# Patient Record
Sex: MALE | Employment: FULL TIME
[De-identification: names, ages, dates, MRNs, and addresses within clinical notes are randomized per-mention and may not be internally consistent; named-entity substitution may affect disease eponyms.]

---

## 2017-01-16 ENCOUNTER — OFFICE VISIT (OUTPATIENT)
Dept: UROLOGY | Facility: CLINIC | Age: 62
End: 2017-01-16

## 2017-01-16 VITALS
HEART RATE: 80 BPM | BODY MASS INDEX: 34.59 KG/M2 | SYSTOLIC BLOOD PRESSURE: 114 MMHG | TEMPERATURE: 97.4 F | DIASTOLIC BLOOD PRESSURE: 74 MMHG | WEIGHT: 293 LBS | HEIGHT: 77 IN

## 2017-01-16 DIAGNOSIS — N40.1 BENIGN NON-NODULAR PROSTATIC HYPERPLASIA WITH LOWER URINARY TRACT SYMPTOMS: ICD-10-CM

## 2017-01-16 DIAGNOSIS — R33.9 INCOMPLETE EMPTYING OF BLADDER: Primary | ICD-10-CM

## 2017-01-16 DIAGNOSIS — N32.0 BLADDER NECK CONTRACTURE: ICD-10-CM

## 2017-01-16 PROCEDURE — 99214 OFFICE O/P EST MOD 30 MIN: CPT | Performed by: UROLOGY

## 2017-01-16 PROCEDURE — 51798 US URINE CAPACITY MEASURE: CPT | Performed by: UROLOGY

## 2017-01-16 ASSESSMENT — ENCOUNTER SYMPTOMS
SHORTNESS OF BREATH: 0
WHEEZING: 0
ABDOMINAL PAIN: 0
EYE REDNESS: 0
VOMITING: 0
BACK PAIN: 0
COUGH: 0
NAUSEA: 0
EYE PAIN: 0
COLOR CHANGE: 0

## 2017-01-20 ENCOUNTER — TELEPHONE (OUTPATIENT)
Dept: UROLOGY | Facility: CLINIC | Age: 62
End: 2017-01-20

## 2017-01-26 PROBLEM — B18.2 HEPATITIS C CARRIER (HCC): Status: ACTIVE | Noted: 2017-01-26

## 2017-01-26 PROBLEM — B18.2 HEPATITIS C CARRIER (HCC): Chronic | Status: ACTIVE | Noted: 2017-01-26

## 2017-01-26 PROBLEM — Z71.3 WEIGHT LOSS COUNSELING, ENCOUNTER FOR: Status: ACTIVE | Noted: 2017-01-26

## 2017-02-13 ENCOUNTER — HOSPITAL ENCOUNTER (OUTPATIENT)
Age: 62
Setting detail: OUTPATIENT SURGERY
Discharge: HOME | End: 2017-02-13
Attending: UROLOGY | Admitting: UROLOGY
Payer: COMMERCIAL

## 2017-02-13 VITALS
WEIGHT: 292 LBS | DIASTOLIC BLOOD PRESSURE: 73 MMHG | TEMPERATURE: 97.3 F | RESPIRATION RATE: 18 BRPM | OXYGEN SATURATION: 97 % | HEIGHT: 76 IN | HEART RATE: 67 BPM | BODY MASS INDEX: 35.56 KG/M2 | SYSTOLIC BLOOD PRESSURE: 132 MMHG

## 2017-02-13 PROCEDURE — 7100000010 HC PHASE II RECOVERY - FIRST 15 MIN: Performed by: UROLOGY

## 2017-02-13 PROCEDURE — 3600000002 HC SURGERY LEVEL 2 BASE: Performed by: UROLOGY

## 2017-02-13 PROCEDURE — A4216 STERILE WATER/SALINE, 10 ML: HCPCS | Performed by: UROLOGY

## 2017-02-13 PROCEDURE — 6370000000 HC RX 637 (ALT 250 FOR IP): Performed by: UROLOGY

## 2017-02-13 PROCEDURE — 6360000002 HC RX W HCPCS

## 2017-02-13 PROCEDURE — 2580000003 HC RX 258: Performed by: UROLOGY

## 2017-02-13 RX ORDER — SODIUM CHLORIDE, SODIUM LACTATE, POTASSIUM CHLORIDE, CALCIUM CHLORIDE 600; 310; 30; 20 MG/100ML; MG/100ML; MG/100ML; MG/100ML
INJECTION, SOLUTION INTRAVENOUS CONTINUOUS
Status: DISCONTINUED | OUTPATIENT
Start: 2017-02-13 | End: 2017-02-13 | Stop reason: HOSPADM

## 2017-02-13 RX ORDER — LIDOCAINE HYDROCHLORIDE 10 MG/ML
1 INJECTION, SOLUTION EPIDURAL; INFILTRATION; INTRACAUDAL; PERINEURAL
Status: DISCONTINUED | OUTPATIENT
Start: 2017-02-13 | End: 2017-02-13 | Stop reason: HOSPADM

## 2017-02-13 RX ORDER — ULTRASOUND COUPLING MEDIUM
GEL (GRAM) TOPICAL PRN
Status: DISCONTINUED | OUTPATIENT
Start: 2017-02-13 | End: 2017-02-13 | Stop reason: HOSPADM

## 2017-02-13 RX ORDER — CIPROFLOXACIN 2 MG/ML
400 INJECTION, SOLUTION INTRAVENOUS
Status: DISCONTINUED | OUTPATIENT
Start: 2017-02-13 | End: 2017-02-13 | Stop reason: HOSPADM

## 2017-02-13 ASSESSMENT — PAIN DESCRIPTION - PAIN TYPE: TYPE: SURGICAL PAIN

## 2017-02-13 ASSESSMENT — PAIN SCALES - GENERAL: PAINLEVEL_OUTOF10: 0

## 2017-02-13 ASSESSMENT — PAIN - FUNCTIONAL ASSESSMENT: PAIN_FUNCTIONAL_ASSESSMENT: 0-10

## 2017-02-14 ENCOUNTER — OFFICE VISIT (OUTPATIENT)
Dept: BARIATRICS/WEIGHT MGMT | Facility: CLINIC | Age: 62
End: 2017-02-14

## 2017-02-14 VITALS
HEART RATE: 74 BPM | HEIGHT: 76 IN | DIASTOLIC BLOOD PRESSURE: 64 MMHG | SYSTOLIC BLOOD PRESSURE: 128 MMHG | BODY MASS INDEX: 35.25 KG/M2 | WEIGHT: 289.5 LBS

## 2017-02-14 DIAGNOSIS — E80.4 GILBERT'S DISEASE: ICD-10-CM

## 2017-02-14 DIAGNOSIS — F32.A DEPRESSION, UNSPECIFIED DEPRESSION TYPE: ICD-10-CM

## 2017-02-14 DIAGNOSIS — E66.9 OBESITY (BMI 30-39.9): Chronic | ICD-10-CM

## 2017-02-14 DIAGNOSIS — G62.9 NEUROPATHY: ICD-10-CM

## 2017-02-14 DIAGNOSIS — K25.9 GASTRIC ULCER, UNSPECIFIED CHRONICITY: ICD-10-CM

## 2017-02-14 DIAGNOSIS — G47.33 OBSTRUCTIVE SLEEP APNEA OF ADULT: Chronic | ICD-10-CM

## 2017-02-14 DIAGNOSIS — I10 HYPERTENSION, GOAL BELOW 140/90: Chronic | ICD-10-CM

## 2017-02-14 DIAGNOSIS — E11.9 CONTROLLED TYPE 2 DIABETES MELLITUS WITHOUT COMPLICATION, WITHOUT LONG-TERM CURRENT USE OF INSULIN (HCC): Primary | Chronic | ICD-10-CM

## 2017-02-14 PROCEDURE — 99204 OFFICE O/P NEW MOD 45 MIN: CPT | Performed by: NURSE PRACTITIONER

## 2017-02-14 RX ORDER — FLUOXETINE HYDROCHLORIDE 20 MG/1
20 CAPSULE ORAL DAILY
COMMUNITY
End: 2017-08-30 | Stop reason: SDUPTHER

## 2017-02-15 ENCOUNTER — HOSPITAL ENCOUNTER (OUTPATIENT)
Age: 62
Discharge: HOME | End: 2017-02-15
Payer: COMMERCIAL

## 2017-02-15 DIAGNOSIS — E66.9 OBESITY (BMI 30-39.9): Chronic | ICD-10-CM

## 2017-02-15 DIAGNOSIS — K25.9 GASTRIC ULCER, UNSPECIFIED CHRONICITY: ICD-10-CM

## 2017-02-15 DIAGNOSIS — G47.33 OBSTRUCTIVE SLEEP APNEA OF ADULT: Chronic | ICD-10-CM

## 2017-02-15 DIAGNOSIS — G62.9 NEUROPATHY: ICD-10-CM

## 2017-02-15 DIAGNOSIS — F32.A DEPRESSION, UNSPECIFIED DEPRESSION TYPE: ICD-10-CM

## 2017-02-15 DIAGNOSIS — E11.9 CONTROLLED TYPE 2 DIABETES MELLITUS WITHOUT COMPLICATION, WITHOUT LONG-TERM CURRENT USE OF INSULIN (HCC): Chronic | ICD-10-CM

## 2017-02-15 DIAGNOSIS — I10 HYPERTENSION, GOAL BELOW 140/90: Chronic | ICD-10-CM

## 2017-02-15 DIAGNOSIS — E80.4 GILBERT'S DISEASE: ICD-10-CM

## 2017-02-15 LAB
ABSOLUTE EOS #: 0.2 K/UL (ref 0–0.4)
ABSOLUTE LYMPH #: 1.5 K/UL (ref 1–4.8)
ABSOLUTE MONO #: 0.5 K/UL (ref 0.1–1.3)
ALBUMIN SERPL-MCNC: 4.4 G/DL (ref 3.5–5.2)
ALBUMIN/GLOBULIN RATIO: ABNORMAL (ref 1–2.5)
ALP BLD-CCNC: 76 U/L (ref 40–129)
ALT SERPL-CCNC: 49 U/L (ref 5–41)
ANION GAP SERPL CALCULATED.3IONS-SCNC: 13 MMOL/L (ref 9–17)
AST SERPL-CCNC: 37 U/L
BASOPHILS # BLD: 1 % (ref 0–2)
BASOPHILS ABSOLUTE: 0 K/UL (ref 0–0.2)
BILIRUB SERPL-MCNC: 1.83 MG/DL (ref 0.3–1.2)
BUN BLDV-MCNC: 17 MG/DL (ref 8–23)
BUN/CREAT BLD: ABNORMAL (ref 9–20)
CALCIUM SERPL-MCNC: 9.5 MG/DL (ref 8.6–10.4)
CHLORIDE BLD-SCNC: 101 MMOL/L (ref 98–107)
CHOLESTEROL/HDL RATIO: 5.4
CHOLESTEROL: 188 MG/DL
CO2: 24 MMOL/L (ref 20–31)
CREAT SERPL-MCNC: 1.16 MG/DL (ref 0.7–1.2)
CREATININE URINE: 118.8 MG/DL (ref 39–259)
DIFFERENTIAL TYPE: ABNORMAL
EKG ATRIAL RATE: 62 BPM
EKG P AXIS: 35 DEGREES
EKG P-R INTERVAL: 156 MS
EKG Q-T INTERVAL: 428 MS
EKG QRS DURATION: 92 MS
EKG QTC CALCULATION (BAZETT): 434 MS
EKG R AXIS: 76 DEGREES
EKG T AXIS: 33 DEGREES
EKG VENTRICULAR RATE: 62 BPM
EOSINOPHILS RELATIVE PERCENT: 3 % (ref 0–4)
GFR AFRICAN AMERICAN: >60 ML/MIN
GFR NON-AFRICAN AMERICAN: >60 ML/MIN
GFR SERPL CREATININE-BSD FRML MDRD: ABNORMAL ML/MIN/{1.73_M2}
GFR SERPL CREATININE-BSD FRML MDRD: ABNORMAL ML/MIN/{1.73_M2}
GLUCOSE BLD-MCNC: 136 MG/DL (ref 70–99)
HCT VFR BLD CALC: 46.7 % (ref 41–53)
HDLC SERPL-MCNC: 35 MG/DL
HEMOGLOBIN: 16.3 G/DL (ref 13.5–17.5)
LDL CHOLESTEROL: 84 MG/DL (ref 0–130)
LYMPHOCYTES # BLD: 28 % (ref 24–44)
MCH RBC QN AUTO: 33.3 PG (ref 26–34)
MCHC RBC AUTO-ENTMCNC: 34.9 G/DL (ref 31–37)
MCV RBC AUTO: 95.6 FL (ref 80–100)
MICROALBUMIN/CREAT 24H UR: <12 MG/L
MICROALBUMIN/CREAT UR-RTO: 10 MCG/MG CREAT
MONOCYTES # BLD: 9 % (ref 1–7)
PDW BLD-RTO: 12.8 % (ref 11.5–14.9)
PLATELET # BLD: 187 K/UL (ref 150–450)
PLATELET ESTIMATE: ABNORMAL
PMV BLD AUTO: 8.1 FL (ref 6–12)
POTASSIUM SERPL-SCNC: 4.5 MMOL/L (ref 3.7–5.3)
RBC # BLD: 4.88 M/UL (ref 4.5–5.9)
RBC # BLD: ABNORMAL 10*6/UL
SEG NEUTROPHILS: 59 % (ref 36–66)
SEGMENTED NEUTROPHILS ABSOLUTE COUNT: 3.3 K/UL (ref 1.3–9.1)
SODIUM BLD-SCNC: 138 MMOL/L (ref 135–144)
TOTAL PROTEIN: 7.4 G/DL (ref 6.4–8.3)
TRIGL SERPL-MCNC: 345 MG/DL
TSH SERPL DL<=0.05 MIU/L-ACNC: 1.5 MIU/L (ref 0.3–5)
URIC ACID: 6.2 MG/DL (ref 3.4–7)
VLDLC SERPL CALC-MCNC: ABNORMAL MG/DL (ref 1–30)
WBC # BLD: 5.5 K/UL (ref 3.5–11)
WBC # BLD: ABNORMAL 10*3/UL

## 2017-02-15 PROCEDURE — 82043 UR ALBUMIN QUANTITATIVE: CPT

## 2017-02-15 PROCEDURE — 82570 ASSAY OF URINE CREATININE: CPT

## 2017-02-15 PROCEDURE — 80053 COMPREHEN METABOLIC PANEL: CPT

## 2017-02-15 PROCEDURE — 36415 COLL VENOUS BLD VENIPUNCTURE: CPT

## 2017-02-15 PROCEDURE — 84443 ASSAY THYROID STIM HORMONE: CPT

## 2017-02-15 PROCEDURE — 84550 ASSAY OF BLOOD/URIC ACID: CPT

## 2017-02-15 PROCEDURE — 80061 LIPID PANEL: CPT

## 2017-02-15 PROCEDURE — 93005 ELECTROCARDIOGRAM TRACING: CPT

## 2017-02-15 PROCEDURE — 85025 COMPLETE CBC W/AUTO DIFF WBC: CPT

## 2017-02-20 ENCOUNTER — TELEPHONE (OUTPATIENT)
Dept: BARIATRICS/WEIGHT MGMT | Facility: CLINIC | Age: 62
End: 2017-02-20

## 2017-03-02 ENCOUNTER — TELEPHONE (OUTPATIENT)
Dept: UROLOGY | Facility: CLINIC | Age: 62
End: 2017-03-02

## 2017-03-06 ENCOUNTER — HOSPITAL ENCOUNTER (OUTPATIENT)
Age: 62
Setting detail: SPECIMEN
Discharge: HOME | End: 2017-03-06
Payer: COMMERCIAL

## 2017-03-06 ENCOUNTER — NURSE ONLY (OUTPATIENT)
Dept: UROLOGY | Facility: CLINIC | Age: 62
End: 2017-03-06

## 2017-03-06 ENCOUNTER — OFFICE VISIT (OUTPATIENT)
Dept: BARIATRICS/WEIGHT MGMT | Facility: CLINIC | Age: 62
End: 2017-03-06

## 2017-03-06 VITALS
WEIGHT: 280.8 LBS | DIASTOLIC BLOOD PRESSURE: 78 MMHG | BODY MASS INDEX: 34.19 KG/M2 | HEIGHT: 76 IN | SYSTOLIC BLOOD PRESSURE: 138 MMHG | RESPIRATION RATE: 18 BRPM | HEART RATE: 74 BPM

## 2017-03-06 DIAGNOSIS — E11.9 CONTROLLED TYPE 2 DIABETES MELLITUS WITHOUT COMPLICATION, WITHOUT LONG-TERM CURRENT USE OF INSULIN (HCC): Chronic | ICD-10-CM

## 2017-03-06 DIAGNOSIS — E66.9 OBESITY (BMI 30-39.9): Chronic | ICD-10-CM

## 2017-03-06 DIAGNOSIS — N32.0 BLADDER NECK CONTRACTURE: Primary | ICD-10-CM

## 2017-03-06 DIAGNOSIS — I10 HYPERTENSION, GOAL BELOW 140/90: Primary | Chronic | ICD-10-CM

## 2017-03-06 DIAGNOSIS — Z01.818 PRE-OP TESTING: ICD-10-CM

## 2017-03-06 DIAGNOSIS — G47.33 OBSTRUCTIVE SLEEP APNEA OF ADULT: Chronic | ICD-10-CM

## 2017-03-06 DIAGNOSIS — G62.9 NEUROPATHY: ICD-10-CM

## 2017-03-06 PROCEDURE — 99213 OFFICE O/P EST LOW 20 MIN: CPT | Performed by: NURSE PRACTITIONER

## 2017-03-07 LAB
CULTURE: NO GROWTH
CULTURE: NORMAL
Lab: NORMAL
SPECIMEN DESCRIPTION: NORMAL
STATUS: NORMAL

## 2017-03-10 ENCOUNTER — ANESTHESIA EVENT (OUTPATIENT)
Dept: OPERATING ROOM | Age: 62
End: 2017-03-10
Payer: COMMERCIAL

## 2017-03-13 ENCOUNTER — OFFICE VISIT (OUTPATIENT)
Dept: BARIATRICS/WEIGHT MGMT | Age: 62
End: 2017-03-13
Payer: COMMERCIAL

## 2017-03-13 ENCOUNTER — HOSPITAL ENCOUNTER (OUTPATIENT)
Age: 62
Setting detail: OUTPATIENT SURGERY
Discharge: HOME | End: 2017-03-13
Attending: UROLOGY | Admitting: UROLOGY
Payer: COMMERCIAL

## 2017-03-13 ENCOUNTER — ANESTHESIA (OUTPATIENT)
Dept: OPERATING ROOM | Age: 62
End: 2017-03-13
Payer: COMMERCIAL

## 2017-03-13 VITALS
DIASTOLIC BLOOD PRESSURE: 78 MMHG | SYSTOLIC BLOOD PRESSURE: 120 MMHG | HEIGHT: 66 IN | WEIGHT: 273.8 LBS | HEART RATE: 60 BPM | BODY MASS INDEX: 44 KG/M2

## 2017-03-13 VITALS
HEART RATE: 63 BPM | HEIGHT: 76 IN | OXYGEN SATURATION: 100 % | RESPIRATION RATE: 20 BRPM | WEIGHT: 273.8 LBS | TEMPERATURE: 97.2 F | BODY MASS INDEX: 33.34 KG/M2 | DIASTOLIC BLOOD PRESSURE: 80 MMHG | SYSTOLIC BLOOD PRESSURE: 130 MMHG

## 2017-03-13 VITALS — TEMPERATURE: 96.2 F | DIASTOLIC BLOOD PRESSURE: 64 MMHG | SYSTOLIC BLOOD PRESSURE: 107 MMHG | OXYGEN SATURATION: 98 %

## 2017-03-13 DIAGNOSIS — G47.33 OBSTRUCTIVE SLEEP APNEA OF ADULT: Chronic | ICD-10-CM

## 2017-03-13 DIAGNOSIS — F32.A DEPRESSION, UNSPECIFIED DEPRESSION TYPE: ICD-10-CM

## 2017-03-13 DIAGNOSIS — E66.01 OBESITY, MORBID, BMI 40.0-49.9 (HCC): ICD-10-CM

## 2017-03-13 DIAGNOSIS — I10 HYPERTENSION, GOAL BELOW 140/90: Chronic | ICD-10-CM

## 2017-03-13 DIAGNOSIS — E11.40 TYPE 2 DIABETES, CONTROLLED, WITH NEUROPATHY (HCC): Primary | ICD-10-CM

## 2017-03-13 LAB
GLUCOSE BLD-MCNC: 93 MG/DL (ref 74–106)
POC BUN: 29 MG/DL (ref 6–20)
POC CREATININE: 1.2 MG/DL (ref 0.6–1.4)

## 2017-03-13 PROCEDURE — 84520 ASSAY OF UREA NITROGEN: CPT

## 2017-03-13 PROCEDURE — 6370000000 HC RX 637 (ALT 250 FOR IP): Performed by: UROLOGY

## 2017-03-13 PROCEDURE — 7100000011 HC PHASE II RECOVERY - ADDTL 15 MIN: Performed by: UROLOGY

## 2017-03-13 PROCEDURE — 3600000014 HC SURGERY LEVEL 4 ADDTL 15MIN: Performed by: UROLOGY

## 2017-03-13 PROCEDURE — 3700000001 HC ADD 15 MINUTES (ANESTHESIA): Performed by: UROLOGY

## 2017-03-13 PROCEDURE — 3700000000 HC ANESTHESIA ATTENDED CARE: Performed by: UROLOGY

## 2017-03-13 PROCEDURE — 82565 ASSAY OF CREATININE: CPT

## 2017-03-13 PROCEDURE — 3600000004 HC SURGERY LEVEL 4 BASE: Performed by: UROLOGY

## 2017-03-13 PROCEDURE — C1769 GUIDE WIRE: HCPCS | Performed by: UROLOGY

## 2017-03-13 PROCEDURE — 7100000010 HC PHASE II RECOVERY - FIRST 15 MIN: Performed by: UROLOGY

## 2017-03-13 PROCEDURE — 7100000001 HC PACU RECOVERY - ADDTL 15 MIN: Performed by: UROLOGY

## 2017-03-13 PROCEDURE — 2500000003 HC RX 250 WO HCPCS: Performed by: UROLOGY

## 2017-03-13 PROCEDURE — 87086 URINE CULTURE/COLONY COUNT: CPT

## 2017-03-13 PROCEDURE — 2580000003 HC RX 258: Performed by: ANESTHESIOLOGY

## 2017-03-13 PROCEDURE — 82947 ASSAY GLUCOSE BLOOD QUANT: CPT

## 2017-03-13 PROCEDURE — 6360000002 HC RX W HCPCS: Performed by: NURSE ANESTHETIST, CERTIFIED REGISTERED

## 2017-03-13 PROCEDURE — 2500000003 HC RX 250 WO HCPCS: Performed by: NURSE ANESTHETIST, CERTIFIED REGISTERED

## 2017-03-13 PROCEDURE — 7100000000 HC PACU RECOVERY - FIRST 15 MIN: Performed by: UROLOGY

## 2017-03-13 PROCEDURE — 6360000002 HC RX W HCPCS: Performed by: UROLOGY

## 2017-03-13 PROCEDURE — 99213 OFFICE O/P EST LOW 20 MIN: CPT | Performed by: NURSE PRACTITIONER

## 2017-03-13 RX ORDER — FENTANYL CITRATE 50 UG/ML
INJECTION, SOLUTION INTRAMUSCULAR; INTRAVENOUS PRN
Status: DISCONTINUED | OUTPATIENT
Start: 2017-03-13 | End: 2017-03-13 | Stop reason: SDUPTHER

## 2017-03-13 RX ORDER — FENTANYL CITRATE 50 UG/ML
25 INJECTION, SOLUTION INTRAMUSCULAR; INTRAVENOUS EVERY 5 MIN PRN
Status: DISCONTINUED | OUTPATIENT
Start: 2017-03-13 | End: 2017-03-13 | Stop reason: HOSPADM

## 2017-03-13 RX ORDER — WATER 1000 ML/1000ML
INJECTION, SOLUTION INTRAVENOUS PRN
Status: DISCONTINUED | OUTPATIENT
Start: 2017-03-13 | End: 2017-03-13 | Stop reason: HOSPADM

## 2017-03-13 RX ORDER — MIDAZOLAM HYDROCHLORIDE 1 MG/ML
INJECTION INTRAMUSCULAR; INTRAVENOUS PRN
Status: DISCONTINUED | OUTPATIENT
Start: 2017-03-13 | End: 2017-03-13 | Stop reason: SDUPTHER

## 2017-03-13 RX ORDER — PROPOFOL 10 MG/ML
INJECTION, EMULSION INTRAVENOUS PRN
Status: DISCONTINUED | OUTPATIENT
Start: 2017-03-13 | End: 2017-03-13 | Stop reason: SDUPTHER

## 2017-03-13 RX ORDER — ONDANSETRON 2 MG/ML
4 INJECTION INTRAMUSCULAR; INTRAVENOUS
Status: DISCONTINUED | OUTPATIENT
Start: 2017-03-13 | End: 2017-03-13 | Stop reason: HOSPADM

## 2017-03-13 RX ORDER — SODIUM CHLORIDE, SODIUM LACTATE, POTASSIUM CHLORIDE, CALCIUM CHLORIDE 600; 310; 30; 20 MG/100ML; MG/100ML; MG/100ML; MG/100ML
INJECTION, SOLUTION INTRAVENOUS CONTINUOUS
Status: DISCONTINUED | OUTPATIENT
Start: 2017-03-13 | End: 2017-03-13 | Stop reason: HOSPADM

## 2017-03-13 RX ORDER — ONDANSETRON 2 MG/ML
INJECTION INTRAMUSCULAR; INTRAVENOUS PRN
Status: DISCONTINUED | OUTPATIENT
Start: 2017-03-13 | End: 2017-03-13 | Stop reason: SDUPTHER

## 2017-03-13 RX ORDER — OXYCODONE HYDROCHLORIDE AND ACETAMINOPHEN 5; 325 MG/1; MG/1
1 TABLET ORAL
Status: COMPLETED | OUTPATIENT
Start: 2017-03-13 | End: 2017-03-13

## 2017-03-13 RX ORDER — LIDOCAINE HYDROCHLORIDE 10 MG/ML
INJECTION, SOLUTION EPIDURAL; INFILTRATION; INTRACAUDAL; PERINEURAL PRN
Status: DISCONTINUED | OUTPATIENT
Start: 2017-03-13 | End: 2017-03-13 | Stop reason: SDUPTHER

## 2017-03-13 RX ORDER — DIPHENHYDRAMINE HYDROCHLORIDE 50 MG/ML
12.5 INJECTION INTRAMUSCULAR; INTRAVENOUS
Status: DISCONTINUED | OUTPATIENT
Start: 2017-03-13 | End: 2017-03-13 | Stop reason: HOSPADM

## 2017-03-13 RX ORDER — MIDAZOLAM HYDROCHLORIDE 1 MG/ML
1 INJECTION INTRAMUSCULAR; INTRAVENOUS EVERY 5 MIN PRN
Status: DISCONTINUED | OUTPATIENT
Start: 2017-03-13 | End: 2017-03-13 | Stop reason: HOSPADM

## 2017-03-13 RX ORDER — FENTANYL CITRATE 50 UG/ML
50 INJECTION, SOLUTION INTRAMUSCULAR; INTRAVENOUS EVERY 5 MIN PRN
Status: DISCONTINUED | OUTPATIENT
Start: 2017-03-13 | End: 2017-03-13 | Stop reason: HOSPADM

## 2017-03-13 RX ADMIN — PROPOFOL 100 MG: 10 INJECTION, EMULSION INTRAVENOUS at 15:12

## 2017-03-13 RX ADMIN — SODIUM CHLORIDE, POTASSIUM CHLORIDE, SODIUM LACTATE AND CALCIUM CHLORIDE: 600; 310; 30; 20 INJECTION, SOLUTION INTRAVENOUS at 15:06

## 2017-03-13 RX ADMIN — PROPOFOL 250 MG: 10 INJECTION, EMULSION INTRAVENOUS at 15:08

## 2017-03-13 RX ADMIN — LIDOCAINE HYDROCHLORIDE 50 MG: 10 INJECTION, SOLUTION EPIDURAL; INFILTRATION; INTRACAUDAL; PERINEURAL at 15:10

## 2017-03-13 RX ADMIN — ONDANSETRON 4 MG: 2 INJECTION, SOLUTION INTRAMUSCULAR; INTRAVENOUS at 15:25

## 2017-03-13 RX ADMIN — SODIUM CHLORIDE, POTASSIUM CHLORIDE, SODIUM LACTATE AND CALCIUM CHLORIDE: 600; 310; 30; 20 INJECTION, SOLUTION INTRAVENOUS at 14:19

## 2017-03-13 RX ADMIN — CEFAZOLIN SODIUM 2 G: 1 INJECTION, SOLUTION INTRAVENOUS at 15:18

## 2017-03-13 RX ADMIN — MIDAZOLAM HYDROCHLORIDE 2 MG: 1 INJECTION, SOLUTION INTRAMUSCULAR; INTRAVENOUS at 15:16

## 2017-03-13 RX ADMIN — OXYCODONE HYDROCHLORIDE AND ACETAMINOPHEN 1 TABLET: 5; 325 TABLET ORAL at 16:49

## 2017-03-13 RX ADMIN — FENTANYL CITRATE 100 MCG: 50 INJECTION INTRAMUSCULAR; INTRAVENOUS at 15:10

## 2017-03-13 ASSESSMENT — PAIN - FUNCTIONAL ASSESSMENT: PAIN_FUNCTIONAL_ASSESSMENT: 0-10

## 2017-03-13 ASSESSMENT — PAIN SCALES - GENERAL
PAINLEVEL_OUTOF10: 3
PAINLEVEL_OUTOF10: 4
PAINLEVEL_OUTOF10: 4
PAINLEVEL_OUTOF10: 0
PAINLEVEL_OUTOF10: 0
PAINLEVEL_OUTOF10: 3

## 2017-03-13 ASSESSMENT — ENCOUNTER SYMPTOMS
STRIDOR: 0
SHORTNESS OF BREATH: 0

## 2017-03-14 ENCOUNTER — TELEPHONE (OUTPATIENT)
Dept: UROLOGY | Age: 62
End: 2017-03-14

## 2017-03-14 ENCOUNTER — PROCEDURE VISIT (OUTPATIENT)
Dept: UROLOGY | Age: 62
End: 2017-03-14

## 2017-03-14 VITALS — HEART RATE: 71 BPM | SYSTOLIC BLOOD PRESSURE: 134 MMHG | DIASTOLIC BLOOD PRESSURE: 80 MMHG | TEMPERATURE: 98.5 F

## 2017-03-14 DIAGNOSIS — N32.0 BLADDER NECK CONTRACTURE: Primary | ICD-10-CM

## 2017-03-14 LAB
CULTURE: NO GROWTH
CULTURE: NORMAL
GLUCOSE BLD-MCNC: 107 MG/DL (ref 75–110)
Lab: NORMAL
SPECIMEN DESCRIPTION: NORMAL
STATUS: NORMAL

## 2017-03-14 PROCEDURE — 99024 POSTOP FOLLOW-UP VISIT: CPT | Performed by: NURSE PRACTITIONER

## 2017-03-15 ENCOUNTER — HOSPITAL ENCOUNTER (OUTPATIENT)
Age: 62
Discharge: HOME | End: 2017-03-15
Payer: COMMERCIAL

## 2017-03-15 DIAGNOSIS — G62.9 NEUROPATHY: ICD-10-CM

## 2017-03-15 DIAGNOSIS — I10 HYPERTENSION, GOAL BELOW 140/90: Chronic | ICD-10-CM

## 2017-03-15 DIAGNOSIS — G47.33 OBSTRUCTIVE SLEEP APNEA OF ADULT: Chronic | ICD-10-CM

## 2017-03-15 DIAGNOSIS — E66.9 OBESITY (BMI 30-39.9): Chronic | ICD-10-CM

## 2017-03-15 DIAGNOSIS — E11.9 CONTROLLED TYPE 2 DIABETES MELLITUS WITHOUT COMPLICATION, WITHOUT LONG-TERM CURRENT USE OF INSULIN (HCC): Chronic | ICD-10-CM

## 2017-03-15 LAB
ALBUMIN SERPL-MCNC: 4.3 G/DL (ref 3.5–5.2)
ALBUMIN/GLOBULIN RATIO: ABNORMAL (ref 1–2.5)
ALP BLD-CCNC: 73 U/L (ref 40–129)
ALT SERPL-CCNC: 45 U/L (ref 5–41)
ANION GAP SERPL CALCULATED.3IONS-SCNC: 12 MMOL/L (ref 9–17)
AST SERPL-CCNC: 32 U/L
BILIRUB SERPL-MCNC: 1.43 MG/DL (ref 0.3–1.2)
BUN BLDV-MCNC: 19 MG/DL (ref 8–23)
BUN/CREAT BLD: ABNORMAL (ref 9–20)
CALCIUM SERPL-MCNC: 9.2 MG/DL (ref 8.6–10.4)
CHLORIDE BLD-SCNC: 103 MMOL/L (ref 98–107)
CO2: 26 MMOL/L (ref 20–31)
CREAT SERPL-MCNC: 1.05 MG/DL (ref 0.7–1.2)
GFR AFRICAN AMERICAN: >60 ML/MIN
GFR NON-AFRICAN AMERICAN: >60 ML/MIN
GFR SERPL CREATININE-BSD FRML MDRD: ABNORMAL ML/MIN/{1.73_M2}
GFR SERPL CREATININE-BSD FRML MDRD: ABNORMAL ML/MIN/{1.73_M2}
GLUCOSE BLD-MCNC: 101 MG/DL (ref 70–99)
POTASSIUM SERPL-SCNC: 4.5 MMOL/L (ref 3.7–5.3)
SODIUM BLD-SCNC: 141 MMOL/L (ref 135–144)
TOTAL PROTEIN: 6.9 G/DL (ref 6.4–8.3)
URIC ACID: 5.4 MG/DL (ref 3.4–7)

## 2017-03-15 PROCEDURE — 36415 COLL VENOUS BLD VENIPUNCTURE: CPT

## 2017-03-15 PROCEDURE — 80053 COMPREHEN METABOLIC PANEL: CPT

## 2017-03-15 PROCEDURE — 84550 ASSAY OF BLOOD/URIC ACID: CPT

## 2017-03-27 ENCOUNTER — OFFICE VISIT (OUTPATIENT)
Dept: BARIATRICS/WEIGHT MGMT | Age: 62
End: 2017-03-27
Payer: COMMERCIAL

## 2017-03-27 VITALS
WEIGHT: 269.4 LBS | SYSTOLIC BLOOD PRESSURE: 130 MMHG | DIASTOLIC BLOOD PRESSURE: 70 MMHG | RESPIRATION RATE: 20 BRPM | BODY MASS INDEX: 32.79 KG/M2 | HEART RATE: 72 BPM

## 2017-03-27 DIAGNOSIS — E66.9 OBESITY (BMI 30-39.9): Chronic | ICD-10-CM

## 2017-03-27 DIAGNOSIS — G47.33 OBSTRUCTIVE SLEEP APNEA OF ADULT: Chronic | ICD-10-CM

## 2017-03-27 DIAGNOSIS — I10 HYPERTENSION, GOAL BELOW 140/90: Chronic | ICD-10-CM

## 2017-03-27 DIAGNOSIS — E11.40 TYPE 2 DIABETES, CONTROLLED, WITH NEUROPATHY (HCC): Primary | ICD-10-CM

## 2017-03-27 DIAGNOSIS — M15.9 PRIMARY OSTEOARTHRITIS INVOLVING MULTIPLE JOINTS: ICD-10-CM

## 2017-03-27 PROBLEM — E66.01 OBESITY, MORBID, BMI 40.0-49.9 (HCC): Status: RESOLVED | Noted: 2017-03-13 | Resolved: 2017-03-27

## 2017-03-27 PROCEDURE — 99213 OFFICE O/P EST LOW 20 MIN: CPT | Performed by: NURSE PRACTITIONER

## 2017-04-10 ENCOUNTER — OFFICE VISIT (OUTPATIENT)
Dept: BARIATRICS/WEIGHT MGMT | Age: 62
End: 2017-04-10
Payer: COMMERCIAL

## 2017-04-10 VITALS
RESPIRATION RATE: 16 BRPM | WEIGHT: 261.1 LBS | BODY MASS INDEX: 31.79 KG/M2 | HEART RATE: 60 BPM | DIASTOLIC BLOOD PRESSURE: 74 MMHG | SYSTOLIC BLOOD PRESSURE: 124 MMHG | HEIGHT: 76 IN

## 2017-04-10 DIAGNOSIS — E11.40 TYPE 2 DIABETES, CONTROLLED, WITH NEUROPATHY (HCC): Primary | ICD-10-CM

## 2017-04-10 DIAGNOSIS — G47.33 OBSTRUCTIVE SLEEP APNEA OF ADULT: Chronic | ICD-10-CM

## 2017-04-10 DIAGNOSIS — M15.9 PRIMARY OSTEOARTHRITIS INVOLVING MULTIPLE JOINTS: ICD-10-CM

## 2017-04-10 DIAGNOSIS — I10 HYPERTENSION, GOAL BELOW 140/90: Chronic | ICD-10-CM

## 2017-04-10 PROCEDURE — 99213 OFFICE O/P EST LOW 20 MIN: CPT | Performed by: NURSE PRACTITIONER

## 2017-04-17 ENCOUNTER — OFFICE VISIT (OUTPATIENT)
Dept: UROLOGY | Age: 62
End: 2017-04-17
Payer: COMMERCIAL

## 2017-04-17 VITALS
HEART RATE: 62 BPM | WEIGHT: 262 LBS | HEIGHT: 76 IN | TEMPERATURE: 97.8 F | BODY MASS INDEX: 31.9 KG/M2 | SYSTOLIC BLOOD PRESSURE: 115 MMHG | DIASTOLIC BLOOD PRESSURE: 65 MMHG

## 2017-04-17 DIAGNOSIS — N39.43 POST-VOID DRIBBLING: ICD-10-CM

## 2017-04-17 DIAGNOSIS — R33.9 INCOMPLETE EMPTYING OF BLADDER: Primary | ICD-10-CM

## 2017-04-17 DIAGNOSIS — R39.198 SLOW URINARY STREAM: ICD-10-CM

## 2017-04-17 DIAGNOSIS — N32.0 BLADDER NECK CONTRACTURE: ICD-10-CM

## 2017-04-17 PROCEDURE — 99024 POSTOP FOLLOW-UP VISIT: CPT | Performed by: UROLOGY

## 2017-04-17 PROCEDURE — 51798 US URINE CAPACITY MEASURE: CPT | Performed by: UROLOGY

## 2017-05-08 ENCOUNTER — OFFICE VISIT (OUTPATIENT)
Dept: BARIATRICS/WEIGHT MGMT | Age: 62
End: 2017-05-08
Payer: COMMERCIAL

## 2017-05-08 VITALS
WEIGHT: 253.6 LBS | RESPIRATION RATE: 20 BRPM | HEART RATE: 70 BPM | BODY MASS INDEX: 30.88 KG/M2 | DIASTOLIC BLOOD PRESSURE: 70 MMHG | SYSTOLIC BLOOD PRESSURE: 118 MMHG | HEIGHT: 76 IN

## 2017-05-08 DIAGNOSIS — G47.33 OBSTRUCTIVE SLEEP APNEA OF ADULT: Chronic | ICD-10-CM

## 2017-05-08 DIAGNOSIS — I10 HYPERTENSION, GOAL BELOW 140/90: Chronic | ICD-10-CM

## 2017-05-08 DIAGNOSIS — M15.9 PRIMARY OSTEOARTHRITIS INVOLVING MULTIPLE JOINTS: ICD-10-CM

## 2017-05-08 DIAGNOSIS — E11.40 TYPE 2 DIABETES, CONTROLLED, WITH NEUROPATHY (HCC): Primary | ICD-10-CM

## 2017-05-08 DIAGNOSIS — E66.9 OBESITY (BMI 30-39.9): Chronic | ICD-10-CM

## 2017-05-08 PROCEDURE — 99213 OFFICE O/P EST LOW 20 MIN: CPT | Performed by: NURSE PRACTITIONER

## 2017-05-15 ENCOUNTER — HOSPITAL ENCOUNTER (OUTPATIENT)
Age: 62
Discharge: HOME | End: 2017-05-15
Payer: COMMERCIAL

## 2017-05-15 DIAGNOSIS — M15.9 PRIMARY OSTEOARTHRITIS INVOLVING MULTIPLE JOINTS: ICD-10-CM

## 2017-05-15 DIAGNOSIS — E66.9 OBESITY (BMI 30-39.9): Chronic | ICD-10-CM

## 2017-05-15 DIAGNOSIS — E11.40 TYPE 2 DIABETES, CONTROLLED, WITH NEUROPATHY (HCC): ICD-10-CM

## 2017-05-15 DIAGNOSIS — G47.33 OBSTRUCTIVE SLEEP APNEA OF ADULT: Chronic | ICD-10-CM

## 2017-05-15 DIAGNOSIS — I10 HYPERTENSION, GOAL BELOW 140/90: Chronic | ICD-10-CM

## 2017-05-15 DIAGNOSIS — E55.9 VITAMIN D DEFICIENCY: ICD-10-CM

## 2017-05-15 LAB
ABSOLUTE EOS #: 0.2 K/UL (ref 0–0.4)
ABSOLUTE LYMPH #: 1.5 K/UL (ref 1–4.8)
ABSOLUTE MONO #: 0.5 K/UL (ref 0.1–1.3)
ALBUMIN SERPL-MCNC: 4.2 G/DL (ref 3.5–5.2)
ALBUMIN/GLOBULIN RATIO: ABNORMAL (ref 1–2.5)
ALP BLD-CCNC: 88 U/L (ref 40–129)
ALT SERPL-CCNC: 33 U/L (ref 5–41)
ANION GAP SERPL CALCULATED.3IONS-SCNC: 14 MMOL/L (ref 9–17)
AST SERPL-CCNC: 28 U/L
BASOPHILS # BLD: 1 %
BASOPHILS ABSOLUTE: 0 K/UL (ref 0–0.2)
BILIRUB SERPL-MCNC: 1.35 MG/DL (ref 0.3–1.2)
BUN BLDV-MCNC: 26 MG/DL (ref 8–23)
BUN/CREAT BLD: ABNORMAL (ref 9–20)
CALCIUM SERPL-MCNC: 9.7 MG/DL (ref 8.6–10.4)
CHLORIDE BLD-SCNC: 100 MMOL/L (ref 98–107)
CHOLESTEROL/HDL RATIO: 4.4
CHOLESTEROL: 153 MG/DL
CO2: 27 MMOL/L (ref 20–31)
CREAT SERPL-MCNC: 1.1 MG/DL (ref 0.7–1.2)
DIFFERENTIAL TYPE: NORMAL
EOSINOPHILS RELATIVE PERCENT: 3 %
ESTIMATED AVERAGE GLUCOSE: 103 MG/DL
GFR AFRICAN AMERICAN: >60 ML/MIN
GFR NON-AFRICAN AMERICAN: >60 ML/MIN
GFR SERPL CREATININE-BSD FRML MDRD: ABNORMAL ML/MIN/{1.73_M2}
GFR SERPL CREATININE-BSD FRML MDRD: ABNORMAL ML/MIN/{1.73_M2}
GLUCOSE BLD-MCNC: 122 MG/DL (ref 70–99)
HBA1C MFR BLD: 5.2 % (ref 4–6)
HCT VFR BLD CALC: 45.5 % (ref 41–53)
HDLC SERPL-MCNC: 35 MG/DL
HEMOGLOBIN: 15.7 G/DL (ref 13.5–17.5)
LDL CHOLESTEROL: 74 MG/DL (ref 0–130)
LYMPHOCYTES # BLD: 26 %
MCH RBC QN AUTO: 33.3 PG (ref 26–34)
MCHC RBC AUTO-ENTMCNC: 34.5 G/DL (ref 31–37)
MCV RBC AUTO: 96.7 FL (ref 80–100)
MONOCYTES # BLD: 9 %
PDW BLD-RTO: 12.5 % (ref 11.5–14.9)
PLATELET # BLD: 188 K/UL (ref 150–450)
PLATELET ESTIMATE: NORMAL
PMV BLD AUTO: 8.3 FL (ref 6–12)
POTASSIUM SERPL-SCNC: 4.6 MMOL/L (ref 3.7–5.3)
RBC # BLD: 4.71 M/UL (ref 4.5–5.9)
RBC # BLD: NORMAL 10*6/UL
SEG NEUTROPHILS: 61 %
SEGMENTED NEUTROPHILS ABSOLUTE COUNT: 3.7 K/UL (ref 1.3–9.1)
SODIUM BLD-SCNC: 141 MMOL/L (ref 135–144)
TOTAL PROTEIN: 7.4 G/DL (ref 6.4–8.3)
TRIGL SERPL-MCNC: 221 MG/DL
URIC ACID: 5.5 MG/DL (ref 3.4–7)
VITAMIN D 25-HYDROXY: 19.1 NG/ML (ref 30–100)
VLDLC SERPL CALC-MCNC: ABNORMAL MG/DL (ref 1–30)
WBC # BLD: 6 K/UL (ref 3.5–11)
WBC # BLD: NORMAL 10*3/UL

## 2017-05-15 PROCEDURE — 84550 ASSAY OF BLOOD/URIC ACID: CPT

## 2017-05-15 PROCEDURE — 36415 COLL VENOUS BLD VENIPUNCTURE: CPT

## 2017-05-15 PROCEDURE — 85025 COMPLETE CBC W/AUTO DIFF WBC: CPT

## 2017-05-15 PROCEDURE — 80061 LIPID PANEL: CPT

## 2017-05-15 PROCEDURE — 80053 COMPREHEN METABOLIC PANEL: CPT

## 2017-05-15 PROCEDURE — 83036 HEMOGLOBIN GLYCOSYLATED A1C: CPT

## 2017-05-15 PROCEDURE — 82306 VITAMIN D 25 HYDROXY: CPT

## 2017-05-15 RX ORDER — ERGOCALCIFEROL 1.25 MG/1
50000 CAPSULE ORAL WEEKLY
Qty: 8 CAPSULE | Refills: 0 | Status: SHIPPED | OUTPATIENT
Start: 2017-05-15 | End: 2017-07-10 | Stop reason: SDUPTHER

## 2017-05-18 ENCOUNTER — TELEPHONE (OUTPATIENT)
Dept: GASTROENTEROLOGY | Age: 62
End: 2017-05-18

## 2017-05-22 ENCOUNTER — OFFICE VISIT (OUTPATIENT)
Dept: BARIATRICS/WEIGHT MGMT | Age: 62
End: 2017-05-22
Payer: COMMERCIAL

## 2017-05-22 VITALS
WEIGHT: 255.1 LBS | BODY MASS INDEX: 31.06 KG/M2 | DIASTOLIC BLOOD PRESSURE: 74 MMHG | HEART RATE: 78 BPM | RESPIRATION RATE: 20 BRPM | HEIGHT: 76 IN | SYSTOLIC BLOOD PRESSURE: 126 MMHG

## 2017-05-22 DIAGNOSIS — F32.A DEPRESSION, UNSPECIFIED DEPRESSION TYPE: ICD-10-CM

## 2017-05-22 DIAGNOSIS — I10 HYPERTENSION, GOAL BELOW 140/90: Chronic | ICD-10-CM

## 2017-05-22 DIAGNOSIS — E55.9 VITAMIN D DEFICIENCY: ICD-10-CM

## 2017-05-22 DIAGNOSIS — G47.33 OBSTRUCTIVE SLEEP APNEA OF ADULT: Chronic | ICD-10-CM

## 2017-05-22 DIAGNOSIS — E11.40 TYPE 2 DIABETES, CONTROLLED, WITH NEUROPATHY (HCC): Primary | ICD-10-CM

## 2017-05-22 DIAGNOSIS — M15.9 PRIMARY OSTEOARTHRITIS INVOLVING MULTIPLE JOINTS: ICD-10-CM

## 2017-05-22 DIAGNOSIS — E66.9 OBESITY (BMI 30-39.9): Chronic | ICD-10-CM

## 2017-05-22 PROCEDURE — 99213 OFFICE O/P EST LOW 20 MIN: CPT | Performed by: NURSE PRACTITIONER

## 2017-06-26 ENCOUNTER — OFFICE VISIT (OUTPATIENT)
Dept: BARIATRICS/WEIGHT MGMT | Age: 62
End: 2017-06-26
Payer: COMMERCIAL

## 2017-06-26 VITALS
HEART RATE: 68 BPM | WEIGHT: 246.7 LBS | BODY MASS INDEX: 30.67 KG/M2 | DIASTOLIC BLOOD PRESSURE: 64 MMHG | RESPIRATION RATE: 20 BRPM | SYSTOLIC BLOOD PRESSURE: 126 MMHG | HEIGHT: 75 IN

## 2017-06-26 DIAGNOSIS — E11.40 TYPE 2 DIABETES, CONTROLLED, WITH NEUROPATHY (HCC): Primary | ICD-10-CM

## 2017-06-26 DIAGNOSIS — N40.0 PROSTATE ENLARGEMENT: ICD-10-CM

## 2017-06-26 DIAGNOSIS — E80.4 GILBERT'S DISEASE: ICD-10-CM

## 2017-06-26 DIAGNOSIS — M15.9 PRIMARY OSTEOARTHRITIS INVOLVING MULTIPLE JOINTS: ICD-10-CM

## 2017-06-26 DIAGNOSIS — F41.9 ANXIETY: ICD-10-CM

## 2017-06-26 DIAGNOSIS — G47.33 OBSTRUCTIVE SLEEP APNEA OF ADULT: Chronic | ICD-10-CM

## 2017-06-26 DIAGNOSIS — I10 HYPERTENSION, GOAL BELOW 140/90: Chronic | ICD-10-CM

## 2017-06-26 DIAGNOSIS — E55.9 VITAMIN D DEFICIENCY: ICD-10-CM

## 2017-06-26 DIAGNOSIS — F32.A DEPRESSION, UNSPECIFIED DEPRESSION TYPE: ICD-10-CM

## 2017-06-26 DIAGNOSIS — E66.9 OBESITY (BMI 30-39.9): Chronic | ICD-10-CM

## 2017-06-26 PROCEDURE — 99213 OFFICE O/P EST LOW 20 MIN: CPT | Performed by: NURSE PRACTITIONER

## 2017-06-26 RX ORDER — TAMSULOSIN HYDROCHLORIDE 0.4 MG/1
0.4 CAPSULE ORAL 2 TIMES DAILY
Qty: 180 CAPSULE | Refills: 3 | Status: SHIPPED | OUTPATIENT
Start: 2017-06-26 | End: 2018-02-08 | Stop reason: ALTCHOICE

## 2017-07-10 ENCOUNTER — HOSPITAL ENCOUNTER (OUTPATIENT)
Age: 62
Discharge: HOME | End: 2017-07-10
Payer: COMMERCIAL

## 2017-07-10 DIAGNOSIS — E55.9 VITAMIN D DEFICIENCY: ICD-10-CM

## 2017-07-10 LAB — VITAMIN D 25-HYDROXY: 25.4 NG/ML (ref 30–100)

## 2017-07-10 PROCEDURE — 36415 COLL VENOUS BLD VENIPUNCTURE: CPT

## 2017-07-10 PROCEDURE — 82306 VITAMIN D 25 HYDROXY: CPT

## 2017-07-10 RX ORDER — ERGOCALCIFEROL 1.25 MG/1
50000 CAPSULE ORAL WEEKLY
Qty: 8 CAPSULE | Refills: 0 | Status: SHIPPED | OUTPATIENT
Start: 2017-07-10 | End: 2017-11-06

## 2017-07-24 ENCOUNTER — OFFICE VISIT (OUTPATIENT)
Dept: BARIATRICS/WEIGHT MGMT | Age: 62
End: 2017-07-24
Payer: COMMERCIAL

## 2017-07-24 VITALS
DIASTOLIC BLOOD PRESSURE: 70 MMHG | RESPIRATION RATE: 20 BRPM | HEIGHT: 75 IN | HEART RATE: 70 BPM | WEIGHT: 246.2 LBS | BODY MASS INDEX: 30.61 KG/M2 | SYSTOLIC BLOOD PRESSURE: 118 MMHG

## 2017-07-24 DIAGNOSIS — M15.9 PRIMARY OSTEOARTHRITIS INVOLVING MULTIPLE JOINTS: ICD-10-CM

## 2017-07-24 DIAGNOSIS — I10 HYPERTENSION, GOAL BELOW 140/90: Chronic | ICD-10-CM

## 2017-07-24 DIAGNOSIS — E66.9 OBESITY (BMI 30-39.9): Chronic | ICD-10-CM

## 2017-07-24 DIAGNOSIS — G47.33 OBSTRUCTIVE SLEEP APNEA OF ADULT: Chronic | ICD-10-CM

## 2017-07-24 DIAGNOSIS — E11.40 TYPE 2 DIABETES, CONTROLLED, WITH NEUROPATHY (HCC): Primary | Chronic | ICD-10-CM

## 2017-07-24 PROBLEM — Z00.00 ENCOUNTER FOR WELL ADULT EXAM WITHOUT ABNORMAL FINDINGS: Status: ACTIVE | Noted: 2017-07-24

## 2017-07-24 PROBLEM — E55.9 VITAMIN D DEFICIENCY: Chronic | Status: ACTIVE | Noted: 2017-05-15

## 2017-07-24 PROBLEM — Z87.898 H/O URINARY RETENTION: Status: ACTIVE | Noted: 2017-07-24

## 2017-07-24 PROBLEM — Z87.898 H/O URINARY RETENTION: Chronic | Status: ACTIVE | Noted: 2017-07-24

## 2017-07-24 PROCEDURE — 99213 OFFICE O/P EST LOW 20 MIN: CPT | Performed by: NURSE PRACTITIONER

## 2017-08-28 ENCOUNTER — OFFICE VISIT (OUTPATIENT)
Dept: BARIATRICS/WEIGHT MGMT | Age: 62
End: 2017-08-28
Payer: COMMERCIAL

## 2017-08-28 VITALS
SYSTOLIC BLOOD PRESSURE: 102 MMHG | BODY MASS INDEX: 28.95 KG/M2 | RESPIRATION RATE: 18 BRPM | WEIGHT: 232.8 LBS | HEIGHT: 75 IN | HEART RATE: 66 BPM | DIASTOLIC BLOOD PRESSURE: 68 MMHG

## 2017-08-28 DIAGNOSIS — E80.4 GILBERT'S DISEASE: ICD-10-CM

## 2017-08-28 DIAGNOSIS — I10 HYPERTENSION, GOAL BELOW 140/90: Chronic | ICD-10-CM

## 2017-08-28 DIAGNOSIS — F32.A DEPRESSION, UNSPECIFIED DEPRESSION TYPE: ICD-10-CM

## 2017-08-28 DIAGNOSIS — G62.9 NEUROPATHY: ICD-10-CM

## 2017-08-28 DIAGNOSIS — G47.33 OBSTRUCTIVE SLEEP APNEA OF ADULT: Chronic | ICD-10-CM

## 2017-08-28 DIAGNOSIS — E66.3 OVERWEIGHT (BMI 25.0-29.9): ICD-10-CM

## 2017-08-28 DIAGNOSIS — E11.40 TYPE 2 DIABETES, CONTROLLED, WITH NEUROPATHY (HCC): Primary | Chronic | ICD-10-CM

## 2017-08-28 PROCEDURE — 99213 OFFICE O/P EST LOW 20 MIN: CPT | Performed by: NURSE PRACTITIONER

## 2017-09-25 ENCOUNTER — OFFICE VISIT (OUTPATIENT)
Dept: BARIATRICS/WEIGHT MGMT | Age: 62
End: 2017-09-25
Payer: COMMERCIAL

## 2017-09-25 VITALS
HEIGHT: 75 IN | BODY MASS INDEX: 28.67 KG/M2 | DIASTOLIC BLOOD PRESSURE: 68 MMHG | SYSTOLIC BLOOD PRESSURE: 122 MMHG | WEIGHT: 230.6 LBS | HEART RATE: 70 BPM | RESPIRATION RATE: 20 BRPM

## 2017-09-25 DIAGNOSIS — G47.33 OBSTRUCTIVE SLEEP APNEA OF ADULT: Chronic | ICD-10-CM

## 2017-09-25 DIAGNOSIS — E55.9 VITAMIN D DEFICIENCY: Chronic | ICD-10-CM

## 2017-09-25 DIAGNOSIS — I10 HYPERTENSION, GOAL BELOW 140/90: Chronic | ICD-10-CM

## 2017-09-25 DIAGNOSIS — E80.4 GILBERT'S DISEASE: ICD-10-CM

## 2017-09-25 DIAGNOSIS — E66.3 OVERWEIGHT (BMI 25.0-29.9): ICD-10-CM

## 2017-09-25 DIAGNOSIS — E11.40 TYPE 2 DIABETES, CONTROLLED, WITH NEUROPATHY (HCC): Primary | Chronic | ICD-10-CM

## 2017-09-25 PROCEDURE — 99213 OFFICE O/P EST LOW 20 MIN: CPT | Performed by: NURSE PRACTITIONER

## 2017-10-09 ENCOUNTER — HOSPITAL ENCOUNTER (OUTPATIENT)
Age: 62
Discharge: HOME | End: 2017-10-09
Payer: COMMERCIAL

## 2017-10-09 DIAGNOSIS — E80.4 GILBERT'S DISEASE: ICD-10-CM

## 2017-10-09 DIAGNOSIS — I10 HYPERTENSION, GOAL BELOW 140/90: Chronic | ICD-10-CM

## 2017-10-09 DIAGNOSIS — G47.33 OBSTRUCTIVE SLEEP APNEA OF ADULT: Chronic | ICD-10-CM

## 2017-10-09 DIAGNOSIS — E66.3 OVERWEIGHT (BMI 25.0-29.9): ICD-10-CM

## 2017-10-09 DIAGNOSIS — E11.40 TYPE 2 DIABETES, CONTROLLED, WITH NEUROPATHY (HCC): Chronic | ICD-10-CM

## 2017-10-09 DIAGNOSIS — E55.9 VITAMIN D DEFICIENCY: Chronic | ICD-10-CM

## 2017-10-09 LAB
ABSOLUTE EOS #: 0.2 K/UL (ref 0–0.4)
ABSOLUTE LYMPH #: 1.5 K/UL (ref 1–4.8)
ABSOLUTE MONO #: 0.3 K/UL (ref 0.1–1.3)
ALBUMIN SERPL-MCNC: 4.2 G/DL (ref 3.5–5.2)
ALBUMIN/GLOBULIN RATIO: ABNORMAL (ref 1–2.5)
ALP BLD-CCNC: 86 U/L (ref 40–129)
ALT SERPL-CCNC: 43 U/L (ref 5–41)
ANION GAP SERPL CALCULATED.3IONS-SCNC: 10 MMOL/L (ref 9–17)
AST SERPL-CCNC: 37 U/L
BASOPHILS # BLD: 1 %
BASOPHILS ABSOLUTE: 0 K/UL (ref 0–0.2)
BILIRUB SERPL-MCNC: 1.12 MG/DL (ref 0.3–1.2)
BUN BLDV-MCNC: 15 MG/DL (ref 8–23)
BUN/CREAT BLD: ABNORMAL (ref 9–20)
CALCIUM SERPL-MCNC: 9.6 MG/DL (ref 8.6–10.4)
CHLORIDE BLD-SCNC: 102 MMOL/L (ref 98–107)
CHOLESTEROL/HDL RATIO: 3.5
CHOLESTEROL: 130 MG/DL
CO2: 27 MMOL/L (ref 20–31)
CREAT SERPL-MCNC: 0.85 MG/DL (ref 0.7–1.2)
DIFFERENTIAL TYPE: NORMAL
EOSINOPHILS RELATIVE PERCENT: 4 %
ESTIMATED AVERAGE GLUCOSE: 94 MG/DL
GFR AFRICAN AMERICAN: >60 ML/MIN
GFR NON-AFRICAN AMERICAN: >60 ML/MIN
GFR SERPL CREATININE-BSD FRML MDRD: ABNORMAL ML/MIN/{1.73_M2}
GFR SERPL CREATININE-BSD FRML MDRD: ABNORMAL ML/MIN/{1.73_M2}
GLUCOSE BLD-MCNC: 98 MG/DL (ref 70–99)
HBA1C MFR BLD: 4.9 % (ref 4–6)
HCT VFR BLD CALC: 47.9 % (ref 41–53)
HDLC SERPL-MCNC: 37 MG/DL
HEMOGLOBIN: 16.5 G/DL (ref 13.5–17.5)
LDL CHOLESTEROL: 71 MG/DL (ref 0–130)
LYMPHOCYTES # BLD: 37 %
MCH RBC QN AUTO: 33.9 PG (ref 26–34)
MCHC RBC AUTO-ENTMCNC: 34.4 G/DL (ref 31–37)
MCV RBC AUTO: 98.6 FL (ref 80–100)
MONOCYTES # BLD: 8 %
PDW BLD-RTO: 13.1 % (ref 11.5–14.9)
PLATELET # BLD: 183 K/UL (ref 150–450)
PLATELET ESTIMATE: NORMAL
PMV BLD AUTO: 8.2 FL (ref 6–12)
POTASSIUM SERPL-SCNC: 4.7 MMOL/L (ref 3.7–5.3)
RBC # BLD: 4.85 M/UL (ref 4.5–5.9)
RBC # BLD: NORMAL 10*6/UL
SEG NEUTROPHILS: 50 %
SEGMENTED NEUTROPHILS ABSOLUTE COUNT: 2.2 K/UL (ref 1.3–9.1)
SODIUM BLD-SCNC: 139 MMOL/L (ref 135–144)
TOTAL PROTEIN: 7.1 G/DL (ref 6.4–8.3)
TRIGL SERPL-MCNC: 108 MG/DL
TSH SERPL DL<=0.05 MIU/L-ACNC: 1.52 MIU/L (ref 0.3–5)
URIC ACID: 5 MG/DL (ref 3.4–7)
VITAMIN D 25-HYDROXY: 30.8 NG/ML (ref 30–100)
VLDLC SERPL CALC-MCNC: ABNORMAL MG/DL (ref 1–30)
WBC # BLD: 4.2 K/UL (ref 3.5–11)
WBC # BLD: NORMAL 10*3/UL

## 2017-10-09 PROCEDURE — 82306 VITAMIN D 25 HYDROXY: CPT

## 2017-10-09 PROCEDURE — 83036 HEMOGLOBIN GLYCOSYLATED A1C: CPT

## 2017-10-09 PROCEDURE — 84443 ASSAY THYROID STIM HORMONE: CPT

## 2017-10-09 PROCEDURE — 85025 COMPLETE CBC W/AUTO DIFF WBC: CPT

## 2017-10-09 PROCEDURE — 80053 COMPREHEN METABOLIC PANEL: CPT

## 2017-10-09 PROCEDURE — 84550 ASSAY OF BLOOD/URIC ACID: CPT

## 2017-10-09 PROCEDURE — 80061 LIPID PANEL: CPT

## 2017-10-09 PROCEDURE — 36415 COLL VENOUS BLD VENIPUNCTURE: CPT

## 2017-10-23 ENCOUNTER — OFFICE VISIT (OUTPATIENT)
Dept: UROLOGY | Age: 62
End: 2017-10-23
Payer: COMMERCIAL

## 2017-10-23 VITALS
BODY MASS INDEX: 27.98 KG/M2 | HEIGHT: 75 IN | TEMPERATURE: 97.7 F | SYSTOLIC BLOOD PRESSURE: 135 MMHG | DIASTOLIC BLOOD PRESSURE: 75 MMHG | HEART RATE: 63 BPM | WEIGHT: 225 LBS

## 2017-10-23 DIAGNOSIS — R39.198 SLOW URINARY STREAM: ICD-10-CM

## 2017-10-23 DIAGNOSIS — N32.0 BLADDER NECK CONTRACTURE: ICD-10-CM

## 2017-10-23 DIAGNOSIS — R33.9 INCOMPLETE EMPTYING OF BLADDER: Primary | ICD-10-CM

## 2017-10-23 PROCEDURE — 99214 OFFICE O/P EST MOD 30 MIN: CPT | Performed by: UROLOGY

## 2017-10-23 PROCEDURE — 51798 US URINE CAPACITY MEASURE: CPT | Performed by: UROLOGY

## 2017-10-23 ASSESSMENT — ENCOUNTER SYMPTOMS
EYE PAIN: 0
NAUSEA: 0
WHEEZING: 0
ABDOMINAL PAIN: 0
EYE REDNESS: 0
SHORTNESS OF BREATH: 0
BACK PAIN: 1
COUGH: 0
VOMITING: 0
COLOR CHANGE: 0

## 2017-10-23 NOTE — PROGRESS NOTES
[facility]  [facility] UROLOGY SPECIALISTS - [place]  [address] [address]  [address]  [address] [facility] [address]  Dept: [address] [facility] Urology Office Note - Established    Patient:  Aleida Avila  YOB: 1955  Date: 10/23/2017    The patient is a 58 y.o. male who presents today for evaluation of the following problems:   Chief Complaint   Patient presents with    Follow-up     6 mo recheck with pvr - bladder neck contracture, pt c/o incomplete emptying        HPI  Pt has h.o greenlight. Developed bnc. Now having weak stream.  Having prolonged urination. Slow stream.  Retaining large volumes of urine. Summary of old records: N/A    Additional History: N/A    Procedures Today: PVR = 680 ml    Urinalysis today:  No results found for this visit on 10/23/17. Last several PSA's:  Lab Results   Component Value Date    PSA 0.52 06/30/2015     Last total testosterone:  No results found for: TESTOSTERONE    AUA Symptom Score (10/23/2017):   INCOMPLETE EMPTYING: How often have you had the sensation of not emptying your bladder?: More than Half the time  FREQUENCY: How often do you have to urinate less than every two hours?: Less than 1 to 5 times  INTERMITTENCY: How often have you found you stopped and started again several times when you urinated?: Less than 1 to 5 times  URGENCY: How often have you found it difficult to postpone urination?: Not at all  WEAK STREAM: How often have you had a weak urinary stream?: Not at all  STRAINING: How often have you had to strain to start  urination?: Less than 1 to 5 times  NOCTURIA: How many times did you typically get up at night to uriniate?: 1 Time  TOTAL I-PSS SCORE[de-identified] 8  How would you feel if you were to spend the rest of your life with your urinary condition?: Mostly Satisfied    Last BUN and creatinine:  Lab Results   Component Value Date    BUN 15 10/09/2017     Lab Results   Component Value Date    CREATININE 0.85 10/09/2017       Additional Lab/Culture results: none    Imaging Reviewed during this Office Visit: none  (results were independently reviewed by physician and radiology report verified)    PAST MEDICAL, FAMILY AND SOCIAL HISTORY UPDATE:  Past Medical History:   Diagnosis Date    Allergic rhinitis     Anxiety     Controlled diabetes mellitus type II without complication ([facility] [address]     Dental abscess     Depression     On medas    History of colon polyps     Hypertension 2009    On Meds    Hypertension, goal below 140/90     Intermittent self-catheterization of bladder 06/17/2016    3/8/17 does not do this any more- was getting UTI's    Kidney stones     Neuropathy ([facility] [address]     Obesity     Obstructive sleep apnea of adult 2005    on CPAP    Osteoarthritis     Prostate enlargement 2014    Self cath since Jan. 16 2016    Tubular adenoma of colon 06/2016    Type II or unspecified type diabetes mellitus without mention of complication, not stated as uncontrolled 2006    On Meds    UTI (urinary tract infection)     Wears glasses      Past Surgical History:   Procedure Laterality Date    CHOLECYSTECTOMY  1992    COLONOSCOPY  06/30/2016    portions of tubular adenoma and tubulovillous adenoma    CYSTOSCOPY  3-4-16    with greenlight laser of prostate    CYSTOSCOPY  06/18/2016    Cystoscopy, difficult Leyva catheter placement.  CYSTOSCOPY  07/29/2016    with bladder neck contrature laser ablation and placement of suprapubic catheter    CYSTOSCOPY      CYSTOSCOPY N/A 2/13/2017    CYSTOSCOPY performed by Eddi Gonzales MD at [address] [facility] 3/13/2017    CYSTOSCOPY, TRANSURETHERAL INCISION BLADDER NECK  WITH HOLMIUM LASER performed by Eddi Gonzales MD at [facility].     JOINT REPLACEMENT      right shoulder -June 2016    LITHOTRIPSY  01/25/2016    LITHOTRIPSY      2014    PROSTATE SURGERY  01/2016    reduction    SHOULDER ARTHROPLASTY Right 06/16/2016    shoulder    SHOULDER ARTHROSCOPY Right 2002    VASECTOMY  1995     Family History   Problem Relation Age of Onset    Diabetes Mother     Heart Disease Mother      A-Fib    High Blood Pressure Mother     Other Mother      Gout    Heart Disease Father     Heart Surgery Father      Stent in    Other Father      LymphoCarcinoma    Arthritis Sister     Colon Cancer Paternal Grandfather      Outpatient Prescriptions Marked as Taking for the 10/23/17 encounter (Office Visit) with Chelsey Monreal MD   Medication Sig Dispense Refill    FLUoxetine (PROZAC) 20 MG capsule TAKE ONE CAPSULE BY MOUTH EVERY MORNING 90 capsule 3    meloxicam (MOBIC) 15 MG tablet TAKE ONE TABLET BY MOUTH DAILY 90 tablet 3    vitamin D (ERGOCALCIFEROL) 27802 units CAPS capsule Take 1 capsule by mouth once a week for 8 doses 8 capsule 0    tamsulosin (FLOMAX) 0.4 MG capsule Take 1 capsule by mouth 2 times daily 180 capsule 3    pregabalin (LYRICA) 150 MG capsule TAKE ONE CAPSULE BY MOUTH DAILY 90 capsule 1       Dye [iodides]; Colace [dss]; Metformin and related; and Sitagliptin-metformin hcl  History   Smoking Status    Former Smoker    Types: Cigarettes    Start date: 7/21/1977   [employer] Quit date: 7/21/1981   Smokeless Tobacco    Never Used     Comment: back in college     (If patient a smoker, smoking cessation counseling offered)    History   Alcohol Use    0.0 oz/week     Comment: occas       REVIEW OF SYSTEMS:  Review of Systems   Constitutional: Negative for activity change, chills and fever. Eyes: Negative for pain, redness and visual disturbance. Respiratory: Negative for cough, shortness of breath and wheezing. Cardiovascular: Negative for chest pain and leg swelling. Gastrointestinal: Negative for abdominal pain, nausea and vomiting. Genitourinary: Negative for difficulty urinating, discharge, dysuria, flank pain, frequency, hematuria, scrotal swelling and testicular pain. Musculoskeletal: Positive for back pain.  Negative for joint swelling and myalgias. Skin: Negative for color change and rash. Neurological: Negative for dizziness, tremors and numbness. Hematological: Negative for adenopathy. Does not bruise/bleed easily. Physical Exam:      Vitals:    10/23/17 1332   BP: 135/75   Pulse: 63   Temp: 97.7 °F (36.5 °C)     Body mass index is 28.12 kg/m². Patient is a 58 y.o. male in no acute distress and alert and oriented to person, place and time. Physical Exam  Constitutional: Patient in no acute distress. Neuro: Alert and oriented to person, place and time. Psych: Mood normal, affect normal  Skin: No rash noted  HEENT: Head: Normocephalic and atraumatic  Conjunctivae and EOM are normal. Pupils are equal, round  Nose: Normal  Right External Ear: Normal; Left External Ear: Normal  Mouth: Mucosa Moist  Neck: Supple  Lungs: Respiratory effort is normal  Cardiovascular: Warm & Pink  Abdomen: Soft, non-tender, non-distended with no CVA,  No flank tenderness,  Or hepatosplenomegaly   Lymphatics: No palpable lymphadenopathy. Bladder non-tender and not distended. Musculoskeletal: Normal gait and station      Assessment and Plan      1. Incomplete emptying of bladder    2. Bladder neck contracture    3. Slow urinary stream           Plan:   transurtrhal incision of bladder neck contracture with holmium laser and cysto, suprapubic tube placement st v's under general anesthesia     No Follow-up on file. Prescriptions Ordered:  No orders of the defined types were placed in this encounter.      Orders Placed:  Orders Placed This Encounter   Procedures   Kate Casey MD

## 2017-10-27 ENCOUNTER — TELEPHONE (OUTPATIENT)
Dept: UROLOGY | Age: 62
End: 2017-10-27

## 2017-10-27 NOTE — TELEPHONE ENCOUNTER
Cysto, TUI-BN, Uethral Dialation, HLL, SP Tube placement @ [facility] 11/15/17 10:30am **STOP Blood Thinners 11/6/17**  PAT @ [facility] 11/8/17      Spoke with patient, procedure info mailed 10/27/17.

## 2017-11-06 ENCOUNTER — OFFICE VISIT (OUTPATIENT)
Dept: BARIATRICS/WEIGHT MGMT | Age: 62
End: 2017-11-06
Payer: COMMERCIAL

## 2017-11-06 ENCOUNTER — HOSPITAL ENCOUNTER (OUTPATIENT)
Dept: PREADMISSION TESTING | Age: 62
Discharge: HOME | End: 2017-11-06
Payer: COMMERCIAL

## 2017-11-06 VITALS
BODY MASS INDEX: 27.12 KG/M2 | WEIGHT: 218.1 LBS | HEIGHT: 75 IN | SYSTOLIC BLOOD PRESSURE: 132 MMHG | HEART RATE: 64 BPM | DIASTOLIC BLOOD PRESSURE: 72 MMHG

## 2017-11-06 VITALS
HEART RATE: 64 BPM | DIASTOLIC BLOOD PRESSURE: 77 MMHG | TEMPERATURE: 97.5 F | HEIGHT: 76 IN | SYSTOLIC BLOOD PRESSURE: 132 MMHG | BODY MASS INDEX: 27.11 KG/M2 | OXYGEN SATURATION: 99 % | RESPIRATION RATE: 18 BRPM | WEIGHT: 222.66 LBS

## 2017-11-06 DIAGNOSIS — E11.40 TYPE 2 DIABETES, CONTROLLED, WITH NEUROPATHY (HCC): Primary | Chronic | ICD-10-CM

## 2017-11-06 DIAGNOSIS — I10 HYPERTENSION, GOAL BELOW 140/90: Chronic | ICD-10-CM

## 2017-11-06 DIAGNOSIS — M15.9 PRIMARY OSTEOARTHRITIS INVOLVING MULTIPLE JOINTS: ICD-10-CM

## 2017-11-06 DIAGNOSIS — E66.3 OVERWEIGHT (BMI 25.0-29.9): ICD-10-CM

## 2017-11-06 DIAGNOSIS — G47.33 OBSTRUCTIVE SLEEP APNEA OF ADULT: Chronic | ICD-10-CM

## 2017-11-06 PROCEDURE — 99213 OFFICE O/P EST LOW 20 MIN: CPT | Performed by: NURSE PRACTITIONER

## 2017-11-06 PROCEDURE — 87086 URINE CULTURE/COLONY COUNT: CPT

## 2017-11-06 RX ORDER — SODIUM CHLORIDE, SODIUM LACTATE, POTASSIUM CHLORIDE, CALCIUM CHLORIDE 600; 310; 30; 20 MG/100ML; MG/100ML; MG/100ML; MG/100ML
1000 INJECTION, SOLUTION INTRAVENOUS CONTINUOUS
Status: CANCELLED | OUTPATIENT
Start: 2017-11-06

## 2017-11-06 RX ORDER — OXYMETAZOLINE HYDROCHLORIDE 0.05 G/100ML
2 SPRAY NASAL PRN
COMMUNITY
End: 2021-03-15

## 2017-11-06 NOTE — ANESTHESIA PRE-OP
No    Medical or cardiac clearance ordered:                                         No    Anesthesiologist called:                                                                No    ALEXIS Monson PA-C  Electronically signed 11/6/2017 at 9:46 AM

## 2017-11-06 NOTE — H&P
History and Physical    Pt Name: Ale Veloz  MRN: [id number]  YOB: 1955  Date of evaluation: 11/6/2017  Primary Care Physician: Allison Nguyen MD  Patient evaluated at the request of  Dr. Alok Schumacher    Reason for evaluation: Bladder neck contracture  SUBJECTIVE:   History of Chief Complaint:      Serena Gu is a 58 y.o. male  Who has a hx of incomplete emptying of his bladder . Hx of self cath of his bladder in the past .  Has removed approx 80 lbs . Past Medical History      has a past medical history of Allergic rhinitis; Anxiety; Controlled diabetes mellitus type II without complication ([address] Dental abscess; Depression; History of colon polyps; Hypertension; Hypertension, goal below 140/90; Intermittent self-catheterization of bladder; Kidney stones; Neuropathy ([address] Obesity; Obstructive sleep apnea of adult; Osteoarthritis; Prostate enlargement; Tubular adenoma of colon; Type II or unspecified type diabetes mellitus without mention of complication, not stated as uncontrolled; UTI (urinary tract infection); and Wears glasses. Past Surgical History   has a past surgical history that includes Cholecystectomy (1992); Lithotripsy (01/25/2016); Lithotripsy; Cystocopy (3-4-16); ostate surgery (01/2016); Cystoscopy (06/18/2016); Colonoscopy (06/30/2016); Shoulder Arthroplasty (Right, 06/16/2016); Shoulder arthroscopy (Right, 2002); eye surgery (Bilateral, 1992); Vasectomy (1995); Cystoscopy (07/29/2016); Cystocopy; Cystoscopy (N/A, 2/13/2017); joint replacement; and Cystoscopy (N/A, 3/13/2017).        Medications   Scheduled Meds:  Current Outpatient Rx   Medication Sig Dispense Refill    LYRICA 150 MG capsule TAKE ONE CAPSULE BY MOUTH DAILY 90 capsule 1    FLUoxetine (PROZAC) 20 MG capsule TAKE ONE CAPSULE BY MOUTH EVERY MORNING 90 capsule 3    meloxicam (MOBIC) 15 MG tablet TAKE ONE TABLET BY MOUTH DAILY 90 tablet 3    vitamin D (ERGOCALCIFEROL) 92972 units CAPS capsule Take 1 capsule by mouth once a week for 8 doses 8 capsule 0    tamsulosin (FLOMAX) 0.4 MG capsule Take 1 capsule by mouth 2 times daily 180 capsule 3     Continuous Infusions:  PRN Meds:. Allergies  is allergic to dye [iodides]; colace [dss]; metformin and related; and sitagliptin-metformin hcl. Family History    family history includes Arthritis in his sister; Colon Cancer in his paternal grandfather; Diabetes in his mother; Heart Disease in his father and mother; Heart Surgery in his father; High Blood Pressure in his mother; Other in his father and mother. Family Status   Relation Status    Mother Alive    @80   [place] Father     Sister Alive    Maternal Grandmother     Maternal Grandfather     Paternal Grandmother     Paternal Grandfather          Social History  Social History     Social History    Marital status:      Spouse name: Dorothy Billings Number of children: 2    Years of education: N/A     Occupational History          Social History Main Topics    Smoking status: Former Smoker     Types: Cigarettes     Start date: 1977     Quit date: 1981    Smokeless tobacco: Never Used      Comment: back in college    Alcohol use 0.0 oz/week      Comment: occas    Drug use: No    Sexual activity: Not on file     Other Topics Concern    Not on file     Social History Narrative    No narrative on file        Service:No    Occupation:[employer]  Has approx 1 million miles      Hobbies:     Does math in his head he reports, physics     OBJECTIVE:   VITALS:  vitals were not taken for this visit. CONSTITUTIONAL: Alert and oriented times 3, no acute distress and cooperative to examination. friendly and pleasant     SKIN: rash No    HEENT: Head is normocephalic, atraumatic.  EOMI, PERRLA    Oral air way :slightly narrow Yes    NECK: neck supple, no lymphadenopathy noted, trachea midline and straight       2+ carotid, no

## 2017-11-06 NOTE — PROGRESS NOTES
Medically Supervised Weight Loss Follow-up Progress Note      Subjective     Patient is here for weight management program to follow-up for the chronic conditions of DM Type 2, Neuropathy, HTN, BRIDGETTE. Rickey Richardson Patient continues on [employer] Diabetic diet plan. Consistently getting in minimum food script and fluids. Physical activity includes exercise bike 20 minutes daily. Total weight loss to date is 71 lbs. He is at his weight goal.  Off all diabetes and hypertension meds. Wants to transition to Phase 2. Scheduled for placement of suprapubic urinary catheter on 11/15/17. No current issues. Allergies: Allergies   Allergen Reactions    Dye [Iodides] Hives    Colace [Dss] Hives    Metformin And Related Diarrhea and Nausea And Vomiting    Seasonal Other (See Comments)     RUNNY NOSE, SNEEZING    Sitagliptin-Metformin Hcl Diarrhea and Nausea And Vomiting       Past Medical History:     Past Medical History:   Diagnosis Date    Allergic rhinitis     Anxiety     Controlled diabetes mellitus type II without complication ([address]     Dental abscess     Depression     On medas    History of colon polyps     Intermittent self-catheterization of bladder 06/17/2016    3/8/17 does not do this any more- was getting UTI's    Kidney stones     Neuropathy ([address]     Obesity     HISTORY OF OBESITY, AS OF 11/06/2017 PATIENT HAS LOST 80 LBS    Obstructive sleep apnea of adult 2005    on CPAP    Osteoarthritis     RIGHT SHOULDER, BACK    Prostate enlargement 2014    Self cath since Jan. 16 2016    Tubular adenoma of colon 06/2016    Type II or unspecified type diabetes mellitus without mention of complication, not stated as uncontrolled 2006    NO LONGER ON MEDS, LOST WEIGHT, DIET CONTROLLED    UTI (urinary tract infection)     Wears glasses    .     Past Surgical History:  Past Surgical History:   Procedure Laterality Date    CHOLECYSTECTOMY  1992    COLONOSCOPY  06/30/2016    portions of tubular adenoma and tubulovillous adenoma    CYSTOSCOPY  3-4-16    with greenlight laser of prostate    CYSTOSCOPY  06/18/2016    Cystoscopy, difficult Leyva catheter placement.  CYSTOSCOPY  07/29/2016    with bladder neck contrature laser ablation and placement of suprapubic catheter    CYSTOSCOPY      CYSTOSCOPY N/A 2/13/2017    CYSTOSCOPY performed by Crow Saab MD at [address] [facility] 3/13/2017    CYSTOSCOPY, TRANSURETHERAL INCISION BLADDER NECK  WITH HOLMIUM LASER performed by Crow Saab MD at [address] [facility].     JOINT REPLACEMENT      right shoulder -June 2016    LITHOTRIPSY  01/25/2016    LITHOTRIPSY      2014    PROSTATE SURGERY  01/2016    reduction    SHOULDER ARTHROPLASTY Right 06/16/2016    shoulder    SHOULDER ARTHROSCOPY Right 2002    VASECTOMY  1995       Family History:  Family History   Problem Relation Age of Onset    Diabetes Mother     Heart Disease Mother      A-Fib    High Blood Pressure Mother     Other Mother      Gout    Heart Disease Father     Heart Surgery Father      Stent in    Other Father      Mandy Denny Father     Arthritis Sister     Colon Cancer Paternal Grandfather        Social History:  Social History     Social History    Marital status:      Spouse name: Rob Zaidi Number of children: 2    Years of education: N/A     Occupational History          Social History Main Topics    Smoking status: Former Smoker     Packs/day: 0.50     Years: 5.00     Types: Cigarettes     Start date: 7/21/1977     Quit date: 7/21/1981    Smokeless tobacco: Never Used      Comment: back in college    Alcohol use 0.6 oz/week     1 Glasses of wine per week      Comment: 0NCE A WEEK    Drug use: No    Sexual activity: Not on file     Other Topics Concern    Not on file     Social History Narrative    No narrative on file       Current Medications:  Current Outpatient Prescriptions Medication Sig Dispense Refill    ciclopirox (PENLAC) 8 % solution Apply 1 Dose topically daily      Multiple Vitamins-Minerals (THERA-M MULTIPLE VITAMINS PO) Take 1 tablet by mouth daily      oxymetazoline (AFRIN 12 HOUR) 0.05 % nasal spray 2 sprays by Nasal route as needed for Congestion 1-2 TIMES PER DAY PRN      LYRICA 150 MG capsule TAKE ONE CAPSULE BY MOUTH DAILY 90 capsule 1    FLUoxetine (PROZAC) 20 MG capsule TAKE ONE CAPSULE BY MOUTH EVERY MORNING 90 capsule 3    meloxicam (MOBIC) 15 MG tablet TAKE ONE TABLET BY MOUTH DAILY 90 tablet 3    tamsulosin (FLOMAX) 0.4 MG capsule Take 1 capsule by mouth 2 times daily 180 capsule 3     No current facility-administered medications for this visit. Vital Signs:  /72   Pulse 64   Ht 6' 3\" (1.905 m)   Wt 218 lb 1.6 oz (98.9 kg)   BMI 27.26 kg/m²     BMI/Height/Weight:  Body mass index is 27.26 kg/m². Review of Systems - Review of systems was performed. All were negative unless otherwise documented in HPI. Constitutional: Negative for fever, chills and diaphoresis. HENT: Negative for hearing loss and trouble swallowing. Eyes: Negative for photophobia and visual disturbance. Respiratory: Negative for cough, shortness of breath and wheezing. Cardiovascular: Negative for chest pain and palpitations. Gastrointestinal: Negative for nausea, vomiting, abdominal pain, diarrhea, constipation, blood in stool and abdominal distention. Endocrine: Negative for polydipsia, polyphagia and polyuria. Genitourinary: Negative for dysuria, frequency, hematuria. Positive for difficulty urinating. Musculoskeletal: Negative for myalgias, joint swelling. Skin: Negative for pallor and rash. Neurological: Negative for tremors, light-headedness and headaches. Positive for dizziness. Psychiatric/Behavioral: Negative for sleep disturbance and dysphoric mood. Objective:      Physical Exam   Vital signs reviewed.   General: Well-developed

## 2017-11-07 LAB
CULTURE: NORMAL
CULTURE: NORMAL
Lab: NORMAL
SPECIMEN DESCRIPTION: NORMAL
STATUS: NORMAL

## 2017-11-15 ENCOUNTER — ANESTHESIA EVENT (OUTPATIENT)
Dept: OPERATING ROOM | Age: 62
End: 2017-11-15
Payer: COMMERCIAL

## 2017-11-15 ENCOUNTER — ANESTHESIA (OUTPATIENT)
Dept: OPERATING ROOM | Age: 62
End: 2017-11-15
Payer: COMMERCIAL

## 2017-11-15 ENCOUNTER — HOSPITAL ENCOUNTER (OUTPATIENT)
Age: 62
Setting detail: OUTPATIENT SURGERY
Discharge: HOME | End: 2017-11-15
Attending: UROLOGY | Admitting: UROLOGY
Payer: COMMERCIAL

## 2017-11-15 VITALS
RESPIRATION RATE: 14 BRPM | OXYGEN SATURATION: 97 % | WEIGHT: 216 LBS | SYSTOLIC BLOOD PRESSURE: 134 MMHG | BODY MASS INDEX: 26.3 KG/M2 | HEART RATE: 75 BPM | TEMPERATURE: 97.5 F | HEIGHT: 76 IN | DIASTOLIC BLOOD PRESSURE: 89 MMHG

## 2017-11-15 VITALS
RESPIRATION RATE: 8 BRPM | TEMPERATURE: 96.5 F | SYSTOLIC BLOOD PRESSURE: 117 MMHG | OXYGEN SATURATION: 100 % | DIASTOLIC BLOOD PRESSURE: 80 MMHG

## 2017-11-15 LAB
GLUCOSE BLD-MCNC: 103 MG/DL (ref 75–110)
GLUCOSE BLD-MCNC: 144 MG/DL (ref 75–110)

## 2017-11-15 PROCEDURE — 87077 CULTURE AEROBIC IDENTIFY: CPT

## 2017-11-15 PROCEDURE — 7100000000 HC PACU RECOVERY - FIRST 15 MIN: Performed by: UROLOGY

## 2017-11-15 PROCEDURE — 87086 URINE CULTURE/COLONY COUNT: CPT

## 2017-11-15 PROCEDURE — 3700000001 HC ADD 15 MINUTES (ANESTHESIA): Performed by: UROLOGY

## 2017-11-15 PROCEDURE — 6360000002 HC RX W HCPCS: Performed by: NURSE ANESTHETIST, CERTIFIED REGISTERED

## 2017-11-15 PROCEDURE — 2500000003 HC RX 250 WO HCPCS: Performed by: NURSE ANESTHETIST, CERTIFIED REGISTERED

## 2017-11-15 PROCEDURE — 2580000003 HC RX 258: Performed by: ANESTHESIOLOGY

## 2017-11-15 PROCEDURE — C1894 INTRO/SHEATH, NON-LASER: HCPCS | Performed by: UROLOGY

## 2017-11-15 PROCEDURE — 6360000002 HC RX W HCPCS

## 2017-11-15 PROCEDURE — 7100000040 HC SPAR PHASE II RECOVERY - FIRST 15 MIN: Performed by: UROLOGY

## 2017-11-15 PROCEDURE — 3600000014 HC SURGERY LEVEL 4 ADDTL 15MIN: Performed by: UROLOGY

## 2017-11-15 PROCEDURE — A6258 TRANSPARENT FILM >16<=48 IN: HCPCS | Performed by: UROLOGY

## 2017-11-15 PROCEDURE — 7100000001 HC PACU RECOVERY - ADDTL 15 MIN: Performed by: UROLOGY

## 2017-11-15 PROCEDURE — 82947 ASSAY GLUCOSE BLOOD QUANT: CPT

## 2017-11-15 PROCEDURE — 3600000004 HC SURGERY LEVEL 4 BASE: Performed by: UROLOGY

## 2017-11-15 PROCEDURE — 6360000002 HC RX W HCPCS: Performed by: UROLOGY

## 2017-11-15 PROCEDURE — 3700000000 HC ANESTHESIA ATTENDED CARE: Performed by: UROLOGY

## 2017-11-15 PROCEDURE — C1769 GUIDE WIRE: HCPCS | Performed by: UROLOGY

## 2017-11-15 PROCEDURE — 6360000002 HC RX W HCPCS: Performed by: ANESTHESIOLOGY

## 2017-11-15 RX ORDER — DIPHENHYDRAMINE HYDROCHLORIDE 50 MG/ML
12.5 INJECTION INTRAMUSCULAR; INTRAVENOUS
Status: DISCONTINUED | OUTPATIENT
Start: 2017-11-15 | End: 2017-11-15 | Stop reason: HOSPADM

## 2017-11-15 RX ORDER — PROPOFOL 10 MG/ML
INJECTION, EMULSION INTRAVENOUS PRN
Status: DISCONTINUED | OUTPATIENT
Start: 2017-11-15 | End: 2017-11-15 | Stop reason: SDUPTHER

## 2017-11-15 RX ORDER — FENTANYL CITRATE 50 UG/ML
INJECTION, SOLUTION INTRAMUSCULAR; INTRAVENOUS PRN
Status: DISCONTINUED | OUTPATIENT
Start: 2017-11-15 | End: 2017-11-15 | Stop reason: SDUPTHER

## 2017-11-15 RX ORDER — ONDANSETRON 2 MG/ML
INJECTION INTRAMUSCULAR; INTRAVENOUS PRN
Status: DISCONTINUED | OUTPATIENT
Start: 2017-11-15 | End: 2017-11-15 | Stop reason: SDUPTHER

## 2017-11-15 RX ORDER — ONDANSETRON 2 MG/ML
4 INJECTION INTRAMUSCULAR; INTRAVENOUS
Status: DISCONTINUED | OUTPATIENT
Start: 2017-11-15 | End: 2017-11-15 | Stop reason: HOSPADM

## 2017-11-15 RX ORDER — GLYCOPYRROLATE 0.2 MG/ML
INJECTION INTRAMUSCULAR; INTRAVENOUS PRN
Status: DISCONTINUED | OUTPATIENT
Start: 2017-11-15 | End: 2017-11-15 | Stop reason: SDUPTHER

## 2017-11-15 RX ORDER — FENTANYL CITRATE 50 UG/ML
50 INJECTION, SOLUTION INTRAMUSCULAR; INTRAVENOUS EVERY 5 MIN PRN
Status: DISCONTINUED | OUTPATIENT
Start: 2017-11-15 | End: 2017-11-15 | Stop reason: HOSPADM

## 2017-11-15 RX ORDER — LIDOCAINE HYDROCHLORIDE 10 MG/ML
INJECTION, SOLUTION EPIDURAL; INFILTRATION; INTRACAUDAL; PERINEURAL PRN
Status: DISCONTINUED | OUTPATIENT
Start: 2017-11-15 | End: 2017-11-15 | Stop reason: SDUPTHER

## 2017-11-15 RX ORDER — DEXAMETHASONE SODIUM PHOSPHATE 10 MG/ML
INJECTION INTRAMUSCULAR; INTRAVENOUS PRN
Status: DISCONTINUED | OUTPATIENT
Start: 2017-11-15 | End: 2017-11-15 | Stop reason: SDUPTHER

## 2017-11-15 RX ORDER — SODIUM CHLORIDE, SODIUM LACTATE, POTASSIUM CHLORIDE, CALCIUM CHLORIDE 600; 310; 30; 20 MG/100ML; MG/100ML; MG/100ML; MG/100ML
1000 INJECTION, SOLUTION INTRAVENOUS CONTINUOUS
Status: DISCONTINUED | OUTPATIENT
Start: 2017-11-15 | End: 2017-11-15 | Stop reason: HOSPADM

## 2017-11-15 RX ORDER — FENTANYL CITRATE 50 UG/ML
25 INJECTION, SOLUTION INTRAMUSCULAR; INTRAVENOUS EVERY 5 MIN PRN
Status: DISCONTINUED | OUTPATIENT
Start: 2017-11-15 | End: 2017-11-15 | Stop reason: HOSPADM

## 2017-11-15 RX ORDER — CIPROFLOXACIN 2 MG/ML
400 INJECTION, SOLUTION INTRAVENOUS
Status: COMPLETED | OUTPATIENT
Start: 2017-11-15 | End: 2017-11-15

## 2017-11-15 RX ORDER — PROMETHAZINE HYDROCHLORIDE 25 MG/ML
6.25 INJECTION, SOLUTION INTRAMUSCULAR; INTRAVENOUS
Status: DISCONTINUED | OUTPATIENT
Start: 2017-11-15 | End: 2017-11-15 | Stop reason: HOSPADM

## 2017-11-15 RX ADMIN — DEXAMETHASONE SODIUM PHOSPHATE 10 MG: 10 INJECTION INTRAMUSCULAR; INTRAVENOUS at 10:04

## 2017-11-15 RX ADMIN — HYDROMORPHONE HYDROCHLORIDE 0.25 MG: 1 INJECTION, SOLUTION INTRAMUSCULAR; INTRAVENOUS; SUBCUTANEOUS at 10:50

## 2017-11-15 RX ADMIN — SODIUM CHLORIDE, POTASSIUM CHLORIDE, SODIUM LACTATE AND CALCIUM CHLORIDE 1000 ML: 600; 310; 30; 20 INJECTION, SOLUTION INTRAVENOUS at 08:49

## 2017-11-15 RX ADMIN — LIDOCAINE HYDROCHLORIDE 50 MG: 10 INJECTION, SOLUTION EPIDURAL; INFILTRATION; INTRACAUDAL; PERINEURAL at 09:53

## 2017-11-15 RX ADMIN — CIPROFLOXACIN 400 MG: 2 INJECTION, SOLUTION INTRAVENOUS at 10:02

## 2017-11-15 RX ADMIN — HYDROMORPHONE HYDROCHLORIDE 0.25 MG: 1 INJECTION, SOLUTION INTRAMUSCULAR; INTRAVENOUS; SUBCUTANEOUS at 10:55

## 2017-11-15 RX ADMIN — GLYCOPYRROLATE 0.2 MG: 0.2 INJECTION, SOLUTION INTRAMUSCULAR; INTRAVENOUS at 10:02

## 2017-11-15 RX ADMIN — ONDANSETRON 4 MG: 2 INJECTION, SOLUTION INTRAMUSCULAR; INTRAVENOUS at 09:53

## 2017-11-15 RX ADMIN — FENTANYL CITRATE 50 MCG: 50 INJECTION INTRAMUSCULAR; INTRAVENOUS at 09:53

## 2017-11-15 RX ADMIN — FENTANYL CITRATE 25 MCG: 50 INJECTION INTRAMUSCULAR; INTRAVENOUS at 10:45

## 2017-11-15 RX ADMIN — ONDANSETRON 4 MG: 2 INJECTION, SOLUTION INTRAMUSCULAR; INTRAVENOUS at 10:18

## 2017-11-15 RX ADMIN — PROPOFOL 200 MG: 10 INJECTION, EMULSION INTRAVENOUS at 09:53

## 2017-11-15 RX ADMIN — FENTANYL CITRATE 25 MCG: 50 INJECTION INTRAMUSCULAR; INTRAVENOUS at 10:40

## 2017-11-15 RX ADMIN — GLYCOPYRROLATE 0.2 MG: 0.2 INJECTION, SOLUTION INTRAMUSCULAR; INTRAVENOUS at 10:04

## 2017-11-15 ASSESSMENT — PAIN DESCRIPTION - DESCRIPTORS: DESCRIPTORS: ACHING

## 2017-11-15 ASSESSMENT — PAIN SCALES - GENERAL
PAINLEVEL_OUTOF10: 4
PAINLEVEL_OUTOF10: 0
PAINLEVEL_OUTOF10: 5
PAINLEVEL_OUTOF10: 3
PAINLEVEL_OUTOF10: 5
PAINLEVEL_OUTOF10: 4
PAINLEVEL_OUTOF10: 0
PAINLEVEL_OUTOF10: 3

## 2017-11-15 ASSESSMENT — PAIN - FUNCTIONAL ASSESSMENT: PAIN_FUNCTIONAL_ASSESSMENT: 0-10

## 2017-11-15 ASSESSMENT — LIFESTYLE VARIABLES: SMOKING_STATUS: 0

## 2017-11-15 ASSESSMENT — PAIN DESCRIPTION - PAIN TYPE
TYPE: SURGICAL PAIN

## 2017-11-15 ASSESSMENT — PAIN DESCRIPTION - LOCATION: LOCATION: GROIN;ABDOMEN

## 2017-11-15 ASSESSMENT — ENCOUNTER SYMPTOMS
SHORTNESS OF BREATH: 0
STRIDOR: 0

## 2017-11-15 NOTE — OP NOTE
FACILITY:  [address] [place] [address]  Olivia Parisi  1955  [id number]    DATE: 11/15/17  SURGEON: Dr. Angelica Bull M.D.  Layne Escobar: Dr. Porter SOLANO MD  PREOPERATIVE DIAGNOSIS:  NGB   POSTOPERATIVE DIAGNOSIS: same  OPERATION:  1. Cystoscopy suprapubic catheter placement. ANESTHESIA:  General.   COMPLICATIONS:  None.   ESTIMATED BLOOD LOSS:  Minimal.  FLUIDS:  Crystalloid. DRAINS:  A 16 German suprapubic Leyva catheter. SPECIMENS:  None.     INDICATIONS FOR THE PROCEDURE:  Olivia Parisi is a 58 y.o. male with a history of bladder neck contracture, and reports his urinary stream has decreased recently. We are concerned his [address] may have recurred. We will also plan to place a supra pubic tube incase he is still unable to urinate. After treatment options were discussed including risks, benefits, alternatives, goals and possible complications,  the patient elected to proceed with today's procedure.     NARRATIVE SUMMARY OF THE PROCEDURE:  After informed consent was reviewed in the preoperative area the patient was taken back to the operating room and transferred to the operating table in the supine position. EPC cuffs were placed and the machine was turned on. Anesthesia was induced and the antibiotics were started. Patient was placed in modified dorsal lithotomy position, sterilely prepped and draped in a standard fashion. We entered the urethra with the cystoscope and advanced into the bladder easily. The bladder neck appeared open and the scope entered the bladder easily. We did not feel as it was necessary to incised the bladder neck further. At this time we assume the patient has an atonic bladder and will need catheter drainage via SPT. We then used a spinal needle to project our trajectory of the trochar. We could see the needle going into the dome of the bladder. We then made a small skin incision using a 15 blade scalpel.  Using a Rouch trochar we then entered the bladder

## 2017-11-15 NOTE — ANESTHESIA POSTPROCEDURE EVALUATION
Department of Anesthesiology  Postprocedure Note    Patient: Gia Gaitan  MRN: [id number]  YOB: 1955  Date of evaluation: 11/15/2017  Time:  1:51 PM     Procedure Summary     Date:  11/15/17 Room / Location:  [facility] OR  / [facility] OR    Anesthesia Start:  9956 Anesthesia Stop:  2684    Procedures:       CYSTOSCOPY (N/A )      SUPRAPUBIC TUBE PLACEMENT, C-ARM (N/A ) Diagnosis:  (BLADDER NECK STRICTURE, INCOMPLETE BLADDER EMPTYING )    Surgeon:  Rick Whitfield MD Responsible Provider:  Sathish Farris MD    Anesthesia Type:  general ASA Status:  2          Anesthesia Type: general    Iram Phase I: Iram Score: 10    Iram Phase II: Iram Score: 10    Last vitals: Reviewed and per EMR flowsheets.        Anesthesia Post Evaluation    Patient location during evaluation: PACU  Patient participation: complete - patient participated  Level of consciousness: awake and alert  Pain score: 0  Airway patency: patent  Nausea & Vomiting: no nausea and no vomiting  Complications: no  Cardiovascular status: blood pressure returned to baseline and hemodynamically stable  Respiratory status: acceptable and nonlabored ventilation  Hydration status: euvolemic

## 2017-11-15 NOTE — ANESTHESIA PRE PROCEDURE
Department of Anesthesiology  Preprocedure Note       Name:  Gerardo Gonzalez   Age:  58 y.o.  :  1955                                          MRN:  [id number]         Date:  11/15/2017      Surgeon: Lashon Crawford):  Ethel Adams MD    Procedure: Orren Pilling - CYSTOSCOPY, TRANSURETHRAL BLADDER NECK RESECTION WITH URETHRAL DILATION (N/A )      SUPRAPUBIC TUBE PLACEMENT, C-ARM (N/A )    Medications prior to admission:   Prior to Admission medications    Medication Sig Start Date End Date Taking? Authorizing Provider   ciclopirox (PENLAC) 8 % solution Apply 1 Dose topically daily 10/30/17   Historical Provider, MD   Multiple Vitamins-Minerals (THERA-M MULTIPLE VITAMINS PO) Take 1 tablet by mouth daily    Historical Provider, MD   oxymetazoline (AFRIN 12 HOUR) 0.05 % nasal spray 2 sprays by Nasal route as needed for Congestion 1-2 TIMES PER DAY PRN    Historical Provider, MD   LYRICA 150 MG capsule TAKE ONE CAPSULE BY MOUTH DAILY 10/31/17   Madelin Anderson CNP   FLUoxetine (PROZAC) 20 MG capsule TAKE ONE CAPSULE BY MOUTH EVERY MORNING 17   Madelin Anderson CNP   meloxicam (MOBIC) 15 MG tablet TAKE ONE TABLET BY MOUTH DAILY 17   Madelin Anderson CNP   tamsulosin (FLOMAX) 0.4 MG capsule Take 1 capsule by mouth 2 times daily 17   Ethel Adams MD       Current medications:    No current outpatient prescriptions on file. No current facility-administered medications for this visit. Allergies:     Allergies   Allergen Reactions    Dye [Iodides] Hives    Colace [Dss] Hives    Metformin And Related Diarrhea and Nausea And Vomiting    Seasonal Other (See Comments)     RUNNY NOSE, SNEEZING    Sitagliptin-Metformin Hcl Diarrhea and Nausea And Vomiting       Problem List:    Patient Active Problem List   Diagnosis Code    Prostate enlargement N40.0    Type 2 diabetes, controlled, with neuropathy ([facility] [address] E11.40    Hypertension, goal below 140/90 I10    Osteoarthritis M19.90    Anxiety F41.9    Depression F32.9    Obstructive sleep apnea of adult G47.33    Neuropathy (HCC) G62.9    Obesity (BMI 30-39. 9) E66.9    Right shoulder pain M25.511    Tubular adenoma of colon D12.6    History of colon polyps Z86.010    Hepatitis C carrier (HCC) B18.2    Weight loss counseling, encounter for Z71.3    Stomach ulcer K25.9    Gilbert's disease E80.4    Vitamin D deficiency E55.9    Encounter for well adult exam without abnormal findings Z00.00    BMI 31.0-31.9,adult Z68.31    H/O urinary retention Z87.898    Overweight (BMI 25.0-29. 9) E66.3       Past Medical History:        Diagnosis Date    Allergic rhinitis     Anxiety     Controlled diabetes mellitus type II without complication ([address]     Dental abscess     Depression     On medas    History of colon polyps     Intermittent self-catheterization of bladder 06/17/2016    3/8/17 does not do this any more- was getting UTI's    Kidney stones     Neuropathy ([address]     Obesity     HISTORY OF OBESITY, AS OF 11/06/2017 PATIENT HAS LOST 80 LBS    Obstructive sleep apnea of adult 2005    on CPAP    Osteoarthritis     RIGHT SHOULDER, BACK    Prostate enlargement 2014    Self cath since Jan. 16 2016    Tubular adenoma of colon 06/2016    Type II or unspecified type diabetes mellitus without mention of complication, not stated as uncontrolled 2006    NO LONGER ON MEDS, LOST WEIGHT, DIET CONTROLLED    UTI (urinary tract infection)     Wears glasses        Past Surgical History:        Procedure Laterality Date    CHOLECYSTECTOMY  1992    COLONOSCOPY  06/30/2016    portions of tubular adenoma and tubulovillous adenoma    CYSTOSCOPY  3-4-16    with greenlight laser of prostate    CYSTOSCOPY  06/18/2016    Cystoscopy, difficult Leyva catheter placement.     CYSTOSCOPY  07/29/2016    with bladder neck contrature laser ablation and placement of suprapubic catheter    CYSTOSCOPY      CYSTOSCOPY N/A 2/13/2017    CYSTOSCOPY performed by ALKPHOS 86 10/09/2017    AST 37 10/09/2017    ALT 43 10/09/2017       POC Tests:   No results for input(s): POCGLU, POCNA, POCK, POCCL, POCBUN, POCHEMO, POCHCT in the last 72 hours. Coags:   Lab Results   Component Value Date    PROTIME 11.0 01/27/2014    INR 1.1 01/27/2014    APTT 28.7 01/27/2014       HCG (If Applicable): No results found for: PREGTESTUR, PREGSERUM, HCG, HCGQUANT     ABGs: No results found for: PHART, PO2ART, OGN9QOJ, MVD4ZHH, BEART, C5BKLXVP     Type & Screen (If Applicable):  No results found for: LABABO, [address]    Anesthesia Evaluation  Patient summary reviewed no history of anesthetic complications:   Airway: Mallampati: III  TM distance: >3 FB   Neck ROM: full  Mouth opening: > = 3 FB Dental:          Pulmonary: breath sounds clear to auscultation  (+) sleep apnea: on CPAP,      (-) pneumonia, COPD, asthma, shortness of breath, recent URI, rhonchi, wheezes, rales, stridor and not a current smoker                           Cardiovascular:Negative CV ROS  Exercise tolerance: good (>4 METS),       (-) pacemaker, hypertension, past MI, CABG/stent,  angina and  CHF    ECG reviewed  Rhythm: regular  Rate: normal           Beta Blocker:  Not on Beta Blocker         Neuro/Psych:   (+) neuromuscular disease (neuropathy):, depression/anxiety    (-) seizures, TIA, CVA, headaches and psychiatric history           GI/Hepatic/Renal:   (+) hepatitis: C, liver disease (cosme dse):, renal disease: kidney stones, morbid obesity     (-) hiatal hernia, GERD, PUD, bowel prep and no morbid obesity       Endo/Other:    (+) Type II DM (not since weight loss), , .    (-) hypothyroidism, hyperthyroidism, blood dyscrasia, arthritis, no Type I DM               Abdominal:       Abdomen: soft.     Vascular:                                     Narrative      Normal sinus rhythmIncomplete right bundle branch blockBorderline ECGWhen compared with ECG of 27-JAN-2014 09:12,No significant change was found       Lab and Collection      EKG 12 Lead on 2/15/2017                Anesthesia Plan      general     ASA 2       Induction: intravenous. MIPS: Prophylactic antiemetics administered. Anesthetic plan and risks discussed with patient (female ). Plan discussed with CRNA.                   Haydee Mayfield MD   11/15/2017

## 2017-11-15 NOTE — H&P (VIEW-ONLY)
History and Physical    Pt Name: Liu Restrepo  MRN: [id number]  YOB: 1955  Date of evaluation: 11/6/2017  Primary Care Physician: Evelia Samuel MD  Patient evaluated at the request of  Dr. Ame Velasco    Reason for evaluation: Bladder neck contracture  SUBJECTIVE:   History of Chief Complaint:      Jason Dominguez is a 58 y.o. male  Who has a hx of incomplete emptying of his bladder . Hx of self cath of his bladder in the past .  Has removed approx 80 lbs . Past Medical History      has a past medical history of Allergic rhinitis; Anxiety; Controlled diabetes mellitus type II without complication ([facility] [address] Dental abscess; Depression; History of colon polyps; Hypertension; Hypertension, goal below 140/90; Intermittent self-catheterization of bladder; Kidney stones; Neuropathy ([facility] [address] Obesity; Obstructive sleep apnea of adult; Osteoarthritis; Prostate enlargement; Tubular adenoma of colon; Type II or unspecified type diabetes mellitus without mention of complication, not stated as uncontrolled; UTI (urinary tract infection); and Wears glasses. Past Surgical History   has a past surgical history that includes Cholecystectomy (1992); Lithotripsy (01/25/2016); Lithotripsy; Cystocopy (3-4-16); ostate surgery (01/2016); Cystoscopy (06/18/2016); Colonoscopy (06/30/2016); Shoulder Arthroplasty (Right, 06/16/2016); Shoulder arthroscopy (Right, 2002); eye surgery (Bilateral, 1992); Vasectomy (1995); Cystoscopy (07/29/2016); Cystocopy; Cystoscopy (N/A, 2/13/2017); joint replacement; and Cystoscopy (N/A, 3/13/2017).        Medications   Scheduled Meds:  Current Outpatient Rx   Medication Sig Dispense Refill    LYRICA 150 MG capsule TAKE ONE CAPSULE BY MOUTH DAILY 90 capsule 1    FLUoxetine (PROZAC) 20 MG capsule TAKE ONE CAPSULE BY MOUTH EVERY MORNING 90 capsule 3    meloxicam (MOBIC) 15 MG tablet TAKE ONE TABLET BY MOUTH DAILY 90 tablet 3    vitamin D (ERGOCALCIFEROL) 24933 units CAPS bruit    LUNGS: Chest expands equally bilaterally upon respiration, no accessory muscle used. Ausculation reveals no adventitious breath sounds. CARDIOVASCULAR: \"Heart sounds are normal.  Regular rate and rhythm without murmur,    ABDOMEN: Bowel sounds are present in all four quadrants      GENATALIA:Deferred. NEUROLOGIC: \"CN II-XII are grossly intact. EXTREMITIES: Pitting edema:  No,  Varicose veins: No     Dorsal pedal/posterior tibial pulses palpable: Yes         Strength:  Normal       Patient Active Problem List   Diagnosis    Prostate enlargement    Type 2 diabetes, controlled, with neuropathy ([address]    Hypertension, goal below 140/90    Osteoarthritis    Anxiety    Depression    Obstructive sleep apnea of adult    Neuropathy (HCC)    Obesity (BMI 30-39. 9)    Right shoulder pain    Tubular adenoma of colon    History of colon polyps    Hepatitis C carrier ([address]    Weight loss counseling, encounter for    Stomach ulcer    Gilbert's disease    Vitamin D deficiency    Encounter for well adult exam without abnormal findings    BMI 31.0-31.9,adult    H/O urinary retention    Overweight (BMI 25.0-29. 9)               IMPRESSIONS:   1.  Bladder neck contracture  2.  does not have any pertinent problems on file.     [facility]  Electronically signed 11/6/2017 at 9:44 AM       Scheduled for:  Cysto ,  Transurethral bladder neck resection with urethral dilation, suprapubic tube placement

## 2017-11-17 LAB
CULTURE: ABNORMAL
CULTURE: ABNORMAL
Lab: ABNORMAL
SPECIMEN DESCRIPTION: ABNORMAL
STATUS: ABNORMAL

## 2017-11-20 ENCOUNTER — OFFICE VISIT (OUTPATIENT)
Dept: BARIATRICS/WEIGHT MGMT | Age: 62
End: 2017-11-20
Payer: COMMERCIAL

## 2017-11-20 VITALS
RESPIRATION RATE: 20 BRPM | BODY MASS INDEX: 27.14 KG/M2 | HEART RATE: 68 BPM | DIASTOLIC BLOOD PRESSURE: 68 MMHG | SYSTOLIC BLOOD PRESSURE: 122 MMHG | HEIGHT: 75 IN | WEIGHT: 218.3 LBS

## 2017-11-20 DIAGNOSIS — E66.3 OVERWEIGHT (BMI 25.0-29.9): ICD-10-CM

## 2017-11-20 DIAGNOSIS — I10 HYPERTENSION, GOAL BELOW 140/90: Chronic | ICD-10-CM

## 2017-11-20 DIAGNOSIS — E11.40 TYPE 2 DIABETES, CONTROLLED, WITH NEUROPATHY (HCC): Primary | Chronic | ICD-10-CM

## 2017-11-20 DIAGNOSIS — M15.9 PRIMARY OSTEOARTHRITIS INVOLVING MULTIPLE JOINTS: ICD-10-CM

## 2017-11-20 DIAGNOSIS — E80.4 GILBERT'S DISEASE: ICD-10-CM

## 2017-11-20 DIAGNOSIS — G47.33 OBSTRUCTIVE SLEEP APNEA OF ADULT: Chronic | ICD-10-CM

## 2017-11-20 PROCEDURE — 99213 OFFICE O/P EST LOW 20 MIN: CPT | Performed by: NURSE PRACTITIONER

## 2017-12-18 ENCOUNTER — OFFICE VISIT (OUTPATIENT)
Dept: UROLOGY | Age: 62
End: 2017-12-18
Payer: COMMERCIAL

## 2017-12-18 VITALS
SYSTOLIC BLOOD PRESSURE: 144 MMHG | DIASTOLIC BLOOD PRESSURE: 85 MMHG | TEMPERATURE: 97.7 F | HEART RATE: 62 BPM | HEIGHT: 75 IN | BODY MASS INDEX: 27.14 KG/M2 | WEIGHT: 218.26 LBS

## 2017-12-18 DIAGNOSIS — N40.0 PROSTATE ENLARGEMENT: Primary | Chronic | ICD-10-CM

## 2017-12-18 PROCEDURE — 51705 CHANGE OF BLADDER TUBE: CPT | Performed by: UROLOGY

## 2017-12-18 ASSESSMENT — ENCOUNTER SYMPTOMS
EYE REDNESS: 0
COLOR CHANGE: 0
SHORTNESS OF BREATH: 0
WHEEZING: 0
VOMITING: 0
COUGH: 0
NAUSEA: 0
ABDOMINAL PAIN: 0
EYE PAIN: 0
BACK PAIN: 0

## 2018-01-18 ENCOUNTER — PROCEDURE VISIT (OUTPATIENT)
Dept: UROLOGY | Age: 63
End: 2018-01-18
Payer: COMMERCIAL

## 2018-01-18 VITALS — SYSTOLIC BLOOD PRESSURE: 126 MMHG | HEART RATE: 64 BPM | TEMPERATURE: 98 F | DIASTOLIC BLOOD PRESSURE: 82 MMHG

## 2018-01-18 DIAGNOSIS — R33.9 URINARY RETENTION: Primary | ICD-10-CM

## 2018-01-18 PROCEDURE — 99999 PR OFFICE/OUTPT VISIT,PROCEDURE ONLY: CPT | Performed by: NURSE PRACTITIONER

## 2018-01-18 PROCEDURE — 51705 CHANGE OF BLADDER TUBE: CPT | Performed by: NURSE PRACTITIONER

## 2018-01-29 PROBLEM — B35.1 ONYCHOMYCOSIS OF RIGHT GREAT TOE: Status: ACTIVE | Noted: 2018-01-29

## 2018-02-08 ENCOUNTER — PROCEDURE VISIT (OUTPATIENT)
Dept: UROLOGY | Age: 63
End: 2018-02-08
Payer: COMMERCIAL

## 2018-02-08 VITALS — DIASTOLIC BLOOD PRESSURE: 75 MMHG | HEART RATE: 63 BPM | TEMPERATURE: 98.4 F | SYSTOLIC BLOOD PRESSURE: 135 MMHG

## 2018-02-08 DIAGNOSIS — N40.0 PROSTATE ENLARGEMENT: Primary | Chronic | ICD-10-CM

## 2018-02-08 PROCEDURE — 99999 PR OFFICE/OUTPT VISIT,PROCEDURE ONLY: CPT | Performed by: NURSE PRACTITIONER

## 2018-02-08 PROCEDURE — 51705 CHANGE OF BLADDER TUBE: CPT | Performed by: NURSE PRACTITIONER

## 2018-02-08 NOTE — PROGRESS NOTES
Suprapubic Wolf Change and Insertion Procedure:    Placement Date: 2/8/18    Verbal consent obtained from patient prior to procedure. Patient arrived with old 20F catheter in place, balloon deflated and was removed without complication. Per Dr Joann Thakkar, new 22F wolf inserted utilizing sterile technique per organization policy placed by [facility]. 8ml sterile water balloon inflated with return of clear yellow urine. New catheter plug inserted and wolf secured. Patient tolerated procedure well and without complications. Reviewed wolf care. Verbalized understanding.

## 2018-03-08 ENCOUNTER — PROCEDURE VISIT (OUTPATIENT)
Dept: UROLOGY | Age: 63
End: 2018-03-08
Payer: COMMERCIAL

## 2018-03-08 VITALS — DIASTOLIC BLOOD PRESSURE: 80 MMHG | TEMPERATURE: 99.1 F | HEART RATE: 64 BPM | SYSTOLIC BLOOD PRESSURE: 128 MMHG

## 2018-03-08 DIAGNOSIS — R33.9 URINARY RETENTION: Primary | ICD-10-CM

## 2018-03-08 PROCEDURE — 51705 CHANGE OF BLADDER TUBE: CPT | Performed by: NURSE PRACTITIONER

## 2018-03-08 PROCEDURE — 99999 PR OFFICE/OUTPT VISIT,PROCEDURE ONLY: CPT | Performed by: NURSE PRACTITIONER

## 2018-03-08 NOTE — PROGRESS NOTES
Suprapubic Catheter Change and Insertion Procedure:  Risks and Benefits discussed with patient prior to procedure. Placement Date: 3/8/18    Verbal consent obtained from patient prior to procedure. Patient arrived with old 25 SP catheter in place and plugged. Plug removed and emptied 225mL of yellow urine. Balloon deflated and catheter was removed without complication. New 22F catheter inserted utilizing sterile technique per organization policy placed by CES[employer] GONG.  8ml sterile water balloon inflated with return of pink fluid. New plug inserted. SP catheter secured. Patient tolerated procedure well and without complications. Reviewed catheter care. Verbalized understanding. Pt given a new overnight bag.

## 2018-04-05 ENCOUNTER — PROCEDURE VISIT (OUTPATIENT)
Dept: UROLOGY | Age: 63
End: 2018-04-05
Payer: COMMERCIAL

## 2018-04-05 VITALS
HEIGHT: 76 IN | TEMPERATURE: 98.2 F | WEIGHT: 230 LBS | DIASTOLIC BLOOD PRESSURE: 76 MMHG | SYSTOLIC BLOOD PRESSURE: 120 MMHG | BODY MASS INDEX: 28.01 KG/M2 | HEART RATE: 63 BPM

## 2018-04-05 DIAGNOSIS — Z87.898 H/O URINARY RETENTION: Primary | Chronic | ICD-10-CM

## 2018-04-05 PROCEDURE — 99999 PR OFFICE/OUTPT VISIT,PROCEDURE ONLY: CPT | Performed by: NURSE PRACTITIONER

## 2018-04-05 PROCEDURE — 51705 CHANGE OF BLADDER TUBE: CPT | Performed by: NURSE PRACTITIONER

## 2018-05-07 ENCOUNTER — PROCEDURE VISIT (OUTPATIENT)
Dept: UROLOGY | Age: 63
End: 2018-05-07
Payer: COMMERCIAL

## 2018-05-07 VITALS
TEMPERATURE: 98.2 F | DIASTOLIC BLOOD PRESSURE: 71 MMHG | WEIGHT: 229.94 LBS | HEIGHT: 76 IN | BODY MASS INDEX: 28 KG/M2 | HEART RATE: 67 BPM | SYSTOLIC BLOOD PRESSURE: 138 MMHG

## 2018-05-07 DIAGNOSIS — Z87.898 H/O URINARY RETENTION: Primary | Chronic | ICD-10-CM

## 2018-05-07 PROCEDURE — 99999 PR OFFICE/OUTPT VISIT,PROCEDURE ONLY: CPT | Performed by: NURSE PRACTITIONER

## 2018-05-07 PROCEDURE — 51705 CHANGE OF BLADDER TUBE: CPT | Performed by: NURSE PRACTITIONER

## 2018-05-13 ENCOUNTER — HOSPITAL ENCOUNTER (OUTPATIENT)
Age: 63
Setting detail: SPECIMEN
Discharge: HOME | End: 2018-05-13
Payer: COMMERCIAL

## 2018-05-13 ENCOUNTER — OFFICE VISIT (OUTPATIENT)
Dept: FAMILY MEDICINE CLINIC | Age: 63
End: 2018-05-13
Payer: COMMERCIAL

## 2018-05-13 VITALS
BODY MASS INDEX: 28.01 KG/M2 | WEIGHT: 230 LBS | OXYGEN SATURATION: 97 % | RESPIRATION RATE: 16 BRPM | HEART RATE: 63 BPM | SYSTOLIC BLOOD PRESSURE: 135 MMHG | TEMPERATURE: 97.9 F | HEIGHT: 76 IN | DIASTOLIC BLOOD PRESSURE: 80 MMHG

## 2018-05-13 DIAGNOSIS — N39.0 URINARY TRACT INFECTION ASSOCIATED WITH INDWELLING URETHRAL CATHETER, INITIAL ENCOUNTER (HCC): Primary | ICD-10-CM

## 2018-05-13 DIAGNOSIS — R30.0 DYSURIA: ICD-10-CM

## 2018-05-13 DIAGNOSIS — T83.511A URINARY TRACT INFECTION ASSOCIATED WITH INDWELLING URETHRAL CATHETER, INITIAL ENCOUNTER (HCC): Primary | ICD-10-CM

## 2018-05-13 LAB
-: ABNORMAL
AMORPHOUS: ABNORMAL
BACTERIA: ABNORMAL
BILIRUBIN URINE: NEGATIVE
BILIRUBIN, POC: NEGATIVE
BLOOD URINE, POC: ABNORMAL
CASTS UA: ABNORMAL /LPF (ref 0–8)
CLARITY, POC: CLEAR
COLOR, POC: YELLOW
COLOR: YELLOW
COMMENT UA: ABNORMAL
CRYSTALS, UA: ABNORMAL /HPF
EPITHELIAL CELLS UA: ABNORMAL /HPF (ref 0–5)
GLUCOSE URINE, POC: NEGATIVE
GLUCOSE URINE: NEGATIVE
KETONES, POC: NEGATIVE
KETONES, URINE: NEGATIVE
LEUKOCYTE EST, POC: ABNORMAL
LEUKOCYTE ESTERASE, URINE: ABNORMAL
MUCUS: ABNORMAL
NITRITE, POC: POSITIVE
NITRITE, URINE: POSITIVE
OTHER OBSERVATIONS UA: ABNORMAL
PH UA: 7.5 (ref 5–8)
PH, POC: 7.5
PROTEIN UA: NEGATIVE
PROTEIN, POC: NEGATIVE
RBC UA: ABNORMAL /HPF (ref 0–4)
RENAL EPITHELIAL, UA: ABNORMAL /HPF
SPECIFIC GRAVITY UA: 1.01 (ref 1–1.03)
SPECIFIC GRAVITY, POC: 1.01
TRICHOMONAS: ABNORMAL
TURBIDITY: CLEAR
URINE HGB: NEGATIVE
UROBILINOGEN, POC: 0.2
UROBILINOGEN, URINE: NORMAL
WBC UA: ABNORMAL /HPF (ref 0–5)
YEAST: ABNORMAL

## 2018-05-13 PROCEDURE — 81003 URINALYSIS AUTO W/O SCOPE: CPT | Performed by: INTERNAL MEDICINE

## 2018-05-13 PROCEDURE — 99213 OFFICE O/P EST LOW 20 MIN: CPT | Performed by: INTERNAL MEDICINE

## 2018-05-13 RX ORDER — LEVOFLOXACIN 500 MG/1
500 TABLET, FILM COATED ORAL DAILY
Qty: 7 TABLET | Refills: 0 | Status: SHIPPED | OUTPATIENT
Start: 2018-05-13 | End: 2018-05-20

## 2018-05-13 ASSESSMENT — PATIENT HEALTH QUESTIONNAIRE - PHQ9
2. FEELING DOWN, DEPRESSED OR HOPELESS: 0
SUM OF ALL RESPONSES TO PHQ QUESTIONS 1-9: 0
SUM OF ALL RESPONSES TO PHQ9 QUESTIONS 1 & 2: 0

## 2018-05-13 ASSESSMENT — ENCOUNTER SYMPTOMS
COUGH: 0
SHORTNESS OF BREATH: 0

## 2018-05-15 LAB
CULTURE: ABNORMAL
Lab: ABNORMAL
ORGANISM: ABNORMAL
SPECIMEN DESCRIPTION: ABNORMAL
STATUS: ABNORMAL

## 2018-06-07 ENCOUNTER — PROCEDURE VISIT (OUTPATIENT)
Dept: UROLOGY | Age: 63
End: 2018-06-07
Payer: COMMERCIAL

## 2018-06-07 VITALS
DIASTOLIC BLOOD PRESSURE: 89 MMHG | HEIGHT: 76 IN | BODY MASS INDEX: 27.89 KG/M2 | SYSTOLIC BLOOD PRESSURE: 154 MMHG | HEART RATE: 68 BPM | TEMPERATURE: 98.5 F | WEIGHT: 229 LBS

## 2018-06-07 DIAGNOSIS — Z87.898 H/O URINARY RETENTION: Primary | Chronic | ICD-10-CM

## 2018-06-07 PROCEDURE — 99999 PR OFFICE/OUTPT VISIT,PROCEDURE ONLY: CPT | Performed by: NURSE PRACTITIONER

## 2018-06-07 PROCEDURE — 51705 CHANGE OF BLADDER TUBE: CPT | Performed by: NURSE PRACTITIONER

## 2018-06-21 ENCOUNTER — PROCEDURE VISIT (OUTPATIENT)
Dept: UROLOGY | Age: 63
End: 2018-06-21
Payer: COMMERCIAL

## 2018-06-21 VITALS — HEART RATE: 80 BPM | SYSTOLIC BLOOD PRESSURE: 134 MMHG | DIASTOLIC BLOOD PRESSURE: 80 MMHG

## 2018-06-21 DIAGNOSIS — R33.9 URINARY RETENTION: Primary | ICD-10-CM

## 2018-06-21 PROCEDURE — 99999 PR OFFICE/OUTPT VISIT,PROCEDURE ONLY: CPT | Performed by: NURSE PRACTITIONER

## 2018-06-21 PROCEDURE — 51705 CHANGE OF BLADDER TUBE: CPT | Performed by: NURSE PRACTITIONER

## 2018-07-26 ENCOUNTER — PROCEDURE VISIT (OUTPATIENT)
Dept: UROLOGY | Age: 63
End: 2018-07-26
Payer: COMMERCIAL

## 2018-07-26 VITALS
DIASTOLIC BLOOD PRESSURE: 87 MMHG | SYSTOLIC BLOOD PRESSURE: 145 MMHG | TEMPERATURE: 98.1 F | BODY MASS INDEX: 27.89 KG/M2 | HEART RATE: 69 BPM | HEIGHT: 76 IN | WEIGHT: 229 LBS

## 2018-07-26 DIAGNOSIS — Z87.898 H/O URINARY RETENTION: Primary | Chronic | ICD-10-CM

## 2018-07-26 PROCEDURE — 51710 CHANGE OF BLADDER TUBE: CPT | Performed by: NURSE PRACTITIONER

## 2018-07-26 PROCEDURE — 99999 PR OFFICE/OUTPT VISIT,PROCEDURE ONLY: CPT | Performed by: NURSE PRACTITIONER

## 2018-07-30 PROBLEM — Z98.890 HISTORY OF SUPRAPUBIC CATHETER: Status: ACTIVE | Noted: 2018-07-30

## 2018-08-23 ENCOUNTER — PROCEDURE VISIT (OUTPATIENT)
Dept: UROLOGY | Age: 63
End: 2018-08-23
Payer: COMMERCIAL

## 2018-08-23 VITALS — SYSTOLIC BLOOD PRESSURE: 131 MMHG | DIASTOLIC BLOOD PRESSURE: 77 MMHG | HEART RATE: 64 BPM

## 2018-08-23 DIAGNOSIS — R33.9 URINARY RETENTION: Primary | ICD-10-CM

## 2018-08-23 PROCEDURE — 99999 PR OFFICE/OUTPT VISIT,PROCEDURE ONLY: CPT | Performed by: NURSE PRACTITIONER

## 2018-08-23 PROCEDURE — 51710 CHANGE OF BLADDER TUBE: CPT | Performed by: NURSE PRACTITIONER

## 2018-09-20 ENCOUNTER — PROCEDURE VISIT (OUTPATIENT)
Dept: UROLOGY | Age: 63
End: 2018-09-20
Payer: COMMERCIAL

## 2018-09-20 VITALS — TEMPERATURE: 97.8 F | DIASTOLIC BLOOD PRESSURE: 83 MMHG | HEART RATE: 63 BPM | SYSTOLIC BLOOD PRESSURE: 130 MMHG

## 2018-09-20 DIAGNOSIS — R33.9 URINARY RETENTION: Primary | ICD-10-CM

## 2018-09-20 PROCEDURE — 99999 PR OFFICE/OUTPT VISIT,PROCEDURE ONLY: CPT | Performed by: NURSE PRACTITIONER

## 2018-09-20 PROCEDURE — 51710 CHANGE OF BLADDER TUBE: CPT | Performed by: NURSE PRACTITIONER

## 2018-09-26 PROBLEM — Z00.00 ENCOUNTER FOR WELL ADULT EXAM WITHOUT ABNORMAL FINDINGS: Status: RESOLVED | Noted: 2017-07-24 | Resolved: 2018-09-26

## 2018-10-18 ENCOUNTER — PROCEDURE VISIT (OUTPATIENT)
Dept: UROLOGY | Age: 63
End: 2018-10-18
Payer: COMMERCIAL

## 2018-10-18 VITALS — DIASTOLIC BLOOD PRESSURE: 87 MMHG | SYSTOLIC BLOOD PRESSURE: 131 MMHG | TEMPERATURE: 98.3 F | HEART RATE: 68 BPM

## 2018-10-18 DIAGNOSIS — R33.9 URINARY RETENTION: Primary | ICD-10-CM

## 2018-10-18 PROCEDURE — 51705 CHANGE OF BLADDER TUBE: CPT | Performed by: NURSE PRACTITIONER

## 2018-10-18 PROCEDURE — 99999 PR OFFICE/OUTPT VISIT,PROCEDURE ONLY: CPT | Performed by: NURSE PRACTITIONER

## 2018-11-15 ENCOUNTER — PROCEDURE VISIT (OUTPATIENT)
Dept: UROLOGY | Age: 63
End: 2018-11-15
Payer: COMMERCIAL

## 2018-11-15 VITALS — SYSTOLIC BLOOD PRESSURE: 139 MMHG | DIASTOLIC BLOOD PRESSURE: 83 MMHG | HEART RATE: 72 BPM | TEMPERATURE: 98.3 F

## 2018-11-15 DIAGNOSIS — R33.9 URINARY RETENTION: Primary | ICD-10-CM

## 2018-11-15 PROCEDURE — 51705 CHANGE OF BLADDER TUBE: CPT | Performed by: NURSE PRACTITIONER

## 2018-11-15 PROCEDURE — 99999 PR OFFICE/OUTPT VISIT,PROCEDURE ONLY: CPT | Performed by: NURSE PRACTITIONER

## 2018-12-13 ENCOUNTER — PROCEDURE VISIT (OUTPATIENT)
Dept: UROLOGY | Age: 63
End: 2018-12-13
Payer: COMMERCIAL

## 2018-12-13 VITALS — DIASTOLIC BLOOD PRESSURE: 83 MMHG | SYSTOLIC BLOOD PRESSURE: 121 MMHG | HEART RATE: 68 BPM | TEMPERATURE: 98.2 F

## 2018-12-13 DIAGNOSIS — N40.0 PROSTATE ENLARGEMENT: Primary | Chronic | ICD-10-CM

## 2018-12-13 PROCEDURE — 51705 CHANGE OF BLADDER TUBE: CPT | Performed by: NURSE PRACTITIONER

## 2018-12-13 PROCEDURE — 99999 PR OFFICE/OUTPT VISIT,PROCEDURE ONLY: CPT | Performed by: NURSE PRACTITIONER

## 2019-01-10 ENCOUNTER — PROCEDURE VISIT (OUTPATIENT)
Dept: UROLOGY | Age: 64
End: 2019-01-10
Payer: COMMERCIAL

## 2019-01-10 VITALS
DIASTOLIC BLOOD PRESSURE: 88 MMHG | TEMPERATURE: 98.4 F | BODY MASS INDEX: 30.44 KG/M2 | HEART RATE: 72 BPM | SYSTOLIC BLOOD PRESSURE: 140 MMHG | WEIGHT: 250 LBS | HEIGHT: 76 IN

## 2019-01-10 DIAGNOSIS — Z87.898 H/O URINARY RETENTION: Primary | Chronic | ICD-10-CM

## 2019-01-10 PROCEDURE — 51705 CHANGE OF BLADDER TUBE: CPT | Performed by: NURSE PRACTITIONER

## 2019-01-10 PROCEDURE — 99999 PR OFFICE/OUTPT VISIT,PROCEDURE ONLY: CPT | Performed by: NURSE PRACTITIONER

## 2019-02-04 PROBLEM — E66.811 CLASS 1 OBESITY DUE TO EXCESS CALORIES WITH SERIOUS COMORBIDITY AND BODY MASS INDEX (BMI) OF 30.0 TO 30.9 IN ADULT: Status: ACTIVE | Noted: 2017-03-13

## 2019-02-04 PROBLEM — E66.09 CLASS 1 OBESITY DUE TO EXCESS CALORIES WITH SERIOUS COMORBIDITY AND BODY MASS INDEX (BMI) OF 30.0 TO 30.9 IN ADULT: Status: ACTIVE | Noted: 2017-03-13

## 2019-02-07 ENCOUNTER — PROCEDURE VISIT (OUTPATIENT)
Dept: UROLOGY | Age: 64
End: 2019-02-07
Payer: COMMERCIAL

## 2019-02-07 VITALS
WEIGHT: 244 LBS | HEART RATE: 79 BPM | HEIGHT: 75 IN | BODY MASS INDEX: 30.34 KG/M2 | DIASTOLIC BLOOD PRESSURE: 85 MMHG | SYSTOLIC BLOOD PRESSURE: 137 MMHG | TEMPERATURE: 98 F

## 2019-02-07 DIAGNOSIS — Z98.890 HISTORY OF SUPRAPUBIC CATHETER: Primary | ICD-10-CM

## 2019-02-07 PROCEDURE — 51705 CHANGE OF BLADDER TUBE: CPT | Performed by: NURSE PRACTITIONER

## 2019-02-07 PROCEDURE — 99999 PR OFFICE/OUTPT VISIT,PROCEDURE ONLY: CPT | Performed by: NURSE PRACTITIONER

## 2019-02-14 ENCOUNTER — HOSPITAL ENCOUNTER (OUTPATIENT)
Age: 64
Discharge: HOME | End: 2019-02-14
Payer: COMMERCIAL

## 2019-02-14 DIAGNOSIS — E11.40 TYPE 2 DIABETES, CONTROLLED, WITH NEUROPATHY (HCC): Chronic | ICD-10-CM

## 2019-02-14 DIAGNOSIS — I10 HYPERTENSION, GOAL BELOW 140/90: Chronic | ICD-10-CM

## 2019-02-14 DIAGNOSIS — Z11.4 ENCOUNTER FOR SCREENING FOR HIV: ICD-10-CM

## 2019-02-14 LAB
ABSOLUTE EOS #: 0.1 K/UL (ref 0–0.4)
ABSOLUTE IMMATURE GRANULOCYTE: ABNORMAL K/UL (ref 0–0.3)
ABSOLUTE LYMPH #: 1.9 K/UL (ref 1–4.8)
ABSOLUTE MONO #: 0.5 K/UL (ref 0.1–1.3)
ALBUMIN SERPL-MCNC: 4.7 G/DL (ref 3.5–5.2)
ALBUMIN/GLOBULIN RATIO: ABNORMAL (ref 1–2.5)
ALP BLD-CCNC: 74 U/L (ref 40–129)
ALT SERPL-CCNC: 37 U/L (ref 5–41)
ANION GAP SERPL CALCULATED.3IONS-SCNC: 10 MMOL/L (ref 9–17)
AST SERPL-CCNC: 32 U/L
BASOPHILS # BLD: 1 % (ref 0–2)
BASOPHILS ABSOLUTE: 0.1 K/UL (ref 0–0.2)
BILIRUB SERPL-MCNC: 2.44 MG/DL (ref 0.3–1.2)
BUN BLDV-MCNC: 16 MG/DL (ref 8–23)
BUN/CREAT BLD: ABNORMAL (ref 9–20)
CALCIUM SERPL-MCNC: 10 MG/DL (ref 8.6–10.4)
CHLORIDE BLD-SCNC: 101 MMOL/L (ref 98–107)
CHOLESTEROL/HDL RATIO: 3.3
CHOLESTEROL: 142 MG/DL
CO2: 27 MMOL/L (ref 20–31)
CREAT SERPL-MCNC: 0.96 MG/DL (ref 0.7–1.2)
CREATININE URINE: 93.2 MG/DL (ref 39–259)
DIFFERENTIAL TYPE: ABNORMAL
EOSINOPHILS RELATIVE PERCENT: 1 % (ref 0–4)
GFR AFRICAN AMERICAN: >60 ML/MIN
GFR NON-AFRICAN AMERICAN: >60 ML/MIN
GFR SERPL CREATININE-BSD FRML MDRD: ABNORMAL ML/MIN/{1.73_M2}
GFR SERPL CREATININE-BSD FRML MDRD: ABNORMAL ML/MIN/{1.73_M2}
GLUCOSE BLD-MCNC: 107 MG/DL (ref 70–99)
HCT VFR BLD CALC: 47.8 % (ref 41–53)
HDLC SERPL-MCNC: 43 MG/DL
HEMOGLOBIN: 16.7 G/DL (ref 13.5–17.5)
HIV AG/AB: NONREACTIVE
IMMATURE GRANULOCYTES: ABNORMAL %
LDL CHOLESTEROL: 72 MG/DL (ref 0–130)
LYMPHOCYTES # BLD: 29 % (ref 24–44)
MCH RBC QN AUTO: 34.4 PG (ref 26–34)
MCHC RBC AUTO-ENTMCNC: 35 G/DL (ref 31–37)
MCV RBC AUTO: 98.3 FL (ref 80–100)
MICROALBUMIN/CREAT 24H UR: 82 MG/L
MICROALBUMIN/CREAT UR-RTO: 88 MCG/MG CREAT
MONOCYTES # BLD: 7 % (ref 1–7)
NRBC AUTOMATED: ABNORMAL PER 100 WBC
PDW BLD-RTO: 12.7 % (ref 11.5–14.9)
PLATELET # BLD: 229 K/UL (ref 150–450)
PLATELET ESTIMATE: ABNORMAL
PMV BLD AUTO: 8.1 FL (ref 6–12)
POTASSIUM SERPL-SCNC: 4.5 MMOL/L (ref 3.7–5.3)
RBC # BLD: 4.87 M/UL (ref 4.5–5.9)
RBC # BLD: ABNORMAL 10*6/UL
SEG NEUTROPHILS: 62 % (ref 36–66)
SEGMENTED NEUTROPHILS ABSOLUTE COUNT: 4 K/UL (ref 1.3–9.1)
SODIUM BLD-SCNC: 138 MMOL/L (ref 135–144)
TOTAL PROTEIN: 7.5 G/DL (ref 6.4–8.3)
TRIGL SERPL-MCNC: 135 MG/DL
VLDLC SERPL CALC-MCNC: NORMAL MG/DL (ref 1–30)
WBC # BLD: 6.5 K/UL (ref 3.5–11)
WBC # BLD: ABNORMAL 10*3/UL

## 2019-02-14 PROCEDURE — 85025 COMPLETE CBC W/AUTO DIFF WBC: CPT

## 2019-02-14 PROCEDURE — 80053 COMPREHEN METABOLIC PANEL: CPT

## 2019-02-14 PROCEDURE — 80061 LIPID PANEL: CPT

## 2019-02-14 PROCEDURE — 82570 ASSAY OF URINE CREATININE: CPT

## 2019-02-14 PROCEDURE — 82043 UR ALBUMIN QUANTITATIVE: CPT

## 2019-02-14 PROCEDURE — 87389 HIV-1 AG W/HIV-1&-2 AB AG IA: CPT

## 2019-03-04 PROBLEM — T50.905A WEIGHT LOSS DUE TO MEDICATION: Status: ACTIVE | Noted: 2019-03-04

## 2019-03-04 PROBLEM — R63.4 WEIGHT LOSS DUE TO MEDICATION: Status: ACTIVE | Noted: 2019-03-04

## 2019-03-07 ENCOUNTER — PROCEDURE VISIT (OUTPATIENT)
Dept: UROLOGY | Age: 64
End: 2019-03-07
Payer: COMMERCIAL

## 2019-03-07 VITALS
HEIGHT: 76 IN | TEMPERATURE: 98.1 F | HEART RATE: 70 BPM | WEIGHT: 240 LBS | DIASTOLIC BLOOD PRESSURE: 74 MMHG | BODY MASS INDEX: 29.22 KG/M2 | SYSTOLIC BLOOD PRESSURE: 122 MMHG

## 2019-03-07 DIAGNOSIS — Z87.898 H/O URINARY RETENTION: Primary | ICD-10-CM

## 2019-03-07 PROCEDURE — 99999 PR OFFICE/OUTPT VISIT,PROCEDURE ONLY: CPT | Performed by: NURSE PRACTITIONER

## 2019-03-07 PROCEDURE — 51705 CHANGE OF BLADDER TUBE: CPT | Performed by: NURSE PRACTITIONER

## 2019-04-11 ENCOUNTER — PROCEDURE VISIT (OUTPATIENT)
Dept: UROLOGY | Age: 64
End: 2019-04-11
Payer: COMMERCIAL

## 2019-04-11 VITALS
SYSTOLIC BLOOD PRESSURE: 120 MMHG | HEIGHT: 76 IN | TEMPERATURE: 98.1 F | WEIGHT: 238.32 LBS | DIASTOLIC BLOOD PRESSURE: 81 MMHG | HEART RATE: 69 BPM | BODY MASS INDEX: 29.02 KG/M2

## 2019-04-11 DIAGNOSIS — R33.9 INCOMPLETE EMPTYING OF BLADDER: Primary | ICD-10-CM

## 2019-04-11 PROCEDURE — 51705 CHANGE OF BLADDER TUBE: CPT | Performed by: NURSE PRACTITIONER

## 2019-04-11 RX ORDER — OXYCODONE HYDROCHLORIDE AND ACETAMINOPHEN 5; 325 MG/1; MG/1
TABLET ORAL
COMMUNITY
Start: 2019-04-01 | End: 2020-01-30 | Stop reason: ALTCHOICE

## 2019-04-11 NOTE — PROGRESS NOTES
Suprapubic Catheter Change and Insertion Procedure:  Risks and Benefits discussed with patient prior to procedure. Placement Date: 4/11/19    Verbal consent obtained from patient prior to procedure. Patient arrived with old 25 F SP catheter in place, balloon deflated and was removed without complication. New 22F catheter inserted utilizing sterile technique per organization policy placed by Praveen Boles. 8ml sterile water balloon inflated, attached to catheter plug with return of yellow  urine. SP catheter secured. Patient tolerated procedure well and without complications. Reviewed catheter care. Verbalized understanding.

## 2019-05-16 ENCOUNTER — PROCEDURE VISIT (OUTPATIENT)
Dept: UROLOGY | Age: 64
End: 2019-05-16
Payer: COMMERCIAL

## 2019-05-16 VITALS
TEMPERATURE: 98.5 F | BODY MASS INDEX: 29.83 KG/M2 | SYSTOLIC BLOOD PRESSURE: 127 MMHG | WEIGHT: 245 LBS | HEART RATE: 92 BPM | DIASTOLIC BLOOD PRESSURE: 81 MMHG | HEIGHT: 76 IN

## 2019-05-16 DIAGNOSIS — R33.9 INCOMPLETE EMPTYING OF BLADDER: Primary | ICD-10-CM

## 2019-05-16 PROCEDURE — 51705 CHANGE OF BLADDER TUBE: CPT | Performed by: NURSE PRACTITIONER

## 2019-06-13 ENCOUNTER — PROCEDURE VISIT (OUTPATIENT)
Dept: UROLOGY | Age: 64
End: 2019-06-13
Payer: COMMERCIAL

## 2019-06-13 VITALS
BODY MASS INDEX: 30.2 KG/M2 | HEART RATE: 77 BPM | WEIGHT: 248 LBS | DIASTOLIC BLOOD PRESSURE: 82 MMHG | SYSTOLIC BLOOD PRESSURE: 157 MMHG | HEIGHT: 76 IN | TEMPERATURE: 98.2 F

## 2019-06-13 DIAGNOSIS — R33.9 URINARY RETENTION: Primary | ICD-10-CM

## 2019-06-13 PROCEDURE — 51705 CHANGE OF BLADDER TUBE: CPT | Performed by: NURSE PRACTITIONER

## 2019-06-13 RX ORDER — CHLORAL HYDRATE 500 MG
3000 CAPSULE ORAL 3 TIMES DAILY
COMMUNITY
End: 2020-09-14 | Stop reason: ALTCHOICE

## 2019-06-13 NOTE — PROGRESS NOTES
Suprapubic Catheter Change and Insertion Procedure:  Risks and Benefits discussed with patient prior to procedure. Placement Date: 6/13/19    Verbal consent obtained from patient prior to procedure. Patient arrived with old 22SP catheter in place, balloon deflated and was removed without complication. New 22F catheter inserted utilizing sterile technique per organization policy placed by [place]. 8ml sterile water balloon inflated, with positive returns. SP catheter secured. Patient tolerated procedure well and without complications. Reviewed catheter care. Verbalized understanding.

## 2019-07-18 ENCOUNTER — PROCEDURE VISIT (OUTPATIENT)
Dept: UROLOGY | Age: 64
End: 2019-07-18
Payer: COMMERCIAL

## 2019-07-18 VITALS
TEMPERATURE: 98.6 F | HEIGHT: 76 IN | BODY MASS INDEX: 30.34 KG/M2 | SYSTOLIC BLOOD PRESSURE: 130 MMHG | WEIGHT: 249.12 LBS | DIASTOLIC BLOOD PRESSURE: 73 MMHG | HEART RATE: 65 BPM

## 2019-07-18 DIAGNOSIS — R33.9 INCOMPLETE EMPTYING OF BLADDER: ICD-10-CM

## 2019-07-18 DIAGNOSIS — R33.9 URINARY RETENTION: Primary | ICD-10-CM

## 2019-07-18 PROCEDURE — 51705 CHANGE OF BLADDER TUBE: CPT | Performed by: NURSE PRACTITIONER

## 2019-08-12 ENCOUNTER — PROCEDURE VISIT (OUTPATIENT)
Dept: UROLOGY | Age: 64
End: 2019-08-12
Payer: COMMERCIAL

## 2019-08-12 VITALS
SYSTOLIC BLOOD PRESSURE: 136 MMHG | WEIGHT: 246 LBS | DIASTOLIC BLOOD PRESSURE: 78 MMHG | TEMPERATURE: 98.3 F | HEART RATE: 71 BPM | BODY MASS INDEX: 29.96 KG/M2 | HEIGHT: 76 IN

## 2019-08-12 DIAGNOSIS — R33.9 URINARY RETENTION: Primary | ICD-10-CM

## 2019-08-12 PROCEDURE — 52000 CYSTOURETHROSCOPY: CPT | Performed by: UROLOGY

## 2019-09-09 PROBLEM — R63.4 WEIGHT LOSS DUE TO MEDICATION: Status: RESOLVED | Noted: 2019-03-04 | Resolved: 2019-09-09

## 2019-09-09 PROBLEM — E66.3 OVERWEIGHT (BMI 25.0-29.9): Status: RESOLVED | Noted: 2017-08-28 | Resolved: 2019-09-09

## 2019-09-09 PROBLEM — T50.905A WEIGHT LOSS DUE TO MEDICATION: Status: RESOLVED | Noted: 2019-03-04 | Resolved: 2019-09-09

## 2019-09-10 ENCOUNTER — TELEPHONE (OUTPATIENT)
Dept: GASTROENTEROLOGY | Age: 64
End: 2019-09-10

## 2019-09-12 ENCOUNTER — PROCEDURE VISIT (OUTPATIENT)
Dept: UROLOGY | Age: 64
End: 2019-09-12
Payer: COMMERCIAL

## 2019-09-12 VITALS
HEART RATE: 65 BPM | DIASTOLIC BLOOD PRESSURE: 68 MMHG | SYSTOLIC BLOOD PRESSURE: 116 MMHG | HEIGHT: 76 IN | WEIGHT: 249.34 LBS | BODY MASS INDEX: 30.36 KG/M2 | TEMPERATURE: 97.9 F

## 2019-09-12 DIAGNOSIS — R33.9 URINARY RETENTION: Primary | ICD-10-CM

## 2019-09-12 PROCEDURE — 51705 CHANGE OF BLADDER TUBE: CPT | Performed by: NURSE PRACTITIONER

## 2019-10-02 DIAGNOSIS — Z12.11 SCREEN FOR COLON CANCER: Primary | ICD-10-CM

## 2019-10-02 RX ORDER — POLYETHYLENE GLYCOL 3350 17 G/17G
POWDER, FOR SOLUTION ORAL
Qty: 255 G | Refills: 0 | Status: SHIPPED | OUTPATIENT
Start: 2019-10-02 | End: 2020-09-14 | Stop reason: ALTCHOICE

## 2019-10-10 ENCOUNTER — PROCEDURE VISIT (OUTPATIENT)
Dept: UROLOGY | Age: 64
End: 2019-10-10
Payer: COMMERCIAL

## 2019-10-10 VITALS
DIASTOLIC BLOOD PRESSURE: 88 MMHG | BODY MASS INDEX: 30.34 KG/M2 | HEART RATE: 67 BPM | HEIGHT: 76 IN | TEMPERATURE: 97.9 F | SYSTOLIC BLOOD PRESSURE: 136 MMHG | WEIGHT: 249.12 LBS

## 2019-10-10 DIAGNOSIS — R33.9 INCOMPLETE EMPTYING OF BLADDER: ICD-10-CM

## 2019-10-10 DIAGNOSIS — N40.0 PROSTATE ENLARGEMENT: Primary | ICD-10-CM

## 2019-10-10 PROCEDURE — 51710 CHANGE OF BLADDER TUBE: CPT | Performed by: NURSE PRACTITIONER

## 2019-11-07 ENCOUNTER — PROCEDURE VISIT (OUTPATIENT)
Dept: UROLOGY | Age: 64
End: 2019-11-07
Payer: COMMERCIAL

## 2019-11-07 VITALS
HEART RATE: 67 BPM | SYSTOLIC BLOOD PRESSURE: 137 MMHG | DIASTOLIC BLOOD PRESSURE: 75 MMHG | HEIGHT: 76 IN | TEMPERATURE: 97.6 F | BODY MASS INDEX: 30.34 KG/M2 | WEIGHT: 249.12 LBS

## 2019-11-07 DIAGNOSIS — R33.9 INCOMPLETE EMPTYING OF BLADDER: Primary | ICD-10-CM

## 2019-11-07 PROCEDURE — 51705 CHANGE OF BLADDER TUBE: CPT | Performed by: NURSE PRACTITIONER

## 2019-12-05 ENCOUNTER — PROCEDURE VISIT (OUTPATIENT)
Dept: UROLOGY | Age: 64
End: 2019-12-05
Payer: COMMERCIAL

## 2019-12-05 ENCOUNTER — TELEPHONE (OUTPATIENT)
Dept: GASTROENTEROLOGY | Age: 64
End: 2019-12-05

## 2019-12-05 VITALS
HEIGHT: 76 IN | BODY MASS INDEX: 30.34 KG/M2 | TEMPERATURE: 97.7 F | HEART RATE: 70 BPM | WEIGHT: 249.12 LBS | SYSTOLIC BLOOD PRESSURE: 141 MMHG | DIASTOLIC BLOOD PRESSURE: 70 MMHG

## 2019-12-05 DIAGNOSIS — R33.9 INCOMPLETE EMPTYING OF BLADDER: Primary | ICD-10-CM

## 2019-12-05 PROCEDURE — 51705 CHANGE OF BLADDER TUBE: CPT | Performed by: NURSE PRACTITIONER

## 2019-12-12 ENCOUNTER — TELEPHONE (OUTPATIENT)
Dept: GASTROENTEROLOGY | Age: 64
End: 2019-12-12

## 2019-12-19 ENCOUNTER — ANESTHESIA EVENT (OUTPATIENT)
Dept: ENDOSCOPY | Age: 64
End: 2019-12-19
Payer: COMMERCIAL

## 2019-12-20 ENCOUNTER — HOSPITAL ENCOUNTER (OUTPATIENT)
Age: 64
Setting detail: OUTPATIENT SURGERY
Discharge: HOME | End: 2019-12-20
Attending: INTERNAL MEDICINE | Admitting: INTERNAL MEDICINE
Payer: COMMERCIAL

## 2019-12-20 ENCOUNTER — ANESTHESIA (OUTPATIENT)
Dept: ENDOSCOPY | Age: 64
End: 2019-12-20
Payer: COMMERCIAL

## 2019-12-20 VITALS — DIASTOLIC BLOOD PRESSURE: 51 MMHG | SYSTOLIC BLOOD PRESSURE: 95 MMHG | OXYGEN SATURATION: 96 %

## 2019-12-20 VITALS
BODY MASS INDEX: 29.96 KG/M2 | DIASTOLIC BLOOD PRESSURE: 82 MMHG | HEIGHT: 76 IN | TEMPERATURE: 97.9 F | OXYGEN SATURATION: 100 % | WEIGHT: 246 LBS | SYSTOLIC BLOOD PRESSURE: 135 MMHG | RESPIRATION RATE: 18 BRPM | HEART RATE: 72 BPM

## 2019-12-20 LAB — GLUCOSE BLD-MCNC: 108 MG/DL (ref 75–110)

## 2019-12-20 PROCEDURE — 7100000000 HC PACU RECOVERY - FIRST 15 MIN: Performed by: INTERNAL MEDICINE

## 2019-12-20 PROCEDURE — 2709999900 HC NON-CHARGEABLE SUPPLY: Performed by: INTERNAL MEDICINE

## 2019-12-20 PROCEDURE — 6360000002 HC RX W HCPCS: Performed by: NURSE ANESTHETIST, CERTIFIED REGISTERED

## 2019-12-20 PROCEDURE — 3700000001 HC ADD 15 MINUTES (ANESTHESIA): Performed by: INTERNAL MEDICINE

## 2019-12-20 PROCEDURE — 3609027000 HC COLONOSCOPY: Performed by: INTERNAL MEDICINE

## 2019-12-20 PROCEDURE — 45378 DIAGNOSTIC COLONOSCOPY: CPT | Performed by: INTERNAL MEDICINE

## 2019-12-20 PROCEDURE — 2580000003 HC RX 258: Performed by: ANESTHESIOLOGY

## 2019-12-20 PROCEDURE — 2500000003 HC RX 250 WO HCPCS: Performed by: NURSE ANESTHETIST, CERTIFIED REGISTERED

## 2019-12-20 PROCEDURE — 82947 ASSAY GLUCOSE BLOOD QUANT: CPT

## 2019-12-20 PROCEDURE — 7100000001 HC PACU RECOVERY - ADDTL 15 MIN: Performed by: INTERNAL MEDICINE

## 2019-12-20 PROCEDURE — 3700000000 HC ANESTHESIA ATTENDED CARE: Performed by: INTERNAL MEDICINE

## 2019-12-20 RX ORDER — PROPOFOL 10 MG/ML
INJECTION, EMULSION INTRAVENOUS CONTINUOUS PRN
Status: DISCONTINUED | OUTPATIENT
Start: 2019-12-20 | End: 2019-12-20 | Stop reason: SDUPTHER

## 2019-12-20 RX ORDER — LIDOCAINE HYDROCHLORIDE 10 MG/ML
INJECTION, SOLUTION EPIDURAL; INFILTRATION; INTRACAUDAL; PERINEURAL PRN
Status: DISCONTINUED | OUTPATIENT
Start: 2019-12-20 | End: 2019-12-20 | Stop reason: SDUPTHER

## 2019-12-20 RX ORDER — MIDAZOLAM HYDROCHLORIDE 1 MG/ML
INJECTION INTRAMUSCULAR; INTRAVENOUS PRN
Status: DISCONTINUED | OUTPATIENT
Start: 2019-12-20 | End: 2019-12-20 | Stop reason: SDUPTHER

## 2019-12-20 RX ORDER — DIPHENHYDRAMINE HYDROCHLORIDE 50 MG/ML
12.5 INJECTION INTRAMUSCULAR; INTRAVENOUS
Status: DISCONTINUED | OUTPATIENT
Start: 2019-12-20 | End: 2019-12-20 | Stop reason: HOSPADM

## 2019-12-20 RX ORDER — SODIUM CHLORIDE, SODIUM LACTATE, POTASSIUM CHLORIDE, CALCIUM CHLORIDE 600; 310; 30; 20 MG/100ML; MG/100ML; MG/100ML; MG/100ML
INJECTION, SOLUTION INTRAVENOUS CONTINUOUS
Status: DISCONTINUED | OUTPATIENT
Start: 2019-12-20 | End: 2019-12-20 | Stop reason: HOSPADM

## 2019-12-20 RX ORDER — PROPOFOL 10 MG/ML
INJECTION, EMULSION INTRAVENOUS PRN
Status: DISCONTINUED | OUTPATIENT
Start: 2019-12-20 | End: 2019-12-20 | Stop reason: SDUPTHER

## 2019-12-20 RX ORDER — MORPHINE SULFATE 2 MG/ML
2 INJECTION, SOLUTION INTRAMUSCULAR; INTRAVENOUS EVERY 5 MIN PRN
Status: DISCONTINUED | OUTPATIENT
Start: 2019-12-20 | End: 2019-12-20 | Stop reason: HOSPADM

## 2019-12-20 RX ORDER — LABETALOL 20 MG/4 ML (5 MG/ML) INTRAVENOUS SYRINGE
5 EVERY 10 MIN PRN
Status: DISCONTINUED | OUTPATIENT
Start: 2019-12-20 | End: 2019-12-20 | Stop reason: HOSPADM

## 2019-12-20 RX ORDER — MEPERIDINE HYDROCHLORIDE 25 MG/ML
12.5 INJECTION INTRAMUSCULAR; INTRAVENOUS; SUBCUTANEOUS EVERY 5 MIN PRN
Status: DISCONTINUED | OUTPATIENT
Start: 2019-12-20 | End: 2019-12-20 | Stop reason: HOSPADM

## 2019-12-20 RX ORDER — ONDANSETRON 2 MG/ML
4 INJECTION INTRAMUSCULAR; INTRAVENOUS
Status: DISCONTINUED | OUTPATIENT
Start: 2019-12-20 | End: 2019-12-20 | Stop reason: HOSPADM

## 2019-12-20 RX ADMIN — PROPOFOL 50 MG: 10 INJECTION, EMULSION INTRAVENOUS at 07:36

## 2019-12-20 RX ADMIN — PROPOFOL 150 MCG/KG/MIN: 10 INJECTION, EMULSION INTRAVENOUS at 07:36

## 2019-12-20 RX ADMIN — SODIUM CHLORIDE, POTASSIUM CHLORIDE, SODIUM LACTATE AND CALCIUM CHLORIDE: 600; 310; 30; 20 INJECTION, SOLUTION INTRAVENOUS at 07:03

## 2019-12-20 RX ADMIN — LIDOCAINE HYDROCHLORIDE 50 MG: 10 INJECTION, SOLUTION EPIDURAL; INFILTRATION; INTRACAUDAL at 07:36

## 2019-12-20 RX ADMIN — MIDAZOLAM 2 MG: 1 INJECTION INTRAMUSCULAR; INTRAVENOUS at 07:26

## 2019-12-20 ASSESSMENT — PULMONARY FUNCTION TESTS
PIF_VALUE: 0
PIF_VALUE: 1
PIF_VALUE: 0

## 2019-12-20 ASSESSMENT — ENCOUNTER SYMPTOMS
STRIDOR: 0
SHORTNESS OF BREATH: 0

## 2019-12-20 ASSESSMENT — PAIN - FUNCTIONAL ASSESSMENT: PAIN_FUNCTIONAL_ASSESSMENT: 0-10

## 2019-12-20 ASSESSMENT — PAIN SCALES - GENERAL: PAINLEVEL_OUTOF10: 0

## 2019-12-20 ASSESSMENT — LIFESTYLE VARIABLES: SMOKING_STATUS: 0

## 2020-01-02 ENCOUNTER — PROCEDURE VISIT (OUTPATIENT)
Dept: UROLOGY | Age: 65
End: 2020-01-02
Payer: COMMERCIAL

## 2020-01-02 VITALS
BODY MASS INDEX: 29.96 KG/M2 | HEIGHT: 76 IN | SYSTOLIC BLOOD PRESSURE: 118 MMHG | WEIGHT: 246.03 LBS | TEMPERATURE: 97.7 F | DIASTOLIC BLOOD PRESSURE: 72 MMHG

## 2020-01-02 PROCEDURE — 51705 CHANGE OF BLADDER TUBE: CPT | Performed by: UROLOGY

## 2020-01-30 ENCOUNTER — PROCEDURE VISIT (OUTPATIENT)
Dept: UROLOGY | Age: 65
End: 2020-01-30
Payer: COMMERCIAL

## 2020-01-30 VITALS
SYSTOLIC BLOOD PRESSURE: 117 MMHG | WEIGHT: 246.03 LBS | HEIGHT: 76 IN | DIASTOLIC BLOOD PRESSURE: 74 MMHG | BODY MASS INDEX: 29.96 KG/M2 | HEART RATE: 73 BPM

## 2020-01-30 PROCEDURE — 51705 CHANGE OF BLADDER TUBE: CPT | Performed by: NURSE PRACTITIONER

## 2020-03-05 ENCOUNTER — TELEPHONE (OUTPATIENT)
Dept: UROLOGY | Age: 65
End: 2020-03-05

## 2020-03-08 PROBLEM — M62.08 DIASTASIS RECTI: Status: ACTIVE | Noted: 2020-03-08

## 2020-03-09 ENCOUNTER — PROCEDURE VISIT (OUTPATIENT)
Dept: UROLOGY | Age: 65
End: 2020-03-09
Payer: COMMERCIAL

## 2020-03-09 VITALS
TEMPERATURE: 98 F | SYSTOLIC BLOOD PRESSURE: 151 MMHG | BODY MASS INDEX: 30.32 KG/M2 | HEIGHT: 76 IN | DIASTOLIC BLOOD PRESSURE: 78 MMHG | HEART RATE: 74 BPM | WEIGHT: 249 LBS

## 2020-03-09 PROCEDURE — 51705 CHANGE OF BLADDER TUBE: CPT | Performed by: UROLOGY

## 2020-03-09 NOTE — PROGRESS NOTES
Suprapubic Catheter Change and Insertion Procedure:  Risks and Benefits discussed with patient prior to procedure. Placement Date: 3/9/20    Verbal consent obtained from patient prior to procedure. Patient arrived with old SP catheter in place, balloon deflated and was removed without complication. New 22F catheter inserted utilizing sterile technique per organization policy placed by Galo Arzate. 8ml sterile water balloon inflated, at with return of clear yellow fluid . SP catheter secured. Patient tolerated procedure well and without complications. Reviewed catheter care. Verbalized understanding.

## 2020-03-30 ENCOUNTER — TELEPHONE (OUTPATIENT)
Dept: UROLOGY | Age: 65
End: 2020-03-30

## 2020-04-02 NOTE — TELEPHONE ENCOUNTER
Enid Flynn called from [facility] stating \"unable to provide service due to patient will need to contact [facility] and see if they have a plan in place due to COVID-19. \"         Called insurance spoke to [place] and was informed \" insurance has nothing to do with payments. No plan in place for COVID-19. \"     Called [facility] spoke to [facility] who informed insurance is out of network. Shruti Mcadams from [facility] called back stating will call back in the morning to give information if pt is able to be seen by [facility]. Pt notified.

## 2020-04-03 NOTE — TELEPHONE ENCOUNTER
Spoke with Maria M Hammonds from [place] and she states that they will be able to change patient catheter.

## 2020-05-11 ENCOUNTER — HOSPITAL ENCOUNTER (OUTPATIENT)
Age: 65
Discharge: HOME | End: 2020-05-11
Payer: COMMERCIAL

## 2020-05-11 LAB
ALBUMIN SERPL-MCNC: 4.4 G/DL (ref 3.5–5.2)
ALBUMIN/GLOBULIN RATIO: ABNORMAL (ref 1–2.5)
ALP BLD-CCNC: 68 U/L (ref 40–129)
ALT SERPL-CCNC: 32 U/L (ref 5–41)
ANION GAP SERPL CALCULATED.3IONS-SCNC: 10 MMOL/L (ref 9–17)
AST SERPL-CCNC: 31 U/L
BILIRUB SERPL-MCNC: 0.91 MG/DL (ref 0.3–1.2)
BUN BLDV-MCNC: 26 MG/DL (ref 8–23)
BUN/CREAT BLD: ABNORMAL (ref 9–20)
CALCIUM SERPL-MCNC: 9.7 MG/DL (ref 8.6–10.4)
CHLORIDE BLD-SCNC: 104 MMOL/L (ref 98–107)
CHOLESTEROL/HDL RATIO: 4.4
CHOLESTEROL: 174 MG/DL
CO2: 25 MMOL/L (ref 20–31)
CREAT SERPL-MCNC: 0.98 MG/DL (ref 0.7–1.2)
CREATININE URINE: 134.8 MG/DL (ref 39–259)
GFR AFRICAN AMERICAN: >60 ML/MIN
GFR NON-AFRICAN AMERICAN: >60 ML/MIN
GFR SERPL CREATININE-BSD FRML MDRD: ABNORMAL ML/MIN/{1.73_M2}
GFR SERPL CREATININE-BSD FRML MDRD: ABNORMAL ML/MIN/{1.73_M2}
GLUCOSE BLD-MCNC: 94 MG/DL (ref 70–99)
HDLC SERPL-MCNC: 40 MG/DL
LDL CHOLESTEROL: 74 MG/DL (ref 0–130)
MICROALBUMIN/CREAT 24H UR: 29 MG/L
MICROALBUMIN/CREAT UR-RTO: 22 MCG/MG CREAT
POTASSIUM SERPL-SCNC: 4.5 MMOL/L (ref 3.7–5.3)
SODIUM BLD-SCNC: 139 MMOL/L (ref 135–144)
TOTAL PROTEIN: 7.3 G/DL (ref 6.4–8.3)
TRIGL SERPL-MCNC: 302 MG/DL
VLDLC SERPL CALC-MCNC: ABNORMAL MG/DL (ref 1–30)

## 2020-05-11 PROCEDURE — 80061 LIPID PANEL: CPT

## 2020-05-11 PROCEDURE — 82570 ASSAY OF URINE CREATININE: CPT

## 2020-05-11 PROCEDURE — 82043 UR ALBUMIN QUANTITATIVE: CPT

## 2020-05-11 PROCEDURE — 36415 COLL VENOUS BLD VENIPUNCTURE: CPT

## 2020-05-11 PROCEDURE — 80053 COMPREHEN METABOLIC PANEL: CPT

## 2020-05-18 ENCOUNTER — TELEPHONE (OUTPATIENT)
Dept: UROLOGY | Age: 65
End: 2020-05-18

## 2020-07-06 ENCOUNTER — PROCEDURE VISIT (OUTPATIENT)
Dept: UROLOGY | Age: 65
End: 2020-07-06
Payer: COMMERCIAL

## 2020-07-06 VITALS — TEMPERATURE: 98.4 F

## 2020-07-06 PROCEDURE — 51705 CHANGE OF BLADDER TUBE: CPT | Performed by: UROLOGY

## 2020-07-06 NOTE — PROGRESS NOTES
Suprapubic Catheter Change and Insertion Procedure:  Risks and Benefits discussed with patient prior to procedure. Placement Date: 7/6/20    Verbal consent obtained from patient prior to procedure. Patient arrived with old 22F SP catheter in place, balloon deflated and was removed without complication. New 22F catheter inserted utilizing sterile technique per organization policy placed by Jessy Solorzano. 8ml sterile water balloon inflated, with return of clear yellow fluid. SP catheter secured with plug. Patient tolerated procedure well and without complications. Reviewed catheter care. Verbalized understanding.

## 2020-08-03 ENCOUNTER — PROCEDURE VISIT (OUTPATIENT)
Dept: UROLOGY | Age: 65
End: 2020-08-03
Payer: COMMERCIAL

## 2020-08-03 VITALS — TEMPERATURE: 98.5 F

## 2020-08-03 PROCEDURE — 52000 CYSTOURETHROSCOPY: CPT | Performed by: UROLOGY

## 2020-08-03 NOTE — PROGRESS NOTES
Suprapubic Catheter Change and Insertion Procedure:  Risks and Benefits discussed with patient prior to procedure. Placement Date: 8/3/20    Verbal consent obtained from patient prior to procedure. Patient arrived with old 25 F SP catheter in place, balloon deflated and was removed without complication. New 22F catheter inserted utilizing sterile technique per organization policy placed by Elder Aleman. 8ml sterile water balloon inflated, attached to plug with return of yellow urine. SP catheter secured. Patient tolerated procedure well and without complications. Reviewed catheter care. Verbalized understanding.

## 2020-08-03 NOTE — PROGRESS NOTES
Cystoscopy Operative Note (8/3/20)  Surgeon: Mahi Escamilla MD   Anesthesia: Urethral 2% Xylocaine   Indications: Incomplete emptying of Bladder  Position: Dorsal Lithotomy    Findings:   The patient was prepped and draped in the usual sterile fashion. The flexible cystoscope was advanced through the urethra and into the bladder. The bladder was thoroughly inspected and the following was noted:      Urethra: normal appearing urethra with no masses, tenderness or lesions  Prostate: partially obstructing lateral lobes of prostate;   Bladder: No tumors or CIS noted. No bladder diverticulum. There was mild trabeculation noted. Ureters: Clear efflux from both ureters. Orifices with normal configuration and location. The cystoscope was removed. The patient tolerated the procedure well.       Plan: spt changes q month  F/u one year cysto

## 2020-08-31 ENCOUNTER — PROCEDURE VISIT (OUTPATIENT)
Dept: UROLOGY | Age: 65
End: 2020-08-31
Payer: COMMERCIAL

## 2020-08-31 VITALS — TEMPERATURE: 98.1 F

## 2020-08-31 PROCEDURE — 51705 CHANGE OF BLADDER TUBE: CPT | Performed by: UROLOGY

## 2020-08-31 NOTE — PROGRESS NOTES
Suprapubic Catheter Change and Insertion Procedure:  Risks and Benefits discussed with patient prior to procedure. Placement Date: 8/31/20    Verbal consent obtained from patient prior to procedure. Patient arrived with old SP catheter in place, balloon deflated and was removed without complication. New 22F catheter inserted utilizing sterile technique per organization policy placed by [place]. 8ml sterile water balloon inflated, attached to plug with return of yellow urine. SP catheter secured. Patient tolerated procedure well and without complications. Reviewed catheter care. Verbalized understanding.

## 2020-09-28 ENCOUNTER — PROCEDURE VISIT (OUTPATIENT)
Dept: UROLOGY | Age: 65
End: 2020-09-28
Payer: COMMERCIAL

## 2020-09-28 VITALS — SYSTOLIC BLOOD PRESSURE: 146 MMHG | HEART RATE: 72 BPM | TEMPERATURE: 98 F | DIASTOLIC BLOOD PRESSURE: 71 MMHG

## 2020-09-28 PROCEDURE — 51702 INSERT TEMP BLADDER CATH: CPT | Performed by: UROLOGY

## 2020-09-28 PROCEDURE — 99999 PR OFFICE/OUTPT VISIT,PROCEDURE ONLY: CPT | Performed by: UROLOGY

## 2020-09-28 NOTE — PROGRESS NOTES
Wolf Change and Insertion Procedure:    Placement Date: 9/28/20    Verbal consent obtained from patient prior to procedure. Patient arrived with old catheter in place and was removed without complication. New 22F wolf inserted utilizing sterile technique per organization policy placed by Lb Alarcon. 8ml saline balloon inflated with positive returns noted. Wolf secured. Patient tolerated procedure well and without complications.

## 2020-10-26 ENCOUNTER — PROCEDURE VISIT (OUTPATIENT)
Dept: UROLOGY | Age: 65
End: 2020-10-26
Payer: COMMERCIAL

## 2020-10-26 VITALS
HEART RATE: 67 BPM | HEIGHT: 76 IN | BODY MASS INDEX: 30.32 KG/M2 | TEMPERATURE: 97 F | DIASTOLIC BLOOD PRESSURE: 69 MMHG | SYSTOLIC BLOOD PRESSURE: 129 MMHG | WEIGHT: 249 LBS

## 2020-10-26 PROCEDURE — 51705 CHANGE OF BLADDER TUBE: CPT | Performed by: UROLOGY

## 2020-10-26 PROCEDURE — 99999 PR OFFICE/OUTPT VISIT,PROCEDURE ONLY: CPT | Performed by: UROLOGY

## 2020-10-26 RX ORDER — CEPHALEXIN 500 MG/1
CAPSULE ORAL
COMMUNITY
Start: 2020-10-23 | End: 2021-03-15

## 2020-10-26 NOTE — PROGRESS NOTES
Suprapubic Catheter Change and Insertion Procedure:  Risks and Benefits discussed with patient prior to procedure. Placement Date: 10/26/20    Verbal consent obtained from patient prior to procedure. Patient arrived with old SP catheter in place, balloon deflated and was removed without complication. New 22F catheter inserted utilizing sterile technique per organization policy placed by Fallon Billy. 8ml sterile water balloon inflated, attached to plug with  positive return. SP catheter secured. Patient tolerated procedure well and without complications. Reviewed catheter care. Verbalized understanding.

## 2020-11-30 ENCOUNTER — PROCEDURE VISIT (OUTPATIENT)
Dept: UROLOGY | Age: 65
End: 2020-11-30
Payer: COMMERCIAL

## 2020-11-30 VITALS
BODY MASS INDEX: 30.32 KG/M2 | HEART RATE: 74 BPM | HEIGHT: 76 IN | TEMPERATURE: 97.3 F | SYSTOLIC BLOOD PRESSURE: 129 MMHG | DIASTOLIC BLOOD PRESSURE: 70 MMHG | WEIGHT: 249 LBS

## 2020-11-30 PROCEDURE — 99999 PR OFFICE/OUTPT VISIT,PROCEDURE ONLY: CPT | Performed by: UROLOGY

## 2020-11-30 PROCEDURE — 51705 CHANGE OF BLADDER TUBE: CPT | Performed by: UROLOGY

## 2020-11-30 NOTE — PROGRESS NOTES
Suprapubic Catheter Change and Insertion Procedure:  Risks and Benefits discussed with patient prior to procedure. Placement Date: 11/30/20    Verbal consent obtained from patient prior to procedure. Patient arrived with old SP catheter in place, balloon deflated and was removed without complication. New 22F catheter inserted utilizing sterile technique per organization policy placed by Kunal Koch. 8ml sterile water balloon inflated, attached to plug with return of yellow urine. SP catheter secured. Patient tolerated procedure well and without complications. Reviewed catheter care. Verbalized understanding.

## 2020-12-28 ENCOUNTER — PROCEDURE VISIT (OUTPATIENT)
Dept: UROLOGY | Age: 65
End: 2020-12-28
Payer: COMMERCIAL

## 2020-12-28 VITALS — HEART RATE: 76 BPM | DIASTOLIC BLOOD PRESSURE: 61 MMHG | SYSTOLIC BLOOD PRESSURE: 127 MMHG | TEMPERATURE: 98.2 F

## 2020-12-28 PROCEDURE — 99999 PR OFFICE/OUTPT VISIT,PROCEDURE ONLY: CPT | Performed by: UROLOGY

## 2020-12-28 PROCEDURE — 51705 CHANGE OF BLADDER TUBE: CPT | Performed by: UROLOGY

## 2020-12-28 NOTE — PROGRESS NOTES
Suprapubic Catheter Change and Insertion Procedure:  Risks and Benefits discussed with patient prior to procedure. Placement Date: 12/28/20    Verbal consent obtained from patient prior to procedure. Patient arrived with old SP catheter in place, balloon deflated and was removed without complication. New 22F catheter inserted utilizing sterile technique per organization policy placed by Liu Gamino. 8ml sterile water balloon inflated, .   SP catheter secured. Patient tolerated procedure well and without complications. Reviewed catheter care. Verbalized understanding.

## 2021-01-25 ENCOUNTER — PROCEDURE VISIT (OUTPATIENT)
Dept: UROLOGY | Age: 66
End: 2021-01-25
Payer: COMMERCIAL

## 2021-01-25 VITALS — TEMPERATURE: 98.2 F

## 2021-01-25 DIAGNOSIS — R33.9 URINARY RETENTION: Primary | ICD-10-CM

## 2021-01-25 PROCEDURE — 51705 CHANGE OF BLADDER TUBE: CPT | Performed by: UROLOGY

## 2021-01-25 RX ORDER — TRAMADOL HYDROCHLORIDE 50 MG/1
TABLET ORAL
COMMUNITY
Start: 2021-01-22 | End: 2021-01-25 | Stop reason: ALTCHOICE

## 2021-01-25 NOTE — PROGRESS NOTES
Suprapubic Catheter Change and Insertion Procedure:  Risks and Benefits discussed with patient prior to procedure. Placement Date: 1/25/21    Verbal consent obtained from patient prior to procedure. Patient arrived with old SP catheter in place, balloon deflated and was removed without complication. New 22F catheter inserted utilizing sterile technique per organization policy placed by [employer]. 8ml sterile water balloon inflated, attached to plug with return of yellow urine. SP catheter secured. Patient tolerated procedure well and without complications. Reviewed catheter care. Verbalized understanding.

## 2021-02-05 PROBLEM — M54.2 NECK PAIN: Status: ACTIVE | Noted: 2021-01-21

## 2021-02-05 PROBLEM — M50.30 DDD (DEGENERATIVE DISC DISEASE), CERVICAL: Status: ACTIVE | Noted: 2021-01-21

## 2021-02-22 ENCOUNTER — PROCEDURE VISIT (OUTPATIENT)
Dept: UROLOGY | Age: 66
End: 2021-02-22
Payer: COMMERCIAL

## 2021-02-22 VITALS — TEMPERATURE: 98.3 F

## 2021-02-22 DIAGNOSIS — R33.9 URINARY RETENTION: Primary | ICD-10-CM

## 2021-02-22 PROCEDURE — 51705 CHANGE OF BLADDER TUBE: CPT | Performed by: UROLOGY

## 2021-02-22 NOTE — PROGRESS NOTES
Suprapubic Catheter Change and Insertion Procedure:  Risks and Benefits discussed with patient prior to procedure. Placement Date: 2/22/21    Verbal consent obtained from patient prior to procedure. Patient arrived with old SP catheter in place, balloon deflated and was removed without complication. New 22F catheter inserted utilizing sterile technique per organization policy placed by [email or web address]. 8ml sterile water balloon inflated, attached to plug with return of yellow urine. SP catheter secured. Patient tolerated procedure well and without complications. Reviewed catheter care. Verbalized understanding.

## 2021-03-14 PROBLEM — I10 ESSENTIAL HYPERTENSION: Status: ACTIVE | Noted: 2021-02-12

## 2021-03-14 PROBLEM — G47.33 OSA (OBSTRUCTIVE SLEEP APNEA): Status: ACTIVE | Noted: 2021-02-12

## 2021-03-14 PROBLEM — M48.02 CERVICAL STENOSIS OF SPINE: Status: ACTIVE | Noted: 2021-02-12

## 2021-03-23 ENCOUNTER — PROCEDURE VISIT (OUTPATIENT)
Dept: UROLOGY | Age: 66
End: 2021-03-23
Payer: COMMERCIAL

## 2021-03-23 VITALS
BODY MASS INDEX: 31.17 KG/M2 | HEIGHT: 76 IN | SYSTOLIC BLOOD PRESSURE: 119 MMHG | DIASTOLIC BLOOD PRESSURE: 67 MMHG | TEMPERATURE: 97.8 F | HEART RATE: 67 BPM | WEIGHT: 256 LBS

## 2021-03-23 DIAGNOSIS — R33.9 URINARY RETENTION: Primary | ICD-10-CM

## 2021-03-23 PROCEDURE — 51705 CHANGE OF BLADDER TUBE: CPT | Performed by: NURSE PRACTITIONER

## 2021-03-23 NOTE — PROGRESS NOTES
Suprapubic Catheter Change and Insertion Procedure:  Risks and Benefits discussed with patient prior to procedure. Placement Date: 3/23/21    Verbal consent obtained from patient prior to procedure. Patient arrived with old SP catheter in place, balloon deflated and was removed without complication. New 22F catheter inserted utilizing sterile technique per organization policy placed by Daryl Shea. 8ml sterile water balloon inflated, attached to catheter plug with positive return. SP catheter secured. Patient tolerated procedure well and without complications. Reviewed catheter care. Verbalized understanding.

## 2021-04-20 ENCOUNTER — PROCEDURE VISIT (OUTPATIENT)
Dept: UROLOGY | Age: 66
End: 2021-04-20
Payer: COMMERCIAL

## 2021-04-20 VITALS
DIASTOLIC BLOOD PRESSURE: 58 MMHG | BODY MASS INDEX: 31.17 KG/M2 | HEIGHT: 76 IN | TEMPERATURE: 97.3 F | HEART RATE: 68 BPM | SYSTOLIC BLOOD PRESSURE: 103 MMHG | WEIGHT: 256 LBS

## 2021-04-20 DIAGNOSIS — R33.9 URINARY RETENTION: Primary | ICD-10-CM

## 2021-04-20 PROCEDURE — 51705 CHANGE OF BLADDER TUBE: CPT | Performed by: NURSE PRACTITIONER

## 2021-04-20 RX ORDER — TIZANIDINE 4 MG/1
2 TABLET ORAL EVERY 8 HOURS PRN
COMMUNITY
Start: 2021-03-24 | End: 2021-04-23

## 2021-04-20 NOTE — PROGRESS NOTES
Suprapubic Catheter Change and Insertion Procedure:  Risks and Benefits discussed with patient prior to procedure. Placement Date: 4/20/21    Verbal consent obtained from patient prior to procedure. Patient arrived with old SP catheter in place, balloon deflated and was removed without complication. New 22F catheter inserted utilizing sterile technique per organization policy placed by Joi Matias. 8ml sterile water balloon inflated, attached to catheter plug  With positive return. SP catheter secured. Patient tolerated procedure well and without complications. Reviewed catheter care. Verbalized understanding.

## 2021-05-17 ENCOUNTER — PROCEDURE VISIT (OUTPATIENT)
Dept: UROLOGY | Age: 66
End: 2021-05-17
Payer: COMMERCIAL

## 2021-05-17 VITALS — WEIGHT: 256 LBS | HEIGHT: 76 IN | TEMPERATURE: 97 F | BODY MASS INDEX: 31.17 KG/M2

## 2021-05-17 DIAGNOSIS — R33.9 URINARY RETENTION: Primary | ICD-10-CM

## 2021-05-17 PROCEDURE — 51705 CHANGE OF BLADDER TUBE: CPT | Performed by: UROLOGY

## 2021-05-17 NOTE — PROGRESS NOTES
Suprapubic Catheter Change and Insertion Procedure:  Risks and Benefits discussed with patient prior to procedure. Placement Date: 5/17/21    Verbal consent obtained from patient prior to procedure. Patient arrived with old SP catheter in place, balloon deflated and was removed without complication. New 22F catheter inserted utilizing sterile technique per organization policy placed by Enid Lau MA.  8ml sterile water balloon inflated, attached to catheter plug with positive return of urine. SP catheter secured. Patient tolerated procedure well and without complications. Reviewed catheter care. Verbalized understanding.

## 2021-06-24 ENCOUNTER — PROCEDURE VISIT (OUTPATIENT)
Dept: UROLOGY | Age: 66
End: 2021-06-24
Payer: COMMERCIAL

## 2021-06-24 VITALS
BODY MASS INDEX: 31.17 KG/M2 | WEIGHT: 256 LBS | HEART RATE: 69 BPM | SYSTOLIC BLOOD PRESSURE: 125 MMHG | RESPIRATION RATE: 17 BRPM | HEIGHT: 76 IN | TEMPERATURE: 97.3 F | DIASTOLIC BLOOD PRESSURE: 72 MMHG

## 2021-06-24 DIAGNOSIS — R33.9 URINARY RETENTION: Primary | ICD-10-CM

## 2021-06-24 PROCEDURE — 51705 CHANGE OF BLADDER TUBE: CPT | Performed by: NURSE PRACTITIONER

## 2021-06-24 NOTE — PROGRESS NOTES
Suprapubic Catheter Change and Insertion Procedure:  Risks and Benefits discussed with patient prior to procedure. Placement Date: 6/24/21    Verbal consent obtained from patient prior to procedure. Patient arrived with old SP catheter in place, balloon deflated and was removed without complication. New 22F catheter inserted utilizing sterile technique per organization policy placed by Jo Bond MA.  8ml sterile water balloon inflated, with positive return of urine and plugged  SP catheter secured. Patient tolerated procedure well and without complications. Reviewed catheter care. Verbalized understanding.

## 2021-07-29 ENCOUNTER — HOSPITAL ENCOUNTER (OUTPATIENT)
Age: 66
Setting detail: SPECIMEN
Discharge: HOME | End: 2021-07-29
Payer: COMMERCIAL

## 2021-07-29 ENCOUNTER — PROCEDURE VISIT (OUTPATIENT)
Dept: UROLOGY | Age: 66
End: 2021-07-29
Payer: COMMERCIAL

## 2021-07-29 VITALS
SYSTOLIC BLOOD PRESSURE: 135 MMHG | WEIGHT: 256 LBS | BODY MASS INDEX: 31.17 KG/M2 | HEIGHT: 76 IN | TEMPERATURE: 97.2 F | DIASTOLIC BLOOD PRESSURE: 74 MMHG | HEART RATE: 62 BPM

## 2021-07-29 DIAGNOSIS — R33.9 URINARY RETENTION: Primary | ICD-10-CM

## 2021-07-29 DIAGNOSIS — R39.9 UTI SYMPTOMS: ICD-10-CM

## 2021-07-29 PROCEDURE — 51705 CHANGE OF BLADDER TUBE: CPT | Performed by: NURSE PRACTITIONER

## 2021-07-29 RX ORDER — TIZANIDINE 4 MG/1
TABLET ORAL
COMMUNITY
Start: 2021-07-22

## 2021-07-29 NOTE — PROGRESS NOTES
Suprapubic Catheter Change and Insertion Procedure:  Risks and Benefits discussed with patient prior to procedure. Placement Date: 7/29/21    Verbal consent obtained from patient prior to procedure. Patient arrived with old SP catheter in place, balloon deflated and was removed without complication. New 22F catheter inserted utilizing sterile technique per organization policy placed by Junie Soulier, MA.  8ml sterile water balloon inflated, attached to cath plug with positive  return. SP catheter secured. Patient tolerated procedure well and without complications. Reviewed catheter care. Verbalized understanding.

## 2021-07-30 LAB
CULTURE: ABNORMAL
Lab: ABNORMAL
SPECIMEN DESCRIPTION: ABNORMAL

## 2021-08-31 ENCOUNTER — PROCEDURE VISIT (OUTPATIENT)
Dept: UROLOGY | Age: 66
End: 2021-08-31
Payer: COMMERCIAL

## 2021-08-31 ENCOUNTER — HOSPITAL ENCOUNTER (OUTPATIENT)
Age: 66
Setting detail: SPECIMEN
Discharge: HOME | End: 2021-08-31
Payer: COMMERCIAL

## 2021-08-31 VITALS
BODY MASS INDEX: 31.17 KG/M2 | HEART RATE: 57 BPM | DIASTOLIC BLOOD PRESSURE: 65 MMHG | HEIGHT: 76 IN | WEIGHT: 256 LBS | SYSTOLIC BLOOD PRESSURE: 140 MMHG

## 2021-08-31 DIAGNOSIS — R39.9 UTI SYMPTOMS: ICD-10-CM

## 2021-08-31 DIAGNOSIS — R33.9 URINARY RETENTION: Primary | ICD-10-CM

## 2021-08-31 PROCEDURE — 51705 CHANGE OF BLADDER TUBE: CPT | Performed by: NURSE PRACTITIONER

## 2021-08-31 RX ORDER — NAPROXEN SODIUM 220 MG
220 TABLET ORAL PRN
COMMUNITY

## 2021-08-31 NOTE — PROGRESS NOTES
Suprapubic Catheter Change and Insertion Procedure:  Risks and Benefits discussed with patient prior to procedure. Placement Date: 8/31/21    Verbal consent obtained from patient prior to procedure. Patient arrived with old SP catheter in place, balloon deflated and was removed without complication. New 22F catheter inserted utilizing sterile technique per organization policy placed by Lawyer Tammie MA.  8ml sterile water balloon inflated, attached to catheter plug  With positive return. SP catheter secured. Patient tolerated procedure well and without complications. Reviewed catheter care. Verbalized understanding.

## 2021-09-01 DIAGNOSIS — R39.9 UTI SYMPTOMS: ICD-10-CM

## 2021-09-01 DIAGNOSIS — R33.9 URINARY RETENTION: ICD-10-CM

## 2021-09-02 LAB
CULTURE: ABNORMAL
Lab: ABNORMAL
SPECIMEN DESCRIPTION: ABNORMAL

## 2021-09-23 ENCOUNTER — TELEPHONE (OUTPATIENT)
Dept: UROLOGY | Age: 66
End: 2021-09-23

## 2021-09-23 NOTE — TELEPHONE ENCOUNTER
Patient called in to inform office that he just had back surgery and will not be able to come in for cath change. Patient states that within the next few days home health will be coming in to do a home assessment and they will try to do the catheter changes. Writer informed patient that once home health does an assessment they will call our office and clinical team will follow up for orders. Patient verbalizes understanding.

## 2021-10-27 ENCOUNTER — PROCEDURE VISIT (OUTPATIENT)
Dept: UROLOGY | Age: 66
End: 2021-10-27
Payer: COMMERCIAL

## 2021-10-27 VITALS — BODY MASS INDEX: 31.17 KG/M2 | TEMPERATURE: 96.6 F | WEIGHT: 256 LBS | HEIGHT: 76 IN

## 2021-10-27 DIAGNOSIS — R33.9 URINARY RETENTION: Primary | ICD-10-CM

## 2021-10-27 PROCEDURE — 51705 CHANGE OF BLADDER TUBE: CPT | Performed by: NURSE PRACTITIONER

## 2021-10-27 NOTE — PROGRESS NOTES
Suprapubic Catheter Change and Insertion Procedure:  Risks and Benefits discussed with patient prior to procedure. Placement Date: 10/27/21    Verbal consent obtained from patient prior to procedure. Patient arrived with old SP catheter in place, balloon deflated and was removed without complication. New 22F catheter inserted utilizing sterile technique per organization policy placed by Kerri Kowalski MA.  8ml sterile water balloon inflated, attached to cath plug with return of positive yellow urine. SP catheter secured. Patient tolerated procedure well and without complications. Reviewed catheter care. Verbalized understanding.

## 2021-11-22 ENCOUNTER — TELEPHONE (OUTPATIENT)
Dept: UROLOGY | Age: 66
End: 2021-11-22

## 2021-11-22 DIAGNOSIS — R33.9 URINARY RETENTION: Primary | ICD-10-CM

## 2021-11-22 DIAGNOSIS — Z93.59 CHRONIC SUPRAPUBIC CATHETER (HCC): ICD-10-CM

## 2021-12-01 ENCOUNTER — PROCEDURE VISIT (OUTPATIENT)
Dept: UROLOGY | Age: 66
End: 2021-12-01
Payer: COMMERCIAL

## 2021-12-01 VITALS
DIASTOLIC BLOOD PRESSURE: 80 MMHG | TEMPERATURE: 97.2 F | SYSTOLIC BLOOD PRESSURE: 134 MMHG | HEIGHT: 76 IN | BODY MASS INDEX: 31.17 KG/M2 | WEIGHT: 256 LBS | HEART RATE: 81 BPM

## 2021-12-01 DIAGNOSIS — R33.9 URINARY RETENTION: Primary | ICD-10-CM

## 2021-12-01 DIAGNOSIS — Z93.59 CHRONIC SUPRAPUBIC CATHETER (HCC): ICD-10-CM

## 2021-12-01 PROCEDURE — 51705 CHANGE OF BLADDER TUBE: CPT | Performed by: NURSE PRACTITIONER

## 2021-12-01 NOTE — PROGRESS NOTES
SP tube Catheterization Teaching    You will need to change the catheter every 4weeks. Always wash your hands with soap and water before changing it. Once you have your sterile supplies ready, lie down on your back. Put on two pairs of sterile gloves, one over the other. Then:    Make sure your new catheter is lubricated on the end you will insert into your belly. Clean around the site using a sterile solution. Deflate the balloon with one of the syringes. Take out the old catheter slowly. Take off the top pair of gloves. Insert the new catheter as far in as the other one was placed. Wait for urine to flow. It may take a few minutes. Inflate the balloon using 8-10 ml of sterile water. Attach your drainage bag. If you are having trouble changing your catheter, call your provider right away. Insert a catheter into your urethra through your urinary opening between your labia (women) or in the penis (men) to pass urine. Do not remove the suprapubic catheter because the hole can close up quickly. However, if you have removed the catheter already and cannot get it back in, call your provider or go to the local emergency room. When to call a health care professional    - Pain for discomfort while cathing  - Fever  - Urine is cloudy or has foul odor  - Blood in urine  - Nausea or vomiting  - Urgency or frequency of urination  - Absence of urine for 6-8 hours while on catheterization schedule  - Inability to insert catheter    Suprapubic Catheter Change and Insertion Procedure:  Risks and Benefits discussed with patient prior to procedure. Placement Date: 12/1/21    Verbal consent obtained from patient prior to procedure. Patient arrived with old SP catheter in place, balloon deflated and was removed without complication. New 22F catheter inserted utilizing sterile technique per organization policy placed by patient with the direction of Eleno Javier APRN - CNP.   10 ml sterile water balloon inflated  with return of clear,yellow urine. SP catheter secured with cath plug in place. Patient tolerated procedure well and without complications. Reviewed catheter care. Verbalized understanding. Patient was able to complete the procedure from start to finish by himself. He reports his wife was previously a nurse and knows how to do cath changes, too. He will call with any questions or concerns. Last surveillance cysto was 8/3/2020  Patient has a diagnosis of chronic urinary retention and has a suprapubic tube in place. SP tube catheterization is for a lifetime.

## 2021-12-10 ENCOUNTER — TELEPHONE (OUTPATIENT)
Dept: UROLOGY | Age: 66
End: 2021-12-10

## 2021-12-10 RX ORDER — CHLORHEXIDINE GLUCONATE 4 G/100ML
SOLUTION TOPICAL
Qty: 118 ML | Refills: 11 | Status: SHIPPED | OUTPATIENT
Start: 2021-12-10 | End: 2022-09-15 | Stop reason: SDUPTHER

## 2021-12-10 NOTE — TELEPHONE ENCOUNTER
Orders placed for patient to preform cath changes at home. Sterile water, sterile gloves, sterile drape, and chlorohexidine to be sent to [facility]- printed scripts for MA's to fax. Catheters, lubricant, and syringes to be provided by [address] [place] [address]. Rx sent and faxed. I spoke with patient regarding the above and he verablized understanding. He will be set up for a year re-call for routine appt, as he needs to be seen yearly (or more frequently PRN).

## 2022-05-04 PROBLEM — M43.16 SPONDYLOLISTHESIS OF LUMBAR REGION: Status: ACTIVE | Noted: 2021-09-08

## 2022-05-04 PROBLEM — E78.5 HYPERLIPIDEMIA: Status: ACTIVE | Noted: 2021-02-22

## 2022-05-04 PROBLEM — N40.0 BPH (BENIGN PROSTATIC HYPERPLASIA): Status: ACTIVE | Noted: 2022-05-04

## 2022-05-04 PROBLEM — K59.09 CHRONIC CONSTIPATION: Status: ACTIVE | Noted: 2022-05-04

## 2022-05-05 ENCOUNTER — TELEPHONE (OUTPATIENT)
Dept: GASTROENTEROLOGY | Age: 67
End: 2022-05-05

## 2022-05-05 NOTE — TELEPHONE ENCOUNTER
Last colon in 2019 with 5 year recall. Called pt regarding referral; LVM to see if pt having any current issues; otherwise pt not due until 2024.

## 2022-05-24 ENCOUNTER — HOSPITAL ENCOUNTER (OUTPATIENT)
Age: 67
Discharge: HOME | End: 2022-05-24
Payer: COMMERCIAL

## 2022-05-24 DIAGNOSIS — Z86.39 HISTORY OF DIABETES MELLITUS: ICD-10-CM

## 2022-05-24 LAB
ABSOLUTE EOS #: 0.07 K/UL (ref 0–0.4)
ABSOLUTE LYMPH #: 1.59 K/UL (ref 1–4.8)
ABSOLUTE MONO #: 0.3 K/UL (ref 0.1–1.3)
ALBUMIN SERPL-MCNC: 4.2 G/DL (ref 3.5–5.2)
ALP BLD-CCNC: 80 U/L (ref 40–129)
ALT SERPL-CCNC: 32 U/L (ref 5–41)
ANION GAP SERPL CALCULATED.3IONS-SCNC: 9 MMOL/L (ref 9–17)
AST SERPL-CCNC: 38 U/L
BASOPHILS # BLD: 1 % (ref 0–2)
BASOPHILS ABSOLUTE: 0.04 K/UL (ref 0–0.2)
BILIRUB SERPL-MCNC: 1.03 MG/DL (ref 0.3–1.2)
BUN BLDV-MCNC: 18 MG/DL (ref 8–23)
CALCIUM SERPL-MCNC: 9.6 MG/DL (ref 8.6–10.4)
CHLORIDE BLD-SCNC: 104 MMOL/L (ref 98–107)
CO2: 28 MMOL/L (ref 20–31)
CREAT SERPL-MCNC: 0.95 MG/DL (ref 0.7–1.2)
CREATININE URINE: 195.5 MG/DL (ref 39–259)
EOSINOPHILS RELATIVE PERCENT: 2 % (ref 0–4)
GFR AFRICAN AMERICAN: >60 ML/MIN
GFR NON-AFRICAN AMERICAN: >60 ML/MIN
GFR SERPL CREATININE-BSD FRML MDRD: ABNORMAL ML/MIN/{1.73_M2}
GLUCOSE BLD-MCNC: 113 MG/DL (ref 70–99)
HCT VFR BLD CALC: 41.5 % (ref 41–53)
HEMOGLOBIN: 14.4 G/DL (ref 13.5–17.5)
LYMPHOCYTES # BLD: 43 % (ref 24–44)
MCH RBC QN AUTO: 33.8 PG (ref 26–34)
MCHC RBC AUTO-ENTMCNC: 34.8 G/DL (ref 31–37)
MCV RBC AUTO: 97.3 FL (ref 80–100)
MICROALBUMIN/CREAT 24H UR: 35 MG/L
MICROALBUMIN/CREAT UR-RTO: 18 MCG/MG CREAT
MONOCYTES # BLD: 8 % (ref 1–7)
MORPHOLOGY: NORMAL
PDW BLD-RTO: 12.9 % (ref 11.5–14.9)
PLATELET # BLD: 215 K/UL (ref 150–450)
PMV BLD AUTO: 6.9 FL (ref 6–12)
POTASSIUM SERPL-SCNC: 4.3 MMOL/L (ref 3.7–5.3)
RBC # BLD: 4.27 M/UL (ref 4.5–5.9)
SEG NEUTROPHILS: 46 % (ref 36–66)
SEGMENTED NEUTROPHILS ABSOLUTE COUNT: 1.7 K/UL (ref 1.3–9.1)
SODIUM BLD-SCNC: 141 MMOL/L (ref 135–144)
TOTAL PROTEIN: 6.7 G/DL (ref 6.4–8.3)
WBC # BLD: 3.7 K/UL (ref 3.5–11)

## 2022-05-24 PROCEDURE — 80053 COMPREHEN METABOLIC PANEL: CPT

## 2022-05-24 PROCEDURE — 82043 UR ALBUMIN QUANTITATIVE: CPT

## 2022-05-24 PROCEDURE — 85025 COMPLETE CBC W/AUTO DIFF WBC: CPT

## 2022-05-24 PROCEDURE — 36415 COLL VENOUS BLD VENIPUNCTURE: CPT

## 2022-05-24 PROCEDURE — 82570 ASSAY OF URINE CREATININE: CPT

## 2022-05-25 PROBLEM — R80.9 MICROALBUMINURIA: Status: ACTIVE | Noted: 2022-05-25

## 2022-07-09 ENCOUNTER — PATIENT MESSAGE (OUTPATIENT)
Dept: UROLOGY | Age: 67
End: 2022-07-09

## 2022-07-12 DIAGNOSIS — R33.9 URINARY RETENTION: Primary | ICD-10-CM

## 2022-08-05 ENCOUNTER — HOSPITAL ENCOUNTER (EMERGENCY)
Age: 67
Discharge: HOME | End: 2022-08-05
Attending: STUDENT IN AN ORGANIZED HEALTH CARE EDUCATION/TRAINING PROGRAM
Payer: COMMERCIAL

## 2022-08-05 VITALS
HEART RATE: 73 BPM | WEIGHT: 250 LBS | SYSTOLIC BLOOD PRESSURE: 133 MMHG | RESPIRATION RATE: 15 BRPM | BODY MASS INDEX: 31.08 KG/M2 | DIASTOLIC BLOOD PRESSURE: 79 MMHG | TEMPERATURE: 98 F | HEIGHT: 75 IN | OXYGEN SATURATION: 97 %

## 2022-08-05 DIAGNOSIS — T83.010A SUPRAPUBIC CATHETER DYSFUNCTION, INITIAL ENCOUNTER (HCC): Primary | ICD-10-CM

## 2022-08-05 PROCEDURE — 51702 INSERT TEMP BLADDER CATH: CPT

## 2022-08-05 PROCEDURE — 99283 EMERGENCY DEPT VISIT LOW MDM: CPT

## 2022-08-05 ASSESSMENT — ENCOUNTER SYMPTOMS
SORE THROAT: 0
VOMITING: 0
DIARRHEA: 0
CHEST TIGHTNESS: 0
EYE DISCHARGE: 0
SHORTNESS OF BREATH: 0
RHINORRHEA: 0
EYE REDNESS: 0
NAUSEA: 0
ABDOMINAL PAIN: 0

## 2022-08-05 ASSESSMENT — PAIN - FUNCTIONAL ASSESSMENT: PAIN_FUNCTIONAL_ASSESSMENT: NONE - DENIES PAIN

## 2022-08-05 ASSESSMENT — LIFESTYLE VARIABLES: HOW OFTEN DO YOU HAVE A DRINK CONTAINING ALCOHOL: NEVER

## 2022-08-06 NOTE — ED TRIAGE NOTES
Mode of arrival (squad #, walk in, police, etc) : car        Chief complaint(s): Pt needs wolf catheter placed         Arrival Note (brief scenario, treatment PTA, etc). : Pt here for wolf cath placement. Pt states he ran out of caths at home and used a straight cath until his supplies came in and his urethra hole shrunk and he is unable to pass his normal size. C= \"Have you ever felt that you should Cut down on your drinking? \"  no  A= \"Have people Annoyed you by criticizing your drinking? \"  no  G= \"Have you ever felt bad or Guilty about your drinking? \"  no  E= \"Have you ever had a drink as an Eye-opener first thing in the morning to steady your nerves or to help a hangover? \"  no

## 2022-08-06 NOTE — ED PROVIDER NOTES
[address]    Pt Name: Julio Gil  MRN: [id number]  Birthdate 1955  Date of evaluation: 8/5/22  CHIEF COMPLAINT       Chief Complaint   Patient presents with    Other     Needs Leyva Placed     HISTORY OF PRESENT ILLNESS   69-year-old presents for suprapubic catheter issue    Patient sees Dr. Yolande Stover of urology    While sleeping a few days ago his suprapubic catheter fell out    They have been able to place a smaller Leyva catheter in the place for now    They went to his office to  some refills of the 22 Telugu suprapubic catheter he did have but now they cannot fit it within the tract    Thus they came here    His current catheter is not draining    No fevers chills abdominal pain vomiting or other symptoms                REVIEW OF SYSTEMS     Review of Systems   Constitutional:  Negative for chills and fever. HENT:  Negative for rhinorrhea and sore throat. Eyes:  Negative for discharge and redness. Respiratory:  Negative for chest tightness and shortness of breath. Cardiovascular:  Negative for chest pain. Gastrointestinal:  Negative for abdominal pain, diarrhea, nausea and vomiting. Genitourinary:  Negative for dysuria and frequency. Musculoskeletal:  Negative for arthralgias and myalgias. Skin:  Negative for rash. Neurological:  Negative for weakness and numbness. Psychiatric/Behavioral:  Negative for suicidal ideas. All other systems reviewed and are negative.   PASTMEDICAL HISTORY     Past Medical History:   Diagnosis Date    Allergic rhinitis     Anxiety     Borderline hypertension     Controlled diabetes mellitus type II without complication ([facility] [address]     Dental abscess     Depression     On medas    History of colon polyps     Intermittent self-catheterization of bladder 06/17/2016    3/8/17 does not do this any more- was getting UTI's    Kidney stones     Neuropathy     Obesity     HISTORY OF OBESITY, AS OF 11/06/2017 PATIENT HAS LOST [de-identified] LBS    Obstructive sleep apnea of adult 2005    on CPAP    Osteoarthritis     RIGHT SHOULDER, BACK    Prostate enlargement 2014    Self cath since Jan. 16 2016    Tubular adenoma of colon 06/2016    Type II or unspecified type diabetes mellitus without mention of complication, not stated as uncontrolled 2006    NO LONGER ON MEDS, LOST WEIGHT, DIET CONTROLLED    UTI (urinary tract infection)     Wears glasses      Past Problem List  Patient Active Problem List   Diagnosis Code    Prostate enlargement N40.0    Type 2 diabetes, controlled, with neuropathy ([facility] [address] E11.40    Essential hypertension I10    Osteoarthritis M19.90    Anxiety F41.9    Depression F32. A    BRIDGETTE (obstructive sleep apnea) G47.33    Neuropathy G62.9    Chronic right shoulder pain M25.511, G89.29    Tubular adenoma of colon D12.6    History of colon polyps Z86.010    Hepatitis C carrier (HCC) B18.2    Weight loss counseling, encounter for Z71.3    Stomach ulcer K25.9    Gilbert's disease E80.4    Class 1 obesity due to excess calories with serious comorbidity and body mass index (BMI) of 30.0 to 30.9 in adult E66.09, Z68.30    Vitamin D deficiency E55.9    H/O urinary retention Z87.898    Onychomycosis of right great toe B35.1    History of suprapubic catheter Z98.890    Diastasis recti M62.08    DDD (degenerative disc disease), cervical M50.30    Neck pain M54.2    Cervical stenosis of spine M48.02    BPH (benign prostatic hyperplasia) N40.0    Chronic constipation K59.09    Hyperlipidemia E78.5    Spondylolisthesis of lumbar region M43.16    Microalbuminuria R80.9     SURGICAL HISTORY       Past Surgical History:   Procedure Laterality Date    BLADDER SURGERY      permanent supra pubic cath    CHOLECYSTECTOMY  1992    COLONOSCOPY  06/30/2016    portions of tubular adenoma and tubulovillous adenoma    COLONOSCOPY N/A 12/20/2019    COLONOSCOPY DIAGNOSTIC performed by Araceli Jerry [facility] at [facility] CYSTOSCOPY  3-4-16    with greenlight laser of prostate    CYSTOSCOPY  06/18/2016    Cystoscopy, difficult Leyva catheter placement.  CYSTOSCOPY  07/29/2016    with bladder neck contrature laser ablation and placement of suprapubic catheter    CYSTOSCOPY      CYSTOSCOPY N/A 2/13/2017    [facility] 3/13/2017    CYSTOSCOPY, TRANSURETHERAL INCISION BLADDER NECK  WITH HOLMIUM LASER performed by Yves De Santiago MD at [facility]. N/A 11/15/2017    SUPRAPUBIC TUBE PLACEMENT, C-ARM performed by Yves De Santiago MD at [address]  11/15/2017    HOLMIUM - CYSTOSCOPY, TRANSURETHRAL BLADDER NECK RESECTION WITH URETHRAL DILATION    EYE SURGERY Bilateral 1992    R.K. surgery    JOINT REPLACEMENT      right shoulder -June 2016    LITHOTRIPSY  01/25/2016    LITHOTRIPSY      2014    [place] CYSTOURETHROSCOPY N/A 11/15/2017    CYSTOSCOPY performed by Yves De Santiago MD at [place] [address]  01/2016    reduction    SHOULDER ARTHROPLASTY Right 06/16/2016    shoulder    SHOULDER ARTHROSCOPY Right 2002    VASECTOMY  1995     CURRENT MEDICATIONS       Previous Medications    FLUOXETINE (PROZAC) 20 MG CAPSULE    TAKE ONE CAPSULE BY MOUTH EVERY MORNING    HYDROCODONE-ACETAMINOPHEN (NORCO) 5-325 MG PER TABLET        LISINOPRIL (PRINIVIL;ZESTRIL) 10 MG TABLET    Take 1 tablet by mouth daily    MELOXICAM (MOBIC) 15 MG TABLET    TAKE ONE TABLET BY MOUTH DAILY    MISC. DEVICES MISC    Apply 1 pair of large sterile gloves for suprapubic cath changes. MISC. DEVICES MISC    Apply 1 sterile drape (18in x 26in) for suprapubic cath change.     MULTIPLE VITAMINS-MINERALS (THERA-M MULTIPLE VITAMINS PO)    Take 1 tablet by mouth daily    NAPROXEN SODIUM (ANAPROX) 220 MG TABLET    Take 220 mg by mouth as needed    PREGABALIN (LYRICA) 150 MG CAPSULE    TAKE ONE CAPSULE BY MOUTH DAILY    TIZANIDINE (ZANAFLEX) 4 MG TABLET    TAKE 1/2 TABLET BY MOUTH EVERY 8 HOURS AS NEEDED FOR MUSCLE SPASMS FOR UP TO 30 DAYS    VITAMIN D3 (CHOLECALCIFEROL) 125 MCG (5000 UT) TABS TABLET    Take 5,000 Units by mouth daily    WATER FOR INJECTION STERILE (STERILE WATER) INJECTION    Inject 10 mLs as directed every 14 days To be used for suprapubic balloon inflation with cath changes. ALLERGIES     is allergic to iodides, senna, colace [dss], metformin and related, seasonal, and sitagliptin-metformin hcl. FAMILY HISTORY     He indicated that his mother is . He indicated that his father is . He indicated that his sister is alive. He indicated that his maternal grandmother is . He indicated that his maternal grandfather is . He indicated that his paternal grandmother is . He indicated that his paternal grandfather is . SOCIAL HISTORY       Social History     Tobacco Use    Smoking status: Former     Packs/day: 0.50     Years: 5.00     Pack years: 2.50     Types: Cigarettes     Start date: 1977     Quit date: 1981     Years since quittin.0    Smokeless tobacco: Never    Tobacco comments:     back in college   Vaping Use    Vaping Use: Never used   Substance Use Topics    Alcohol use: Yes     Alcohol/week: 1.0 standard drink     Types: 1 Glasses of wine per week     Comment: glass of wine daily.  Drug use: No     PHYSICAL EXAM     INITIAL VITALS: /79   Pulse 73   Temp 98 °F (36.7 °C) (Tympanic)   Resp 15   Ht 6' 3\" (1.905 m)   Wt 250 lb (113.4 kg)   SpO2 97%   BMI 31.25 kg/m²    Physical Exam  Vitals and nursing note reviewed. Constitutional:       Appearance: Normal appearance. HENT:      Head: Normocephalic and atraumatic. Nose: Nose normal.      Mouth/Throat:      Mouth: Mucous membranes are moist.   Eyes:      Conjunctiva/sclera: Conjunctivae normal.      Pupils: Pupils are equal, round, and reactive to light.    Cardiovascular:      Rate and Rhythm: Normal rate and regular rhythm. Pulses: Normal pulses. Heart sounds: Normal heart sounds. Pulmonary:      Effort: Pulmonary effort is normal.      Breath sounds: Normal breath sounds. Abdominal:      Palpations: Abdomen is soft. Tenderness: There is no abdominal tenderness. Comments: Suprapubic catheter in place clean dry intact   Musculoskeletal:         General: No swelling or deformity. Cervical back: Normal range of motion. Skin:     General: Skin is warm. Findings: No rash. Neurological:      General: No focal deficit present. Mental Status: He is alert and oriented to person, place, and time. Psychiatric:         Mood and Affect: Mood normal.       MEDICAL DECISION MAKINyear-old presents after his suprapubic catheter accidentally fell out while sleeping    They have replaced with a Leyva catheter of smaller size to keep the tract open    They can now not fit the 22 Bulgarian suprapubic catheter he normally places    Will discuss with his urologist    ED Course as of 22 2313   Fri Aug 05, 2022   2220 Discussed with Dr. Yojana Morales who recommends against trying to replace suprapubic [GIULIA]      ED Course User Index  [GIULIA] Jordan Hamlin MD       CRITICAL CARE:       PROCEDURES:    Insert Suprapubic Catheter    Date/Time: 2022 11:10 PM  Performed by: Jordan Hamlin MD  Authorized by: Jordan Hamlin MD   Consent: Verbal consent obtained. Risks and benefits: risks, benefits and alternatives were discussed  Consent given by: patient  Patient identity confirmed: verbally with patient  Indications: catheter change  Preparation: Patient was prepped and draped in the usual sterile fashion. Suprapubic aspiration by: catheter  Catheter type:  Leyva  Catheter size: 14 Fr  Number of attempts: 1  Urine characteristics: clear  Patient tolerance: patient tolerated the procedure well with no immediate complications        DIAGNOSTIC RESULTS   EKG:All EKG's are interpreted by the Emergency Department Physician who either signs or Co-signs this chart in the absence of a cardiologist.        RADIOLOGY:All plain film, CT, MRI, and formal ultrasound images (except ED bedside ultrasound) are read by the radiologist, see reports below, unless otherwisenoted in MDM or here. No orders to display     LABS: All lab results were reviewed by myself, and all abnormals are listed below. Labs Reviewed - No data to display    EMERGENCY DEPARTMENTCOURSE:     Patient seen requesting replacement of suprapubic catheter    This was repleaced    Discharge with urology follow up. Vitals:    Vitals:    08/05/22 2041   BP: 133/79   Pulse: 73   Resp: 15   Temp: 98 °F (36.7 °C)   TempSrc: Tympanic   SpO2: 97%   Weight: 250 lb (113.4 kg)   Height: 6' 3\" (1.905 m)       The patient was given the following medications while in the emergency department:  No orders of the defined types were placed in this encounter. CONSULTS:  IP CONSULT TO UROLOGY    FINAL IMPRESSION      1. Suprapubic catheter dysfunction, initial encounter [facility])          DISPOSITION/PLAN   DISPOSITION Decision To Discharge 08/05/2022 11:12:30 PM      PATIENT REFERRED TO:  Monique Solitario MD  [address]  [phone]    Schedule an appointment as soon as possible for a visit       [facility] ED  [place] [address]  [phone]    As needed  DISCHARGE MEDICATIONS:  New Prescriptions    No medications on file     The care is provided during an unprecedented national emergency due to the novel coronavirus, COVID 19.   MD Etelvina Michelle MD  08/05/22 [facility] [address], MD  08/05/22 2050

## 2022-08-17 RX ORDER — DIPHENHYDRAMINE HCL 50 MG
CAPSULE ORAL
Qty: 1 EACH | Refills: 11 | Status: CANCELLED | OUTPATIENT
Start: 2022-08-17

## 2022-08-17 RX ORDER — GLOVES
1 EACH MISCELLANEOUS
Qty: 1 EACH | Refills: 11 | Status: CANCELLED | OUTPATIENT
Start: 2022-08-17

## 2022-08-29 ENCOUNTER — PROCEDURE VISIT (OUTPATIENT)
Dept: UROLOGY | Age: 67
End: 2022-08-29
Payer: COMMERCIAL

## 2022-08-29 DIAGNOSIS — R33.9 URINARY RETENTION: Primary | ICD-10-CM

## 2022-08-29 PROCEDURE — 51705 CHANGE OF BLADDER TUBE: CPT | Performed by: UROLOGY

## 2022-08-29 NOTE — PROGRESS NOTES
Suprapubic Catheter Change and Insertion Procedure:  Risks and Benefits discussed with patient prior to procedure. Placement Date: 8/29/22    Verbal consent obtained from patient prior to procedure. Patient arrived with old SP catheter in place, balloon deflated and was removed without complication. New 16F catheter inserted utilizing sterile technique per organization policy placed by Adelfo Zamarripa MA.  10ml sterile water balloon inflated, attached to plug with return of yellow urine. SP catheter secured. Patient tolerated procedure well and without complications. Reviewed catheter care. Verbalized understanding. Patient was given a 18F and 20F catheter for monthly home changes.

## 2022-09-14 DIAGNOSIS — Z93.59 CHRONIC SUPRAPUBIC CATHETER (HCC): Primary | ICD-10-CM

## 2022-09-14 DIAGNOSIS — R33.9 URINARY RETENTION: ICD-10-CM

## 2022-09-15 RX ORDER — CHLORHEXIDINE GLUCONATE 4 G/100ML
SOLUTION TOPICAL
Qty: 118 ML | Refills: 11 | Status: SHIPPED | OUTPATIENT
Start: 2022-09-15 | End: 2022-09-29

## 2022-09-15 NOTE — PROGRESS NOTES
Orders placed for cath supplies. All orders printed and signed. Please fax to his preferred pharmacy.

## 2022-09-29 ENCOUNTER — SCHEDULED TELEPHONE ENCOUNTER (OUTPATIENT)
Dept: UROLOGY | Age: 67
End: 2022-09-29
Payer: COMMERCIAL

## 2022-09-29 ENCOUNTER — TELEPHONE (OUTPATIENT)
Dept: UROLOGY | Age: 67
End: 2022-09-29

## 2022-09-29 DIAGNOSIS — Z12.5 PROSTATE CANCER SCREENING: Primary | ICD-10-CM

## 2022-09-29 DIAGNOSIS — Z93.59 CHRONIC SUPRAPUBIC CATHETER (HCC): ICD-10-CM

## 2022-09-29 DIAGNOSIS — R33.9 URINARY RETENTION: ICD-10-CM

## 2022-09-29 PROCEDURE — 99442 PR PHYS/QHP TELEPHONE EVALUATION 11-20 MIN: CPT | Performed by: NURSE PRACTITIONER

## 2022-09-29 ASSESSMENT — ENCOUNTER SYMPTOMS
EYE PAIN: 0
DIARRHEA: 0
EYE REDNESS: 0
CONSTIPATION: 0
GASTROINTESTINAL NEGATIVE: 1
SHORTNESS OF BREATH: 0
COUGH: 0
RESPIRATORY NEGATIVE: 1
VOMITING: 0
NAUSEA: 0
BACK PAIN: 0
WHEEZING: 0
EYES NEGATIVE: 1
ABDOMINAL PAIN: 0

## 2022-09-29 NOTE — PROGRESS NOTES
Bridgette Cardozo is a 79 y.o. male evaluated via telephone on 9/29/2022 for Urinary Retention (F/u- urinary retention)  . Subjective   Prior to Visit Medications    Medication Sig Taking? Authorizing Provider   Misc. Devices MISC Apply single lidocaine packet every 2 weeks to SP tube insertion site for catheter changes  TSERING Fuller CNPc. Devices MISC Apply 1 pair of large sterile gloves for suprapubic cath changes every 2 weeks. TSERING Fuller CNP   Water For Injection Sterile (STERILE WATER) injection Inject 10 mLs as directed every 14 days To be used for suprapubic balloon inflation with cath changes. TSERING Fuller CNP   chlorhexidine (HIBICLENS) 4 % external liquid Apply topically to abdomen every 2 weeks, use with suprapubic cath changes. TSERING Fuller CNP   Misc. Devices [address] (or equivalent) wolf insertion tray with pre-filled 30cc syringe, use every 2 weeks for SP tube changes. TSERING Fuller CNP   Misc. Devices MISC Apply large drainage bag (2000cc) every 2 weeks with SP tube changes. TSERING Fuller CNP   Misc. Devices MISC Apply wolf leg drainage bag every 2 weeks with SP tube changes. TSERING Fuller CNP   meloxicam (MOBIC) 15 MG tablet TAKE ONE TABLET BY MOUTH DAILY  Fatuma Lujan MD   pregabalin (LYRICA) 150 MG capsule TAKE ONE CAPSULE BY MOUTH DAILY  Fatuma Lujan MD   lisinopril (PRINIVIL;ZESTRIL) 10 MG tablet Take 1 tablet by mouth daily  Fatuma Lujan MD   Misc. Devices MISC Apply 1 sterile drape (18in x 26in) for suprapubic cath change.   TSERING Fuller CNP   FLUoxetine (PROZAC) 20 MG capsule TAKE ONE CAPSULE BY MOUTH EVERY MORNING  Fatuma Lujan MD   naproxen sodium (ANAPROX) 220 MG tablet Take 220 mg by mouth as needed  Historical Provider, MD   vitamin D3 (CHOLECALCIFEROL) 125 MCG (5000 UT) TABS tablet Take 5,000 Units by mouth daily  Historical Provider, MD   tiZANidine (ZANAFLEX) 4 MG tablet TAKE 1/2 TABLET BY MOUTH EVERY 8 HOURS AS NEEDED FOR MUSCLE SPASMS FOR UP TO 30 DAYS  Historical Provider, MD   HYDROcodone-acetaminophen (Oj Prudent) 5-325 MG per tablet   Historical Provider, MD   Multiple Vitamins-Minerals (THERA-M MULTIPLE VITAMINS PO) Take 1 tablet by mouth daily  Historical Provider, MD     HPI  Patient is presenting via VV (telephone) for routine f/u urinary retention. He had a greenlight in the past, then developed [address] [place] [address]. Continued retaining large amounts of urine and difficulty with stream.   BNC was repaired and SP tube placed back in 2017. SP tube is now chronic. He was educated on sterile cath changes in the past and does them at home. He denies any recent  infections and denies any bothersome LUTS. He is overdue for surveillance cysto for chronic cath (last one was 8/3/2020)  He is also overdue for PSA, last PSA I am able to locate is 2015- 0.52. Denies fhx prostate ca. He denies any other concerns for today's visit. Review of Systems  - Reviewed and agree with ROS entered by MA. Objective   Patient-Reported Vitals  BP Observations: No, remote/electronic monitoring device was not used or able to be verified           Assessment & Plan   1. Prostate cancer screening   - Update PSA now, patient overdue and agreeable to complete within next week   - We can call with PSA results. If abnormal would recommend f/u in office. 2. Urinary retention   - Chronic SP tube. - he completes cath changes at home by himself. - urinary retention is chronic and SP tube is indefinite. 3. Chronic suprapubic catheter ([address]   - Supply orders were sent to preferred pharmacy   - They need documentation of visit, will send this office visit note.     - Continue SP tube changes at home using 22 Fr catheter every 2 weeks and PRN for clotting/UTI symptoms.   - He will need supplies for cath changes, orders were already printed, signed, and

## 2022-10-03 DIAGNOSIS — R33.9 URINARY RETENTION: ICD-10-CM

## 2022-10-03 DIAGNOSIS — Z93.59 CHRONIC SUPRAPUBIC CATHETER (HCC): ICD-10-CM

## 2022-10-03 NOTE — PROGRESS NOTES
Received fax stating new orders for cath supplies needed to be sent to [facility]. Orders placed. Kathia Belle MA to fax.

## 2022-10-07 ENCOUNTER — TELEPHONE (OUTPATIENT)
Dept: UROLOGY | Age: 67
End: 2022-10-07

## 2022-10-07 DIAGNOSIS — N40.0 PROSTATE ENLARGEMENT: Primary | ICD-10-CM

## 2022-10-20 ENCOUNTER — PATIENT MESSAGE (OUTPATIENT)
Dept: UROLOGY | Age: 67
End: 2022-10-20

## 2022-10-20 DIAGNOSIS — Z93.59 CHRONIC SUPRAPUBIC CATHETER (HCC): ICD-10-CM

## 2022-10-20 DIAGNOSIS — R33.9 URINARY RETENTION: Primary | ICD-10-CM

## 2022-10-22 ENCOUNTER — HOSPITAL ENCOUNTER (OUTPATIENT)
Age: 67
Discharge: HOME | End: 2022-10-22
Payer: COMMERCIAL

## 2022-10-22 DIAGNOSIS — N40.0 PROSTATE ENLARGEMENT: ICD-10-CM

## 2022-10-22 LAB — PROSTATE SPECIFIC ANTIGEN: 0.27 NG/ML

## 2022-10-22 PROCEDURE — 36415 COLL VENOUS BLD VENIPUNCTURE: CPT

## 2022-10-22 PROCEDURE — G0103 PSA SCREENING: HCPCS

## 2022-10-23 ENCOUNTER — PATIENT MESSAGE (OUTPATIENT)
Dept: UROLOGY | Age: 67
End: 2022-10-23

## 2022-10-23 DIAGNOSIS — Z12.5 PROSTATE CANCER SCREENING: Primary | ICD-10-CM

## 2022-11-28 ENCOUNTER — TRANSCRIBE ORDERS (OUTPATIENT)
Dept: ADMINISTRATIVE | Age: 67
End: 2022-11-28

## 2022-11-28 DIAGNOSIS — M54.2 CERVICAL PAIN: Primary | ICD-10-CM

## 2023-01-05 ENCOUNTER — HOSPITAL ENCOUNTER (OUTPATIENT)
Dept: NEUROLOGY | Age: 68
Discharge: HOME | End: 2023-01-05
Payer: COMMERCIAL

## 2023-01-05 DIAGNOSIS — M54.2 CERVICAL PAIN: ICD-10-CM

## 2023-01-05 PROCEDURE — 95908 NRV CNDJ TST 3-4 STUDIES: CPT | Performed by: PHYSICAL MEDICINE & REHABILITATION

## 2023-01-05 PROCEDURE — 95886 MUSC TEST DONE W/N TEST COMP: CPT | Performed by: PHYSICAL MEDICINE & REHABILITATION

## 2023-01-30 ENCOUNTER — HOSPITAL ENCOUNTER (OUTPATIENT)
Dept: MRI IMAGING | Age: 68
Discharge: HOME | End: 2023-02-01
Payer: COMMERCIAL

## 2023-01-30 DIAGNOSIS — M54.2 CERVICALGIA: ICD-10-CM

## 2023-01-30 PROCEDURE — 72141 MRI NECK SPINE W/O DYE: CPT

## 2023-04-24 ENCOUNTER — HOSPITAL ENCOUNTER (OUTPATIENT)
Age: 68
Discharge: HOME | End: 2023-04-24
Payer: COMMERCIAL

## 2023-04-24 LAB
CRP SERPL HS-MCNC: <3 MG/L (ref 0–5)
ERYTHROCYTE [SEDIMENTATION RATE] IN BLOOD BY WESTERGREN METHOD: 1 MM/HR (ref 0–20)

## 2023-04-24 PROCEDURE — 85652 RBC SED RATE AUTOMATED: CPT

## 2023-04-24 PROCEDURE — 36415 COLL VENOUS BLD VENIPUNCTURE: CPT

## 2023-04-24 PROCEDURE — 86140 C-REACTIVE PROTEIN: CPT

## 2023-04-24 PROCEDURE — 84207 ASSAY OF VITAMIN B-6: CPT

## 2023-04-27 LAB — VITAMIN B6: 166 NMOL/L (ref 20–125)

## 2023-06-01 PROBLEM — Z93.59 CHRONIC SUPRAPUBIC CATHETER (HCC): Status: ACTIVE | Noted: 2023-06-01

## 2023-08-17 ENCOUNTER — HOSPITAL ENCOUNTER (OUTPATIENT)
Age: 68
Discharge: HOME | End: 2023-08-17
Payer: COMMERCIAL

## 2023-08-17 LAB
T4 FREE SERPL-MCNC: 1.2 NG/DL (ref 0.9–1.7)
TSH SERPL DL<=0.05 MIU/L-ACNC: 1.34 UIU/ML (ref 0.3–5)

## 2023-08-17 PROCEDURE — 36415 COLL VENOUS BLD VENIPUNCTURE: CPT

## 2023-08-17 PROCEDURE — 84439 ASSAY OF FREE THYROXINE: CPT

## 2023-08-17 PROCEDURE — 86038 ANTINUCLEAR ANTIBODIES: CPT

## 2023-08-17 PROCEDURE — 82784 ASSAY IGA/IGD/IGG/IGM EACH: CPT

## 2023-08-17 PROCEDURE — 84443 ASSAY THYROID STIM HORMONE: CPT

## 2023-08-17 PROCEDURE — 83516 IMMUNOASSAY NONANTIBODY: CPT

## 2023-08-17 PROCEDURE — 86225 DNA ANTIBODY NATIVE: CPT

## 2023-08-17 PROCEDURE — 87522 HEPATITIS C REVRS TRNSCRPJ: CPT

## 2023-08-18 LAB
HCV RNA # SERPL NAA+PROBE: NOT DETECTED {COPIES}/ML
IGA SERPL-MCNC: 199 MG/DL (ref 70–400)
SPECIMEN SOURCE: NORMAL

## 2023-08-19 LAB
ANA SER QL IA: NEGATIVE
DSDNA IGG SER QL IA: <0.5 IU/ML
NUCLEAR IGG SER IA-RTO: 0.1 U/ML
TISSUE TRANSGLUTAMINASE ANTIBODY IGG: <0.6 U/ML
TTG IGA SER IA-ACNC: 0.4 U/ML

## 2023-08-30 ENCOUNTER — TELEPHONE (OUTPATIENT)
Dept: UROLOGY | Age: 68
End: 2023-08-30

## 2023-08-30 NOTE — TELEPHONE ENCOUNTER
Pt stated he is seeing blood in catheter, but pt can only come in on mondays, so pt is scheduled 9/11/23 for a cath change & urine sample. no history of blood product transfusion

## 2023-09-11 ENCOUNTER — OFFICE VISIT (OUTPATIENT)
Dept: UROLOGY | Age: 68
End: 2023-09-11
Payer: COMMERCIAL

## 2023-09-11 VITALS
DIASTOLIC BLOOD PRESSURE: 70 MMHG | HEIGHT: 76 IN | SYSTOLIC BLOOD PRESSURE: 138 MMHG | TEMPERATURE: 97 F | HEART RATE: 58 BPM | BODY MASS INDEX: 31.17 KG/M2 | WEIGHT: 256 LBS

## 2023-09-11 DIAGNOSIS — R33.9 URINARY RETENTION: ICD-10-CM

## 2023-09-11 DIAGNOSIS — Z87.448 HISTORY OF BLADDER STONE: ICD-10-CM

## 2023-09-11 DIAGNOSIS — Z87.442 HISTORY OF KIDNEY STONES: ICD-10-CM

## 2023-09-11 DIAGNOSIS — Z12.5 PROSTATE CANCER SCREENING: ICD-10-CM

## 2023-09-11 DIAGNOSIS — Z93.59 CHRONIC SUPRAPUBIC CATHETER (HCC): ICD-10-CM

## 2023-09-11 DIAGNOSIS — R31.0 GROSS HEMATURIA: Primary | ICD-10-CM

## 2023-09-11 PROCEDURE — G8417 CALC BMI ABV UP PARAM F/U: HCPCS | Performed by: NURSE PRACTITIONER

## 2023-09-11 PROCEDURE — 3075F SYST BP GE 130 - 139MM HG: CPT | Performed by: NURSE PRACTITIONER

## 2023-09-11 PROCEDURE — 1036F TOBACCO NON-USER: CPT | Performed by: NURSE PRACTITIONER

## 2023-09-11 PROCEDURE — 3078F DIAST BP <80 MM HG: CPT | Performed by: NURSE PRACTITIONER

## 2023-09-11 PROCEDURE — 1123F ACP DISCUSS/DSCN MKR DOCD: CPT | Performed by: NURSE PRACTITIONER

## 2023-09-11 PROCEDURE — G8427 DOCREV CUR MEDS BY ELIG CLIN: HCPCS | Performed by: NURSE PRACTITIONER

## 2023-09-11 PROCEDURE — 3017F COLORECTAL CA SCREEN DOC REV: CPT | Performed by: NURSE PRACTITIONER

## 2023-09-11 PROCEDURE — 99214 OFFICE O/P EST MOD 30 MIN: CPT | Performed by: NURSE PRACTITIONER

## 2023-09-11 ASSESSMENT — ENCOUNTER SYMPTOMS
CONSTIPATION: 0
BACK PAIN: 0
EYE PAIN: 0
EYES NEGATIVE: 1
EYE REDNESS: 0
DIARRHEA: 0
ABDOMINAL PAIN: 0
NAUSEA: 0
SHORTNESS OF BREATH: 0
WHEEZING: 0
RESPIRATORY NEGATIVE: 1
COUGH: 0
GASTROINTESTINAL NEGATIVE: 1
VOMITING: 0

## 2023-09-11 NOTE — PROGRESS NOTES
[address]  [address] [facility] [address]  Dept: [address] Urology Office Note - Established    Patient:  Yaritza Avila  YOB: 1955  Date: 9/11/2023    The patient is a 76 y.o. male who presents todayfor evaluation of the following problems:   Chief Complaint   Patient presents with    Hematuria     Blood in urine (pt has wolf)       HPI  This is a 75-year-old male with a history of urinary retention chronic SP tube, bladder and kidney stones presenting for gross hematuria. symptoms started approximately 3 weeks ago, he noticed visible blood in his overnight drainage bag. Symptoms lasted for about 1 week and resolved on their own. Associated symptoms at that time include acute right flank pain. He denies any associated dysuria, nausea or vomiting, fever or chills. SP tube is draining well now, he continues to change catheter every 4 weeks at home-uses [facility]. He is not on anticoagulation, he denies any trauma to cath. He is a former smoker. Denies any fhx prostate, bladder, or kidney ca. Summary of old records: N/A    Additional History: N/A    Procedures Today: N/A    Urinalysis today:  No results found for this visit on 09/11/23.   Last several PSA's:  Lab Results   Component Value Date    PSA 0.27 10/22/2022    PSA 0.52 06/30/2015     Last total testosterone:  No results found for: \"TESTOSTERONE\"      Last BUN and creatinine:  Lab Results   Component Value Date    BUN 18 05/24/2022     Lab Results   Component Value Date    CREATININE 0.95 05/24/2022       Additional Lab/Culture results: none    Imaging Reviewed during this Office Visit: none    PAST MEDICAL, FAMILY AND SOCIAL HISTORY UPDATE:  Past Medical History:   Diagnosis Date    Allergic rhinitis     Anxiety     Borderline hypertension     Controlled diabetes mellitus type II without complication ([address])     Dental abscess

## 2023-09-15 ENCOUNTER — HOSPITAL ENCOUNTER (OUTPATIENT)
Dept: CT IMAGING | Age: 68
Discharge: HOME | End: 2023-09-15
Payer: COMMERCIAL

## 2023-09-15 DIAGNOSIS — Z87.442 HISTORY OF KIDNEY STONES: ICD-10-CM

## 2023-09-15 DIAGNOSIS — Z87.448 HISTORY OF BLADDER STONE: ICD-10-CM

## 2023-09-15 DIAGNOSIS — R31.0 GROSS HEMATURIA: ICD-10-CM

## 2023-09-15 LAB
EGFR, POC: >60 ML/MIN/1.73M2
POC CREATININE: 0.9 MG/DL (ref 0.51–1.19)

## 2023-09-15 PROCEDURE — 74178 CT ABD&PLV WO CNTR FLWD CNTR: CPT | Performed by: NURSE PRACTITIONER

## 2023-09-15 PROCEDURE — 82565 ASSAY OF CREATININE: CPT

## 2023-09-15 PROCEDURE — 2580000003 HC RX 258: Performed by: FAMILY MEDICINE

## 2023-09-15 PROCEDURE — 6360000004 HC RX CONTRAST MEDICATION: Performed by: FAMILY MEDICINE

## 2023-09-15 RX ORDER — 0.9 % SODIUM CHLORIDE 0.9 %
100 INTRAVENOUS SOLUTION INTRAVENOUS ONCE
Status: COMPLETED | OUTPATIENT
Start: 2023-09-15 | End: 2023-09-15

## 2023-09-15 RX ORDER — SODIUM CHLORIDE 0.9 % (FLUSH) 0.9 %
10 SYRINGE (ML) INJECTION PRN
Status: DISCONTINUED | OUTPATIENT
Start: 2023-09-15 | End: 2023-09-18 | Stop reason: HOSPADM

## 2023-09-15 RX ADMIN — IOPAMIDOL 100 ML: 755 INJECTION, SOLUTION INTRAVENOUS at 09:14

## 2023-09-15 RX ADMIN — SODIUM CHLORIDE 100 ML: 9 INJECTION, SOLUTION INTRAVENOUS at 09:15

## 2023-09-15 RX ADMIN — SODIUM CHLORIDE, PRESERVATIVE FREE 10 ML: 5 INJECTION INTRAVENOUS at 09:16

## 2023-10-30 ENCOUNTER — PROCEDURE VISIT (OUTPATIENT)
Dept: UROLOGY | Age: 68
End: 2023-10-30
Payer: COMMERCIAL

## 2023-10-30 VITALS — WEIGHT: 256 LBS | BODY MASS INDEX: 31.17 KG/M2 | HEIGHT: 76 IN

## 2023-10-30 DIAGNOSIS — R31.0 GROSS HEMATURIA: Primary | ICD-10-CM

## 2023-10-30 PROCEDURE — 52000 CYSTOURETHROSCOPY: CPT | Performed by: UROLOGY

## 2023-10-30 NOTE — PROGRESS NOTES
Cystoscopy Operative Note (10/30/23)  Surgeon: Catherine Vargas MD  Anesthesia: Urethral 2% Xylocaine   Indications: Hematuria  Position: Dorsal Lithotomy    Findings:   Risks and Benefits discussed with patient prior to procedure. The patient was prepped and draped in the usual sterile fashion. The flexible cystoscope was advanced through the urethra and into the bladder. The bladder was thoroughly inspected and the following was noted:    Residual Urine: none  Urethra: normal appearing urethra with no masses, tenderness or lesions  Prostate: partially obstructing lateral lobes of prostate; median lobe present? no.   Bladder: No tumors or CIS noted. No bladder diverticulum. There was none trabeculation noted. Ureters: Clear efflux from both ureters. Orifices with normal configuration and location. The cystoscope was removed. The patient tolerated the procedure well. Agree with the ROS entered by the MA. Plan: We will follow-up in 1 year. We will do repeat cystoscopy in 2 years unless there is a reason to do it sooner such as recurrent gross hematuria.

## 2024-05-07 ENCOUNTER — APPOINTMENT (OUTPATIENT)
Dept: GENERAL RADIOLOGY | Age: 69
End: 2024-05-07
Payer: COMMERCIAL

## 2024-05-07 ENCOUNTER — HOSPITAL ENCOUNTER (OUTPATIENT)
Age: 69
Setting detail: OBSERVATION
Discharge: HOME | End: 2024-05-08
Attending: STUDENT IN AN ORGANIZED HEALTH CARE EDUCATION/TRAINING PROGRAM | Admitting: FAMILY MEDICINE
Payer: COMMERCIAL

## 2024-05-07 DIAGNOSIS — R79.89 ELEVATED TROPONIN: Primary | ICD-10-CM

## 2024-05-07 LAB
ANION GAP SERPL CALCULATED.3IONS-SCNC: 13 MMOL/L (ref 9–17)
BASOPHILS # BLD: 0.1 K/UL (ref 0–0.2)
BASOPHILS NFR BLD: 1 % (ref 0–2)
BILIRUB UR QL STRIP: NEGATIVE
BUN SERPL-MCNC: 16 MG/DL (ref 8–23)
CALCIUM SERPL-MCNC: 9.4 MG/DL (ref 8.6–10.4)
CHLORIDE SERPL-SCNC: 100 MMOL/L (ref 98–107)
CLARITY UR: CLEAR
CO2 SERPL-SCNC: 24 MMOL/L (ref 20–31)
COLOR UR: YELLOW
COMMENT: NORMAL
CREAT SERPL-MCNC: 0.9 MG/DL (ref 0.7–1.2)
EOSINOPHIL # BLD: 0.2 K/UL (ref 0–0.4)
EOSINOPHILS RELATIVE PERCENT: 4 % (ref 0–4)
ERYTHROCYTE [DISTWIDTH] IN BLOOD BY AUTOMATED COUNT: 13.3 % (ref 11.5–14.9)
GFR, ESTIMATED: >90 ML/MIN/1.73M2
GLUCOSE SERPL-MCNC: 171 MG/DL (ref 70–99)
GLUCOSE UR STRIP-MCNC: NEGATIVE MG/DL
HCT VFR BLD AUTO: 35.8 % (ref 41–53)
HGB BLD-MCNC: 12.6 G/DL (ref 13.5–17.5)
HGB UR QL STRIP.AUTO: NEGATIVE
KETONES UR STRIP-MCNC: NEGATIVE MG/DL
LEUKOCYTE ESTERASE UR QL STRIP: NEGATIVE
LYMPHOCYTES NFR BLD: 1.4 K/UL (ref 1–4.8)
LYMPHOCYTES RELATIVE PERCENT: 20 % (ref 24–44)
MAGNESIUM SERPL-MCNC: 2 MG/DL (ref 1.6–2.6)
MCH RBC QN AUTO: 35.1 PG (ref 26–34)
MCHC RBC AUTO-ENTMCNC: 35.1 G/DL (ref 31–37)
MCV RBC AUTO: 100.1 FL (ref 80–100)
MONOCYTES NFR BLD: 0.7 K/UL (ref 0.1–1.3)
MONOCYTES NFR BLD: 10 % (ref 1–7)
NEUTROPHILS NFR BLD: 65 % (ref 36–66)
NEUTS SEG NFR BLD: 4.6 K/UL (ref 1.3–9.1)
NITRITE UR QL STRIP: NEGATIVE
PH UR STRIP: 6.5 [PH] (ref 5–8)
PLATELET # BLD AUTO: 240 K/UL (ref 150–450)
PMV BLD AUTO: 7.5 FL (ref 6–12)
POTASSIUM SERPL-SCNC: 4.8 MMOL/L (ref 3.7–5.3)
PROT UR STRIP-MCNC: NEGATIVE MG/DL
RBC # BLD AUTO: 3.58 M/UL (ref 4.5–5.9)
SODIUM SERPL-SCNC: 137 MMOL/L (ref 135–144)
SP GR UR STRIP: 1.01 (ref 1–1.03)
TROPONIN I SERPL HS-MCNC: 25 NG/L (ref 0–22)
TROPONIN I SERPL HS-MCNC: 28 NG/L (ref 0–22)
UROBILINOGEN UR STRIP-ACNC: NORMAL EU/DL (ref 0–1)
WBC OTHER # BLD: 7 K/UL (ref 3.5–11)

## 2024-05-07 PROCEDURE — 83735 ASSAY OF MAGNESIUM: CPT

## 2024-05-07 PROCEDURE — 71046 X-RAY EXAM CHEST 2 VIEWS: CPT

## 2024-05-07 PROCEDURE — 93005 ELECTROCARDIOGRAM TRACING: CPT | Performed by: EMERGENCY MEDICINE

## 2024-05-07 PROCEDURE — 99285 EMERGENCY DEPT VISIT HI MDM: CPT

## 2024-05-07 PROCEDURE — 84484 ASSAY OF TROPONIN QUANT: CPT

## 2024-05-07 PROCEDURE — 81003 URINALYSIS AUTO W/O SCOPE: CPT

## 2024-05-07 PROCEDURE — G0378 HOSPITAL OBSERVATION PER HR: HCPCS

## 2024-05-07 PROCEDURE — 85025 COMPLETE CBC W/AUTO DIFF WBC: CPT

## 2024-05-07 PROCEDURE — 36415 COLL VENOUS BLD VENIPUNCTURE: CPT

## 2024-05-07 PROCEDURE — 80048 BASIC METABOLIC PNL TOTAL CA: CPT

## 2024-05-07 RX ORDER — PREGABALIN 100 MG/1
100 CAPSULE ORAL 3 TIMES DAILY
COMMUNITY

## 2024-05-07 RX ORDER — POTASSIUM CHLORIDE 7.45 MG/ML
10 INJECTION INTRAVENOUS PRN
Status: DISCONTINUED | OUTPATIENT
Start: 2024-05-07 | End: 2024-05-08 | Stop reason: HOSPADM

## 2024-05-07 RX ORDER — ACETAMINOPHEN 325 MG/1
650 TABLET ORAL EVERY 6 HOURS PRN
Status: DISCONTINUED | OUTPATIENT
Start: 2024-05-07 | End: 2024-05-08 | Stop reason: HOSPADM

## 2024-05-07 RX ORDER — OXYCODONE HYDROCHLORIDE AND ACETAMINOPHEN 5; 325 MG/1; MG/1
1 TABLET ORAL EVERY 6 HOURS PRN
Status: DISCONTINUED | OUTPATIENT
Start: 2024-05-07 | End: 2024-05-08 | Stop reason: HOSPADM

## 2024-05-07 RX ORDER — POTASSIUM CHLORIDE 20 MEQ/1
40 TABLET, EXTENDED RELEASE ORAL PRN
Status: DISCONTINUED | OUTPATIENT
Start: 2024-05-07 | End: 2024-05-08 | Stop reason: HOSPADM

## 2024-05-07 RX ORDER — MELOXICAM 15 MG/1
15 TABLET ORAL DAILY
Status: DISCONTINUED | OUTPATIENT
Start: 2024-05-08 | End: 2024-05-08 | Stop reason: HOSPADM

## 2024-05-07 RX ORDER — ONDANSETRON 2 MG/ML
4 INJECTION INTRAMUSCULAR; INTRAVENOUS EVERY 6 HOURS PRN
Status: DISCONTINUED | OUTPATIENT
Start: 2024-05-07 | End: 2024-05-08 | Stop reason: HOSPADM

## 2024-05-07 RX ORDER — ATORVASTATIN CALCIUM 40 MG/1
40 TABLET, FILM COATED ORAL NIGHTLY
Status: DISCONTINUED | OUTPATIENT
Start: 2024-05-07 | End: 2024-05-08 | Stop reason: HOSPADM

## 2024-05-07 RX ORDER — FLUOXETINE HYDROCHLORIDE 20 MG/1
20 CAPSULE ORAL EVERY MORNING
Status: DISCONTINUED | OUTPATIENT
Start: 2024-05-07 | End: 2024-05-08 | Stop reason: HOSPADM

## 2024-05-07 RX ORDER — MAGNESIUM SULFATE 1 G/100ML
1000 INJECTION INTRAVENOUS PRN
Status: DISCONTINUED | OUTPATIENT
Start: 2024-05-07 | End: 2024-05-08 | Stop reason: HOSPADM

## 2024-05-07 RX ORDER — ONDANSETRON 4 MG/1
4 TABLET, ORALLY DISINTEGRATING ORAL EVERY 8 HOURS PRN
Status: DISCONTINUED | OUTPATIENT
Start: 2024-05-07 | End: 2024-05-08 | Stop reason: HOSPADM

## 2024-05-07 RX ORDER — ACETAMINOPHEN 650 MG/1
650 SUPPOSITORY RECTAL EVERY 6 HOURS PRN
Status: DISCONTINUED | OUTPATIENT
Start: 2024-05-07 | End: 2024-05-08 | Stop reason: HOSPADM

## 2024-05-07 RX ORDER — ASPIRIN 81 MG/1
81 TABLET, CHEWABLE ORAL DAILY
Status: DISCONTINUED | OUTPATIENT
Start: 2024-05-08 | End: 2024-05-08 | Stop reason: HOSPADM

## 2024-05-07 RX ORDER — LISINOPRIL 10 MG/1
10 TABLET ORAL DAILY
Status: DISCONTINUED | OUTPATIENT
Start: 2024-05-07 | End: 2024-05-08 | Stop reason: HOSPADM

## 2024-05-07 RX ORDER — TIZANIDINE 4 MG/1
4 TABLET ORAL EVERY 8 HOURS PRN
Status: DISCONTINUED | OUTPATIENT
Start: 2024-05-07 | End: 2024-05-08 | Stop reason: HOSPADM

## 2024-05-07 RX ORDER — PREGABALIN 100 MG/1
100 CAPSULE ORAL 3 TIMES DAILY
Status: DISCONTINUED | OUTPATIENT
Start: 2024-05-07 | End: 2024-05-08 | Stop reason: HOSPADM

## 2024-05-07 RX ORDER — OXYCODONE HYDROCHLORIDE AND ACETAMINOPHEN 5; 325 MG/1; MG/1
1 TABLET ORAL EVERY 4 HOURS PRN
COMMUNITY
Start: 2024-05-02 | End: 2024-05-08

## 2024-05-07 ASSESSMENT — PAIN - FUNCTIONAL ASSESSMENT: PAIN_FUNCTIONAL_ASSESSMENT: 0-10

## 2024-05-07 ASSESSMENT — LIFESTYLE VARIABLES
HOW MANY STANDARD DRINKS CONTAINING ALCOHOL DO YOU HAVE ON A TYPICAL DAY: 1 OR 2
HOW OFTEN DO YOU HAVE A DRINK CONTAINING ALCOHOL: 4 OR MORE TIMES A WEEK

## 2024-05-07 ASSESSMENT — HEART SCORE: ECG: NORMAL

## 2024-05-07 ASSESSMENT — PAIN SCALES - GENERAL
PAINLEVEL_OUTOF10: 5
PAINLEVEL_OUTOF10: 6

## 2024-05-08 ENCOUNTER — APPOINTMENT (OUTPATIENT)
Age: 69
End: 2024-05-08
Attending: INTERNAL MEDICINE
Payer: COMMERCIAL

## 2024-05-08 VITALS
HEIGHT: 75 IN | OXYGEN SATURATION: 96 % | DIASTOLIC BLOOD PRESSURE: 62 MMHG | HEART RATE: 66 BPM | TEMPERATURE: 98.2 F | WEIGHT: 262.79 LBS | SYSTOLIC BLOOD PRESSURE: 121 MMHG | BODY MASS INDEX: 32.67 KG/M2 | RESPIRATION RATE: 18 BRPM

## 2024-05-08 PROBLEM — I49.9 IRREGULAR HEART BEATS: Status: ACTIVE | Noted: 2024-05-08

## 2024-05-08 LAB
C DIFF GDH + TOXINS A+B STL QL IA.RAPID: NEGATIVE
CHOLEST SERPL-MCNC: 166 MG/DL
CHOLESTEROL/HDL RATIO: 5
ECHO AO ROOT DIAM: 3.3 CM
ECHO AO ROOT INDEX: 1.34 CM/M2
ECHO AV AREA PEAK VELOCITY: 1.9 CM2
ECHO AV AREA VTI: 2.1 CM2
ECHO AV AREA/BSA PEAK VELOCITY: 0.8 CM2/M2
ECHO AV AREA/BSA VTI: 0.9 CM2/M2
ECHO AV MEAN GRADIENT: 5 MMHG
ECHO AV MEAN VELOCITY: 1.1 M/S
ECHO AV PEAK GRADIENT: 12 MMHG
ECHO AV VELOCITY RATIO: 0.59
ECHO AV VTI: 31.9 CM
ECHO BSA: 2.51 M2
ECHO EST RA PRESSURE: 8 MMHG
ECHO LA DIAMETER INDEX: 1.59 CM/M2
ECHO LA DIAMETER: 3.9 CM
ECHO LA TO AORTIC ROOT RATIO: 1.18
ECHO LV E' LATERAL VELOCITY: 8 CM/S
ECHO LV E' SEPTAL VELOCITY: 7 CM/S
ECHO LV FRACTIONAL SHORTENING: 27 % (ref 28–44)
ECHO LV INTERNAL DIMENSION DIASTOLE INDEX: 2.07 CM/M2
ECHO LV INTERNAL DIMENSION DIASTOLIC: 5.1 CM (ref 4.2–5.9)
ECHO LV INTERNAL DIMENSION SYSTOLIC INDEX: 1.5 CM/M2
ECHO LV INTERNAL DIMENSION SYSTOLIC: 3.7 CM
ECHO LV IVSD: 1.1 CM (ref 0.6–1)
ECHO LV MASS 2D: 213.9 G (ref 88–224)
ECHO LV MASS INDEX 2D: 86.9 G/M2 (ref 49–115)
ECHO LV POSTERIOR WALL DIASTOLIC: 1.1 CM (ref 0.6–1)
ECHO LV RELATIVE WALL THICKNESS RATIO: 0.43
ECHO LVOT AREA: 3.5 CM2
ECHO LVOT AV VTI INDEX: 0.61
ECHO LVOT DIAM: 2.1 CM
ECHO LVOT MEAN GRADIENT: 2 MMHG
ECHO LVOT PEAK GRADIENT: 4 MMHG
ECHO LVOT PEAK VELOCITY: 1 M/S
ECHO LVOT STROKE VOLUME INDEX: 27.3 ML/M2
ECHO LVOT SV: 67.2 ML
ECHO LVOT VTI: 19.4 CM
ECHO MV A VELOCITY: 0.87 M/S
ECHO MV AREA VTI: 2.3 CM2
ECHO MV E DECELERATION TIME (DT): 369 MS
ECHO MV E VELOCITY: 0.65 M/S
ECHO MV E/A RATIO: 0.75
ECHO MV E/E' LATERAL: 8.13
ECHO MV E/E' RATIO (AVERAGED): 8.71
ECHO MV LVOT VTI INDEX: 1.52
ECHO MV MAX VELOCITY: 1.1 M/S
ECHO MV MEAN GRADIENT: 1 MMHG
ECHO MV MEAN VELOCITY: 0.5 M/S
ECHO MV PEAK GRADIENT: 5 MMHG
ECHO MV VTI: 29.5 CM
ECHO RA AREA 4C: 11.6 CM2
ECHO RIGHT VENTRICULAR SYSTOLIC PRESSURE (RVSP): 18 MMHG
ECHO RV TAPSE: 2.3 CM (ref 1.7–?)
ECHO TV REGURGITANT MAX VELOCITY: 1.59 M/S
ECHO TV REGURGITANT PEAK GRADIENT: 10 MMHG
ERYTHROCYTE [DISTWIDTH] IN BLOOD BY AUTOMATED COUNT: 13.2 % (ref 11.5–14.9)
HCT VFR BLD AUTO: 37.3 % (ref 41–53)
HDLC SERPL-MCNC: 33 MG/DL
HGB BLD-MCNC: 13 G/DL (ref 13.5–17.5)
LDLC SERPL CALC-MCNC: 93 MG/DL (ref 0–130)
MAGNESIUM SERPL-MCNC: 2.1 MG/DL (ref 1.6–2.6)
MCH RBC QN AUTO: 34.8 PG (ref 26–34)
MCHC RBC AUTO-ENTMCNC: 34.9 G/DL (ref 31–37)
MCV RBC AUTO: 99.5 FL (ref 80–100)
PLATELET # BLD AUTO: 258 K/UL (ref 150–450)
PMV BLD AUTO: 8.2 FL (ref 6–12)
RBC # BLD AUTO: 3.75 M/UL (ref 4.5–5.9)
SPECIMEN DESCRIPTION: NORMAL
TRIGL SERPL-MCNC: 200 MG/DL
WBC OTHER # BLD: 7.5 K/UL (ref 3.5–11)

## 2024-05-08 PROCEDURE — 80061 LIPID PANEL: CPT

## 2024-05-08 PROCEDURE — 96372 THER/PROPH/DIAG INJ SC/IM: CPT

## 2024-05-08 PROCEDURE — 87449 NOS EACH ORGANISM AG IA: CPT

## 2024-05-08 PROCEDURE — 2580000003 HC RX 258: Performed by: FAMILY MEDICINE

## 2024-05-08 PROCEDURE — 36415 COLL VENOUS BLD VENIPUNCTURE: CPT

## 2024-05-08 PROCEDURE — G0378 HOSPITAL OBSERVATION PER HR: HCPCS

## 2024-05-08 PROCEDURE — 6370000000 HC RX 637 (ALT 250 FOR IP): Performed by: FAMILY MEDICINE

## 2024-05-08 PROCEDURE — 87324 CLOSTRIDIUM AG IA: CPT

## 2024-05-08 PROCEDURE — 99255 IP/OBS CONSLTJ NEW/EST HI 80: CPT | Performed by: INTERNAL MEDICINE

## 2024-05-08 PROCEDURE — 83735 ASSAY OF MAGNESIUM: CPT

## 2024-05-08 PROCEDURE — 93306 TTE W/DOPPLER COMPLETE: CPT | Performed by: INTERNAL MEDICINE

## 2024-05-08 PROCEDURE — 93306 TTE W/DOPPLER COMPLETE: CPT

## 2024-05-08 PROCEDURE — 99223 1ST HOSP IP/OBS HIGH 75: CPT | Performed by: FAMILY MEDICINE

## 2024-05-08 PROCEDURE — 6360000002 HC RX W HCPCS: Performed by: FAMILY MEDICINE

## 2024-05-08 PROCEDURE — 6370000000 HC RX 637 (ALT 250 FOR IP): Performed by: INTERNAL MEDICINE

## 2024-05-08 PROCEDURE — 85027 COMPLETE CBC AUTOMATED: CPT

## 2024-05-08 RX ORDER — ENOXAPARIN SODIUM 100 MG/ML
40 INJECTION SUBCUTANEOUS DAILY
Status: DISCONTINUED | OUTPATIENT
Start: 2024-05-08 | End: 2024-05-08 | Stop reason: DRUGHIGH

## 2024-05-08 RX ORDER — ATORVASTATIN CALCIUM 40 MG/1
40 TABLET, FILM COATED ORAL NIGHTLY
Qty: 30 TABLET | Refills: 3 | Status: SHIPPED | OUTPATIENT
Start: 2024-05-08

## 2024-05-08 RX ORDER — METOPROLOL SUCCINATE 25 MG/1
25 TABLET, EXTENDED RELEASE ORAL DAILY
Qty: 30 TABLET | Refills: 3 | Status: SHIPPED | OUTPATIENT
Start: 2024-05-09

## 2024-05-08 RX ORDER — ENOXAPARIN SODIUM 100 MG/ML
30 INJECTION SUBCUTANEOUS 2 TIMES DAILY
Status: DISCONTINUED | OUTPATIENT
Start: 2024-05-08 | End: 2024-05-08 | Stop reason: HOSPADM

## 2024-05-08 RX ORDER — SODIUM CHLORIDE 0.9 % (FLUSH) 0.9 %
10 SYRINGE (ML) INJECTION PRN
Status: DISCONTINUED | OUTPATIENT
Start: 2024-05-08 | End: 2024-05-08 | Stop reason: HOSPADM

## 2024-05-08 RX ORDER — WATER 10 ML/10ML
10 INJECTION INTRAMUSCULAR; INTRAVENOUS; SUBCUTANEOUS
Status: DISCONTINUED | OUTPATIENT
Start: 2024-05-08 | End: 2024-05-08 | Stop reason: HOSPADM

## 2024-05-08 RX ORDER — SODIUM CHLORIDE 9 MG/ML
INJECTION, SOLUTION INTRAVENOUS PRN
Status: DISCONTINUED | OUTPATIENT
Start: 2024-05-08 | End: 2024-05-08 | Stop reason: HOSPADM

## 2024-05-08 RX ORDER — NITROGLYCERIN 0.4 MG/1
0.4 TABLET SUBLINGUAL EVERY 5 MIN PRN
Status: DISCONTINUED | OUTPATIENT
Start: 2024-05-08 | End: 2024-05-08 | Stop reason: HOSPADM

## 2024-05-08 RX ORDER — SODIUM CHLORIDE 0.9 % (FLUSH) 0.9 %
5-40 SYRINGE (ML) INJECTION EVERY 12 HOURS SCHEDULED
Status: DISCONTINUED | OUTPATIENT
Start: 2024-05-08 | End: 2024-05-08 | Stop reason: HOSPADM

## 2024-05-08 RX ORDER — METOPROLOL SUCCINATE 25 MG/1
25 TABLET, EXTENDED RELEASE ORAL DAILY
Status: DISCONTINUED | OUTPATIENT
Start: 2024-05-08 | End: 2024-05-08 | Stop reason: HOSPADM

## 2024-05-08 RX ORDER — ASPIRIN 81 MG/1
81 TABLET, CHEWABLE ORAL DAILY
Qty: 30 TABLET | Refills: 3 | Status: SHIPPED | OUTPATIENT
Start: 2024-05-09

## 2024-05-08 RX ORDER — SULFAMETHOXAZOLE AND TRIMETHOPRIM 800; 160 MG/1; MG/1
1 TABLET ORAL 2 TIMES DAILY
COMMUNITY
End: 2024-05-10

## 2024-05-08 RX ADMIN — PREGABALIN 100 MG: 100 CAPSULE ORAL at 01:48

## 2024-05-08 RX ADMIN — MELOXICAM 15 MG: 15 TABLET ORAL at 08:03

## 2024-05-08 RX ADMIN — TIZANIDINE 4 MG: 4 TABLET ORAL at 01:48

## 2024-05-08 RX ADMIN — SODIUM CHLORIDE, PRESERVATIVE FREE 10 ML: 5 INJECTION INTRAVENOUS at 08:02

## 2024-05-08 RX ADMIN — PREGABALIN 100 MG: 100 CAPSULE ORAL at 16:10

## 2024-05-08 RX ADMIN — PREGABALIN 100 MG: 100 CAPSULE ORAL at 09:07

## 2024-05-08 RX ADMIN — ASPIRIN 81 MG: 81 TABLET, CHEWABLE ORAL at 08:02

## 2024-05-08 RX ADMIN — LISINOPRIL 10 MG: 10 TABLET ORAL at 01:48

## 2024-05-08 RX ADMIN — FLUOXETINE HYDROCHLORIDE 20 MG: 20 CAPSULE ORAL at 01:47

## 2024-05-08 RX ADMIN — METOPROLOL SUCCINATE 25 MG: 25 TABLET, EXTENDED RELEASE ORAL at 09:00

## 2024-05-08 RX ADMIN — CHOLECALCIFEROL TAB 125 MCG (5000 UNIT) 5000 UNITS: 125 TAB at 08:03

## 2024-05-08 RX ADMIN — OXYCODONE HYDROCHLORIDE AND ACETAMINOPHEN 1 TABLET: 5; 325 TABLET ORAL at 16:10

## 2024-05-08 RX ADMIN — ENOXAPARIN SODIUM 30 MG: 100 INJECTION SUBCUTANEOUS at 08:02

## 2024-05-08 ASSESSMENT — PAIN DESCRIPTION - ORIENTATION: ORIENTATION: MID

## 2024-05-08 ASSESSMENT — PAIN SCALES - GENERAL
PAINLEVEL_OUTOF10: 5
PAINLEVEL_OUTOF10: 5
PAINLEVEL_OUTOF10: 7

## 2024-05-08 ASSESSMENT — ENCOUNTER SYMPTOMS
NAUSEA: 0
ABDOMINAL PAIN: 0
COUGH: 0
SHORTNESS OF BREATH: 0
VOMITING: 0

## 2024-05-08 ASSESSMENT — PAIN DESCRIPTION - LOCATION
LOCATION: BACK
LOCATION: GENERALIZED
LOCATION: BACK

## 2024-05-08 ASSESSMENT — PAIN DESCRIPTION - DESCRIPTORS: DESCRIPTORS: ACHING

## 2024-05-08 ASSESSMENT — PAIN - FUNCTIONAL ASSESSMENT: PAIN_FUNCTIONAL_ASSESSMENT: ACTIVITIES ARE NOT PREVENTED

## 2024-05-08 NOTE — CARE COORDINATION
Case Management Assessment  Initial Evaluation    Date/Time of Evaluation: 5/8/2024 3:27 PM  Assessment Completed by: Olga Luu RN    If patient is discharged prior to next notation, then this note serves as note for discharge by case management.    Patient Name: Earnest Avila                   YOB: 1955  Diagnosis: Elevated troponin [R79.89]                   Date / Time: 5/7/2024  5:27 PM    Patient Admission Status: Observation   Readmission Risk (Low < 19, Mod (19-27), High > 27): No data recorded  Current PCP: Larissa Rice MD  PCP verified by CM? Yes    Chart Reviewed: Yes      History Provided by: Patient  Patient Orientation: Alert and Oriented    Patient Cognition: Alert    Hospitalization in the last 30 days (Readmission):  No    If yes, Readmission Assessment in CM Navigator will be completed.    Advance Directives:      Code Status: Full Code   Patient's Primary Decision Maker is: Legal Next of Kin    Primary Decision Maker: Alyssa Avila - Ken - [phone]    Discharge Planning:    Patient lives with: Spouse/Significant Other Type of Home: House  Primary Care Giver: Self  Patient Support Systems include: Spouse/Significant Other, Children   Current Financial resources: Medicare  Current community resources: None  Current services prior to admission: C-pap            Current DME:              Type of Home Care services:  None    ADLS  Prior functional level: Independent in ADLs/IADLs  Current functional level: Independent in ADLs/IADLs    PT AM-PAC:   /24  OT AM-PAC:   /24    Family can provide assistance at DC: Yes  Would you like Case Management to discuss the discharge plan with any other family members/significant others, and if so, who? No  Plans to Return to Present Housing: Yes  Other Identified Issues/Barriers to RETURNING to current housing: no  Potential Assistance needed at discharge: N/A            Potential DME:  n/a  Patient expects to discharge

## 2024-05-08 NOTE — CONSULTS
hours.  FASTING LIPID PANEL:  Lab Results   Component Value Date/Time    HDL 33 05/08/2024 07:13 AM    LDLDIRECT 101 01/06/2016 08:46 AM    TRIG 200 05/08/2024 07:13 AM         IMPRESSION:    Frequent PACs and PVCs  Intermittent dizziness and light headedness likely related to dehydration   Minimally elevated troponins, likely type II from above in absence of anginal or chf symptoms   Essential hypertension  Chronic suprapubic catheter  Depression  Chronic alcohol use       PLAN:  Recommend starting Toprol-XL 25 mg daily  Obtain echocardiogram to rule out any underlying structural heart disease  IV hydration  Continue to monitor telemetry for any arrhythmias  Patient counseled to avoid excessive alcohol or caffeine use.  He verbalized understanding.  Also encouraged to increase p.o. hydration up to 60 ounces of fluid every day.  Compression stockings during prolonged standing.  Pharmacological stress test as outpatient if echo is low risk      Discussed with patient and Dr Novoa.      Lemuel Spaulding MD [facility]

## 2024-05-08 NOTE — PROGRESS NOTES
05/08/24 1427   Encounter Summary   Encounter Overview/Reason Spiritual/Emotional Needs   Service Provided For Patient not available  (PT is in test)   Referral/Consult From Nurse;Patient

## 2024-05-08 NOTE — PLAN OF CARE
Problem: Discharge Planning  Goal: Discharge to home or other facility with appropriate resources  Outcome: Completed     Problem: Pain  Goal: Verbalizes/displays adequate comfort level or baseline comfort level  Outcome: Completed     Problem: Safety - Adult  Goal: Free from fall injury  Outcome: Completed     Problem: ABCDS Injury Assessment  Goal: Absence of physical injury  Outcome: Completed     Problem: Cardiovascular - Adult  Goal: Maintains optimal cardiac output and hemodynamic stability  Outcome: Completed  Goal: Absence of cardiac dysrhythmias or at baseline  Outcome: Completed     Problem: Chronic Conditions and Co-morbidities  Goal: Patient's chronic conditions and co-morbidity symptoms are monitored and maintained or improved  Outcome: Completed

## 2024-05-08 NOTE — H&P
current alcohol use of about 5.0 standard drinks of alcohol per week.    Review of Systems - General ROS: positive for  - obesity  Ophthalmic ROS: positive for - uses glasses  Endocrine ROS: positive for - hyperglycemia      Family History:          Problem Relation Age of Onset    Diabetes Mother     Heart Disease Mother         A-Fib    High Blood Pressure Mother     Other Mother         Gout    Heart Disease Father     Heart Surgery Father         Stent in    Other Father         LymphoCarcinoma    Cancer Father     Arthritis Sister     Colon Cancer Paternal Grandfather        PHYSICAL EXAM:    /73   Pulse 78   Temp 98 °F (36.7 °C)   Resp 18   Ht 1.905 m (6' 3\")   Wt 119.2 kg (262 lb 12.6 oz)   SpO2 95%   BMI 32.85 kg/m²     General appearance: No apparent distress appears stated age and cooperative.  HEENT Normal cephalic, atraumatic without obvious deformity.  Pupils equal, round, and reactive to light.  Extra ocular muscles intact.  Conjunctivae/corneas clear.  Neck: Supple, No jugular venous distention/bruits.  Trachea midline without thyromegaly or adenopathy with full range of motion.  Lungs: Clear to auscultation, bilaterally without Rales/Wheezes/Rhonchi with good respiratory effort.  Heart: Regular rate and rhythm with Normal S1/S2 without murmurs, rubs or gallops, point of maximum impulse non-displaced  Abdomen: Soft, non-tender or non-distended without rigidity or guarding and positive bowel sounds all four quadrants.  Extremities: No clubbing, cyanosis, or edema bilaterally.  Full range of motion without deformity and normal gait intact.  Skin: Skin color, texture, turgor normal.  No rashes or lesions.  Neurologic: Alert and oriented X 3, neurovascularly intact with sensory/motor intact upper extremities/lower extremities, bilaterally.  Cranial nerves: II-XII intact, grossly non-focal.  Mental status: Alert, oriented, thought content appropriate.    CXR:  I have reviewed the CXR with the

## 2024-05-08 NOTE — PROGRESS NOTES
Pharmacy Note     Enoxaparin Dose Adjustment    Earnest Avila is a 68 y.o. male. Pharmacist assessment of enoxaparin dose for VTE prophylaxis.    Recent Labs     05/07/24  1807   BUN 16       Recent Labs     05/07/24  1807   CREATININE 0.9       Estimated Creatinine Clearance: 110 mL/min (based on SCr of 0.9 mg/dL).  Estimated CrCl using Ideal Body Weight: 94 mL/min (based on IBW 84.5 kg)    Height:   Ht Readings from Last 1 Encounters:   05/07/24 1.905 m (6' 3\")     Weight:  Wt Readings from Last 1 Encounters:   05/07/24 120.2 kg (265 lb)       The following enoxaparin dose has been adjusted based upon renal function and/or patient weight per P&T Guidelines:    Patient weighs between 101-150.9 kg (current weight = 120.2 kg) and has adequate renal function. The recommended DVT prophylaxis dose is Lovenox (enoxaparin) 30 mg twice a day.    Dose has been adjusted per protocol.     Thank you,  Zaid Knowles [facility]  5/8/2024   12:16 AM

## 2024-05-08 NOTE — DISCHARGE INSTR - DIET

## 2024-05-08 NOTE — PROGRESS NOTES
Patient admit to room 2055. Patient transferred by wheelchair and ambulated to bed per self. Patient oriented to room and educated on how to use call light. Spouse at bedside. Nurse reviewed plan and current orders. Call light in reach.

## 2024-05-08 NOTE — PROGRESS NOTES
Pharmacy Medication History Note      List of current medications patient is taking is complete.     Source of information: dispense report, [place]AR[place]     Changes made to medication list:  Medications flagged for removal (include reason, ex. noncompliance):  None     Medications removed (include reason, ex. therapy complete or physician discontinued):  None     Medications added/doses adjusted:  Percocet 5-325 mg take 1 tab every 6 hours as needed   Pregabalin changed to 100 mg three times daily     Other notes (ex. Recent course of antibiotics, Coumadin dosing):  Per [place]ARRS, last fill of Tylenol #3 was on 3/19/24 with quantity 30 for 60 days.   Per OARRS, last fill of pregabalin was on 24 with quantity 90 for 30 days.   Per [place]ARRS, patient filled Percocet 5-325 mg on 24 with quantity 28 for 7 days.     Denies use of other OTC or herbal medications.      Allergies clarified    Medication list provided to the patient: no  Medication education provided to the patient: none    Prior to Admission Medications   Prescriptions Last Dose Informant Patient Reported? Taking?   FLUoxetine (PROZAC) 20 MG capsule   No No   Sig: TAKE ONE CAPSULE BY MOUTH EVERY MORNING   Misc. Devices MISC   No No   Sig: Apply 1 sterile drape (18in x 26in) for suprapubic cath change.   Misc. Devices MISC   No No   Sig: Apply 1 pair of large sterile gloves for suprapubic cath changes every 2 weeks.   Misc. Devices MISC   No No   Sig: mobiTeris (or equivalent) wolf insertion tray with pre-filled 30cc syringe, use every 2 weeks for SP tube changes.   Misc. Devices MISC   No No   Sig: Apply large drainage bag (2000cc) every 2 weeks with SP tube changes.   Misc. Devices MISC   No No   SiFr wolf catheter to be changed out every 14 days.   Multiple Vitamins-Minerals (THERA-M MULTIPLE VITAMINS PO)   Yes No   Sig: Take 1 tablet by mouth daily   Water For Injection Sterile (STERILE WATER) injection   No No   Sig: Inject 10 mLs as directed

## 2024-05-08 NOTE — PLAN OF CARE
Problem: Discharge Planning  Goal: Discharge to home or other facility with appropriate resources  Outcome: Progressing     Problem: Pain  Goal: Verbalizes/displays adequate comfort level or baseline comfort level  Outcome: Progressing     Problem: Safety - Adult  Goal: Free from fall injury  Outcome: Progressing     Problem: ABCDS Injury Assessment  Goal: Absence of physical injury  Outcome: Progressing     Problem: Cardiovascular - Adult  Goal: Maintains optimal cardiac output and hemodynamic stability  Outcome: Progressing  Goal: Absence of cardiac dysrhythmias or at baseline  Outcome: Progressing

## 2024-05-08 NOTE — CARE COORDINATION
DISCHARGE PLANNING NOTE:    Attempted to speak with patient regarding discharge plans, however he was off the floor for testing.    Electronically signed by Olga Luu RN on 5/8/2024 at 1:57 PM

## 2024-05-08 NOTE — ED PROVIDER NOTES
[facility] ED  Emergency Department  Faculty Attestation       I performed a history and physical examination of the patient and discussed management with the resident. I reviewed the resident’s note and agree with the documented findings including all diagnostic interpretations and plan of care. Any areas of disagreement are noted on the chart. I was personally present for the key portions of any procedures. I have documented in the chart those procedures where I was not present during the key portions. I have reviewed the emergency nurses triage note. I agree with the chief complaint, past medical history, past surgical history, allergies, medications, social and family history as documented unless otherwise noted below. Documentation of the HPI, Physical Exam and Medical Decision Making performed by scribbrigitte is based on my personal performance of the HPI, PE and MDM. For Physician Assistant/ Nurse Practitioner cases/documentation I have personally evaluated this patient and have completed at least one if not all key elements of the E/M (history, physical exam, and MDM). Additional findings are as noted.    Pertinent Comments     Primary Care Physician: Larissa Rice MD    History: This is a 68 y.o. male who presents to the Emergency Department with complaint of    Chief Complaint   Patient presents with    Irregular Heart Beat     Pt here with concerns for irregular heart beat, was at eye doctor for some loss of vision issues and was told his heart beat was irregular, no sob no cp         Physical:    ED Triage Vitals [05/07/24 1608]   BP Temp Temp Source Pulse Respirations SpO2 Height Weight - Scale   (!) 150/68 98.7 °F (37.1 °C) Oral 59 18 100 % 1.905 m (6' 3\") 120.2 kg (265 lb)        RADIOLOGY:  XR CHEST (2 VW)    Result Date: 5/7/2024  EXAMINATION: TWO XRAY VIEWS OF THE CHEST 5/7/2024 5:58 pm COMPARISON: None. HISTORY: ORDERING SYSTEM PROVIDED HISTORY: pre-syncope TECHNOLOGIST PROVIDED 
05/08/24 0011   Tue May 07, 2024   1835 Troponin, High Sensitivity(!): 28  Slightly elevated with no priors to compare to [BL]   1918 Chest x-ray showed no acute process [BL]   1958 Troponin, High Sensitivity(!): 25 [BL]   2031 Heart score 5    Did discuss with Dr. Novoa who admits for the patient's primary care provider who will admit the patient.  Additionally, did inform Dr. Spaulding with cardiology who will see the patient tomorrow. [BL]      ED Course User Index  [BL] Dot Mott DO       PROCEDURES:      CONSULTS:  IP CONSULT TO PRIMARY CARE PROVIDER  IP CONSULT TO CARDIOLOGY  IP CONSULT TO CARDIOLOGY    CRITICAL CARE:  There was significant risk of life threatening deterioration of patient's condition requiring my direct management. Critical care time 0 minutes, excluding any documented procedures.    FINAL IMPRESSION      1. Elevated troponin          DISPOSITION / PLAN     DISPOSITION Admitted 05/07/2024 10:02:23 PM      PATIENT REFERRED TO:  No follow-up provider specified.    DISCHARGE MEDICATIONS:  New Prescriptions    No medications on file       Dot Mott DO  Emergency Medicine Resident    (Please note that portions of this note were completed with a voice recognition program.  Efforts were made to edit the dictations but occasionally words are mis-transcribed.)

## 2024-05-10 LAB
EKG ATRIAL RATE: 81 BPM
EKG P AXIS: 21 DEGREES
EKG P-R INTERVAL: 150 MS
EKG Q-T INTERVAL: 358 MS
EKG QRS DURATION: 82 MS
EKG QTC CALCULATION (BAZETT): 415 MS
EKG R AXIS: 63 DEGREES
EKG T AXIS: 42 DEGREES
EKG VENTRICULAR RATE: 81 BPM

## 2024-05-20 ENCOUNTER — HOSPITAL ENCOUNTER (OUTPATIENT)
Age: 69
Discharge: HOME | End: 2024-05-20
Payer: COMMERCIAL

## 2024-05-20 LAB
ALBUMIN SERPL-MCNC: 4 G/DL (ref 3.5–5.2)
ALP SERPL-CCNC: 96 U/L (ref 40–129)
ALT SERPL-CCNC: 30 U/L (ref 5–41)
ANION GAP SERPL CALCULATED.3IONS-SCNC: 11 MMOL/L (ref 9–17)
AST SERPL-CCNC: 23 U/L
BASOPHILS # BLD: 0.1 K/UL (ref 0–0.2)
BASOPHILS NFR BLD: 1 % (ref 0–2)
BILIRUB SERPL-MCNC: 0.9 MG/DL (ref 0.3–1.2)
BUN SERPL-MCNC: 20 MG/DL (ref 8–23)
CALCIUM SERPL-MCNC: 9.7 MG/DL (ref 8.6–10.4)
CHLORIDE SERPL-SCNC: 105 MMOL/L (ref 98–107)
CHOLEST SERPL-MCNC: 128 MG/DL
CHOLESTEROL/HDL RATIO: 3.2
CO2 SERPL-SCNC: 26 MMOL/L (ref 20–31)
CREAT SERPL-MCNC: 0.9 MG/DL (ref 0.7–1.2)
EOSINOPHIL # BLD: 0.1 K/UL (ref 0–0.4)
EOSINOPHILS RELATIVE PERCENT: 3 % (ref 0–4)
ERYTHROCYTE [DISTWIDTH] IN BLOOD BY AUTOMATED COUNT: 13.4 % (ref 11.5–14.9)
EST. AVERAGE GLUCOSE BLD GHB EST-MCNC: 120 MG/DL
GFR, ESTIMATED: >90 ML/MIN/1.73M2
GLUCOSE SERPL-MCNC: 116 MG/DL (ref 70–99)
HBA1C MFR BLD: 5.8 % (ref 4–6)
HCT VFR BLD AUTO: 38.5 % (ref 41–53)
HDLC SERPL-MCNC: 40 MG/DL
HGB BLD-MCNC: 13.3 G/DL (ref 13.5–17.5)
LDLC SERPL CALC-MCNC: 49 MG/DL (ref 0–130)
LYMPHOCYTES NFR BLD: 1.6 K/UL (ref 1–4.8)
LYMPHOCYTES RELATIVE PERCENT: 32 % (ref 24–44)
MCH RBC QN AUTO: 35.3 PG (ref 26–34)
MCHC RBC AUTO-ENTMCNC: 34.5 G/DL (ref 31–37)
MCV RBC AUTO: 102.3 FL (ref 80–100)
MONOCYTES NFR BLD: 0.5 K/UL (ref 0.1–1.3)
MONOCYTES NFR BLD: 9 % (ref 1–7)
NEUTROPHILS NFR BLD: 55 % (ref 36–66)
NEUTS SEG NFR BLD: 2.7 K/UL (ref 1.3–9.1)
PLATELET # BLD AUTO: 237 K/UL (ref 150–450)
PMV BLD AUTO: 7.9 FL (ref 6–12)
POTASSIUM SERPL-SCNC: 4.4 MMOL/L (ref 3.7–5.3)
PROT SERPL-MCNC: 6.5 G/DL (ref 6.4–8.3)
PSA SERPL-MCNC: 0.3 NG/ML (ref 0–4)
RBC # BLD AUTO: 3.77 M/UL (ref 4.5–5.9)
SODIUM SERPL-SCNC: 142 MMOL/L (ref 135–144)
TRIGL SERPL-MCNC: 197 MG/DL
WBC OTHER # BLD: 4.9 K/UL (ref 3.5–11)

## 2024-05-20 PROCEDURE — 83036 HEMOGLOBIN GLYCOSYLATED A1C: CPT

## 2024-05-20 PROCEDURE — 80053 COMPREHEN METABOLIC PANEL: CPT

## 2024-05-20 PROCEDURE — 36415 COLL VENOUS BLD VENIPUNCTURE: CPT

## 2024-05-20 PROCEDURE — 80061 LIPID PANEL: CPT

## 2024-05-20 PROCEDURE — G0103 PSA SCREENING: HCPCS

## 2024-05-20 PROCEDURE — 85025 COMPLETE CBC W/AUTO DIFF WBC: CPT

## 2024-05-23 DIAGNOSIS — R33.9 URINARY RETENTION: ICD-10-CM

## 2024-05-23 DIAGNOSIS — Z93.59 CHRONIC SUPRAPUBIC CATHETER (HCC): ICD-10-CM

## 2024-05-23 NOTE — RESULT ENCOUNTER NOTE
Bill - your sugar is well controlled, cholesterol is good, and PSA is normal. Continue your current medications.

## 2024-05-23 NOTE — TELEPHONE ENCOUNTER
Patient needed to find new DME supplier as his previous one no longer accepted his insurance. Needs new orders faxed to [facility] at [phone], account number [id number].  Last OV note will need to be faxed.

## 2024-06-06 PROBLEM — R79.89 ELEVATED TROPONIN: Status: RESOLVED | Noted: 2024-05-07 | Resolved: 2024-06-06

## 2024-09-03 PROBLEM — M67.919 DISORDER OF ROTATOR CUFF: Status: ACTIVE | Noted: 2024-06-03

## 2024-09-03 PROBLEM — E78.5 HYPERLIPIDEMIA: Status: RESOLVED | Noted: 2021-02-22 | Resolved: 2024-09-03

## 2024-09-03 PROBLEM — M48.061 SPINAL STENOSIS OF LUMBAR REGION AT MULTIPLE LEVELS: Status: ACTIVE | Noted: 2024-03-04

## 2024-09-03 PROBLEM — E55.9 VITAMIN D DEFICIENCY: Chronic | Status: RESOLVED | Noted: 2017-05-15 | Resolved: 2024-09-03

## 2024-09-03 PROBLEM — B35.1 ONYCHOMYCOSIS OF RIGHT GREAT TOE: Status: RESOLVED | Noted: 2018-01-29 | Resolved: 2024-09-03

## 2024-10-08 ENCOUNTER — PATIENT MESSAGE (OUTPATIENT)
Dept: UROLOGY | Age: 69
End: 2024-10-08

## 2024-10-08 DIAGNOSIS — R10.9 FLANK PAIN: ICD-10-CM

## 2024-10-08 DIAGNOSIS — R31.0 GROSS HEMATURIA: Primary | ICD-10-CM

## 2024-10-08 DIAGNOSIS — Z87.442 HISTORY OF KIDNEY STONES: ICD-10-CM

## 2024-10-09 NOTE — TELEPHONE ENCOUNTER
Writer seen pt was having flank pain less than 2 weeks ago and informed ALCIRA Griffin along with the hematuria the past couple of days.  She gave order for CTU to be done prior to the appt with Dr Ratliff.

## 2024-10-21 ENCOUNTER — OFFICE VISIT (OUTPATIENT)
Dept: UROLOGY | Age: 69
End: 2024-10-21
Payer: COMMERCIAL

## 2024-10-21 VITALS
OXYGEN SATURATION: 96 % | HEART RATE: 68 BPM | WEIGHT: 264 LBS | TEMPERATURE: 97.6 F | BODY MASS INDEX: 32.83 KG/M2 | DIASTOLIC BLOOD PRESSURE: 76 MMHG | SYSTOLIC BLOOD PRESSURE: 120 MMHG | HEIGHT: 75 IN

## 2024-10-21 DIAGNOSIS — Z93.59 CHRONIC SUPRAPUBIC CATHETER (HCC): ICD-10-CM

## 2024-10-21 DIAGNOSIS — R31.0 GROSS HEMATURIA: Primary | ICD-10-CM

## 2024-10-21 PROCEDURE — 3074F SYST BP LT 130 MM HG: CPT | Performed by: UROLOGY

## 2024-10-21 PROCEDURE — G8484 FLU IMMUNIZE NO ADMIN: HCPCS | Performed by: UROLOGY

## 2024-10-21 PROCEDURE — 1123F ACP DISCUSS/DSCN MKR DOCD: CPT | Performed by: UROLOGY

## 2024-10-21 PROCEDURE — 3017F COLORECTAL CA SCREEN DOC REV: CPT | Performed by: UROLOGY

## 2024-10-21 PROCEDURE — G8417 CALC BMI ABV UP PARAM F/U: HCPCS | Performed by: UROLOGY

## 2024-10-21 PROCEDURE — 99214 OFFICE O/P EST MOD 30 MIN: CPT | Performed by: UROLOGY

## 2024-10-21 PROCEDURE — 1036F TOBACCO NON-USER: CPT | Performed by: UROLOGY

## 2024-10-21 PROCEDURE — 3078F DIAST BP <80 MM HG: CPT | Performed by: UROLOGY

## 2024-10-21 PROCEDURE — G8427 DOCREV CUR MEDS BY ELIG CLIN: HCPCS | Performed by: UROLOGY

## 2024-10-21 RX ORDER — ACETAMINOPHEN AND CODEINE PHOSPHATE 300; 30 MG/1; MG/1
1 TABLET ORAL EVERY 12 HOURS PRN
COMMUNITY
Start: 2024-10-10

## 2024-10-21 RX ORDER — CHLORZOXAZONE 500 MG/1
TABLET ORAL
COMMUNITY
Start: 2024-09-03

## 2024-10-21 RX ORDER — CIPROFLOXACIN 500 MG/1
TABLET, FILM COATED ORAL
COMMUNITY
Start: 2024-10-18

## 2024-10-21 RX ORDER — CEPHALEXIN 500 MG/1
CAPSULE ORAL
COMMUNITY
Start: 2024-10-13

## 2024-10-21 ASSESSMENT — ENCOUNTER SYMPTOMS
SHORTNESS OF BREATH: 0
VOMITING: 0
COLOR CHANGE: 0
NAUSEA: 0
GASTROINTESTINAL NEGATIVE: 1
WHEEZING: 0
EYE REDNESS: 0
ABDOMINAL PAIN: 0
EYES NEGATIVE: 1
COUGH: 0
EYE PAIN: 0
RESPIRATORY NEGATIVE: 1
ALLERGIC/IMMUNOLOGIC NEGATIVE: 1
BACK PAIN: 0

## 2024-10-21 NOTE — PROGRESS NOTES
Review of Systems   Constitutional: Negative.  Negative for appetite change, chills and fever.   HENT: Negative.     Eyes: Negative.  Negative for pain, redness and visual disturbance.   Respiratory: Negative.  Negative for cough, shortness of breath and wheezing.    Cardiovascular: Negative.  Negative for chest pain and leg swelling.   Gastrointestinal: Negative.  Negative for abdominal pain, nausea and vomiting.   Endocrine: Negative.    Genitourinary:  Positive for hematuria. Negative for difficulty urinating, dysuria, flank pain, frequency, testicular pain and urgency.   Musculoskeletal: Negative.  Negative for back pain, joint swelling and myalgias.   Skin: Negative.  Negative for color change, rash and wound.   Allergic/Immunologic: Negative.    Neurological: Negative.  Negative for dizziness, tremors, weakness, numbness and headaches.   Hematological: Negative.  Negative for adenopathy. Does not bruise/bleed easily.   Psychiatric/Behavioral: Negative.       
For Injection Sterile (STERILE WATER) injection Inject 10 mLs as directed every 14 days To be used for suprapubic balloon inflation with cath changes. 2 each 11    vitamin D3 (CHOLECALCIFEROL) 125 MCG (5000 UT) TABS tablet Take 1 tablet by mouth daily      tiZANidine (ZANAFLEX) 4 MG tablet Take 1 tablet by mouth every 8 hours as needed      Multiple Vitamins-Minerals (THERA-M MULTIPLE VITAMINS PO) Take 1 tablet by mouth daily         Senna, Metoprolol, Colace [dss], Metformin and related, Seasonal, Sitagliptin, and Sitagliptin-metformin hcl  Social History     Tobacco Use   Smoking Status Former    Current packs/day: 0.00    Average packs/day: 0.5 packs/day for 8.0 years (4.0 ttl pk-yrs)    Types: Cigarettes    Start date: 1974    Quit date: 1981    Years since quittin.2   Smokeless Tobacco Never   Tobacco Comments    back in college     (Ifpatient a smoker, smoking cessation counseling offered)    Social History     Substance and Sexual Activity   Alcohol Use Yes    Alcohol/week: 5.0 standard drinks of alcohol    Types: 5 Cans of beer per week    Comment: glass of wine daily.        REVIEW OF SYSTEMS:  Review of Systems    Physical Exam:      Vitals:    10/21/24 1413   BP: 120/76   Pulse: 68   Temp: 97.6 °F (36.4 °C)   SpO2: 96%     Body mass index is 33 kg/m².  Patient is a 69 y.o. male in no acute distress and alert and oriented to person, place and time.  Physical Exam  Constitutional: Patient in no acute distress.  Neuro: Alert and oriented to person, place and time.  Psych: Mood normal, affect normal  Skin: No rash noted  HEENT: Head: Normocephalic andatraumatic      Assessment and Plan      1. Gross hematuria    2. Chronic suprapubic catheter (HCC)           Plan:   Ct urogram and cystoscopy for gross hematuria   Assessment & Plan   Return for Next available appointment, Cystoscopy and ctu.    Prescriptions Ordered:  No orders of the defined types were placed in this encounter.    Orders

## 2024-10-25 ENCOUNTER — HOSPITAL ENCOUNTER (OUTPATIENT)
Dept: CT IMAGING | Age: 69
Discharge: HOME | End: 2024-10-27
Attending: UROLOGY
Payer: COMMERCIAL

## 2024-10-25 DIAGNOSIS — R31.0 GROSS HEMATURIA: ICD-10-CM

## 2024-10-25 LAB
EGFR, POC: >90 ML/MIN/1.73M2
POC CREATININE: 0.7 MG/DL (ref 0.51–1.19)

## 2024-10-25 PROCEDURE — 2580000003 HC RX 258: Performed by: UROLOGY

## 2024-10-25 PROCEDURE — 82565 ASSAY OF CREATININE: CPT

## 2024-10-25 PROCEDURE — 6360000004 HC RX CONTRAST MEDICATION: Performed by: UROLOGY

## 2024-10-25 PROCEDURE — 74178 CT ABD&PLV WO CNTR FLWD CNTR: CPT | Performed by: UROLOGY

## 2024-10-25 RX ORDER — 0.9 % SODIUM CHLORIDE 0.9 %
100 INTRAVENOUS SOLUTION INTRAVENOUS ONCE
Status: COMPLETED | OUTPATIENT
Start: 2024-10-25 | End: 2024-10-25

## 2024-10-25 RX ORDER — IOPAMIDOL 755 MG/ML
120 INJECTION, SOLUTION INTRAVASCULAR
Status: COMPLETED | OUTPATIENT
Start: 2024-10-25 | End: 2024-10-25

## 2024-10-25 RX ORDER — SODIUM CHLORIDE 0.9 % (FLUSH) 0.9 %
10 SYRINGE (ML) INJECTION PRN
Status: ACTIVE | OUTPATIENT
Start: 2024-10-25

## 2024-10-25 RX ADMIN — SODIUM CHLORIDE, PRESERVATIVE FREE 10 ML: 5 INJECTION INTRAVENOUS at 15:12

## 2024-10-25 RX ADMIN — IOPAMIDOL 120 ML: 755 INJECTION, SOLUTION INTRAVENOUS at 15:12

## 2024-10-25 RX ADMIN — SODIUM CHLORIDE 100 ML: 9 INJECTION, SOLUTION INTRAVENOUS at 15:12

## 2024-11-04 ENCOUNTER — PROCEDURE VISIT (OUTPATIENT)
Dept: UROLOGY | Age: 69
End: 2024-11-04
Payer: MEDICARE

## 2024-11-04 VITALS — SYSTOLIC BLOOD PRESSURE: 125 MMHG | HEART RATE: 71 BPM | DIASTOLIC BLOOD PRESSURE: 82 MMHG | TEMPERATURE: 97.6 F

## 2024-11-04 DIAGNOSIS — R31.0 GROSS HEMATURIA: Primary | ICD-10-CM

## 2024-11-04 DIAGNOSIS — N39.0 RECURRENT UTI: ICD-10-CM

## 2024-11-04 DIAGNOSIS — Z93.59 CHRONIC SUPRAPUBIC CATHETER (HCC): ICD-10-CM

## 2024-11-04 PROCEDURE — 52000 CYSTOURETHROSCOPY: CPT | Performed by: UROLOGY

## 2024-11-04 ASSESSMENT — ENCOUNTER SYMPTOMS
WHEEZING: 0
BACK PAIN: 0
COUGH: 0
GASTROINTESTINAL NEGATIVE: 1
EYE PAIN: 0
NAUSEA: 0
COLOR CHANGE: 0
ABDOMINAL PAIN: 0
SHORTNESS OF BREATH: 0
VOMITING: 0
EYE REDNESS: 0
ALLERGIC/IMMUNOLOGIC NEGATIVE: 1
RESPIRATORY NEGATIVE: 1
EYES NEGATIVE: 1

## 2024-11-04 NOTE — PROGRESS NOTES
Suprapubic Catheter Change and Insertion Procedure:  Indication/Diagnosis: urinary retention  Risks and Benefits discussed with patient prior to procedure.      Placement Date: 11/4/24    Verbal consent obtained from patient prior to procedure.  Patient arrived with old SP catheter in place, balloon deflated and was removed without complication.  New 22F catheter inserted utilizing sterile technique per organization policy placed by Christin Kline RN.  10ml sterile water balloon inflated, attached to plug with return of yellow urine.   SP catheter secured.  Patient tolerated procedure well and without complications. Reviewed catheter care. Verbalized understanding.

## 2024-11-04 NOTE — PROGRESS NOTES
Cystoscopy Operative Note (11/4/24)  Surgeon: Layo Ratliff MD  Anesthesia: Urethral 2% Xylocaine   Indications: Gross hematuria  Position: Dorsal Lithotomy    Findings:   Risks and Benefits discussed with patient prior to procedure.  The patient was prepped and draped in the usual sterile fashion.  The flexible cystoscope was advanced through the urethra and into the bladder.  The bladder was thoroughly inspected and the following was noted:    Residual Urine: none  Urethra: normal appearing urethra with no masses, tenderness or lesions  Prostate:  patent  Bladder: No tumors or CIS noted.  No bladder diverticulum.  There was moderate trabeculation noted.  Ureters: Clear efflux from both ureters.  Orifices with normal configuration and location.    The cystoscope was removed.  The patient tolerated the procedure well.     Will change suprapubic catheter every 3 weeks instead of monthly in hopes that it helps reduce frequency of infections.    Agree with the ROS entered by the MA.  Review of Systems   Constitutional: Negative.  Negative for appetite change, chills and fever.   HENT: Negative.     Eyes: Negative.  Negative for pain, redness and visual disturbance.   Respiratory: Negative.  Negative for cough, shortness of breath and wheezing.    Cardiovascular: Negative.  Negative for chest pain and leg swelling.   Gastrointestinal: Negative.  Negative for abdominal pain, nausea and vomiting.   Endocrine: Negative.    Genitourinary:  Positive for hematuria. Negative for difficulty urinating, dysuria, flank pain, frequency, testicular pain and urgency.   Musculoskeletal: Negative.  Negative for back pain, joint swelling and myalgias.   Skin: Negative.  Negative for color change, rash and wound.   Allergic/Immunologic: Negative.    Neurological: Negative.  Negative for dizziness, tremors, weakness, numbness and headaches.   Hematological: Negative.  Negative for adenopathy. Does not bruise/bleed easily.

## 2024-11-26 ENCOUNTER — HOSPITAL ENCOUNTER (OUTPATIENT)
Age: 69
Discharge: HOME | End: 2024-11-26
Payer: COMMERCIAL

## 2024-11-26 DIAGNOSIS — M79.18 MYALGIA, MULTIPLE SITES: ICD-10-CM

## 2024-11-26 LAB
ALBUMIN SERPL-MCNC: 4.4 G/DL (ref 3.5–5.2)
ALP SERPL-CCNC: 102 U/L (ref 40–129)
ALT SERPL-CCNC: 205 U/L (ref 10–50)
ANION GAP SERPL CALCULATED.3IONS-SCNC: 9 MMOL/L (ref 9–16)
AST SERPL-CCNC: 93 U/L (ref 10–50)
BACTERIA URNS QL MICRO: ABNORMAL
BASOPHILS # BLD: 0 K/UL (ref 0–0.2)
BASOPHILS NFR BLD: 1 % (ref 0–2)
BILIRUB SERPL-MCNC: 2 MG/DL (ref 0–1.2)
BILIRUB UR QL STRIP: NEGATIVE
BUN SERPL-MCNC: 17 MG/DL (ref 8–23)
CALCIUM SERPL-MCNC: 9.8 MG/DL (ref 8.6–10.4)
CASTS #/AREA URNS LPF: ABNORMAL /LPF
CHLORIDE SERPL-SCNC: 101 MMOL/L (ref 98–107)
CK SERPL-CCNC: 107 U/L (ref 39–308)
CLARITY UR: ABNORMAL
CO2 SERPL-SCNC: 29 MMOL/L (ref 20–31)
COLOR UR: ABNORMAL
CREAT SERPL-MCNC: 1 MG/DL (ref 0.7–1.2)
CRYSTALS URNS MICRO: ABNORMAL /HPF
CRYSTALS URNS MICRO: ABNORMAL /HPF
EOSINOPHIL # BLD: 0.1 K/UL (ref 0–0.4)
EOSINOPHILS RELATIVE PERCENT: 2 % (ref 0–4)
EPI CELLS #/AREA URNS HPF: ABNORMAL /HPF
ERYTHROCYTE [DISTWIDTH] IN BLOOD BY AUTOMATED COUNT: 13.2 % (ref 11.5–14.9)
ERYTHROCYTE [SEDIMENTATION RATE] IN BLOOD BY PHOTOMETRIC METHOD: 2 MM/HR (ref 0–20)
GFR, ESTIMATED: 81 ML/MIN/1.73M2
GLUCOSE SERPL-MCNC: 142 MG/DL (ref 74–99)
GLUCOSE UR STRIP-MCNC: NEGATIVE MG/DL
HCT VFR BLD AUTO: 47.1 % (ref 41–53)
HGB BLD-MCNC: 16.4 G/DL (ref 13.5–17.5)
HGB UR QL STRIP.AUTO: ABNORMAL
KETONES UR STRIP-MCNC: ABNORMAL MG/DL
LEUKOCYTE ESTERASE UR QL STRIP: ABNORMAL
LYMPHOCYTES NFR BLD: 1.4 K/UL (ref 1–4.8)
LYMPHOCYTES RELATIVE PERCENT: 27 % (ref 24–44)
MCH RBC QN AUTO: 34.5 PG (ref 26–34)
MCHC RBC AUTO-ENTMCNC: 34.8 G/DL (ref 31–37)
MCV RBC AUTO: 99 FL (ref 80–100)
MONOCYTES NFR BLD: 0.5 K/UL (ref 0.1–1.3)
MONOCYTES NFR BLD: 9 % (ref 1–7)
NEUTROPHILS NFR BLD: 61 % (ref 36–66)
NEUTS SEG NFR BLD: 3.1 K/UL (ref 1.3–9.1)
NITRITE UR QL STRIP: NEGATIVE
PH UR STRIP: 5.5 [PH] (ref 5–8)
PLATELET # BLD AUTO: 202 K/UL (ref 150–450)
PMV BLD AUTO: 8.2 FL (ref 6–12)
POTASSIUM SERPL-SCNC: 4.8 MMOL/L (ref 3.7–5.3)
PROT SERPL-MCNC: 6.9 G/DL (ref 6.6–8.7)
PROT UR STRIP-MCNC: ABNORMAL MG/DL
RBC # BLD AUTO: 4.76 M/UL (ref 4.5–5.9)
RBC #/AREA URNS HPF: ABNORMAL /HPF
RHEUMATOID FACT SER NEPH-ACNC: <10 IU/ML (ref 0–13)
SODIUM SERPL-SCNC: 139 MMOL/L (ref 136–145)
SP GR UR STRIP: 1.02 (ref 1–1.03)
T4 FREE SERPL-MCNC: 1 NG/DL (ref 0.9–1.7)
TSH SERPL DL<=0.05 MIU/L-ACNC: 1.39 UIU/ML (ref 0.27–4.2)
UROBILINOGEN UR STRIP-ACNC: NORMAL EU/DL (ref 0–1)
WBC #/AREA URNS HPF: ABNORMAL /HPF
WBC OTHER # BLD: 5.1 K/UL (ref 3.5–11)

## 2024-11-26 PROCEDURE — 87186 SC STD MICRODIL/AGAR DIL: CPT

## 2024-11-26 PROCEDURE — 87181 SC STD AGAR DILUTION PER AGT: CPT

## 2024-11-26 PROCEDURE — 86038 ANTINUCLEAR ANTIBODIES: CPT

## 2024-11-26 PROCEDURE — 86225 DNA ANTIBODY NATIVE: CPT

## 2024-11-26 PROCEDURE — 87077 CULTURE AEROBIC IDENTIFY: CPT

## 2024-11-26 PROCEDURE — 87184 SC STD DISK METHOD PER PLATE: CPT

## 2024-11-26 PROCEDURE — 87086 URINE CULTURE/COLONY COUNT: CPT

## 2024-11-26 PROCEDURE — 86431 RHEUMATOID FACTOR QUANT: CPT

## 2024-11-26 PROCEDURE — 84443 ASSAY THYROID STIM HORMONE: CPT

## 2024-11-26 PROCEDURE — 80053 COMPREHEN METABOLIC PANEL: CPT

## 2024-11-26 PROCEDURE — 36415 COLL VENOUS BLD VENIPUNCTURE: CPT

## 2024-11-26 PROCEDURE — 85025 COMPLETE CBC W/AUTO DIFF WBC: CPT

## 2024-11-26 PROCEDURE — 84439 ASSAY OF FREE THYROXINE: CPT

## 2024-11-26 PROCEDURE — 81001 URINALYSIS AUTO W/SCOPE: CPT

## 2024-11-26 PROCEDURE — 82550 ASSAY OF CK (CPK): CPT

## 2024-11-26 PROCEDURE — 85652 RBC SED RATE AUTOMATED: CPT

## 2024-11-26 NOTE — RESULT ENCOUNTER NOTE
Bill - Your urine has lots of bacteria, but I suspect that your urine is colonized, and not that you have an infection. The culture results will be in over the weekend; I'll watch for them and message you with any action that I recommend.     Your liver enzymes are very elevated. Stay off your atorvastatin. Avoid alcohol and Tylenol more that 6 extra strength or 8 regular a day.     I would like to have you recheck your liver enzymes in 2 weeks; the order is entered for our lab.     Either of these things could be making your pain worse.

## 2024-11-27 LAB
ANA SER QL IA: NEGATIVE
DSDNA IGG SER QL IA: <0.5 IU/ML
NUCLEAR IGG SER IA-RTO: 0.2 U/ML

## 2024-11-27 NOTE — RESULT ENCOUNTER NOTE
Bill - your urine culture grew E coli. I am sending a prescription for antibiotics to [facility].     Finish all of the medication even if your symptoms are improved before the prescription runs out.

## 2024-11-28 RX ORDER — LEVOFLOXACIN 500 MG/1
500 TABLET, FILM COATED ORAL DAILY
Qty: 7 TABLET | Refills: 0 | Status: SHIPPED | OUTPATIENT
Start: 2024-11-28 | End: 2024-12-05

## 2024-11-28 NOTE — RESULT ENCOUNTER NOTE
Bill - the urine grew a rare bacteria as well. Continue your current antibiotic, and I'm also sending another one to cover the more rare bacteria. Finish both.     I did receive your message about the neurologist. I'm happy to refer you, but won't get that referral out until tomorrow. I'm cautious about how much help they are going to be able to provide you, but they will likely do additional testing.

## 2024-11-29 LAB
MICROORGANISM SPEC CULT: ABNORMAL
SPECIMEN DESCRIPTION: ABNORMAL

## 2025-01-29 ENCOUNTER — HOSPITAL ENCOUNTER (EMERGENCY)
Age: 70
Discharge: HOME | End: 2025-01-29
Attending: EMERGENCY MEDICINE
Payer: MEDICARE

## 2025-01-29 ENCOUNTER — APPOINTMENT (OUTPATIENT)
Dept: GENERAL RADIOLOGY | Age: 70
End: 2025-01-29
Attending: EMERGENCY MEDICINE
Payer: MEDICARE

## 2025-01-29 VITALS
HEART RATE: 61 BPM | RESPIRATION RATE: 11 BRPM | BODY MASS INDEX: 33.57 KG/M2 | TEMPERATURE: 97.8 F | HEIGHT: 75 IN | SYSTOLIC BLOOD PRESSURE: 140 MMHG | DIASTOLIC BLOOD PRESSURE: 81 MMHG | WEIGHT: 270 LBS | OXYGEN SATURATION: 95 %

## 2025-01-29 DIAGNOSIS — R06.00 DYSPNEA, UNSPECIFIED TYPE: Primary | ICD-10-CM

## 2025-01-29 LAB
ALBUMIN SERPL-MCNC: 4 G/DL (ref 3.5–5.2)
ALP SERPL-CCNC: 103 U/L (ref 40–129)
ALT SERPL-CCNC: 126 U/L (ref 10–50)
ANION GAP SERPL CALCULATED.3IONS-SCNC: 11 MMOL/L (ref 9–16)
AST SERPL-CCNC: 74 U/L (ref 10–50)
BASOPHILS # BLD: 0 K/UL (ref 0–0.2)
BASOPHILS NFR BLD: 1 % (ref 0–2)
BILIRUB SERPL-MCNC: 1.5 MG/DL (ref 0–1.2)
BNP SERPL-MCNC: 208 PG/ML (ref 0–300)
BUN SERPL-MCNC: 17 MG/DL (ref 8–23)
CALCIUM SERPL-MCNC: 9.4 MG/DL (ref 8.6–10.4)
CHLORIDE SERPL-SCNC: 104 MMOL/L (ref 98–107)
CO2 SERPL-SCNC: 23 MMOL/L (ref 20–31)
CREAT SERPL-MCNC: 1 MG/DL (ref 0.7–1.2)
D DIMER PPP FEU-MCNC: 0.46 UG/ML FEU (ref 0–0.59)
EOSINOPHIL # BLD: 0.2 K/UL (ref 0–0.4)
EOSINOPHILS RELATIVE PERCENT: 4 % (ref 0–4)
ERYTHROCYTE [DISTWIDTH] IN BLOOD BY AUTOMATED COUNT: 12.6 % (ref 11.5–14.9)
GFR, ESTIMATED: 81 ML/MIN/1.73M2
GLUCOSE SERPL-MCNC: 189 MG/DL (ref 74–99)
HCT VFR BLD AUTO: 45.3 % (ref 41–53)
HGB BLD-MCNC: 15.8 G/DL (ref 13.5–17.5)
LIPASE SERPL-CCNC: 90 U/L (ref 13–60)
LYMPHOCYTES NFR BLD: 1.3 K/UL (ref 1–4.8)
LYMPHOCYTES RELATIVE PERCENT: 24 % (ref 24–44)
MAGNESIUM SERPL-MCNC: 1.9 MG/DL (ref 1.6–2.4)
MCH RBC QN AUTO: 34.1 PG (ref 26–34)
MCHC RBC AUTO-ENTMCNC: 34.8 G/DL (ref 31–37)
MCV RBC AUTO: 98 FL (ref 80–100)
MONOCYTES NFR BLD: 0.6 K/UL (ref 0.1–1.3)
MONOCYTES NFR BLD: 11 % (ref 1–7)
NEUTROPHILS NFR BLD: 60 % (ref 36–66)
NEUTS SEG NFR BLD: 3.4 K/UL (ref 1.3–9.1)
PLATELET # BLD AUTO: 158 K/UL (ref 150–450)
PMV BLD AUTO: 8.4 FL (ref 6–12)
POTASSIUM SERPL-SCNC: 4.4 MMOL/L (ref 3.7–5.3)
PROT SERPL-MCNC: 6.6 G/DL (ref 6.6–8.7)
RBC # BLD AUTO: 4.62 M/UL (ref 4.5–5.9)
SODIUM SERPL-SCNC: 138 MMOL/L (ref 136–145)
TROPONIN I SERPL HS-MCNC: 25 NG/L (ref 0–22)
TROPONIN I SERPL HS-MCNC: 27 NG/L (ref 0–22)
WBC OTHER # BLD: 5.6 K/UL (ref 3.5–11)

## 2025-01-29 PROCEDURE — 85379 FIBRIN DEGRADATION QUANT: CPT

## 2025-01-29 PROCEDURE — 83735 ASSAY OF MAGNESIUM: CPT

## 2025-01-29 PROCEDURE — 85025 COMPLETE CBC W/AUTO DIFF WBC: CPT

## 2025-01-29 PROCEDURE — 93005 ELECTROCARDIOGRAM TRACING: CPT | Performed by: EMERGENCY MEDICINE

## 2025-01-29 PROCEDURE — 84484 ASSAY OF TROPONIN QUANT: CPT

## 2025-01-29 PROCEDURE — 83690 ASSAY OF LIPASE: CPT

## 2025-01-29 PROCEDURE — 83880 ASSAY OF NATRIURETIC PEPTIDE: CPT

## 2025-01-29 PROCEDURE — 36415 COLL VENOUS BLD VENIPUNCTURE: CPT

## 2025-01-29 PROCEDURE — 99285 EMERGENCY DEPT VISIT HI MDM: CPT

## 2025-01-29 PROCEDURE — 71045 X-RAY EXAM CHEST 1 VIEW: CPT

## 2025-01-29 PROCEDURE — 80053 COMPREHEN METABOLIC PANEL: CPT

## 2025-01-29 ASSESSMENT — ENCOUNTER SYMPTOMS
COLOR CHANGE: 0
SHORTNESS OF BREATH: 1
EYE PAIN: 0
BACK PAIN: 0
ABDOMINAL PAIN: 0

## 2025-01-29 ASSESSMENT — LIFESTYLE VARIABLES
HOW OFTEN DO YOU HAVE A DRINK CONTAINING ALCOHOL: NEVER
HOW MANY STANDARD DRINKS CONTAINING ALCOHOL DO YOU HAVE ON A TYPICAL DAY: PATIENT DOES NOT DRINK

## 2025-01-29 NOTE — ED PROVIDER NOTES
EMERGENCY DEPARTMENT ENCOUNTER    Pt Name: Earnest Avila  MRN: [id number]  Birthdate 1955  Date of evaluation: 1/29/25  CHIEF COMPLAINT       Chief Complaint   Patient presents with    Shortness of Breath     HISTORY OF PRESENT ILLNESS   69-year-old male presents for complaints of shortness of breath.  States he been having worsening shortness of breath for the last 2 weeks.  He states that initially started with just exertion but he states now it is mostly all the time.  He denies any cough, denies chest pain, denies any recent illness, denies any associate abdominal pain nausea or vomiting.  He denies any swelling in his legs or abdomen that he has noted.  He denies any significant cardiac history or history of lung disease.    The history is provided by the patient.           REVIEW OF SYSTEMS     Review of Systems   Constitutional:  Negative for chills and fever.   HENT:  Negative for congestion and ear pain.    Eyes:  Negative for pain.   Respiratory:  Positive for shortness of breath.    Cardiovascular:  Negative for chest pain, palpitations and leg swelling.   Gastrointestinal:  Negative for abdominal pain.   Genitourinary:  Negative for dysuria and flank pain.   Musculoskeletal:  Negative for back pain.   Skin:  Negative for color change.   Neurological:  Negative for numbness and headaches.   Psychiatric/Behavioral:  Negative for confusion.    All other systems reviewed and are negative.    PASTMEDICAL HISTORY     Past Medical History:   Diagnosis Date    Allergic rhinitis     Anxiety     Borderline hypertension     Controlled diabetes mellitus type II without complication (HCC)     Dental abscess     Depression     On medas    History of colon polyps     Intermittent self-catheterization of bladder 06/17/2016    3/8/17 does not do this any more- was getting UTI's    Kidney stones     Neuropathy     Obesity     HISTORY OF OBESITY, AS OF 11/06/2017 PATIENT HAS LOST 80 LBS    Obstructive sleep apnea of

## 2025-01-30 LAB
EKG ATRIAL RATE: 66 BPM
EKG P AXIS: 13 DEGREES
EKG P-R INTERVAL: 164 MS
EKG Q-T INTERVAL: 408 MS
EKG QRS DURATION: 88 MS
EKG QTC CALCULATION (BAZETT): 427 MS
EKG R AXIS: -8 DEGREES
EKG T AXIS: 47 DEGREES
EKG VENTRICULAR RATE: 66 BPM

## 2025-01-30 PROCEDURE — 93010 ELECTROCARDIOGRAM REPORT: CPT | Performed by: INTERNAL MEDICINE

## 2025-02-11 ENCOUNTER — HOSPITAL ENCOUNTER (OUTPATIENT)
Dept: NUCLEAR MEDICINE | Age: 70
Discharge: HOME | End: 2025-02-13
Attending: FAMILY MEDICINE
Payer: COMMERCIAL

## 2025-02-11 ENCOUNTER — HOSPITAL ENCOUNTER (OUTPATIENT)
Dept: PULMONOLOGY | Age: 70
Discharge: HOME | End: 2025-02-11
Attending: FAMILY MEDICINE
Payer: COMMERCIAL

## 2025-02-11 ENCOUNTER — HOSPITAL ENCOUNTER (OUTPATIENT)
Age: 70
Discharge: HOME | End: 2025-02-13
Attending: FAMILY MEDICINE
Payer: COMMERCIAL

## 2025-02-11 DIAGNOSIS — R06.02 SHORTNESS OF BREATH: ICD-10-CM

## 2025-02-11 DIAGNOSIS — R06.02 SOB (SHORTNESS OF BREATH) ON EXERTION: ICD-10-CM

## 2025-02-11 LAB
DLCO %PRED: NORMAL
DLCO PRED: NORMAL
DLCO/VA %PRED: NORMAL
DLCO/VA PRED: NORMAL
DLCO/VA: NORMAL
DLCO: NORMAL
EXPIRATORY TIME: NORMAL
FEF 25-75% %PRED-PRE: NORMAL
FEF 25-75% PRED: NORMAL
FEF 25-75-PRE: NORMAL
FEV1 %PRED-PRE: NORMAL
FEV1 PRED: NORMAL
FEV1/FVC %PRED-PRE: NORMAL
FEV1/FVC PRED: NORMAL
FEV1/FVC: NORMAL
FEV1: NORMAL
FVC %PRED-PRE: NORMAL
FVC PRED: NORMAL
FVC: NORMAL
GAW %PRED: NORMAL
GAW PRED: NORMAL
GAW: NORMAL
IC PRE %PRED: NORMAL
IC PRED: NORMAL
IC: NORMAL
MVV %PRED-PRE: NORMAL
MVV PRED: NORMAL
MVV-PRE: NORMAL
PEF %PRED-PRE: NORMAL
PEF PRED: NORMAL
PEF-PRE: NORMAL
RAW %PRED: NORMAL
RAW PRED: NORMAL
RAW: NORMAL
RV PRE %PRED: NORMAL
RV PRED: NORMAL
RV: NORMAL
SVC %PRED: NORMAL
SVC PRED: NORMAL
SVC: NORMAL
TLC PRE %PRED: NORMAL
TLC PRED: NORMAL
TLC: NORMAL
VA %PRED: NORMAL
VA PRED: NORMAL
VA: NORMAL
VTG %PRED: NORMAL
VTG PRED: NORMAL
VTG: NORMAL

## 2025-02-11 PROCEDURE — 2500000003 HC RX 250 WO HCPCS: Performed by: FAMILY MEDICINE

## 2025-02-11 PROCEDURE — 94729 DIFFUSING CAPACITY: CPT

## 2025-02-11 PROCEDURE — 93017 CV STRESS TEST TRACING ONLY: CPT

## 2025-02-11 PROCEDURE — 94726 PLETHYSMOGRAPHY LUNG VOLUMES: CPT

## 2025-02-11 PROCEDURE — 6370000000 HC RX 637 (ALT 250 FOR IP): Performed by: FAMILY MEDICINE

## 2025-02-11 PROCEDURE — 78452 HT MUSCLE IMAGE SPECT MULT: CPT

## 2025-02-11 PROCEDURE — 93016 CV STRESS TEST SUPVJ ONLY: CPT | Performed by: INTERNAL MEDICINE

## 2025-02-11 PROCEDURE — 93018 CV STRESS TEST I&R ONLY: CPT | Performed by: INTERNAL MEDICINE

## 2025-02-11 PROCEDURE — 94060 EVALUATION OF WHEEZING: CPT

## 2025-02-11 PROCEDURE — A9500 TC99M SESTAMIBI: HCPCS | Performed by: FAMILY MEDICINE

## 2025-02-11 PROCEDURE — 3430000000 HC RX DIAGNOSTIC RADIOPHARMACEUTICAL: Performed by: FAMILY MEDICINE

## 2025-02-11 PROCEDURE — 6360000002 HC RX W HCPCS: Performed by: FAMILY MEDICINE

## 2025-02-11 PROCEDURE — 94375 RESPIRATORY FLOW VOLUME LOOP: CPT

## 2025-02-11 RX ORDER — TETRAKIS(2-METHOXYISOBUTYLISOCYANIDE)COPPER(I) TETRAFLUOROBORATE 1 MG/ML
30 INJECTION, POWDER, LYOPHILIZED, FOR SOLUTION INTRAVENOUS
Status: COMPLETED | OUTPATIENT
Start: 2025-02-11 | End: 2025-02-11

## 2025-02-11 RX ORDER — SODIUM CHLORIDE 9 MG/ML
500 INJECTION, SOLUTION INTRAVENOUS CONTINUOUS PRN
Status: ACTIVE | OUTPATIENT
Start: 2025-02-11 | End: 2025-02-11

## 2025-02-11 RX ORDER — SODIUM CHLORIDE 0.9 % (FLUSH) 0.9 %
10 SYRINGE (ML) INJECTION PRN
Status: DISCONTINUED | OUTPATIENT
Start: 2025-02-11 | End: 2025-02-14 | Stop reason: HOSPADM

## 2025-02-11 RX ORDER — AMINOPHYLLINE 25 MG/ML
50 INJECTION, SOLUTION INTRAVENOUS PRN
Status: ACTIVE | OUTPATIENT
Start: 2025-02-11 | End: 2025-02-11

## 2025-02-11 RX ORDER — ATROPINE SULFATE 0.1 MG/ML
0.5 INJECTION INTRAVENOUS EVERY 5 MIN PRN
Status: ACTIVE | OUTPATIENT
Start: 2025-02-11 | End: 2025-02-11

## 2025-02-11 RX ORDER — TETRAKIS(2-METHOXYISOBUTYLISOCYANIDE)COPPER(I) TETRAFLUOROBORATE 1 MG/ML
10 INJECTION, POWDER, LYOPHILIZED, FOR SOLUTION INTRAVENOUS
Status: COMPLETED | OUTPATIENT
Start: 2025-02-11 | End: 2025-02-11

## 2025-02-11 RX ORDER — REGADENOSON 0.08 MG/ML
0.4 INJECTION, SOLUTION INTRAVENOUS
Status: COMPLETED | OUTPATIENT
Start: 2025-02-11 | End: 2025-02-11

## 2025-02-11 RX ORDER — ALBUTEROL SULFATE 90 UG/1
2 INHALANT RESPIRATORY (INHALATION) PRN
Status: ACTIVE | OUTPATIENT
Start: 2025-02-11 | End: 2025-02-11

## 2025-02-11 RX ORDER — SODIUM CHLORIDE 0.9 % (FLUSH) 0.9 %
5-40 SYRINGE (ML) INJECTION PRN
Status: ACTIVE | OUTPATIENT
Start: 2025-02-11 | End: 2025-02-11

## 2025-02-11 RX ORDER — ALBUTEROL SULFATE 90 UG/1
2 INHALANT RESPIRATORY (INHALATION) ONCE
Status: COMPLETED | OUTPATIENT
Start: 2025-02-11 | End: 2025-02-11

## 2025-02-11 RX ORDER — METOPROLOL TARTRATE 1 MG/ML
5 INJECTION, SOLUTION INTRAVENOUS EVERY 5 MIN PRN
Status: ACTIVE | OUTPATIENT
Start: 2025-02-11 | End: 2025-02-11

## 2025-02-11 RX ORDER — NITROGLYCERIN 0.4 MG/1
0.4 TABLET SUBLINGUAL EVERY 5 MIN PRN
Status: ACTIVE | OUTPATIENT
Start: 2025-02-11 | End: 2025-02-11

## 2025-02-11 RX ADMIN — ALBUTEROL SULFATE 2 PUFF: 90 AEROSOL, METERED RESPIRATORY (INHALATION) at 09:15

## 2025-02-11 RX ADMIN — Medication 34 MILLICURIE: at 08:46

## 2025-02-11 RX ADMIN — Medication 10.48 MILLICURIE: at 07:01

## 2025-02-11 RX ADMIN — SODIUM CHLORIDE, PRESERVATIVE FREE 10 ML: 5 INJECTION INTRAVENOUS at 07:02

## 2025-02-11 RX ADMIN — REGADENOSON 0.4 MG: 0.08 INJECTION, SOLUTION INTRAVENOUS at 08:44

## 2025-02-11 NOTE — PROCEDURES
PFT Interpretation:    NORMAL  Lung Mechanics, Volumes,and Diffusion Capacity is noted.  Evidence of air trapping / hyperinflation is NOT noted.  NO Significant improvement is noted lung mechanics after bronchodilators.      Lenard Wilson MD

## 2025-02-13 LAB
STRESS BASELINE DIAS BP: 90 MMHG
STRESS BASELINE HR: 65 BPM
STRESS BASELINE SYS BP: 119 MMHG
STRESS ESTIMATED WORKLOAD: 1 METS
STRESS PEAK DIAS BP: 75 MMHG
STRESS PEAK SYS BP: 125 MMHG
STRESS PERCENT HR ACHIEVED: 56 %
STRESS POST PEAK HR: 85 BPM
STRESS RATE PRESSURE PRODUCT: NORMAL BPM*MMHG
STRESS STAGE RECOVERY 1 BP: NORMAL MMHG
STRESS STAGE RECOVERY 1 COMMENTS: NORMAL
STRESS STAGE RECOVERY 1 DURATION: 1 MIN:SEC
STRESS STAGE RECOVERY 1 HR: 76 BPM
STRESS STAGE RECOVERY 2 BP: NORMAL MMHG
STRESS STAGE RECOVERY 2 DURATION: 3 MIN:SEC
STRESS STAGE RECOVERY 2 HR: 82 BPM
STRESS STAGE RECOVERY 3 BP: NORMAL MMHG
STRESS STAGE RECOVERY 3 DURATION: 5 MIN:SEC
STRESS STAGE RECOVERY 3 HR: 73 BPM
STRESS TARGET HR: 151 BPM

## 2025-02-14 ENCOUNTER — HOSPITAL ENCOUNTER (OUTPATIENT)
Age: 70
Discharge: HOME | End: 2025-02-14
Attending: INTERNAL MEDICINE
Payer: MEDICARE

## 2025-02-14 ENCOUNTER — HOSPITAL ENCOUNTER (OUTPATIENT)
Age: 70
Setting detail: OBSERVATION
LOS: 1 days | Discharge: ANOTHER HOSPITAL | End: 2025-02-17
Attending: EMERGENCY MEDICINE | Admitting: STUDENT IN AN ORGANIZED HEALTH CARE EDUCATION/TRAINING PROGRAM
Payer: MEDICARE

## 2025-02-14 ENCOUNTER — APPOINTMENT (OUTPATIENT)
Dept: GENERAL RADIOLOGY | Age: 70
End: 2025-02-14
Payer: MEDICARE

## 2025-02-14 ENCOUNTER — HOSPITAL ENCOUNTER (OUTPATIENT)
Dept: WOMENS IMAGING | Age: 70
Discharge: HOME | End: 2025-02-16
Attending: INTERNAL MEDICINE
Payer: MEDICARE

## 2025-02-14 DIAGNOSIS — R07.9 CHEST PAIN, UNSPECIFIED TYPE: ICD-10-CM

## 2025-02-14 DIAGNOSIS — R79.89 ELEVATED LFTS: ICD-10-CM

## 2025-02-14 DIAGNOSIS — R06.00 DYSPNEA, UNSPECIFIED TYPE: Primary | ICD-10-CM

## 2025-02-14 LAB
ALBUMIN SERPL-MCNC: 4.5 G/DL (ref 3.5–5.2)
ALBUMIN SERPL-MCNC: 4.5 G/DL (ref 3.5–5.2)
ALP SERPL-CCNC: 122 U/L (ref 40–129)
ALP SERPL-CCNC: 125 U/L (ref 40–129)
ALT SERPL-CCNC: 135 U/L (ref 10–50)
ALT SERPL-CCNC: 139 U/L (ref 10–50)
ANION GAP SERPL CALCULATED.3IONS-SCNC: 14 MMOL/L (ref 9–16)
AST SERPL-CCNC: 74 U/L (ref 10–50)
AST SERPL-CCNC: 80 U/L (ref 10–50)
BASOPHILS # BLD: 0.1 K/UL (ref 0–0.2)
BASOPHILS NFR BLD: 1 % (ref 0–2)
BILIRUB DIRECT SERPL-MCNC: 0.7 MG/DL (ref 0–0.3)
BILIRUB INDIRECT SERPL-MCNC: 1.5 MG/DL (ref 0–1)
BILIRUB SERPL-MCNC: 2.2 MG/DL (ref 0–1.2)
BILIRUB SERPL-MCNC: 2.3 MG/DL (ref 0–1.2)
BNP SERPL-MCNC: 110 PG/ML (ref 0–300)
BUN SERPL-MCNC: 23 MG/DL (ref 8–23)
CALCIUM SERPL-MCNC: 10.1 MG/DL (ref 8.6–10.4)
CERULOPLASMIN SERPL-MCNC: 19 MG/DL (ref 15–30)
CHLORIDE SERPL-SCNC: 99 MMOL/L (ref 98–107)
CO2 SERPL-SCNC: 22 MMOL/L (ref 20–31)
CREAT SERPL-MCNC: 1.1 MG/DL (ref 0.7–1.2)
EOSINOPHIL # BLD: 0.2 K/UL (ref 0–0.4)
EOSINOPHILS RELATIVE PERCENT: 2 % (ref 0–4)
ERYTHROCYTE [DISTWIDTH] IN BLOOD BY AUTOMATED COUNT: 12.8 % (ref 11.5–14.9)
FERRITIN SERPL-MCNC: 625 NG/ML
FLUAV RNA RESP QL NAA+PROBE: NOT DETECTED
FLUBV RNA RESP QL NAA+PROBE: NOT DETECTED
GFR, ESTIMATED: 73 ML/MIN/1.73M2
GLUCOSE SERPL-MCNC: 146 MG/DL (ref 74–99)
HAV IGM SERPL QL IA: NONREACTIVE
HBV CORE IGM SERPL QL IA: NONREACTIVE
HBV SURFACE AB SERPL IA-ACNC: <3.5 MIU/ML
HBV SURFACE AG SERPL QL IA: NONREACTIVE
HCT VFR BLD AUTO: 48.7 % (ref 41–53)
HCV AB SERPL QL IA: NONREACTIVE
HGB BLD-MCNC: 17.1 G/DL (ref 13.5–17.5)
IGG SERPL-MCNC: 1233 MG/DL (ref 700–1600)
INR PPP: 1.1
IRON SATN MFR SERPL: 81 % (ref 20–55)
IRON SERPL-MCNC: 253 UG/DL (ref 61–157)
LYMPHOCYTES NFR BLD: 2.3 K/UL (ref 1–4.8)
LYMPHOCYTES RELATIVE PERCENT: 31 % (ref 24–44)
MAGNESIUM SERPL-MCNC: 1.9 MG/DL (ref 1.6–2.4)
MCH RBC QN AUTO: 34.3 PG (ref 26–34)
MCHC RBC AUTO-ENTMCNC: 35.1 G/DL (ref 31–37)
MCV RBC AUTO: 97.5 FL (ref 80–100)
MONOCYTES NFR BLD: 0.7 K/UL (ref 0.1–1.3)
MONOCYTES NFR BLD: 9 % (ref 1–7)
NEUTROPHILS NFR BLD: 57 % (ref 36–66)
NEUTS SEG NFR BLD: 4.3 K/UL (ref 1.3–9.1)
PLATELET # BLD AUTO: 212 K/UL (ref 150–450)
PMV BLD AUTO: 8.3 FL (ref 6–12)
POTASSIUM SERPL-SCNC: 4.4 MMOL/L (ref 3.7–5.3)
PROT SERPL-MCNC: 7.4 G/DL (ref 6.6–8.7)
PROT SERPL-MCNC: 7.7 G/DL (ref 6.6–8.7)
PROTHROMBIN TIME: 15.2 SEC (ref 11.8–14.6)
RBC # BLD AUTO: 5 M/UL (ref 4.5–5.9)
SARS-COV-2 RNA RESP QL NAA+PROBE: NOT DETECTED
SODIUM SERPL-SCNC: 135 MMOL/L (ref 136–145)
SOURCE: NORMAL
SPECIMEN DESCRIPTION: NORMAL
TIBC SERPL-MCNC: 313 UG/DL (ref 250–450)
TROPONIN I SERPL HS-MCNC: 27 NG/L (ref 0–22)
TROPONIN I SERPL HS-MCNC: 33 NG/L (ref 0–22)
UNSATURATED IRON BINDING CAPACITY: 60 UG/DL (ref 112–347)
WBC OTHER # BLD: 7.5 K/UL (ref 3.5–11)

## 2025-02-14 PROCEDURE — 76705 ECHO EXAM OF ABDOMEN: CPT

## 2025-02-14 PROCEDURE — 93005 ELECTROCARDIOGRAM TRACING: CPT | Performed by: EMERGENCY MEDICINE

## 2025-02-14 PROCEDURE — 96372 THER/PROPH/DIAG INJ SC/IM: CPT

## 2025-02-14 PROCEDURE — 85025 COMPLETE CBC W/AUTO DIFF WBC: CPT

## 2025-02-14 PROCEDURE — G0378 HOSPITAL OBSERVATION PER HR: HCPCS

## 2025-02-14 PROCEDURE — 86317 IMMUNOASSAY INFECTIOUS AGENT: CPT

## 2025-02-14 PROCEDURE — 6370000000 HC RX 637 (ALT 250 FOR IP): Performed by: EMERGENCY MEDICINE

## 2025-02-14 PROCEDURE — 83540 ASSAY OF IRON: CPT

## 2025-02-14 PROCEDURE — 82728 ASSAY OF FERRITIN: CPT

## 2025-02-14 PROCEDURE — 87636 SARSCOV2 & INF A&B AMP PRB: CPT

## 2025-02-14 PROCEDURE — 83516 IMMUNOASSAY NONANTIBODY: CPT

## 2025-02-14 PROCEDURE — 36415 COLL VENOUS BLD VENIPUNCTURE: CPT

## 2025-02-14 PROCEDURE — 83880 ASSAY OF NATRIURETIC PEPTIDE: CPT

## 2025-02-14 PROCEDURE — 80053 COMPREHEN METABOLIC PANEL: CPT

## 2025-02-14 PROCEDURE — 6360000002 HC RX W HCPCS: Performed by: STUDENT IN AN ORGANIZED HEALTH CARE EDUCATION/TRAINING PROGRAM

## 2025-02-14 PROCEDURE — 82390 ASSAY OF CERULOPLASMIN: CPT

## 2025-02-14 PROCEDURE — 83735 ASSAY OF MAGNESIUM: CPT

## 2025-02-14 PROCEDURE — 87340 HEPATITIS B SURFACE AG IA: CPT

## 2025-02-14 PROCEDURE — 71045 X-RAY EXAM CHEST 1 VIEW: CPT

## 2025-02-14 PROCEDURE — 83550 IRON BINDING TEST: CPT

## 2025-02-14 PROCEDURE — 99285 EMERGENCY DEPT VISIT HI MDM: CPT

## 2025-02-14 PROCEDURE — 86803 HEPATITIS C AB TEST: CPT

## 2025-02-14 PROCEDURE — 2500000003 HC RX 250 WO HCPCS: Performed by: STUDENT IN AN ORGANIZED HEALTH CARE EDUCATION/TRAINING PROGRAM

## 2025-02-14 PROCEDURE — 86376 MICROSOMAL ANTIBODY EACH: CPT

## 2025-02-14 PROCEDURE — 82784 ASSAY IGA/IGD/IGG/IGM EACH: CPT

## 2025-02-14 PROCEDURE — 86225 DNA ANTIBODY NATIVE: CPT

## 2025-02-14 PROCEDURE — 85610 PROTHROMBIN TIME: CPT

## 2025-02-14 PROCEDURE — 84484 ASSAY OF TROPONIN QUANT: CPT

## 2025-02-14 PROCEDURE — 82105 ALPHA-FETOPROTEIN SERUM: CPT

## 2025-02-14 PROCEDURE — 86705 HEP B CORE ANTIBODY IGM: CPT

## 2025-02-14 PROCEDURE — 6370000000 HC RX 637 (ALT 250 FOR IP): Performed by: STUDENT IN AN ORGANIZED HEALTH CARE EDUCATION/TRAINING PROGRAM

## 2025-02-14 PROCEDURE — 86038 ANTINUCLEAR ANTIBODIES: CPT

## 2025-02-14 PROCEDURE — 86709 HEPATITIS A IGM ANTIBODY: CPT

## 2025-02-14 PROCEDURE — 80076 HEPATIC FUNCTION PANEL: CPT

## 2025-02-14 RX ORDER — LISINOPRIL 10 MG/1
10 TABLET ORAL DAILY
Status: DISCONTINUED | OUTPATIENT
Start: 2025-02-15 | End: 2025-02-14

## 2025-02-14 RX ORDER — ASPIRIN 81 MG/1
324 TABLET, CHEWABLE ORAL ONCE
Status: COMPLETED | OUTPATIENT
Start: 2025-02-14 | End: 2025-02-14

## 2025-02-14 RX ORDER — ONDANSETRON 4 MG/1
4 TABLET, ORALLY DISINTEGRATING ORAL EVERY 8 HOURS PRN
Status: DISCONTINUED | OUTPATIENT
Start: 2025-02-14 | End: 2025-02-17 | Stop reason: HOSPADM

## 2025-02-14 RX ORDER — HYDROCODONE BITARTRATE AND ACETAMINOPHEN 5; 325 MG/1; MG/1
0.5 TABLET ORAL 2 TIMES DAILY PRN
Status: DISCONTINUED | OUTPATIENT
Start: 2025-02-14 | End: 2025-02-17 | Stop reason: HOSPADM

## 2025-02-14 RX ORDER — POTASSIUM CHLORIDE 1500 MG/1
40 TABLET, EXTENDED RELEASE ORAL PRN
Status: DISCONTINUED | OUTPATIENT
Start: 2025-02-14 | End: 2025-02-17 | Stop reason: HOSPADM

## 2025-02-14 RX ORDER — LISINOPRIL 10 MG/1
10 TABLET ORAL DAILY
Status: DISCONTINUED | OUTPATIENT
Start: 2025-02-15 | End: 2025-02-17 | Stop reason: HOSPADM

## 2025-02-14 RX ORDER — ACETAMINOPHEN 325 MG/1
650 TABLET ORAL EVERY 6 HOURS PRN
Status: DISCONTINUED | OUTPATIENT
Start: 2025-02-14 | End: 2025-02-17 | Stop reason: HOSPADM

## 2025-02-14 RX ORDER — CEPHALEXIN 500 MG/1
500 CAPSULE ORAL 3 TIMES DAILY
Status: DISCONTINUED | OUTPATIENT
Start: 2025-02-14 | End: 2025-02-14 | Stop reason: SDUPTHER

## 2025-02-14 RX ORDER — POTASSIUM CHLORIDE 7.45 MG/ML
10 INJECTION INTRAVENOUS PRN
Status: DISCONTINUED | OUTPATIENT
Start: 2025-02-14 | End: 2025-02-17 | Stop reason: HOSPADM

## 2025-02-14 RX ORDER — ASPIRIN 81 MG/1
81 TABLET, CHEWABLE ORAL DAILY
Status: DISCONTINUED | OUTPATIENT
Start: 2025-02-14 | End: 2025-02-17 | Stop reason: HOSPADM

## 2025-02-14 RX ORDER — MAGNESIUM SULFATE HEPTAHYDRATE 40 MG/ML
2000 INJECTION, SOLUTION INTRAVENOUS PRN
Status: DISCONTINUED | OUTPATIENT
Start: 2025-02-14 | End: 2025-02-17 | Stop reason: HOSPADM

## 2025-02-14 RX ORDER — SODIUM CHLORIDE 9 MG/ML
INJECTION, SOLUTION INTRAVENOUS PRN
Status: DISCONTINUED | OUTPATIENT
Start: 2025-02-14 | End: 2025-02-17 | Stop reason: HOSPADM

## 2025-02-14 RX ORDER — CEPHALEXIN 500 MG/1
500 CAPSULE ORAL 3 TIMES DAILY
Status: COMPLETED | OUTPATIENT
Start: 2025-02-14 | End: 2025-02-14

## 2025-02-14 RX ORDER — SODIUM CHLORIDE 0.9 % (FLUSH) 0.9 %
5-40 SYRINGE (ML) INJECTION EVERY 12 HOURS SCHEDULED
Status: DISCONTINUED | OUTPATIENT
Start: 2025-02-14 | End: 2025-02-17 | Stop reason: HOSPADM

## 2025-02-14 RX ORDER — ONDANSETRON 4 MG/1
4 TABLET, ORALLY DISINTEGRATING ORAL ONCE
Status: COMPLETED | OUTPATIENT
Start: 2025-02-14 | End: 2025-02-14

## 2025-02-14 RX ORDER — TIZANIDINE 2 MG/1
2 TABLET ORAL 3 TIMES DAILY PRN
Status: DISCONTINUED | OUTPATIENT
Start: 2025-02-14 | End: 2025-02-15 | Stop reason: SDUPTHER

## 2025-02-14 RX ORDER — ACETAMINOPHEN 650 MG/1
650 SUPPOSITORY RECTAL EVERY 6 HOURS PRN
Status: DISCONTINUED | OUTPATIENT
Start: 2025-02-14 | End: 2025-02-17 | Stop reason: HOSPADM

## 2025-02-14 RX ORDER — SODIUM CHLORIDE 0.9 % (FLUSH) 0.9 %
5-40 SYRINGE (ML) INJECTION PRN
Status: DISCONTINUED | OUTPATIENT
Start: 2025-02-14 | End: 2025-02-17 | Stop reason: HOSPADM

## 2025-02-14 RX ORDER — ONDANSETRON 2 MG/ML
4 INJECTION INTRAMUSCULAR; INTRAVENOUS EVERY 6 HOURS PRN
Status: DISCONTINUED | OUTPATIENT
Start: 2025-02-14 | End: 2025-02-17 | Stop reason: HOSPADM

## 2025-02-14 RX ORDER — CEPHALEXIN 500 MG/1
500 CAPSULE ORAL 3 TIMES DAILY
Status: ON HOLD | COMMUNITY
End: 2025-02-18 | Stop reason: HOSPADM

## 2025-02-14 RX ORDER — BISACODYL 5 MG/1
5 TABLET, DELAYED RELEASE ORAL DAILY PRN
Status: DISCONTINUED | OUTPATIENT
Start: 2025-02-14 | End: 2025-02-17 | Stop reason: HOSPADM

## 2025-02-14 RX ORDER — ENOXAPARIN SODIUM 100 MG/ML
30 INJECTION SUBCUTANEOUS 2 TIMES DAILY
Status: DISCONTINUED | OUTPATIENT
Start: 2025-02-14 | End: 2025-02-17 | Stop reason: HOSPADM

## 2025-02-14 RX ORDER — FAMOTIDINE 20 MG/1
20 TABLET, FILM COATED ORAL 2 TIMES DAILY PRN
Status: DISCONTINUED | OUTPATIENT
Start: 2025-02-14 | End: 2025-02-17 | Stop reason: HOSPADM

## 2025-02-14 RX ADMIN — ENOXAPARIN SODIUM 30 MG: 100 INJECTION SUBCUTANEOUS at 20:56

## 2025-02-14 RX ADMIN — HYDROCODONE BITARTRATE AND ACETAMINOPHEN 0.5 TABLET: 5; 325 TABLET ORAL at 22:04

## 2025-02-14 RX ADMIN — ASPIRIN 324 MG: 81 TABLET, CHEWABLE ORAL at 13:54

## 2025-02-14 RX ADMIN — TIZANIDINE 2 MG: 2 TABLET ORAL at 22:08

## 2025-02-14 RX ADMIN — SODIUM CHLORIDE, PRESERVATIVE FREE 10 ML: 5 INJECTION INTRAVENOUS at 20:55

## 2025-02-14 RX ADMIN — ONDANSETRON 4 MG: 4 TABLET, ORALLY DISINTEGRATING ORAL at 12:52

## 2025-02-14 RX ADMIN — CEPHALEXIN 500 MG: 500 CAPSULE ORAL at 20:56

## 2025-02-14 ASSESSMENT — LIFESTYLE VARIABLES
HOW OFTEN DO YOU HAVE A DRINK CONTAINING ALCOHOL: 4 OR MORE TIMES A WEEK
HOW MANY STANDARD DRINKS CONTAINING ALCOHOL DO YOU HAVE ON A TYPICAL DAY: 3 OR 4

## 2025-02-14 ASSESSMENT — PAIN SCALES - GENERAL
PAINLEVEL_OUTOF10: 2
PAINLEVEL_OUTOF10: 4
PAINLEVEL_OUTOF10: 3

## 2025-02-14 ASSESSMENT — PAIN DESCRIPTION - ORIENTATION: ORIENTATION: OTHER (COMMENT)

## 2025-02-14 ASSESSMENT — PAIN DESCRIPTION - LOCATION: LOCATION: BACK

## 2025-02-14 ASSESSMENT — PAIN DESCRIPTION - DESCRIPTORS: DESCRIPTORS: DULL;DISCOMFORT

## 2025-02-14 ASSESSMENT — PAIN - FUNCTIONAL ASSESSMENT: PAIN_FUNCTIONAL_ASSESSMENT: 0-10

## 2025-02-14 ASSESSMENT — HEART SCORE: ECG: NORMAL

## 2025-02-14 NOTE — ACP (ADVANCE CARE PLANNING)
Advance Care Planning     Advance Care Planning Activator (Inpatient)  Conversation Note      Date of ACP Conversation: 2/14/2025     Conversation Conducted with: Patient with Decision Making Capacity    ACP Activator: Christina Barahona RN        Health Care Decision Maker:     Current Designated Health Care Decision Maker:     Primary Decision Maker: Alyssa Avila - Ken - [phone]        Care Preferences    Ventilation:  \"If you were in your present state of health and suddenly became very ill and were unable to breathe on your own, what would your preference be about the use of a ventilator (breathing machine) if it were available to you?\"      Would the patient desire the use of ventilator (breathing machine)?: yes    \"If your health worsens and it becomes clear that your chance of recovery is unlikely, what would your preference be about the use of a ventilator (breathing machine) if it were available to you?\"     Would the patient desire the use of ventilator (breathing machine)?:\" I Don't Know\"      Resuscitation  \"CPR works best to restart the heart when there is a sudden event, like a heart attack, in someone who is otherwise healthy. Unfortunately, CPR does not typically restart the heart for people who have serious health conditions or who are very sick.\"    \"In the event your heart stopped as a result of an underlying serious health condition, would you want attempts to be made to restart your heart (answer \"yes\" for attempt to resuscitate) or would you prefer a natural death (answer \"no\" for do not attempt to resuscitate)?\" yes       [] Yes   [] No   Educated Patient / Decision Maker regarding differences between Advance Directives and portable DNR orders.    Length of ACP Conversation in minutes:      Conversation Outcomes:  ACP discussion completed    Follow-up plan:    [] Schedule follow-up conversation to continue planning  [] Referred individual to Provider for additional questions/concerns

## 2025-02-14 NOTE — PROGRESS NOTES
Dr. Spaulding (cardio) notified of new consult via [employer].    What Type Of Note Output Would You Prefer (Optional)?: Standard Output How Severe Is Your Skin Lesion?: moderate Has Your Skin Lesion Been Treated?: not been treated Is This A New Presentation, Or A Follow-Up?: Skin Lesions

## 2025-02-14 NOTE — CARE COORDINATION
Case Management Assessment  Initial Evaluation    Date/Time of Evaluation: 2/14/2025 3:48 PM  Assessment Completed by: Christina Barahona RN    If patient is discharged prior to next notation, then this note serves as note for discharge by case management.    Patient Name: Earnest Avila                   YOB: 1955  Diagnosis: Chest pain [R07.9]  Dyspnea, unspecified type [R06.00]  Chest pain, unspecified type [R07.9]                   Date / Time: 2/14/2025 11:57 AM    Patient Admission Status: Observation   Readmission Risk (Low < 19, Mod (19-27), High > 27): No data recorded  Current PCP: No primary care provider on file.  PCP verified by CM? Yes    Chart Reviewed: Yes      History Provided by: Patient  Patient Orientation: Alert and Oriented    Patient Cognition: Alert    Hospitalization in the last 30 days (Readmission):  No    If yes, Readmission Assessment in CM Navigator will be completed.    Advance Directives:      Code Status: Prior   Patient's Primary Decision Maker is: Legal Next of Kin    Primary Decision Maker: Alyssa Avila - Spouse - [phone]    Discharge Planning:    Patient lives with: Spouse/Significant Other Type of Home: House  Primary Care Giver: Self  Patient Support Systems include: Children, Family Members, Spouse/Significant Other   Current Financial resources: Medicare  Current community resources: None  Current services prior to admission: Durable Medical Equipment, C-pap            Current DME: Walker, Shower Chair (Rollator, GB)            Type of Home Care services:  None    ADLS  Prior functional level: Independent in ADLs/IADLs  Current functional level: Independent in ADLs/IADLs    PT AM-PAC:   /24  OT AM-PAC:   /24    Family can provide assistance at DC: Yes  Would you like Case Management to discuss the discharge plan with any other family members/significant others, and if so, who? No  Plans to Return to Present Housing: Yes  Other Identified

## 2025-02-14 NOTE — RESULT ENCOUNTER NOTE
Bill - your stress showed no areas suspicious for a future heart attack or heart damage, but you do have an area of heart muscle that has been damaged in the past with poor blood flow to that area.     I recommend you see a cardiologist for further evaluation; do you have a cardiologist you prefer to see?

## 2025-02-14 NOTE — PROGRESS NOTES
Pharmacy Medication History Note      List of current medications patient is taking is complete.    Source of information: patient, [facility], Care Everywhere    Changes made to medication list:  Medications removed (include reason, ex. therapy complete or physician discontinued, noncompliance):  None    Medications flagged for provider review:  Tylenol #3 - patient reports taking 1/2 tablet twice daily as needed instead of 1 tablet twice daily as needed    Medications added/doses adjusted:  Changed pregabalin 100 mg 1 capsule three times daily to 200 mg 1 capsule twice daily  Changed vitamin D3 5000 units 1 tablet daily to 2000 units 1 tablet daily    Other notes (ex. Recent course of antibiotics, Coumadin dosing):  Patient received 7-day supply of cephalexin 500 mg 1 capsule twice daily on 2/7/25 and reports he has one dose left that he needs to take today (2/14/25) before course is complete.      Current Home Medication List at Time of Admission:  Prior to Admission medications    Medication Sig   cephALEXin (KEFLEX) 500 MG capsule Take 1 capsule by mouth 3 times daily HAS ONE DOSE LEFT TO TAKE ON 2/14 BEFORE COURSE IS COMPLETE   meloxicam (MOBIC) 15 MG tablet TAKE 1 TABLET BY MOUTH DAILY   FLUoxetine (PROZAC) 20 MG capsule TAKE ONE CAPSULE BY MOUTH EVERY MORNING   acetaminophen-codeine (TYLENOL #3) 300-30 MG per tablet Take 1 tablet by mouth every 12 hours as needed for Pain.   Patient takes differently: TAKES 1/2 TABLET AS NEEDED   chlorzoxazone (PARAFON FORTE) 500 MG tablet TAKE 1 TABLET BY MOUTH 2 TIMES A DAY AS NEEDED FOR MUSCLE SPASMS   aspirin (ASPIRIN LOW DOSE) 81 MG chewable tablet CHEW 1 TABLET BY MOUTH DAILY   lisinopril (PRINIVIL;ZESTRIL) 10 MG tablet TAKE 1 TABLET BY MOUTH DAILY   pregabalin (LYRICA) 200 MG capsule Take 1 capsule by mouth 2 times daily.   Cholecalciferol 50 MCG (2000 UT) TABS Take 1 tablet by mouth daily   Multiple Vitamins-Minerals (THERA-M MULTIPLE VITAMINS PO) Take 1 tablet by

## 2025-02-14 NOTE — ED NOTES
Report given to NAVARRO Parker from [facility].   Report method by phone   The following was reviewed with receiving RN:   Current vital signs:  /89   Pulse 61   Temp 97.3 °F (36.3 °C) (Oral)   Resp 14   Ht 1.905 m (6' 3\")   Wt 117.9 kg (260 lb)   SpO2 97%   BMI 32.50 kg/m²                      Any medication or safety alerts were reviewed. Any pending diagnostics and notifications were also reviewed, as well as any safety concerns or issues, abnormal labs, abnormal imaging, and abnormal assessment findings. Questions were answered.

## 2025-02-14 NOTE — ED PROVIDER NOTES
EMERGENCY DEPARTMENT ENCOUNTER    Pt Name: Earnest Avila  MRN: [id number]  Birthdate 1955  Date of evaluation: 2/14/25  CHIEF COMPLAINT       Chief Complaint   Patient presents with    Shortness of Breath     Sob that started this morning. Getting worse throughout day. Pt also c/o non productive cough. Pt is c/o some nausea that started today. Pt also c/o dizziness and weakness.      HISTORY OF PRESENT ILLNESS   HPI  Increasing shortness of breath worse today.  Ongoing for several weeks.  Some chest tightness.  Some nausea.  Fatigue and malaise.  Dry cough.  Has had a stress test done just recently was told he needs to see a cardiologist.  He had sinus rhythm with PACs.  Family in the room.  No fever chill sweats.  No hemoptysis.  No pain or swelling in his legs.  No recent surgery or travel.  No previous heart problems or heart attacks      REVIEW OF SYSTEMS     Review of Systems   All other systems reviewed and are negative.    PASTMEDICAL HISTORY     Past Medical History:   Diagnosis Date    Allergic rhinitis     Anxiety     Borderline hypertension     Controlled diabetes mellitus type II without complication (HCC)     Dental abscess     Depression     On medas    History of colon polyps     Intermittent self-catheterization of bladder 06/17/2016    3/8/17 does not do this any more- was getting UTI's    Kidney stones     Neuropathy     Obesity     HISTORY OF OBESITY, AS OF 11/06/2017 PATIENT HAS LOST 80 LBS    Obstructive sleep apnea of adult 2005    on CPAP    Osteoarthritis     RIGHT SHOULDER, BACK    Prostate enlargement 2014    Self cath since Jan. 16 2016    Restless legs syndrome     Tubular adenoma of colon 06/2016    Type II or unspecified type diabetes mellitus without mention of complication, not stated as uncontrolled 2006    NO LONGER ON MEDS, LOST WEIGHT, DIET CONTROLLED    UTI (urinary tract infection)     Wears glasses      Past Problem List  Patient Active Problem List   Diagnosis Code

## 2025-02-15 ENCOUNTER — APPOINTMENT (OUTPATIENT)
Dept: CT IMAGING | Age: 70
End: 2025-02-15
Payer: MEDICARE

## 2025-02-15 PROBLEM — R06.00 DYSPNEA: Status: ACTIVE | Noted: 2025-02-15

## 2025-02-15 PROBLEM — R06.09 CHRONIC DYSPNEA: Status: ACTIVE | Noted: 2025-02-15

## 2025-02-15 LAB
AFP SERPL-MCNC: 3.6 UG/L
ANA SER QL IA: NEGATIVE
ANION GAP SERPL CALCULATED.3IONS-SCNC: 8 MMOL/L (ref 9–16)
BASOPHILS # BLD: 0.1 K/UL (ref 0–0.2)
BASOPHILS NFR BLD: 1 % (ref 0–2)
BUN SERPL-MCNC: 22 MG/DL (ref 8–23)
CALCIUM SERPL-MCNC: 9.2 MG/DL (ref 8.6–10.4)
CHLORIDE SERPL-SCNC: 103 MMOL/L (ref 98–107)
CO2 SERPL-SCNC: 28 MMOL/L (ref 20–31)
CREAT SERPL-MCNC: 1 MG/DL (ref 0.7–1.2)
DSDNA IGG SER QL IA: 1.2 IU/ML
EOSINOPHIL # BLD: 0.2 K/UL (ref 0–0.4)
EOSINOPHILS RELATIVE PERCENT: 4 % (ref 0–4)
ERYTHROCYTE [DISTWIDTH] IN BLOOD BY AUTOMATED COUNT: 12.9 % (ref 11.5–14.9)
GFR, ESTIMATED: 81 ML/MIN/1.73M2
GLUCOSE BLD-MCNC: 191 MG/DL (ref 75–110)
GLUCOSE SERPL-MCNC: 180 MG/DL (ref 74–99)
HCT VFR BLD AUTO: 46.9 % (ref 41–53)
HGB BLD-MCNC: 16.2 G/DL (ref 13.5–17.5)
LYMPHOCYTES NFR BLD: 2.1 K/UL (ref 1–4.8)
LYMPHOCYTES RELATIVE PERCENT: 36 % (ref 24–44)
MCH RBC QN AUTO: 34 PG (ref 26–34)
MCHC RBC AUTO-ENTMCNC: 34.6 G/DL (ref 31–37)
MCV RBC AUTO: 98.4 FL (ref 80–100)
MITOCHONDRIA M2 IGG SER-ACNC: 1.1 U/ML (ref 0–4)
MONOCYTES NFR BLD: 0.7 K/UL (ref 0.1–1.3)
MONOCYTES NFR BLD: 12 % (ref 1–7)
NEUTROPHILS NFR BLD: 47 % (ref 36–66)
NEUTS SEG NFR BLD: 2.7 K/UL (ref 1.3–9.1)
NUCLEAR IGG SER IA-RTO: 0.2 U/ML
PLATELET # BLD AUTO: 186 K/UL (ref 150–450)
PMV BLD AUTO: 7.9 FL (ref 6–12)
POTASSIUM SERPL-SCNC: 4.6 MMOL/L (ref 3.7–5.3)
RBC # BLD AUTO: 4.77 M/UL (ref 4.5–5.9)
SODIUM SERPL-SCNC: 139 MMOL/L (ref 136–145)
WBC OTHER # BLD: 5.8 K/UL (ref 3.5–11)

## 2025-02-15 PROCEDURE — 2580000003 HC RX 258: Performed by: STUDENT IN AN ORGANIZED HEALTH CARE EDUCATION/TRAINING PROGRAM

## 2025-02-15 PROCEDURE — 6370000000 HC RX 637 (ALT 250 FOR IP): Performed by: STUDENT IN AN ORGANIZED HEALTH CARE EDUCATION/TRAINING PROGRAM

## 2025-02-15 PROCEDURE — 71260 CT THORAX DX C+: CPT

## 2025-02-15 PROCEDURE — G0378 HOSPITAL OBSERVATION PER HR: HCPCS

## 2025-02-15 PROCEDURE — 96372 THER/PROPH/DIAG INJ SC/IM: CPT

## 2025-02-15 PROCEDURE — 36415 COLL VENOUS BLD VENIPUNCTURE: CPT

## 2025-02-15 PROCEDURE — 99223 1ST HOSP IP/OBS HIGH 75: CPT | Performed by: STUDENT IN AN ORGANIZED HEALTH CARE EDUCATION/TRAINING PROGRAM

## 2025-02-15 PROCEDURE — 85025 COMPLETE CBC W/AUTO DIFF WBC: CPT

## 2025-02-15 PROCEDURE — 97116 GAIT TRAINING THERAPY: CPT

## 2025-02-15 PROCEDURE — 99222 1ST HOSP IP/OBS MODERATE 55: CPT | Performed by: INTERNAL MEDICINE

## 2025-02-15 PROCEDURE — 6360000004 HC RX CONTRAST MEDICATION: Performed by: STUDENT IN AN ORGANIZED HEALTH CARE EDUCATION/TRAINING PROGRAM

## 2025-02-15 PROCEDURE — 82947 ASSAY GLUCOSE BLOOD QUANT: CPT

## 2025-02-15 PROCEDURE — 2500000003 HC RX 250 WO HCPCS: Performed by: STUDENT IN AN ORGANIZED HEALTH CARE EDUCATION/TRAINING PROGRAM

## 2025-02-15 PROCEDURE — 1200000000 HC SEMI PRIVATE

## 2025-02-15 PROCEDURE — 97162 PT EVAL MOD COMPLEX 30 MIN: CPT

## 2025-02-15 PROCEDURE — 6360000002 HC RX W HCPCS: Performed by: STUDENT IN AN ORGANIZED HEALTH CARE EDUCATION/TRAINING PROGRAM

## 2025-02-15 PROCEDURE — 80048 BASIC METABOLIC PNL TOTAL CA: CPT

## 2025-02-15 RX ORDER — SODIUM CHLORIDE 0.9 % (FLUSH) 0.9 %
10 SYRINGE (ML) INJECTION PRN
Status: DISCONTINUED | OUTPATIENT
Start: 2025-02-15 | End: 2025-02-17 | Stop reason: HOSPADM

## 2025-02-15 RX ORDER — IOPAMIDOL 755 MG/ML
75 INJECTION, SOLUTION INTRAVASCULAR
Status: COMPLETED | OUTPATIENT
Start: 2025-02-15 | End: 2025-02-15

## 2025-02-15 RX ORDER — INSULIN LISPRO 100 [IU]/ML
0-8 INJECTION, SOLUTION INTRAVENOUS; SUBCUTANEOUS
Status: DISCONTINUED | OUTPATIENT
Start: 2025-02-15 | End: 2025-02-17 | Stop reason: HOSPADM

## 2025-02-15 RX ORDER — 0.9 % SODIUM CHLORIDE 0.9 %
100 INTRAVENOUS SOLUTION INTRAVENOUS ONCE
Status: COMPLETED | OUTPATIENT
Start: 2025-02-15 | End: 2025-02-15

## 2025-02-15 RX ORDER — DEXTROSE MONOHYDRATE 100 MG/ML
INJECTION, SOLUTION INTRAVENOUS CONTINUOUS PRN
Status: DISCONTINUED | OUTPATIENT
Start: 2025-02-15 | End: 2025-02-17 | Stop reason: HOSPADM

## 2025-02-15 RX ORDER — CHLORZOXAZONE 500 MG/1
500 TABLET ORAL 2 TIMES DAILY PRN
Status: DISCONTINUED | OUTPATIENT
Start: 2025-02-15 | End: 2025-02-17 | Stop reason: HOSPADM

## 2025-02-15 RX ADMIN — HYDROCODONE BITARTRATE AND ACETAMINOPHEN 0.5 TABLET: 5; 325 TABLET ORAL at 10:36

## 2025-02-15 RX ADMIN — HYDROCODONE BITARTRATE AND ACETAMINOPHEN 0.5 TABLET: 5; 325 TABLET ORAL at 23:09

## 2025-02-15 RX ADMIN — SODIUM CHLORIDE 100 ML: 9 INJECTION, SOLUTION INTRAVENOUS at 17:08

## 2025-02-15 RX ADMIN — INSULIN LISPRO 2 UNITS: 100 INJECTION, SOLUTION INTRAVENOUS; SUBCUTANEOUS at 21:01

## 2025-02-15 RX ADMIN — ASPIRIN 81 MG: 81 TABLET, CHEWABLE ORAL at 08:08

## 2025-02-15 RX ADMIN — CHLORZOXAZONE 500 MG: 500 TABLET ORAL at 22:55

## 2025-02-15 RX ADMIN — SODIUM CHLORIDE, PRESERVATIVE FREE 10 ML: 5 INJECTION INTRAVENOUS at 08:08

## 2025-02-15 RX ADMIN — IOPAMIDOL 75 ML: 755 INJECTION, SOLUTION INTRAVENOUS at 17:07

## 2025-02-15 RX ADMIN — Medication 20 MG: at 08:08

## 2025-02-15 RX ADMIN — SODIUM CHLORIDE, PRESERVATIVE FREE 10 ML: 5 INJECTION INTRAVENOUS at 17:08

## 2025-02-15 RX ADMIN — LISINOPRIL 10 MG: 10 TABLET ORAL at 09:04

## 2025-02-15 RX ADMIN — ENOXAPARIN SODIUM 30 MG: 100 INJECTION SUBCUTANEOUS at 21:02

## 2025-02-15 RX ADMIN — SODIUM CHLORIDE, PRESERVATIVE FREE 10 ML: 5 INJECTION INTRAVENOUS at 21:07

## 2025-02-15 RX ADMIN — ENOXAPARIN SODIUM 30 MG: 100 INJECTION SUBCUTANEOUS at 08:07

## 2025-02-15 ASSESSMENT — PAIN DESCRIPTION - LOCATION: LOCATION: BACK;SHOULDER;FOOT

## 2025-02-15 ASSESSMENT — PAIN - FUNCTIONAL ASSESSMENT: PAIN_FUNCTIONAL_ASSESSMENT: ACTIVITIES ARE NOT PREVENTED

## 2025-02-15 ASSESSMENT — PAIN DESCRIPTION - ORIENTATION
ORIENTATION: LEFT;RIGHT
ORIENTATION: RIGHT;LOWER

## 2025-02-15 ASSESSMENT — PAIN DESCRIPTION - DESCRIPTORS: DESCRIPTORS: SHARP;NUMBNESS

## 2025-02-15 ASSESSMENT — PAIN SCALES - GENERAL
PAINLEVEL_OUTOF10: 5
PAINLEVEL_OUTOF10: 5
PAINLEVEL_OUTOF10: 3

## 2025-02-15 ASSESSMENT — PAIN DESCRIPTION - PAIN TYPE: TYPE: CHRONIC PAIN

## 2025-02-15 ASSESSMENT — PAIN DESCRIPTION - FREQUENCY: FREQUENCY: CONTINUOUS

## 2025-02-15 ASSESSMENT — PAIN DESCRIPTION - ONSET: ONSET: ON-GOING

## 2025-02-15 NOTE — PLAN OF CARE
Problem: Chronic Conditions and Co-morbidities  Goal: Patient's chronic conditions and co-morbidity symptoms are monitored and maintained or improved  Outcome: Progressing     Problem: Discharge Planning  Goal: Discharge to home or other facility with appropriate resources  Outcome: Progressing  Note: Heart cath Monday.      Problem: Pain  Goal: Verbalizes/displays adequate comfort level or baseline comfort level  Outcome: Progressing  Flowsheets (Taken 2/15/2025 8524)  Verbalizes/displays adequate comfort level or baseline comfort level:   Assess pain using appropriate pain scale   Encourage patient to monitor pain and request assistance     Problem: Safety - Adult  Goal: Free from fall injury  Outcome: Progressing

## 2025-02-15 NOTE — PROGRESS NOTES
Patients home BIPAP/CPAP checked for integrity and cleanliness. Patient brought his own water for his machine. No O2 needed.

## 2025-02-15 NOTE — PLAN OF CARE
Problem: Chronic Conditions and Co-morbidities  Goal: Patient's chronic conditions and co-morbidity symptoms are monitored and maintained or improved  2/15/2025 0241 by Hanna Rasheed RN  Outcome: Progressing     Problem: Discharge Planning  Goal: Discharge to home or other facility with appropriate resources  2/15/2025 0241 by Hanna Rasheed RN  Outcome: Progressing     Problem: Pain  Goal: Verbalizes/displays adequate comfort level or baseline comfort level  2/15/2025 0241 by Hanna Rasheed RN  Outcome: Progressing     Problem: Safety - Adult  Goal: Free from fall injury  2/15/2025 0241 by Hanna Rasheed RN  Outcome: Progressing  The patient remained free from falls this shift, call light within reach, bed in locked and lowest position.  Side rails up x2.      Problem: ABCDS Injury Assessment  Goal: Absence of physical injury  Outcome: Progressing   Patient remained free from injury this shift. Call light within reach. Bed locked and in lowest position. Side rails x2. Pathways clear.

## 2025-02-15 NOTE — H&P
Date Taking? Authorizing Provider   cephALEXin (KEFLEX) 500 MG capsule Take 1 capsule by mouth 3 times daily HAS ONE DOSE LEFT TO TAKE ON 2/14 BEFORE COURSE IS COMPLETE   Yes Nati Albrecht MD   meloxicam (MOBIC) 15 MG tablet TAKE 1 TABLET BY MOUTH DAILY 12/15/24  Yes Larissa Rice MD   FLUoxetine (PROZAC) 20 MG capsule TAKE ONE CAPSULE BY MOUTH EVERY MORNING 11/18/24  Yes Larissa Rice MD   acetaminophen-codeine (TYLENOL #3) 300-30 MG per tablet Take 1 tablet by mouth every 12 hours as needed for Pain. TAKES 1/2 TABLET TWICE DAILY AS NEEDED 10/10/24  Yes ProviderNati MD   chlorzoxazone (PARAFON FORTE) 500 MG tablet TAKE 1 TABLET BY MOUTH 2 TIMES A DAY AS NEEDED FOR MUSCLE SPASMS 9/3/24  Yes ProviderNati MD   aspirin (ASPIRIN LOW DOSE) 81 MG chewable tablet CHEW 1 TABLET BY MOUTH DAILY 9/3/24  Yes Larissa Rice MD   lisinopril (PRINIVIL;ZESTRIL) 10 MG tablet TAKE 1 TABLET BY MOUTH DAILY 5/14/24  Yes Larissa Rice MD   pregabalin (LYRICA) 200 MG capsule Take 1 capsule by mouth 2 times daily.   Yes ProviderNati MD   Cholecalciferol 50 MCG (2000 UT) TABS Take 1 tablet by mouth daily   Yes ProviderNati MD   Multiple Vitamins-Minerals (THERA-M MULTIPLE VITAMINS PO) Take 1 tablet by mouth daily   Yes ProviderNati MD   Misc. Devices MISC Apply 1 sterile drape (18in x 26in) for suprapubic cath change. 5/28/24   Gaby Amezcua APRN - CNP   Misc. Devices MISC Apply 1 pair of large sterile gloves for suprapubic cath changes every 2 weeks. 5/28/24   Gaby Amezcua APRN - CNP   Misc. Devices [facility] (or equivalent) wolf insertion tray with pre-filled 30cc syringe, use every 2 weeks for SP tube changes. 5/28/24   Gaby Amezcua APRN - CNP   Misc. Devices MISC Apply large drainage bag (2000cc) every 2 weeks with SP tube changes. 5/28/24   Durivage, Gaby, APRN - CNP   Misc. Devices MISC 22Fr wolf catheter to be  Stress-induced perfusion abnormality encumbering 5%-9.9% of the myocardium or stress segmental scores indicating 1 vascular territory with abnormalities but without LV dilation 4. Small wall motion abnormality involving 1-2 segments and only 1 coronary bed. Low Risk (Less than 1% annual death or MI) 1. Normal or small myocardial perfusion defect at rest or with stress encumbering less than 5% of the myocardium.     [Small moderate severity fixed apical perfusion defect representing apical thinning and or infarct. No stress-induced ischemia. Normal LVEF.] Risk stratification: [Low] Author: Mandeep Barrera MD      XR CHEST PORTABLE    Result Date: 1/29/2025  EXAMINATION: ONE XRAY VIEW OF THE CHEST 1/29/2025 9:48 am COMPARISON: 05/07/2024 HISTORY: ORDERING SYSTEM PROVIDED HISTORY: sob TECHNOLOGIST PROVIDED HISTORY: sob FINDINGS: The lungs are without acute focal process.  There is no effusion or pneumothorax. The cardiomediastinal silhouette is stable. The osseous structures are stable.     No acute process.           Vandana Barraza MD  2/15/2025 4:42 PM

## 2025-02-15 NOTE — PROGRESS NOTES
Physical Therapy    [facility]   Physical Therapy Evaluation  Date: 2/15/25  Patient Name: Earnest Avila       Room: 4-  MRN: [id number]  Account: [id number]   : 1955  (69 y.o.) Gender: male     Discharge Recommendations:  Discharge Recommendations: Outpatient PT, Home with assist PRN     PT D/C Equipment  Equipment Needed: No     Past Medical History:  has a past medical history of Allergic rhinitis, Anxiety, Borderline hypertension, Controlled diabetes mellitus type II without complication (HCC), Dental abscess, Depression, History of colon polyps, Intermittent self-catheterization of bladder, Kidney stones, Neuropathy, Obesity, Obstructive sleep apnea of adult, Osteoarthritis, Prostate enlargement, Restless legs syndrome, Tubular adenoma of colon, Type II or unspecified type diabetes mellitus without mention of complication, not stated as uncontrolled, UTI (urinary tract infection), and Wears glasses.  Past Surgical History:   has a past surgical history that includes Cholecystectomy (); Lithotripsy (2016); Lithotripsy; Cystocopy (3-4-16); Prostate surgery (2016); Cystoscopy (2016); Colonoscopy (2016); Shoulder Arthroplasty (Right, 2016); Shoulder arthroscopy (Right, ); Vasectomy (); Cystoscopy (2016); Cystocopy; Cystoscopy (N/A, 2017); Cystoscopy (N/A, 3/13/2017); joint replacement; Cystourethroscopy/Urethral Dilation (11/15/2017); pr cystourethroscopy (N/A, 11/15/2017); Cystoscopy (N/A, 11/15/2017); eye surgery (Bilateral, ); Bladder surgery; and Colonoscopy (N/A, 2019).    Subjective        General  Chart Reviewed: Yes  Patient assessed for rehabilitation services?: Yes  Response To Previous Treatment: Not applicable  Family/Caregiver Present: No  Referring Practitioner: Dwayne  Referral Date : 25  Diagnosis: Chest pain  Follows Commands: Within Functional Limits  General  General Comments: Pt  Ancef

## 2025-02-15 NOTE — CONSULTS
16.2   HCT 48.7 46.9    186     BMP:   Recent Labs     02/14/25  1220 02/15/25  0522   * 139   K 4.4 4.6   CO2 22 28   BUN 23 22   CREATININE 1.1 1.0   LABGLOM 73 81   GLUCOSE 146* 180*     BNP: No results for input(s): \"BNP\" in the last 72 hours.  PT/INR:   Recent Labs     02/14/25  1220   PROTIME 15.2*   INR 1.1     APTT:No results for input(s): \"APTT\" in the last 72 hours.  CARDIAC ENZYMES:No results for input(s): \"CKTOTAL\", \"CKMB\", \"CKMBINDEX\", \"TROPONINI\" in the last 72 hours.  FASTING LIPID PANEL:  Lab Results   Component Value Date/Time    HDL 40 05/20/2024 07:05 AM    LDLDIRECT 101 01/06/2016 08:46 AM    TRIG 197 05/20/2024 07:05 AM     LIVER PROFILE:  Recent Labs     02/14/25  0835 02/14/25  1220   AST 74* 80*   * 139*         IMPRESSION:      Dyspnea on minimal exertion, progressive   Minimally elevated troponins, chronic, likely type ii mi   Fixed apical perfusion defect on stress test with no stress-induced ischemia   Preserved LVEF on last echocardiogram in 2024   frequent PACs   HTN  HLP  History of laminectomy  Chronic back pain  Chronic indwelling Leyva's      PLAN:      Patient has new symptoms of dyspnea on exertion with significant decline in functional capacity over last 2 months.  He denies any chest pain.  No signs of volume overload noted on examination.  Stress test reviewed which shows fixed perfusion defect in the apex with no stress-induced ischemia.  However given his underlying risk factors and progressive symptoms with chronically elevated troponins, I have recommended proceeding with coronary angiography to rule out any underlying obstructive coronary artery disease and to better guide medical therapy.  Risks, benefits and alternatives of the procedure discussed with patient in detail.  He verbalized understanding and willing to proceed.  I also explained to patient and his family that we need to rule out other etiology including pulmonary and perhaps pulmonary  embolism given his recent sedentary status after laminectomy  Continue low-dose aspirin in the meantime  Discussed with Dr. Barraza  Will follow along         Lemuel Spaulding MD [facility]

## 2025-02-15 NOTE — CARE COORDINATION
ONGOING DISCHARGE PLAN:    Patient is alert and oriented x4.    Spoke with patient& Patient's Wife, at the bedside, regarding discharge plan and they confirm that plan is still to return to home w/ no needs.     Sating 99% on RA.     Denies VNS.     Plans Per Cardio.     Information for New PCP, placed on AVS, for pt to call.     Will continue to follow for additional discharge needs.    If patient is discharged prior to next notation, then this note serves as note for discharge by case management.    Electronically signed by Christina Barahona RN on 2/15/2025 at 11:21 AM

## 2025-02-16 PROBLEM — R06.00 DYSPNEA: Status: ACTIVE | Noted: 2025-02-16

## 2025-02-16 LAB
ANION GAP SERPL CALCULATED.3IONS-SCNC: 10 MMOL/L (ref 9–16)
BASOPHILS # BLD: 0.1 K/UL (ref 0–0.2)
BASOPHILS NFR BLD: 1 % (ref 0–2)
BUN SERPL-MCNC: 20 MG/DL (ref 8–23)
CALCIUM SERPL-MCNC: 9.4 MG/DL (ref 8.6–10.4)
CHLORIDE SERPL-SCNC: 102 MMOL/L (ref 98–107)
CO2 SERPL-SCNC: 24 MMOL/L (ref 20–31)
CREAT SERPL-MCNC: 1.1 MG/DL (ref 0.7–1.2)
EOSINOPHIL # BLD: 0.3 K/UL (ref 0–0.4)
EOSINOPHILS RELATIVE PERCENT: 4 % (ref 0–4)
ERYTHROCYTE [DISTWIDTH] IN BLOOD BY AUTOMATED COUNT: 12.9 % (ref 11.5–14.9)
GFR, ESTIMATED: 73 ML/MIN/1.73M2
GLUCOSE BLD-MCNC: 147 MG/DL (ref 75–110)
GLUCOSE BLD-MCNC: 175 MG/DL (ref 75–110)
GLUCOSE BLD-MCNC: 182 MG/DL (ref 75–110)
GLUCOSE SERPL-MCNC: 149 MG/DL (ref 74–99)
HCT VFR BLD AUTO: 44.8 % (ref 41–53)
HGB BLD-MCNC: 15.5 G/DL (ref 13.5–17.5)
LYMPHOCYTES NFR BLD: 2.3 K/UL (ref 1–4.8)
LYMPHOCYTES RELATIVE PERCENT: 38 % (ref 24–44)
MCH RBC QN AUTO: 33.9 PG (ref 26–34)
MCHC RBC AUTO-ENTMCNC: 34.5 G/DL (ref 31–37)
MCV RBC AUTO: 98.2 FL (ref 80–100)
MONOCYTES NFR BLD: 0.6 K/UL (ref 0.1–1.3)
MONOCYTES NFR BLD: 10 % (ref 1–7)
NEUTROPHILS NFR BLD: 47 % (ref 36–66)
NEUTS SEG NFR BLD: 2.8 K/UL (ref 1.3–9.1)
PLATELET # BLD AUTO: 191 K/UL (ref 150–450)
PMV BLD AUTO: 8.1 FL (ref 6–12)
POTASSIUM SERPL-SCNC: 4.2 MMOL/L (ref 3.7–5.3)
RBC # BLD AUTO: 4.57 M/UL (ref 4.5–5.9)
SMOOTH MUSCLE ANTIBODY: 8 UNITS (ref 0–19)
SODIUM SERPL-SCNC: 136 MMOL/L (ref 136–145)
WBC OTHER # BLD: 6.1 K/UL (ref 3.5–11)

## 2025-02-16 PROCEDURE — 99232 SBSQ HOSP IP/OBS MODERATE 35: CPT | Performed by: STUDENT IN AN ORGANIZED HEALTH CARE EDUCATION/TRAINING PROGRAM

## 2025-02-16 PROCEDURE — 82947 ASSAY GLUCOSE BLOOD QUANT: CPT

## 2025-02-16 PROCEDURE — 2500000003 HC RX 250 WO HCPCS: Performed by: STUDENT IN AN ORGANIZED HEALTH CARE EDUCATION/TRAINING PROGRAM

## 2025-02-16 PROCEDURE — 85025 COMPLETE CBC W/AUTO DIFF WBC: CPT

## 2025-02-16 PROCEDURE — 6370000000 HC RX 637 (ALT 250 FOR IP): Performed by: STUDENT IN AN ORGANIZED HEALTH CARE EDUCATION/TRAINING PROGRAM

## 2025-02-16 PROCEDURE — 96372 THER/PROPH/DIAG INJ SC/IM: CPT

## 2025-02-16 PROCEDURE — G0378 HOSPITAL OBSERVATION PER HR: HCPCS

## 2025-02-16 PROCEDURE — 36415 COLL VENOUS BLD VENIPUNCTURE: CPT

## 2025-02-16 PROCEDURE — 80048 BASIC METABOLIC PNL TOTAL CA: CPT

## 2025-02-16 PROCEDURE — 6360000002 HC RX W HCPCS: Performed by: STUDENT IN AN ORGANIZED HEALTH CARE EDUCATION/TRAINING PROGRAM

## 2025-02-16 RX ORDER — OXYMETAZOLINE HYDROCHLORIDE 0.05 G/100ML
2 SPRAY NASAL 2 TIMES DAILY
Status: DISCONTINUED | OUTPATIENT
Start: 2025-02-16 | End: 2025-02-17 | Stop reason: HOSPADM

## 2025-02-16 RX ORDER — OXYMETAZOLINE HYDROCHLORIDE 0.05 G/100ML
2 SPRAY NASAL 2 TIMES DAILY
COMMUNITY

## 2025-02-16 RX ADMIN — CHLORZOXAZONE 500 MG: 500 TABLET ORAL at 09:05

## 2025-02-16 RX ADMIN — ENOXAPARIN SODIUM 30 MG: 100 INJECTION SUBCUTANEOUS at 09:06

## 2025-02-16 RX ADMIN — OXYMETAZOLINE HYDROCHLORIDE 2 SPRAY: 0.5 SPRAY NASAL at 21:53

## 2025-02-16 RX ADMIN — SODIUM CHLORIDE, PRESERVATIVE FREE 10 ML: 5 INJECTION INTRAVENOUS at 21:55

## 2025-02-16 RX ADMIN — ASPIRIN 81 MG: 81 TABLET, CHEWABLE ORAL at 09:06

## 2025-02-16 RX ADMIN — CHLORZOXAZONE 500 MG: 500 TABLET ORAL at 21:54

## 2025-02-16 RX ADMIN — Medication 20 MG: at 09:05

## 2025-02-16 RX ADMIN — HYDROCODONE BITARTRATE AND ACETAMINOPHEN 0.5 TABLET: 5; 325 TABLET ORAL at 15:14

## 2025-02-16 RX ADMIN — ENOXAPARIN SODIUM 30 MG: 100 INJECTION SUBCUTANEOUS at 21:54

## 2025-02-16 RX ADMIN — OXYMETAZOLINE HYDROCHLORIDE 2 SPRAY: 0.5 SPRAY NASAL at 11:30

## 2025-02-16 RX ADMIN — SODIUM CHLORIDE, PRESERVATIVE FREE 10 ML: 5 INJECTION INTRAVENOUS at 09:06

## 2025-02-16 RX ADMIN — LISINOPRIL 10 MG: 10 TABLET ORAL at 09:04

## 2025-02-16 ASSESSMENT — PAIN DESCRIPTION - ONSET: ONSET: ON-GOING

## 2025-02-16 ASSESSMENT — PAIN DESCRIPTION - DESCRIPTORS: DESCRIPTORS: DISCOMFORT

## 2025-02-16 ASSESSMENT — PAIN SCALES - GENERAL: PAINLEVEL_OUTOF10: 7

## 2025-02-16 ASSESSMENT — PAIN DESCRIPTION - FREQUENCY: FREQUENCY: CONTINUOUS

## 2025-02-16 ASSESSMENT — PAIN - FUNCTIONAL ASSESSMENT: PAIN_FUNCTIONAL_ASSESSMENT: ACTIVITIES ARE NOT PREVENTED

## 2025-02-16 ASSESSMENT — PAIN DESCRIPTION - LOCATION: LOCATION: GENERALIZED

## 2025-02-16 ASSESSMENT — PAIN DESCRIPTION - PAIN TYPE: TYPE: CHRONIC PAIN

## 2025-02-16 NOTE — CONSULTS
PULMONARY  CONSULT NOTE      Date of Admission: 2/14/2025 11:57 AM    Reason for Consult: dyspnea, BRIDGETTE    Referring Physician: Dr Anne  PCP: No primary care provider on file.     History of Present Illness:     69 years old patient with known BRIDGETTE admitted on 2/14/25 due to increasing shortness of breath - going on for last few months but has been worse lately; had recent PFT/ stress test as OP.    Problem:  Principal Problem:     PMH:   Past Medical History:   Diagnosis Date    Allergic rhinitis     Anxiety     Borderline hypertension     Controlled diabetes mellitus type II without complication (HCC)     Dental abscess     Depression     On medas    History of colon polyps     Intermittent self-catheterization of bladder 06/17/2016    3/8/17 does not do this any more- was getting UTI's    Kidney stones     Neuropathy     Obesity     HISTORY OF OBESITY, AS OF 11/06/2017 PATIENT HAS LOST 80 LBS    Obstructive sleep apnea of adult 2005    on CPAP    Osteoarthritis     RIGHT SHOULDER, BACK    Prostate enlargement 2014    Self cath since Jan. 16 2016    Restless legs syndrome     Tubular adenoma of colon 06/2016    Type II or unspecified type diabetes mellitus without mention of complication, not stated as uncontrolled 2006    NO LONGER ON MEDS, LOST WEIGHT, DIET CONTROLLED    UTI (urinary tract infection)     Wears glasses        PSH:   Past Surgical History:   Procedure Laterality Date    BLADDER SURGERY      permanent supra pubic cath    CHOLECYSTECTOMY  1992    COLONOSCOPY  06/30/2016    portions of tubular adenoma and tubulovillous adenoma    COLONOSCOPY N/A 12/20/2019    COLONOSCOPY DIAGNOSTIC performed by Rogelio Gutierrez MD at [facility]    CYSTOSCOPY  3-4-16    with greenlight laser of prostate    CYSTOSCOPY  06/18/2016    Cystoscopy, difficult Leyva catheter placement.    CYSTOSCOPY  07/29/2016    with bladder neck contrature laser ablation and placement of suprapubic catheter    CYSTOSCOPY

## 2025-02-16 NOTE — PROGRESS NOTES
Secure message sent to Vandana Fierro MD. Patient requesting muscle relaxer. PRN zanaflex offered., patient state they did not like how they felt after taking zanaflex and would like to know if they are able to get their chlorozoxazone.     Patient has home med, okay to order and take down to pharmacy to verify.

## 2025-02-16 NOTE — CARE COORDINATION
ONGOING DISCHARGE PLAN:    Patient is alert and oriented x4.    Spoke with patient regarding discharge plan and patient confirms that plan is still to DC to home w/ Wife.     Pt. Sating 94% on RA. New Pulm Consult.     Denies VNS.     Per Pt. \"He states he is having a Cardiac cath jadon\". Cardio on board.     Denies needs.     Will continue to follow for additional discharge needs.    If patient is discharged prior to next notation, then this note serves as note for discharge by case management.    Electronically signed by Christina Barahona RN on 2/16/2025 at 11:50 AM

## 2025-02-16 NOTE — PLAN OF CARE
Problem: Chronic Conditions and Co-morbidities  Goal: Patient's chronic conditions and co-morbidity symptoms are monitored and maintained or improved  2/16/2025 0359 by Hanna Rasheed RN  Outcome: Progressing     Problem: Discharge Planning  Goal: Discharge to home or other facility with appropriate resources  2/16/2025 0359 by Hanna Rasheed RN  Outcome: Progressing  Flowsheets (Taken 2/16/2025 0359)  Discharge to home or other facility with appropriate resources: Identify barriers to discharge with patient and caregiver     Problem: Pain  Goal: Verbalizes/displays adequate comfort level or baseline comfort level  2/16/2025 0359 by Hanna Rasheed RN  Outcome: Progressing  Flowsheets (Taken 2/16/2025 0359)  Verbalizes/displays adequate comfort level or baseline comfort level: Encourage patient to monitor pain and request assistance     Problem: Safety - Adult  Goal: Free from fall injury  2/16/2025 0359 by Hanna Rasheed RN  Outcome: Progressing  Flowsheets (Taken 2/16/2025 0359)  Free From Fall Injury:   Instruct family/caregiver on patient safety   Based on caregiver fall risk screen, instruct family/caregiver to ask for assistance with transferring infant if caregiver noted to have fall risk factors  Note: The patient remained free from falls this shift, call light within reach, bed in locked and lowest position.  Side rails up x2.      Problem: ABCDS Injury Assessment  Goal: Absence of physical injury  Outcome: Progressing  Note: Patient remained free from injury this shift. Call light within reach. Bed locked and in lowest position. Side rails x2. Pathways clear.

## 2025-02-16 NOTE — PROGRESS NOTES
[facility]  Family Medicine        Progress Note      Date:   2/16/2025  Patient name:  Earnest Avila  Date of admission:  2/14/2025 11:57 AM  MRN:   [id number]  YOB: 1955        Brief HPI    Patient admitted for worsening dyspnea, concern for cardiac etiology    ASSESSMENT/PLAN     Hospital Problems             Last Modified POA    * (Principal) Chest pain 2/14/2025 Yes    Type 2 diabetes, controlled, with neuropathy (HCC) (Chronic) 2/15/2025 Yes    Essential hypertension 2/15/2025 Yes    Chronic suprapubic catheter (HCC) 2/15/2025 Yes    Chronic dyspnea 2/15/2025 Yes          plan for cardiac cath tomorrow, n.p.o. midnight  CT negative for pulmonary embolism but does show some central airway thickening, bronchitis with reactive airway disease, pulm on board  Norco twice daily as needed  Continue Prozac, lisinopril  Continue Parfon and Lyrica      DVT:Lovenox  Code Status: Full Code   ADULT DIET; Regular         SUBJECTIVE:     Patient was seen and examined at bedside.  Patient denies chest pain but has worsening dyspnea after eating.  No acute events overnight.  Notes, labs & imaging reviewed. Case was discussed with nursing staff.       Review of Systems   Constitutional:  Negative for fever.   Respiratory:  Positive for shortness of breath.    Cardiovascular:  Negative for chest pain.   Gastrointestinal:  Negative for abdominal pain.   Genitourinary:  Negative for difficulty urinating.   Neurological:  Negative for headaches.         OBJECTIVE:     /69   Pulse 60   Temp 97.5 °F (36.4 °C) (Oral)   Resp 20   Ht 1.905 m (6' 3\")   Wt 117.9 kg (260 lb)   SpO2 94%   BMI 32.50 kg/m²      Physical Exam  Vitals and nursing note reviewed.   Constitutional:       General: He is not in acute distress.     Appearance: Normal appearance. He is not ill-appearing.   Eyes:      Extraocular Movements: Extraocular movements intact.      Conjunctiva/sclera: Conjunctivae normal.

## 2025-02-17 ENCOUNTER — APPOINTMENT (OUTPATIENT)
Age: 70
DRG: 282 | End: 2025-02-17
Attending: INTERNAL MEDICINE
Payer: MEDICARE

## 2025-02-17 ENCOUNTER — HOSPITAL ENCOUNTER (INPATIENT)
Age: 70
LOS: 1 days | Discharge: HOME | DRG: 282 | End: 2025-02-18
Attending: INTERNAL MEDICINE | Admitting: INTERNAL MEDICINE
Payer: MEDICARE

## 2025-02-17 ENCOUNTER — APPOINTMENT (OUTPATIENT)
Dept: VASCULAR LAB | Age: 70
DRG: 282 | End: 2025-02-17
Attending: INTERNAL MEDICINE
Payer: MEDICARE

## 2025-02-17 VITALS
HEIGHT: 75 IN | DIASTOLIC BLOOD PRESSURE: 79 MMHG | HEART RATE: 93 BPM | SYSTOLIC BLOOD PRESSURE: 143 MMHG | WEIGHT: 260 LBS | BODY MASS INDEX: 32.33 KG/M2 | RESPIRATION RATE: 18 BRPM | OXYGEN SATURATION: 97 % | TEMPERATURE: 97.5 F

## 2025-02-17 DIAGNOSIS — I20.9 ANGINA PECTORIS: ICD-10-CM

## 2025-02-17 DIAGNOSIS — R07.1 CHEST PAIN ON BREATHING: Primary | ICD-10-CM

## 2025-02-17 DIAGNOSIS — I25.10 CAD, MULTIPLE VESSEL: ICD-10-CM

## 2025-02-17 LAB
ANION GAP SERPL CALCULATED.3IONS-SCNC: 11 MMOL/L (ref 9–16)
BASOPHILS # BLD: 0.1 K/UL (ref 0–0.2)
BASOPHILS NFR BLD: 1 % (ref 0–2)
BUN SERPL-MCNC: 17 MG/DL (ref 8–23)
CALCIUM SERPL-MCNC: 10 MG/DL (ref 8.6–10.4)
CHLORIDE SERPL-SCNC: 101 MMOL/L (ref 98–107)
CO2 SERPL-SCNC: 27 MMOL/L (ref 20–31)
CREAT SERPL-MCNC: 1 MG/DL (ref 0.7–1.2)
EKG ATRIAL RATE: 67 BPM
EKG P AXIS: 20 DEGREES
EKG P-R INTERVAL: 162 MS
EKG Q-T INTERVAL: 410 MS
EKG QRS DURATION: 84 MS
EKG QTC CALCULATION (BAZETT): 433 MS
EKG R AXIS: 20 DEGREES
EKG T AXIS: 61 DEGREES
EKG VENTRICULAR RATE: 67 BPM
EOSINOPHIL # BLD: 0.2 K/UL (ref 0–0.4)
EOSINOPHILS RELATIVE PERCENT: 3 % (ref 0–4)
ERYTHROCYTE [DISTWIDTH] IN BLOOD BY AUTOMATED COUNT: 12.9 % (ref 11.5–14.9)
GFR, ESTIMATED: 81 ML/MIN/1.73M2
GLUCOSE BLD-MCNC: 140 MG/DL (ref 75–110)
GLUCOSE BLD-MCNC: 217 MG/DL (ref 75–110)
GLUCOSE SERPL-MCNC: 166 MG/DL (ref 74–99)
HCT VFR BLD AUTO: 49.2 % (ref 41–53)
HGB BLD-MCNC: 17.3 G/DL (ref 13.5–17.5)
LKM AB TITR SER IF: NORMAL {TITER}
LYMPHOCYTES NFR BLD: 2.2 K/UL (ref 1–4.8)
LYMPHOCYTES RELATIVE PERCENT: 36 % (ref 24–44)
MCH RBC QN AUTO: 33.9 PG (ref 26–34)
MCHC RBC AUTO-ENTMCNC: 35.1 G/DL (ref 31–37)
MCV RBC AUTO: 96.5 FL (ref 80–100)
MONOCYTES NFR BLD: 0.5 K/UL (ref 0.1–1.3)
MONOCYTES NFR BLD: 9 % (ref 1–7)
NEUTROPHILS NFR BLD: 51 % (ref 36–66)
NEUTS SEG NFR BLD: 3.2 K/UL (ref 1.3–9.1)
PLATELET # BLD AUTO: 209 K/UL (ref 150–450)
PMV BLD AUTO: 8.3 FL (ref 6–12)
POTASSIUM SERPL-SCNC: 4.2 MMOL/L (ref 3.7–5.3)
RBC # BLD AUTO: 5.1 M/UL (ref 4.5–5.9)
SODIUM SERPL-SCNC: 139 MMOL/L (ref 136–145)
VAS LEFT BRACHIAL A DIST PSV: 110 CM/S
VAS LEFT CCA DIST EDV: 26.7 CM/S
VAS LEFT CCA DIST PSV: 107 CM/S
VAS LEFT CCA MID EDV: 27.4 CM/S
VAS LEFT CCA MID PSV: 136 CM/S
VAS LEFT CCA PROX EDV: 23 CM/S
VAS LEFT CCA PROX PSV: 123 CM/S
VAS LEFT ECA EDV: 18 CM/S
VAS LEFT ECA PSV: 103 CM/S
VAS LEFT GSV ANKLE DIAM: 1.35 MM
VAS LEFT GSV AT KNEE DIAM: 2.78 MM
VAS LEFT GSV BK MID DIAM: 0.75 MM
VAS LEFT GSV BK PROX DIAM: 1.96 MM
VAS LEFT GSV THIGH MID DIAM: 2.05 MM
VAS LEFT GSV THIGH PROX DIAM: 3.55 MM
VAS LEFT ICA DIST EDV: 36.1 CM/S
VAS LEFT ICA DIST PSV: 110 CM/S
VAS LEFT ICA MID EDV: 29 CM/S
VAS LEFT ICA MID PSV: 74.5 CM/S
VAS LEFT ICA PROX EDV: 14.9 CM/S
VAS LEFT ICA PROX PSV: 105 CM/S
VAS LEFT ICA/CCA PSV: 1.03
VAS LEFT RADIAL A DIST PSV: 59 CM/S
VAS LEFT RADIAL A MID PSV: 64 CM/S
VAS LEFT RADIAL A PROX PSV: 55.3 CM/S
VAS LEFT RADIAL DIST AP DIAM: 0.14 CM
VAS LEFT RADIAL DIST TR DIAM: 0.17 CM
VAS LEFT RADIAL MID AP DIAM: 0.16 CM
VAS LEFT RADIAL MID TR DIAM: 0.17 CM
VAS LEFT RADIAL PROX AP DIAM: 0.15 CM
VAS LEFT RADIAL PROX TR DIAM: 0.2 CM
VAS LEFT ULNAR A DIST PSV: 75.2 CM/S
VAS LEFT ULNAR A MID PSV: 73.3 CM/S
VAS LEFT ULNAR A PROX PSV: 54.7 CM/S
VAS LEFT ULNAR DIST AP DIAM: 0.24 CM
VAS LEFT ULNAR DIST TR DIAM: 0.25 CM
VAS LEFT ULNAR MID AP DIAM: 0.29 CM
VAS LEFT ULNAR MID TR DIAM: 0.23 CM
VAS LEFT ULNAR PROX AP DIAM: 0.36 CM
VAS LEFT ULNAR PROX TR DIAM: 0.41 CM
VAS LEFT VERTEBRAL EDV: 13.3 CM/S
VAS LEFT VERTEBRAL PSV: 59.6 CM/S
VAS RIGHT BRACHIAL A DIST PSV: 83.2 CM/S
VAS RIGHT CCA DIST EDV: 25.1 CM/S
VAS RIGHT CCA DIST PSV: 124 CM/S
VAS RIGHT CCA MID EDV: 16.5 CM/S
VAS RIGHT CCA MID PSV: 127 CM/S
VAS RIGHT CCA PROX EDV: 26.7 CM/S
VAS RIGHT CCA PROX PSV: 132 CM/S
VAS RIGHT ECA EDV: 13.7 CM/S
VAS RIGHT ECA PSV: 58.4 CM/S
VAS RIGHT GSV ANKLE DIAM: 2.28 MM
VAS RIGHT GSV AT KNEE DIAM: 2.37 MM
VAS RIGHT GSV BK MID DIAM: 1.83 MM
VAS RIGHT GSV BK PROX DIAM: 2.11 MM
VAS RIGHT GSV THIGH MID DIAM: 1.87 MM
VAS RIGHT GSV THIGH PROX DIAM: 3.06 MM
VAS RIGHT ICA DIST EDV: 41.6 CM/S
VAS RIGHT ICA DIST PSV: 107 CM/S
VAS RIGHT ICA MID EDV: 15.7 CM/S
VAS RIGHT ICA MID PSV: 66.3 CM/S
VAS RIGHT ICA PROX EDV: 8.4 CM/S
VAS RIGHT ICA PROX PSV: 58.5 CM/S
VAS RIGHT ICA/CCA PSV: 0.86
VAS RIGHT RADIAL A DIST PSV: 59 CM/S
VAS RIGHT RADIAL A MID PSV: 65.9 CM/S
VAS RIGHT RADIAL A PROX PSV: 52.8 CM/S
VAS RIGHT RADIAL DIST AP DIAM: 0.27 CM
VAS RIGHT RADIAL DIST TR DIAM: 0.32 CM
VAS RIGHT RADIAL MID AP DIAM: 0.24 CM
VAS RIGHT RADIAL MID TR DIAM: 0.32 CM
VAS RIGHT RADIAL PROX AP DIAM: 0.31 CM
VAS RIGHT RADIAL PROX TR DIAM: 0.39 CM
VAS RIGHT ULNAR A DIST PSV: 66.5 CM/S
VAS RIGHT ULNAR A MID PSV: 78.9 CM/S
VAS RIGHT ULNAR A PROX PSV: 65.9 CM/S
VAS RIGHT ULNAR DIST AP DIAM: 0.27 CM
VAS RIGHT ULNAR DIST TR DIAM: 0.29 CM
VAS RIGHT ULNAR MID AP DIAM: 0.3 CM
VAS RIGHT ULNAR MID TR DIAM: 0.28 CM
VAS RIGHT ULNAR PROX AP DIAM: 0.46 CM
VAS RIGHT ULNAR PROX TR DIAM: 0.46 CM
VAS RIGHT VERTEBRAL EDV: 11.2 CM/S
VAS RIGHT VERTEBRAL PSV: 53.4 CM/S
WBC OTHER # BLD: 6.1 K/UL (ref 3.5–11)

## 2025-02-17 PROCEDURE — 93930 UPPER EXTREMITY STUDY: CPT

## 2025-02-17 PROCEDURE — 6360000004 HC RX CONTRAST MEDICATION: Performed by: INTERNAL MEDICINE

## 2025-02-17 PROCEDURE — 7100000010 HC PHASE II RECOVERY - FIRST 15 MIN: Performed by: INTERNAL MEDICINE

## 2025-02-17 PROCEDURE — 99152 MOD SED SAME PHYS/QHP 5/>YRS: CPT | Performed by: INTERNAL MEDICINE

## 2025-02-17 PROCEDURE — 82947 ASSAY GLUCOSE BLOOD QUANT: CPT

## 2025-02-17 PROCEDURE — 93325 DOPPLER ECHO COLOR FLOW MAPG: CPT | Performed by: INTERNAL MEDICINE

## 2025-02-17 PROCEDURE — G0378 HOSPITAL OBSERVATION PER HR: HCPCS

## 2025-02-17 PROCEDURE — 99238 HOSP IP/OBS DSCHRG MGMT 30/<: CPT | Performed by: STUDENT IN AN ORGANIZED HEALTH CARE EDUCATION/TRAINING PROGRAM

## 2025-02-17 PROCEDURE — B2151ZZ FLUOROSCOPY OF LEFT HEART USING LOW OSMOLAR CONTRAST: ICD-10-PCS | Performed by: INTERNAL MEDICINE

## 2025-02-17 PROCEDURE — 93880 EXTRACRANIAL BILAT STUDY: CPT

## 2025-02-17 PROCEDURE — 99153 MOD SED SAME PHYS/QHP EA: CPT | Performed by: INTERNAL MEDICINE

## 2025-02-17 PROCEDURE — 93930 UPPER EXTREMITY STUDY: CPT | Performed by: SURGERY

## 2025-02-17 PROCEDURE — C1894 INTRO/SHEATH, NON-LASER: HCPCS | Performed by: INTERNAL MEDICINE

## 2025-02-17 PROCEDURE — 93970 EXTREMITY STUDY: CPT | Performed by: SURGERY

## 2025-02-17 PROCEDURE — 93970 EXTREMITY STUDY: CPT

## 2025-02-17 PROCEDURE — 80048 BASIC METABOLIC PNL TOTAL CA: CPT

## 2025-02-17 PROCEDURE — 2060000000 HC ICU INTERMEDIATE R&B

## 2025-02-17 PROCEDURE — 93010 ELECTROCARDIOGRAM REPORT: CPT | Performed by: INTERNAL MEDICINE

## 2025-02-17 PROCEDURE — 85025 COMPLETE CBC W/AUTO DIFF WBC: CPT

## 2025-02-17 PROCEDURE — 93308 TTE F-UP OR LMTD: CPT

## 2025-02-17 PROCEDURE — 93880 EXTRACRANIAL BILAT STUDY: CPT | Performed by: SURGERY

## 2025-02-17 PROCEDURE — 93458 L HRT ARTERY/VENTRICLE ANGIO: CPT | Performed by: INTERNAL MEDICINE

## 2025-02-17 PROCEDURE — 2720000010 HC SURG SUPPLY STERILE: Performed by: INTERNAL MEDICINE

## 2025-02-17 PROCEDURE — 7100000011 HC PHASE II RECOVERY - ADDTL 15 MIN: Performed by: INTERNAL MEDICINE

## 2025-02-17 PROCEDURE — 2709999900 HC NON-CHARGEABLE SUPPLY: Performed by: INTERNAL MEDICINE

## 2025-02-17 PROCEDURE — 36415 COLL VENOUS BLD VENIPUNCTURE: CPT

## 2025-02-17 PROCEDURE — 99222 1ST HOSP IP/OBS MODERATE 55: CPT | Performed by: NURSE PRACTITIONER

## 2025-02-17 PROCEDURE — 6360000002 HC RX W HCPCS: Performed by: INTERNAL MEDICINE

## 2025-02-17 PROCEDURE — 6360000002 HC RX W HCPCS: Performed by: STUDENT IN AN ORGANIZED HEALTH CARE EDUCATION/TRAINING PROGRAM

## 2025-02-17 PROCEDURE — C1769 GUIDE WIRE: HCPCS | Performed by: INTERNAL MEDICINE

## 2025-02-17 PROCEDURE — B2111ZZ FLUOROSCOPY OF MULTIPLE CORONARY ARTERIES USING LOW OSMOLAR CONTRAST: ICD-10-PCS | Performed by: INTERNAL MEDICINE

## 2025-02-17 PROCEDURE — 2500000003 HC RX 250 WO HCPCS: Performed by: STUDENT IN AN ORGANIZED HEALTH CARE EDUCATION/TRAINING PROGRAM

## 2025-02-17 PROCEDURE — 6370000000 HC RX 637 (ALT 250 FOR IP): Performed by: STUDENT IN AN ORGANIZED HEALTH CARE EDUCATION/TRAINING PROGRAM

## 2025-02-17 PROCEDURE — 2500000003 HC RX 250 WO HCPCS: Performed by: INTERNAL MEDICINE

## 2025-02-17 PROCEDURE — 93308 TTE F-UP OR LMTD: CPT | Performed by: INTERNAL MEDICINE

## 2025-02-17 PROCEDURE — 4A023N7 MEASUREMENT OF CARDIAC SAMPLING AND PRESSURE, LEFT HEART, PERCUTANEOUS APPROACH: ICD-10-PCS | Performed by: INTERNAL MEDICINE

## 2025-02-17 RX ORDER — ENOXAPARIN SODIUM 100 MG/ML
30 INJECTION SUBCUTANEOUS 2 TIMES DAILY
Status: CANCELLED | OUTPATIENT
Start: 2025-02-17

## 2025-02-17 RX ORDER — MAGNESIUM SULFATE HEPTAHYDRATE 40 MG/ML
2000 INJECTION, SOLUTION INTRAVENOUS PRN
Status: CANCELLED | OUTPATIENT
Start: 2025-02-17

## 2025-02-17 RX ORDER — POTASSIUM CHLORIDE 1500 MG/1
40 TABLET, EXTENDED RELEASE ORAL PRN
Status: CANCELLED | OUTPATIENT
Start: 2025-02-17 | End: 2025-02-24

## 2025-02-17 RX ORDER — SODIUM CHLORIDE 9 MG/ML
INJECTION, SOLUTION INTRAVENOUS PRN
Status: DISCONTINUED | OUTPATIENT
Start: 2025-02-17 | End: 2025-02-18 | Stop reason: HOSPADM

## 2025-02-17 RX ORDER — GLUCAGON 1 MG/ML
1 KIT INJECTION PRN
Status: DISCONTINUED | OUTPATIENT
Start: 2025-02-17 | End: 2025-02-18 | Stop reason: HOSPADM

## 2025-02-17 RX ORDER — HYDROCODONE BITARTRATE AND ACETAMINOPHEN 5; 325 MG/1; MG/1
0.5 TABLET ORAL 2 TIMES DAILY PRN
Status: DISCONTINUED | OUTPATIENT
Start: 2025-02-17 | End: 2025-02-18 | Stop reason: HOSPADM

## 2025-02-17 RX ORDER — SODIUM CHLORIDE 0.9 % (FLUSH) 0.9 %
5-40 SYRINGE (ML) INJECTION PRN
Status: DISCONTINUED | OUTPATIENT
Start: 2025-02-17 | End: 2025-02-18 | Stop reason: HOSPADM

## 2025-02-17 RX ORDER — ACETAMINOPHEN 650 MG/1
650 SUPPOSITORY RECTAL EVERY 6 HOURS PRN
Status: DISCONTINUED | OUTPATIENT
Start: 2025-02-17 | End: 2025-02-18 | Stop reason: HOSPADM

## 2025-02-17 RX ORDER — POTASSIUM CHLORIDE 7.45 MG/ML
10 INJECTION INTRAVENOUS PRN
Status: CANCELLED | OUTPATIENT
Start: 2025-02-17 | End: 2025-02-24

## 2025-02-17 RX ORDER — OXYMETAZOLINE HYDROCHLORIDE 0.05 G/100ML
2 SPRAY NASAL 2 TIMES DAILY
Status: CANCELLED | OUTPATIENT
Start: 2025-02-17 | End: 2025-02-19

## 2025-02-17 RX ORDER — ONDANSETRON 4 MG/1
4 TABLET, ORALLY DISINTEGRATING ORAL EVERY 8 HOURS PRN
Status: DISCONTINUED | OUTPATIENT
Start: 2025-02-17 | End: 2025-02-18 | Stop reason: HOSPADM

## 2025-02-17 RX ORDER — ACETAMINOPHEN 325 MG/1
650 TABLET ORAL EVERY 6 HOURS PRN
Status: DISCONTINUED | OUTPATIENT
Start: 2025-02-17 | End: 2025-02-18 | Stop reason: HOSPADM

## 2025-02-17 RX ORDER — ASPIRIN 81 MG/1
81 TABLET, CHEWABLE ORAL DAILY
Status: DISCONTINUED | OUTPATIENT
Start: 2025-02-17 | End: 2025-02-18 | Stop reason: HOSPADM

## 2025-02-17 RX ORDER — OXYMETAZOLINE HYDROCHLORIDE 0.05 G/100ML
2 SPRAY NASAL 2 TIMES DAILY
Status: DISCONTINUED | OUTPATIENT
Start: 2025-02-17 | End: 2025-02-18 | Stop reason: HOSPADM

## 2025-02-17 RX ORDER — LISINOPRIL 10 MG/1
10 TABLET ORAL DAILY
Status: DISCONTINUED | OUTPATIENT
Start: 2025-02-17 | End: 2025-02-18 | Stop reason: HOSPADM

## 2025-02-17 RX ORDER — FAMOTIDINE 20 MG/1
20 TABLET, FILM COATED ORAL 2 TIMES DAILY PRN
Status: DISCONTINUED | OUTPATIENT
Start: 2025-02-17 | End: 2025-02-18 | Stop reason: HOSPADM

## 2025-02-17 RX ORDER — TIZANIDINE 2 MG/1
2 TABLET ORAL 3 TIMES DAILY
Status: CANCELLED | OUTPATIENT
Start: 2025-02-17

## 2025-02-17 RX ORDER — IOPAMIDOL 755 MG/ML
INJECTION, SOLUTION INTRAVASCULAR PRN
Status: DISCONTINUED | OUTPATIENT
Start: 2025-02-17 | End: 2025-02-17 | Stop reason: HOSPADM

## 2025-02-17 RX ORDER — SODIUM CHLORIDE 0.9 % (FLUSH) 0.9 %
10 SYRINGE (ML) INJECTION PRN
Status: DISCONTINUED | OUTPATIENT
Start: 2025-02-17 | End: 2025-02-18 | Stop reason: HOSPADM

## 2025-02-17 RX ORDER — SODIUM CHLORIDE 0.9 % (FLUSH) 0.9 %
5-40 SYRINGE (ML) INJECTION PRN
Status: CANCELLED | OUTPATIENT
Start: 2025-02-17

## 2025-02-17 RX ORDER — NITROGLYCERIN 20 MG/100ML
INJECTION INTRAVENOUS PRN
Status: DISCONTINUED | OUTPATIENT
Start: 2025-02-17 | End: 2025-02-17 | Stop reason: HOSPADM

## 2025-02-17 RX ORDER — VERAPAMIL HYDROCHLORIDE 2.5 MG/ML
INJECTION, SOLUTION INTRAVENOUS PRN
Status: DISCONTINUED | OUTPATIENT
Start: 2025-02-17 | End: 2025-02-17 | Stop reason: HOSPADM

## 2025-02-17 RX ORDER — POTASSIUM CHLORIDE 1500 MG/1
40 TABLET, EXTENDED RELEASE ORAL PRN
Status: DISCONTINUED | OUTPATIENT
Start: 2025-02-17 | End: 2025-02-18 | Stop reason: HOSPADM

## 2025-02-17 RX ORDER — DEXTROSE MONOHYDRATE 100 MG/ML
INJECTION, SOLUTION INTRAVENOUS CONTINUOUS PRN
Status: DISCONTINUED | OUTPATIENT
Start: 2025-02-17 | End: 2025-02-18 | Stop reason: HOSPADM

## 2025-02-17 RX ORDER — TIZANIDINE 2 MG/1
2 TABLET ORAL 3 TIMES DAILY
Status: DISCONTINUED | OUTPATIENT
Start: 2025-02-17 | End: 2025-02-17

## 2025-02-17 RX ORDER — ONDANSETRON 2 MG/ML
4 INJECTION INTRAMUSCULAR; INTRAVENOUS EVERY 6 HOURS PRN
Status: CANCELLED | OUTPATIENT
Start: 2025-02-17

## 2025-02-17 RX ORDER — ACETAMINOPHEN 325 MG/1
650 TABLET ORAL EVERY 6 HOURS PRN
Status: CANCELLED | OUTPATIENT
Start: 2025-02-17

## 2025-02-17 RX ORDER — LISINOPRIL 10 MG/1
10 TABLET ORAL DAILY
Status: CANCELLED | OUTPATIENT
Start: 2025-02-17

## 2025-02-17 RX ORDER — SODIUM CHLORIDE 0.9 % (FLUSH) 0.9 %
5-40 SYRINGE (ML) INJECTION EVERY 12 HOURS SCHEDULED
Status: CANCELLED | OUTPATIENT
Start: 2025-02-17

## 2025-02-17 RX ORDER — ACETAMINOPHEN 325 MG/1
650 TABLET ORAL EVERY 4 HOURS PRN
Status: DISCONTINUED | OUTPATIENT
Start: 2025-02-17 | End: 2025-02-18 | Stop reason: HOSPADM

## 2025-02-17 RX ORDER — MAGNESIUM SULFATE IN WATER 40 MG/ML
2000 INJECTION, SOLUTION INTRAVENOUS PRN
Status: DISCONTINUED | OUTPATIENT
Start: 2025-02-17 | End: 2025-02-18 | Stop reason: HOSPADM

## 2025-02-17 RX ORDER — SODIUM CHLORIDE 9 MG/ML
INJECTION, SOLUTION INTRAVENOUS PRN
Status: CANCELLED | OUTPATIENT
Start: 2025-02-17

## 2025-02-17 RX ORDER — POTASSIUM CHLORIDE 7.45 MG/ML
10 INJECTION INTRAVENOUS PRN
Status: DISCONTINUED | OUTPATIENT
Start: 2025-02-17 | End: 2025-02-18 | Stop reason: HOSPADM

## 2025-02-17 RX ORDER — MIDAZOLAM HYDROCHLORIDE 1 MG/ML
INJECTION, SOLUTION INTRAMUSCULAR; INTRAVENOUS PRN
Status: DISCONTINUED | OUTPATIENT
Start: 2025-02-17 | End: 2025-02-17 | Stop reason: HOSPADM

## 2025-02-17 RX ORDER — ALPRAZOLAM 0.25 MG
0.5 TABLET ORAL NIGHTLY PRN
Status: DISCONTINUED | OUTPATIENT
Start: 2025-02-17 | End: 2025-02-18 | Stop reason: HOSPADM

## 2025-02-17 RX ORDER — ACETAMINOPHEN 650 MG/1
650 SUPPOSITORY RECTAL EVERY 6 HOURS PRN
Status: CANCELLED | OUTPATIENT
Start: 2025-02-17

## 2025-02-17 RX ORDER — ONDANSETRON 4 MG/1
4 TABLET, ORALLY DISINTEGRATING ORAL EVERY 8 HOURS PRN
Status: CANCELLED | OUTPATIENT
Start: 2025-02-17

## 2025-02-17 RX ORDER — SODIUM CHLORIDE 0.9 % (FLUSH) 0.9 %
10 SYRINGE (ML) INJECTION PRN
Status: CANCELLED | OUTPATIENT
Start: 2025-02-17

## 2025-02-17 RX ORDER — BISACODYL 5 MG/1
5 TABLET, DELAYED RELEASE ORAL DAILY PRN
Status: DISCONTINUED | OUTPATIENT
Start: 2025-02-17 | End: 2025-02-18 | Stop reason: HOSPADM

## 2025-02-17 RX ORDER — ASPIRIN 81 MG/1
81 TABLET, CHEWABLE ORAL DAILY
Status: CANCELLED | OUTPATIENT
Start: 2025-02-17

## 2025-02-17 RX ORDER — DEXTROSE MONOHYDRATE 100 MG/ML
INJECTION, SOLUTION INTRAVENOUS CONTINUOUS PRN
Status: CANCELLED | OUTPATIENT
Start: 2025-02-17

## 2025-02-17 RX ORDER — SODIUM CHLORIDE 0.9 % (FLUSH) 0.9 %
5-40 SYRINGE (ML) INJECTION EVERY 12 HOURS SCHEDULED
Status: DISCONTINUED | OUTPATIENT
Start: 2025-02-17 | End: 2025-02-18 | Stop reason: HOSPADM

## 2025-02-17 RX ORDER — BISACODYL 5 MG/1
5 TABLET, DELAYED RELEASE ORAL DAILY PRN
Status: CANCELLED | OUTPATIENT
Start: 2025-02-17

## 2025-02-17 RX ORDER — FENTANYL CITRATE 50 UG/ML
INJECTION, SOLUTION INTRAMUSCULAR; INTRAVENOUS PRN
Status: DISCONTINUED | OUTPATIENT
Start: 2025-02-17 | End: 2025-02-17 | Stop reason: HOSPADM

## 2025-02-17 RX ORDER — FAMOTIDINE 20 MG/1
20 TABLET, FILM COATED ORAL 2 TIMES DAILY PRN
Status: CANCELLED | OUTPATIENT
Start: 2025-02-17

## 2025-02-17 RX ORDER — INSULIN LISPRO 100 [IU]/ML
0-8 INJECTION, SOLUTION INTRAVENOUS; SUBCUTANEOUS
Status: CANCELLED | OUTPATIENT
Start: 2025-02-17

## 2025-02-17 RX ORDER — ENOXAPARIN SODIUM 100 MG/ML
30 INJECTION SUBCUTANEOUS 2 TIMES DAILY
Status: DISCONTINUED | OUTPATIENT
Start: 2025-02-17 | End: 2025-02-18 | Stop reason: HOSPADM

## 2025-02-17 RX ORDER — ONDANSETRON 2 MG/ML
4 INJECTION INTRAMUSCULAR; INTRAVENOUS EVERY 6 HOURS PRN
Status: DISCONTINUED | OUTPATIENT
Start: 2025-02-17 | End: 2025-02-18 | Stop reason: HOSPADM

## 2025-02-17 RX ORDER — HYDROCODONE BITARTRATE AND ACETAMINOPHEN 5; 325 MG/1; MG/1
0.5 TABLET ORAL 2 TIMES DAILY PRN
Status: CANCELLED | OUTPATIENT
Start: 2025-02-17

## 2025-02-17 RX ORDER — HEPARIN SODIUM 1000 [USP'U]/ML
INJECTION, SOLUTION INTRAVENOUS; SUBCUTANEOUS PRN
Status: DISCONTINUED | OUTPATIENT
Start: 2025-02-17 | End: 2025-02-17 | Stop reason: HOSPADM

## 2025-02-17 RX ORDER — INSULIN LISPRO 100 [IU]/ML
0-8 INJECTION, SOLUTION INTRAVENOUS; SUBCUTANEOUS
Status: DISCONTINUED | OUTPATIENT
Start: 2025-02-17 | End: 2025-02-18 | Stop reason: HOSPADM

## 2025-02-17 RX ORDER — CHLORZOXAZONE 500 MG/1
500 TABLET ORAL 2 TIMES DAILY PRN
Status: DISCONTINUED | OUTPATIENT
Start: 2025-02-17 | End: 2025-02-18 | Stop reason: HOSPADM

## 2025-02-17 RX ADMIN — LISINOPRIL 10 MG: 10 TABLET ORAL at 16:07

## 2025-02-17 RX ADMIN — PREGABALIN 200 MG: 75 CAPSULE ORAL at 19:53

## 2025-02-17 RX ADMIN — INSULIN LISPRO 2 UNITS: 100 INJECTION, SOLUTION INTRAVENOUS; SUBCUTANEOUS at 20:00

## 2025-02-17 RX ADMIN — Medication 2 SPRAY: at 16:05

## 2025-02-17 RX ADMIN — Medication 2 SPRAY: at 21:11

## 2025-02-17 RX ADMIN — ENOXAPARIN SODIUM 30 MG: 100 INJECTION SUBCUTANEOUS at 19:53

## 2025-02-17 RX ADMIN — ASPIRIN 81 MG: 81 TABLET, CHEWABLE ORAL at 16:12

## 2025-02-17 RX ADMIN — HYDROCODONE BITARTRATE AND ACETAMINOPHEN 0.5 TABLET: 5; 325 TABLET ORAL at 16:07

## 2025-02-17 RX ADMIN — ENOXAPARIN SODIUM 30 MG: 100 INJECTION SUBCUTANEOUS at 16:12

## 2025-02-17 RX ADMIN — PREGABALIN 200 MG: 75 CAPSULE ORAL at 16:07

## 2025-02-17 RX ADMIN — SODIUM CHLORIDE, PRESERVATIVE FREE 10 ML: 5 INJECTION INTRAVENOUS at 19:53

## 2025-02-17 RX ADMIN — CHLORZOXAZONE 500 MG: 500 TABLET ORAL at 22:27

## 2025-02-17 ASSESSMENT — PAIN SCALES - GENERAL
PAINLEVEL_OUTOF10: 0
PAINLEVEL_OUTOF10: 6

## 2025-02-17 ASSESSMENT — PAIN DESCRIPTION - LOCATION: LOCATION: BACK

## 2025-02-17 NOTE — PROGRESS NOTES
RN returned patients home med Lyrica from [place] lock box back to patient at this time, patient is leaving for heart cath. Patient packed with all belongings and sent with transport.

## 2025-02-17 NOTE — PROGRESS NOTES
Patient arrives from [facility] to pcc room 10 / pain free.  Wife at bedside with all belongings.  Consent signed and questions answered.  To cath lab per cart / unable to complete/review nursing admission history.

## 2025-02-17 NOTE — PROGRESS NOTES
Patient received post cath to pcc room 10. Assessment obtained.  Post cath pathway initiated. Right radial site with VASC BAND intact / 13 ml air. No hematoma noted. Restrictions reviewed with patient and wife.  Patient without complaints.

## 2025-02-17 NOTE — PLAN OF CARE
Problem: Chronic Conditions and Co-morbidities  Goal: Patient's chronic conditions and co-morbidity symptoms are monitored and maintained or improved  Outcome: Progressing     Problem: Discharge Planning  Goal: Discharge to home or other facility with appropriate resources  Outcome: Progressing  Flowsheets (Taken 2/17/2025 0324)  Discharge to home or other facility with appropriate resources: Identify barriers to discharge with patient and caregiver     Problem: Pain  Goal: Verbalizes/displays adequate comfort level or baseline comfort level  Outcome: Progressing  Flowsheets (Taken 2/17/2025 0324)  Verbalizes/displays adequate comfort level or baseline comfort level: Encourage patient to monitor pain and request assistance     Problem: Safety - Adult  Goal: Free from fall injury  Outcome: Progressing  Flowsheets (Taken 2/17/2025 0324)  Free From Fall Injury:   Instruct family/caregiver on patient safety   Based on caregiver fall risk screen, instruct family/caregiver to ask for assistance with transferring infant if caregiver noted to have fall risk factors  Note: The patient remained free from falls this shift, call light within reach, bed in locked and lowest position.  Side rails up x2.      Problem: ABCDS Injury Assessment  Goal: Absence of physical injury  Outcome: Progressing  Flowsheets (Taken 2/17/2025 0324)  Absence of Physical Injury: Implement safety measures based on patient assessment  Note: Patient remained free from injury this shift. Call light within reach. Bed locked and in lowest position. Side rails x2. Pathways clear.

## 2025-02-17 NOTE — PLAN OF CARE
Problem: Chronic Conditions and Co-morbidities  Goal: Patient's chronic conditions and co-morbidity symptoms are monitored and maintained or improved  Outcome: Progressing     Problem: Discharge Planning  Goal: Discharge to home or other facility with appropriate resources  Outcome: Progressing  Flowsheets (Taken 2/17/2025 7140)  Discharge to home or other facility with appropriate resources:   Identify barriers to discharge with patient and caregiver   Arrange for needed discharge resources and transportation as appropriate   Identify discharge learning needs (meds, wound care, etc)   Refer to discharge planning if patient needs post-hospital services based on physician order or complex needs related to functional status, cognitive ability or social support system     Problem: Safety - Adult  Goal: Free from fall injury  Outcome: Progressing     Problem: ABCDS Injury Assessment  Goal: Absence of physical injury  Outcome: Progressing

## 2025-02-17 NOTE — BRIEF OP NOTE
Brief Postoperative Note      Patient: Earnest Avila  YOB: 1955  MRN: [id number]    Date of Procedure: 2/17/2025    Pre-Op Diagnosis Codes:      * Angina pectoris (HCC) [I20.9]  Dyspnea     Post-Op Diagnosis: Same       Procedure(s):  pandya / Left heart cath / coronary angiography / op [facility]    Surgeon(s):  Lemuel Pandya MD    Assistant:  * No surgical staff found *    Anesthesia: IV Sedation    Estimated Blood Loss (mL): Minimal    Complications: None    Specimens:   * No specimens in log *    Implants:  * No implants in log *      Drains:   Suprapubic Catheter (Active)   Site Assessment Other (Comment) 02/17/25 0615   Urine Color Yellow 02/17/25 0615   Urine Appearance Clear 02/17/25 0615   Urine Odor Malodorous 02/17/25 0615   Collection Container Standard 02/17/25 0615   Catheter Care  Soap and water 02/16/25 1850   Output (mL) 450 mL 02/17/25 0615       Findings:    Multivessel CAD  Normal LVEF     Plan:     Asa, statin and bb  CTS consult for CABG     Electronically signed by Lemuel Pandya MD on 2/17/2025 at 10:14 AM

## 2025-02-17 NOTE — PROGRESS NOTES
2 CC AIR successfully removed from VASC BAND.  Site remains clean soft and dry.  CT surgery in to see.

## 2025-02-17 NOTE — PROGRESS NOTES
Patient received post cath to pcc room 10 while awaiting bed placement. Assessment obtained.  Post cath pathway initiated. Right  radial site with VASC BAND intact / 13 ml air. No hematoma noted. Restrictions reviewed with patient and wife.  Patient without complaints.  Fluids offered

## 2025-02-17 NOTE — PROGRESS NOTES
Remaining air removed from VASC BAND and removed.  Site viewed by patient and .  Clean soft and dry.  Dressing applied.  Awaiting bed placement.

## 2025-02-17 NOTE — CONSULTS
[facility] Cardiothoracic Surgery  Consult    Patient's Name/Date of Birth: Earnest Avila / 1955 (69 y.o.)    Date: February 17, 2025     Chief Complaint: Shortness of breath mild chest pressure    HPI: Earnest Avila is a 69 y.o.   male who has been following with Dr. Spaulding regarding his shortness of breath.  Negative stress test.  Patient came in over the weekend with what he describes as episodes of shortness of breath.    Currently patient does not have any chest pain.  Currently asymptomatic.  After cardiac catheterization patient noted to have multivessel CAD and cardiothoracic surgery was consulted for bypass surgery    Overall patient will consider himself healthy.  He does have a suprapubic catheter due to hyperplasia of his prostate and had some scarring in his bladder outlet making it difficult to urinate.    Patient has a history of Gilbert's where his bilirubin is elevated.  He has had an episode x 1 of elevated LFTs that was unexplained.  He does not drink alcohol for the past month.  When he does drink it is usually a beer a day.   Patient states he has never had MOORE.  Patient states his mother had MOORE.  Patient follows with Dr. Bee of GI.    He has recently been told that he has some episodes of A-fib and PVCs.  Does not take any AC or AP medication that would be contraindicated for bypass surgery.        ROS:   CONSTITUTIONAL: Alert and oriented x 4  Respiratory: Sporadic shortness of breath  Cardiovascular: negative  Gastrointestinal: negative  Genitourinary: Patient has a Leyva that is suprapubic catheter.  Urine looks of normal color  Hematologic/lymphatic: negative  Musculoskeletal:negative  Neurological: negative  Endocrine: negative  Psychiatric: negative  Past Medical History:   Diagnosis Date    Allergic rhinitis     Anxiety     Borderline hypertension     Controlled diabetes mellitus type II without complication (HCC)     Dental abscess     Depression     On medas

## 2025-02-17 NOTE — PROGRESS NOTES
Report called to Magdalena BRICEÑO and patient transported to 2026 per cart and all belongings  /  wife with patient at 1405

## 2025-02-18 ENCOUNTER — PREP FOR PROCEDURE (OUTPATIENT)
Dept: VASCULAR SURGERY | Age: 70
End: 2025-02-18

## 2025-02-18 VITALS
SYSTOLIC BLOOD PRESSURE: 125 MMHG | DIASTOLIC BLOOD PRESSURE: 50 MMHG | HEART RATE: 74 BPM | RESPIRATION RATE: 18 BRPM | HEIGHT: 75 IN | OXYGEN SATURATION: 93 % | BODY MASS INDEX: 33.2 KG/M2 | TEMPERATURE: 97.9 F | WEIGHT: 266.98 LBS

## 2025-02-18 PROBLEM — I25.10 CORONARY ARTERY DISEASE: Status: ACTIVE | Noted: 2025-02-18

## 2025-02-18 LAB
ANION GAP SERPL CALCULATED.3IONS-SCNC: 12 MMOL/L (ref 9–16)
BASOPHILS # BLD: 0.07 K/UL (ref 0–0.2)
BASOPHILS NFR BLD: 1 % (ref 0–2)
BUN SERPL-MCNC: 17 MG/DL (ref 8–23)
CALCIUM SERPL-MCNC: 9 MG/DL (ref 8.6–10.4)
CHLORIDE SERPL-SCNC: 105 MMOL/L (ref 98–107)
CO2 SERPL-SCNC: 19 MMOL/L (ref 20–31)
CREAT SERPL-MCNC: 0.9 MG/DL (ref 0.7–1.2)
ECHO BSA: 2.53 M2
ECHO LV EF PHYSICIAN: 55 %
ECHO LV EJECTION FRACTION BIPLANE: 54 % (ref 55–100)
EKG ATRIAL RATE: 75 BPM
EKG P AXIS: 16 DEGREES
EKG P-R INTERVAL: 160 MS
EKG Q-T INTERVAL: 374 MS
EKG QRS DURATION: 86 MS
EKG QTC CALCULATION (BAZETT): 417 MS
EKG R AXIS: 24 DEGREES
EKG T AXIS: 63 DEGREES
EKG VENTRICULAR RATE: 75 BPM
EOSINOPHIL # BLD: 0.2 K/UL (ref 0–0.44)
EOSINOPHILS RELATIVE PERCENT: 3 % (ref 1–4)
ERYTHROCYTE [DISTWIDTH] IN BLOOD BY AUTOMATED COUNT: 11.9 % (ref 11.8–14.4)
GFR, ESTIMATED: >90 ML/MIN/1.73M2
GLUCOSE BLD-MCNC: 146 MG/DL (ref 75–110)
GLUCOSE BLD-MCNC: 160 MG/DL (ref 75–110)
GLUCOSE BLD-MCNC: 170 MG/DL (ref 75–110)
GLUCOSE BLD-MCNC: 172 MG/DL (ref 75–110)
GLUCOSE BLD-MCNC: 177 MG/DL (ref 75–110)
GLUCOSE BLD-MCNC: 189 MG/DL (ref 75–110)
GLUCOSE SERPL-MCNC: 162 MG/DL (ref 74–99)
HCT VFR BLD AUTO: 43.3 % (ref 40.7–50.3)
HGB BLD-MCNC: 15 G/DL (ref 13–17)
IMM GRANULOCYTES # BLD AUTO: 0.03 K/UL (ref 0–0.3)
IMM GRANULOCYTES NFR BLD: 0 %
LYMPHOCYTES NFR BLD: 1.74 K/UL (ref 1.1–3.7)
LYMPHOCYTES RELATIVE PERCENT: 24 % (ref 24–43)
MCH RBC QN AUTO: 33 PG (ref 25.2–33.5)
MCHC RBC AUTO-ENTMCNC: 34.6 G/DL (ref 28.4–34.8)
MCV RBC AUTO: 95.2 FL (ref 82.6–102.9)
MONOCYTES NFR BLD: 0.77 K/UL (ref 0.1–1.2)
MONOCYTES NFR BLD: 11 % (ref 3–12)
NEUTROPHILS NFR BLD: 61 % (ref 36–65)
NEUTS SEG NFR BLD: 4.43 K/UL (ref 1.5–8.1)
NRBC BLD-RTO: 0 PER 100 WBC
PLATELET # BLD AUTO: 186 K/UL (ref 138–453)
PMV BLD AUTO: 10.1 FL (ref 8.1–13.5)
POTASSIUM SERPL-SCNC: 4.4 MMOL/L (ref 3.7–5.3)
RBC # BLD AUTO: 4.55 M/UL (ref 4.21–5.77)
SODIUM SERPL-SCNC: 136 MMOL/L (ref 136–145)
WBC OTHER # BLD: 7.2 K/UL (ref 3.5–11.3)

## 2025-02-18 PROCEDURE — 93005 ELECTROCARDIOGRAM TRACING: CPT | Performed by: NURSE PRACTITIONER

## 2025-02-18 PROCEDURE — 6370000000 HC RX 637 (ALT 250 FOR IP): Performed by: STUDENT IN AN ORGANIZED HEALTH CARE EDUCATION/TRAINING PROGRAM

## 2025-02-18 PROCEDURE — 99238 HOSP IP/OBS DSCHRG MGMT 30/<: CPT | Performed by: INTERNAL MEDICINE

## 2025-02-18 PROCEDURE — 36415 COLL VENOUS BLD VENIPUNCTURE: CPT

## 2025-02-18 PROCEDURE — 6360000002 HC RX W HCPCS: Performed by: STUDENT IN AN ORGANIZED HEALTH CARE EDUCATION/TRAINING PROGRAM

## 2025-02-18 PROCEDURE — 80048 BASIC METABOLIC PNL TOTAL CA: CPT

## 2025-02-18 PROCEDURE — 99232 SBSQ HOSP IP/OBS MODERATE 35: CPT | Performed by: NURSE PRACTITIONER

## 2025-02-18 PROCEDURE — 82947 ASSAY GLUCOSE BLOOD QUANT: CPT

## 2025-02-18 PROCEDURE — 85025 COMPLETE CBC W/AUTO DIFF WBC: CPT

## 2025-02-18 PROCEDURE — 2500000003 HC RX 250 WO HCPCS: Performed by: STUDENT IN AN ORGANIZED HEALTH CARE EDUCATION/TRAINING PROGRAM

## 2025-02-18 RX ORDER — ISOSORBIDE MONONITRATE 30 MG/1
30 TABLET, EXTENDED RELEASE ORAL DAILY
Qty: 30 TABLET | Refills: 3 | Status: ON HOLD | OUTPATIENT
Start: 2025-02-18

## 2025-02-18 RX ADMIN — FLUOXETINE HYDROCHLORIDE 20 MG: 20 CAPSULE ORAL at 08:55

## 2025-02-18 RX ADMIN — Medication 2 SPRAY: at 08:56

## 2025-02-18 RX ADMIN — SODIUM CHLORIDE, PRESERVATIVE FREE 10 ML: 5 INJECTION INTRAVENOUS at 09:04

## 2025-02-18 RX ADMIN — ASPIRIN 81 MG: 81 TABLET, CHEWABLE ORAL at 08:55

## 2025-02-18 RX ADMIN — HYDROCODONE BITARTRATE AND ACETAMINOPHEN 0.5 TABLET: 5; 325 TABLET ORAL at 09:01

## 2025-02-18 RX ADMIN — CHLORZOXAZONE 500 MG: 500 TABLET ORAL at 08:56

## 2025-02-18 RX ADMIN — ENOXAPARIN SODIUM 30 MG: 100 INJECTION SUBCUTANEOUS at 08:55

## 2025-02-18 RX ADMIN — PREGABALIN 200 MG: 75 CAPSULE ORAL at 08:55

## 2025-02-18 RX ADMIN — LISINOPRIL 10 MG: 10 TABLET ORAL at 08:55

## 2025-02-18 ASSESSMENT — PAIN SCALES - GENERAL
PAINLEVEL_OUTOF10: 5
PAINLEVEL_OUTOF10: 5

## 2025-02-18 ASSESSMENT — PAIN DESCRIPTION - PAIN TYPE: TYPE: CHRONIC PAIN

## 2025-02-18 ASSESSMENT — PAIN DESCRIPTION - LOCATION: LOCATION: BACK

## 2025-02-18 NOTE — PLAN OF CARE
Discussed plan with patient bedside.  EKG does not show A-fib.  Patient had PVC noted.  Patient denies any chest pain shortness of breath nausea vomiting diarrhea fever chills.  We will plan for bypass surgery Friday at 8:30 AM with Dr. Jamison  Our plan will be to discharge patient today plan for patient to come to surgery Friday.  Our office will be calling patient with further details on preop process before surgery.  I also went over the preop process day of surgery with patient.

## 2025-02-18 NOTE — DISCHARGE SUMMARY
Cardiology Discharge Note         Name:  Earnest Avila    YOB: 1955    Medical Record Number:  [id number]    Date of Admission:  2/17/2025    Date of Discharge:  2/18/2025    Admitting physician: Lemuel Spaulding MD    Discharge Attending: Lemuel Spaulding MD, MD    Primary Care Physician: No primary care provider on file.    Consultants: None     Discharge to: Home    Discharge Condition: Stable     HOSPITAL ADMISSION PROBLEM LIST:  Patient Active Problem List   Diagnosis    Type 2 diabetes, controlled, with neuropathy (HCC)    Essential hypertension    Osteoarthritis    Anxiety    BRIDGETTE (obstructive sleep apnea)    Neuropathy    Obesity, Class I, BMI 30-34.9    Chronic right shoulder pain    Tubular adenoma of colon    History of colon polyps    Hepatitis C carrier (HCC)    Weight loss counseling, encounter for    Stomach ulcer    Gilbert's disease    Class 1 obesity due to excess calories with serious comorbidity and body mass index (BMI) of 30.0 to 30.9 in adult    H/O urinary retention    History of suprapubic catheter    Diastasis recti    DDD (degenerative disc disease), cervical    Neck pain    Cervical stenosis of spine    BPH (benign prostatic hyperplasia)    Chronic constipation    Spondylolisthesis of lumbar region    Microalbuminuria    Chronic suprapubic catheter (HCC)    Irregular heart beats    Spinal stenosis of lumbar region at multiple levels    Disorder of rotator cuff    Chest pain    Chronic dyspnea    Dyspnea    Multiple vessel coronary artery disease    Coronary artery disease         HOSPITAL COURSE :  The patient was admitted for: Progressive dyspnea on exertion.  Underwent left heart catheter which showed multivessel coronary disease  Cardiothoracic surgery was consulted.  Patient was evaluated in-house.  Plan for outpatient CABG on Friday.  Patient remains hemodynamically stable and without any chest pain.  He has no arrhythmias or acute ECG changes.  His

## 2025-02-18 NOTE — PROGRESS NOTES
CLINICAL PHARMACY NOTE: MEDS TO BEDS    Total # of Prescriptions Filled: 1   The following medications were delivered to the patient:  isosorbide    Additional Documentation: picked up at pharmacy

## 2025-02-18 NOTE — CARE COORDINATION
Case Management Assessment  Initial Evaluation    Date/Time of Evaluation: 2/18/2025 3:21 PM  Assessment Completed by: Holly Moncada RN    If patient is discharged prior to next notation, then this note serves as note for discharge by case management.    Patient Name: Earnest Avila                   YOB: 1955  Diagnosis: Angina pectoris (HCC) [I20.9]  Multiple vessel coronary artery disease [I25.10]                   Date / Time: 2/17/2025  7:47 AM    Patient Admission Status: Inpatient   Readmission Risk (Low < 19, Mod (19-27), High > 27): Readmission Risk Score: 7.4    Current PCP: No primary care provider on file.  PCP verified by CM? (P) Yes    Chart Reviewed: Yes      History Provided by: Patient  Patient Orientation: Alert and Oriented    Patient Cognition: Alert    Hospitalization in the last 30 days (Readmission):  Yes    If yes, Readmission Assessment in CM Navigator will be completed.    Advance Directives:      Code Status: Full Code   Patient's Primary Decision Maker is: (P) Legal Next of Kin    Primary Decision Maker: Alyssa Avila - Spouse - [phone]    Discharge Planning:    Patient lives with: (P) Spouse/Significant Other Type of Home: (P) House  Primary Care Giver: Self  Patient Support Systems include: Family Members, Friends/Neighbors   Current Financial resources: (P) Medicare  Current community resources:    Current services prior to admission: (P) C-pap, Durable Medical Equipment            Current DME: (P) Shower Chair, Walker            Type of Home Care services:  (P) None    ADLS  Prior functional level: (P) Independent in ADLs/IADLs  Current functional level: (P) Independent in ADLs/IADLs    PT AM-PAC:   /24  OT AM-PAC:   /24    Family can provide assistance at DC: (P) Yes  Would you like Case Management to discuss the discharge plan with any other family members/significant others, and if so, who? (P) No  Plans to Return to Present Housing: (P) Yes  Other

## 2025-02-18 NOTE — PLAN OF CARE
Problem: Chronic Conditions and Co-morbidities  Goal: Patient's chronic conditions and co-morbidity symptoms are monitored and maintained or improved  2/18/2025 0044 by Baltazar Valderrama RN  Outcome: Progressing  2/17/2025 1646 by Magdalena Oliva RN  Outcome: Progressing     Problem: Discharge Planning  Goal: Discharge to home or other facility with appropriate resources  2/18/2025 0044 by Baltazar Valderrama RN  Outcome: Progressing  2/17/2025 1646 by Magdalena Oliva RN  Outcome: Progressing  Flowsheets (Taken 2/17/2025 1411)  Discharge to home or other facility with appropriate resources:   Identify barriers to discharge with patient and caregiver   Arrange for needed discharge resources and transportation as appropriate   Identify discharge learning needs (meds, wound care, etc)   Refer to discharge planning if patient needs post-hospital services based on physician order or complex needs related to functional status, cognitive ability or social support system     Problem: Safety - Adult  Goal: Free from fall injury  2/18/2025 0044 by Baltazar Valderrama RN  Outcome: Progressing  2/17/2025 1646 by Magdalena Oliva RN  Outcome: Progressing     Problem: ABCDS Injury Assessment  Goal: Absence of physical injury  2/18/2025 0044 by Baltazar Valderrama RN  Outcome: Progressing  2/17/2025 1646 by Magdalena Oliva RN  Outcome: Progressing

## 2025-02-18 NOTE — PLAN OF CARE
Problem: Chronic Conditions and Co-morbidities  Goal: Patient's chronic conditions and co-morbidity symptoms are monitored and maintained or improved  2/18/2025 0930 by Isra Ridley RN  Outcome: Progressing  2/18/2025 0044 by Baltazar Valderrama RN  Outcome: Progressing     Problem: Discharge Planning  Goal: Discharge to home or other facility with appropriate resources  2/18/2025 0930 by Isra Ridley RN  Outcome: Progressing  2/18/2025 0044 by Baltazar Valderrama RN  Outcome: Progressing     Problem: Safety - Adult  Goal: Free from fall injury  2/18/2025 0930 by Isra Ridley RN  Outcome: Progressing  2/18/2025 0044 by Baltazar Valderrama RN  Outcome: Progressing     Problem: ABCDS Injury Assessment  Goal: Absence of physical injury  2/18/2025 0930 by Isra Ridley RN  Outcome: Progressing  2/18/2025 0044 by Baltazar Valderrama RN  Outcome: Progressing     Problem: Pain  Goal: Verbalizes/displays adequate comfort level or baseline comfort level  Outcome: Progressing

## 2025-02-18 NOTE — PROGRESS NOTES
Writer discussed discharge paperwork with patient and patient wife. Comments, questions, concerns, addressed. IV's removed. Patient with all belongings (cpap, cellphone,etc) Patient wheeled down by Marjorie milan

## 2025-02-18 NOTE — PROGRESS NOTES
[facility] [place] Cardiothoracic Surgery  Progress Note    2/18/2025 9:40 AM       Subjective:  Mr. Avila   Resting in bed with no chest pain or shortness of breath.  Alert and orient x 3.  Questions and concerns answered.  Objective:  /65   Pulse 75   Temp 97.5 °F (36.4 °C) (Oral)   Resp 18   Ht 1.905 m (6' 3\")   Wt 121.1 kg (266 lb 15.6 oz)   SpO2 93%   BMI 33.37 kg/m²   Chest: pacing wires: no, chest tubes:no, air leak no,  +  CV: no murmur noted, rate controlled A-fib, Atrial Fibrillation  Lungs: clear to auscultation, no wheezes, rales, or rhonchi  Abd: normal bowel sounds   Lower Extremities: Trace edema    Reviewed vein mapping, carotid ultrasound and upper extremity ultrasound    Labs: Labs reviewed today  CBC:   Recent Labs     02/16/25 0455 02/17/25  0616 02/18/25  0558   WBC 6.1 6.1 7.2   HGB 15.5 17.3 15.0   HCT 44.8 49.2 43.3   MCV 98.2 96.5 95.2    209 186     BMP:   Recent Labs     02/16/25  0455 02/17/25  0616 02/18/25  0558    139 136   K 4.2 4.2 4.4    101 105   CO2 24 27 19*   BUN 20 17 17   CREATININE 1.1 1.0 0.9       I/O: I/O last 3 completed shifts:  In: -   Out: 1580 [Urine:1575; Blood:5]  Scheduled Meds:   sodium chloride flush  5-40 mL IntraVENous 2 times per day    enoxaparin  30 mg SubCUTAneous BID    aspirin  81 mg Oral Daily    pregabalin  200 mg Oral BID    lisinopril  10 mg Oral Daily    FLUoxetine  20 mg Oral QAM    insulin lispro  0-8 Units SubCUTAneous 4x Daily AC & HS    oxymetazoline  2 spray Each Nostril BID     Continuous Infusions:   sodium chloride      dextrose       PRN Meds:acetaminophen, sodium chloride flush, sodium chloride, potassium chloride **OR** potassium alternative oral replacement **OR** potassium chloride, magnesium sulfate, ondansetron **OR** ondansetron, melatonin, bisacodyl, famotidine, acetaminophen **OR** acetaminophen, HYDROcodone 5 mg - acetaminophen, glucose, dextrose bolus **OR** dextrose bolus, glucagon (rDNA), dextrose,

## 2025-02-19 ENCOUNTER — CARE COORDINATION (OUTPATIENT)
Dept: CARE COORDINATION | Age: 70
End: 2025-02-19

## 2025-02-19 DIAGNOSIS — I25.85 CHRONIC CORONARY MICROVASCULAR DYSFUNCTION: Primary | ICD-10-CM

## 2025-02-19 PROBLEM — I20.9 ANGINA PECTORIS: Status: ACTIVE | Noted: 2025-02-14

## 2025-02-19 PROCEDURE — 1111F DSCHRG MED/CURRENT MED MERGE: CPT

## 2025-02-19 NOTE — CARE COORDINATION
Care Transitions Note    Initial Call - Call within 2 business days of discharge: Yes    Patient Current Location:  Home: [address] [place] [address] [place] [address]    Care Transition Nurse contacted the patient by telephone to perform post hospital discharge assessment, verified name and  as identifiers. Provided introduction to self, and explanation of the Care Transition Nurse role.     Patient: Earnest Avila    Patient : 1955   MRN: [id number]    Reason for Admission: Chest pain on breathing  Discharge Date: 25  RURS: Readmission Risk Score: 7.4      Last Discharge Facility       Date Complaint Diagnosis Description Type Department Provider    25  Chest pain on breathing ... Admission (Discharged) [facility] Lemuel Spaulding MD            Was this an external facility discharge? No    Additional needs identified to be addressed with provider                Method of communication with provider: chart routing.    Patients top risk factors for readmission: functional physical ability and medication management    Interventions to address risk factors:   Education:    Review of patient management of conditions/medications:      Care Summary Note: Writer spoke to patient, reviewed discharge instructions and medications, [id number] order completed, patient informed writer that hs is having surgery on 25  CORONARY ARTERY BYPASS GRAFT X3, RADIAL HARVEST, MANDO CLIP ON PUMP, SWAN, FRAN , has all his medications, would like to participate in RPM after surgery, explained role of CTN provided cotnact information, will continue to follow patient after his planned surgery on     Care Transition Nurse reviewed discharge instructions, medical action plan, and red flags with patient. The patient was given an opportunity to ask questions; all questions answered at this time.. The patient verbalized understanding.   Were discharge instructions available to patient? Yes.   Reviewed appropriate site of care

## 2025-02-20 RX ORDER — CHLORHEXIDINE GLUCONATE ORAL RINSE 1.2 MG/ML
15 SOLUTION DENTAL ONCE
Status: CANCELLED | OUTPATIENT
Start: 2025-02-20 | End: 2025-02-20

## 2025-02-20 RX ORDER — ASPIRIN 81 MG/1
81 TABLET ORAL ONCE
Status: CANCELLED | OUTPATIENT
Start: 2025-02-20 | End: 2025-02-20

## 2025-02-20 RX ORDER — METOPROLOL TARTRATE 25 MG/1
12.5 TABLET, FILM COATED ORAL ONCE
Status: CANCELLED | OUTPATIENT
Start: 2025-02-20 | End: 2025-02-20

## 2025-02-20 RX ORDER — CHLORHEXIDINE GLUCONATE 40 MG/ML
SOLUTION TOPICAL SEE ADMIN INSTRUCTIONS
Status: CANCELLED | OUTPATIENT
Start: 2025-02-20

## 2025-02-20 RX ORDER — AMIODARONE HYDROCHLORIDE 200 MG/1
200 TABLET ORAL ONCE
Status: CANCELLED | OUTPATIENT
Start: 2025-02-20 | End: 2025-02-20

## 2025-02-20 RX ORDER — SODIUM CHLORIDE 9 MG/ML
INJECTION, SOLUTION INTRAVENOUS CONTINUOUS
Status: CANCELLED | OUTPATIENT
Start: 2025-02-20

## 2025-02-20 RX ORDER — MUPIROCIN 20 MG/G
OINTMENT TOPICAL 2 TIMES DAILY
Status: CANCELLED | OUTPATIENT
Start: 2025-02-20

## 2025-02-20 NOTE — DISCHARGE SUMMARY
[facility]  Family Medicine      Discharge Summary      NAME:  Earnest Avila  :  1955  MRN:  [id number]    Admit date:  2025  Discharge date:  2025    Admitting Physician:  Vandana Barraza MD    Primary Diagnosis on Admission:   Present on Admission:   Angina pectoris   Chronic suprapubic catheter (HCC)   Essential hypertension   Type 2 diabetes, controlled, with neuropathy (HCC)   Chronic dyspnea   Dyspnea      Secondary Diagnoses:  does not have any pertinent problems on file.      Admission Condition:  poor     Discharged Condition: fair      Hospital Course:   The patient was admitted for the management of left-sided chest and back pain, echocardiogram shows a reduced ejection fraction and abnormal diastolic function.  Patient declines a stress test.  Patient will be discharged to [place] for cardiac cath    The patient was seen and examined at bedside today. Pt feels better but he still has chest pain with deep breaths.  There were no acute events overnight. All relevant notes, labs & imaging were reviewed.  The case was discussed with nursing staff. Vitals and Labs are at pts baseline. All consultants involved during this admission are agreeable to discharge.    Consults:  cardiology      Disposition:   [place] [facility]    Instructions to Patient:      Follow up with No primary care provider on file. in  1-2 weeks    Discharge Medications:       Medication List        CONTINUE taking these medications      * Misc. Devices Misc  Apply 1 sterile drape (18in x 26in) for suprapubic cath change.     * Misc. Devices Misc  Apply 1 pair of large sterile gloves for suprapubic cath changes every 2 weeks.     * Misc. Devices Misc  [facility] (or equivalent) wolf insertion tray with pre-filled 30cc syringe, use every 2 weeks for SP tube changes.     * Misc. Devices Misc  Apply large drainage bag (2000cc) every 2 weeks with SP tube changes.     * Misc. Devices Misc  22Fr wolf

## 2025-02-20 NOTE — H&P
[facility] Cardiothoracic Surgery  Consult    Patient's Name/Date of Birth: Earnest Avila / 1955 (69 y.o.)    Date: February 20, 2025     Chief Complaint: Shortness of breath mild chest pressure    HPI: Earnest Avila is a 69 y.o.   male who has been following with Dr. Spaulding regarding his shortness of breath.  Negative stress test.  Patient came in over the weekend with what he describes as episodes of shortness of breath.    Currently patient does not have any chest pain.  Currently asymptomatic.  After cardiac catheterization patient noted to have multivessel CAD and cardiothoracic surgery was consulted for bypass surgery    Overall patient will consider himself healthy.  He does have a suprapubic catheter due to hyperplasia of his prostate and had some scarring in his bladder outlet making it difficult to urinate.    Patient has a history of Gilbert's where his bilirubin is elevated.  He has had an episode x 1 of elevated LFTs that was unexplained.  He does not drink alcohol for the past month.  When he does drink it is usually a beer a day.   Patient states he has never had MOORE.  Patient states his mother had MOORE.  Patient follows with Dr. Bee of GI.    He has recently been told that he has some episodes of A-fib and PVCs.  Does not take any AC or AP medication that would be contraindicated for bypass surgery.        ROS:   CONSTITUTIONAL: Alert and oriented x 4  Respiratory: Sporadic shortness of breath  Cardiovascular: negative  Gastrointestinal: negative  Genitourinary: Patient has a Leyva that is suprapubic catheter.  Urine looks of normal color  Hematologic/lymphatic: negative  Musculoskeletal:negative  Neurological: negative  Endocrine: negative  Psychiatric: negative  Past Medical History:   Diagnosis Date    Allergic rhinitis     Anxiety     Borderline hypertension     CAD (coronary artery disease)     Controlled diabetes mellitus type II without complication (HCC)     Dental abscess 
scheduled left heart cath and PCI if indicated.   Procedure discussed with patient,all questions answered, informed consent in the chart   Further orders post procedure     [X] I personally reviewed and examined the patient to confirm the above information    Electronically signed by Lemuel Spaulding MD on 2/17/2025 at 10:12 AM

## 2025-02-21 ENCOUNTER — HOSPITAL ENCOUNTER (INPATIENT)
Age: 70
LOS: 8 days | Discharge: HOME HEALTH | End: 2025-03-01
Attending: THORACIC SURGERY (CARDIOTHORACIC VASCULAR SURGERY) | Admitting: THORACIC SURGERY (CARDIOTHORACIC VASCULAR SURGERY)
Payer: COMMERCIAL

## 2025-02-21 ENCOUNTER — ANESTHESIA (OUTPATIENT)
Dept: OPERATING ROOM | Age: 70
End: 2025-02-21
Payer: COMMERCIAL

## 2025-02-21 ENCOUNTER — ANESTHESIA EVENT (OUTPATIENT)
Dept: OPERATING ROOM | Age: 70
End: 2025-02-21
Payer: COMMERCIAL

## 2025-02-21 ENCOUNTER — APPOINTMENT (OUTPATIENT)
Dept: GENERAL RADIOLOGY | Age: 70
End: 2025-02-21
Attending: THORACIC SURGERY (CARDIOTHORACIC VASCULAR SURGERY)
Payer: COMMERCIAL

## 2025-02-21 DIAGNOSIS — Z95.1 S/P CABG (CORONARY ARTERY BYPASS GRAFT): Primary | ICD-10-CM

## 2025-02-21 LAB
ANION GAP SERPL CALCULATED.3IONS-SCNC: 10 MMOL/L (ref 9–16)
ANION GAP SERPL CALCULATED.3IONS-SCNC: 10 MMOL/L (ref 9–16)
BUN BLD-MCNC: 19 MG/DL (ref 8–26)
BUN SERPL-MCNC: 18 MG/DL (ref 8–23)
BUN SERPL-MCNC: 18 MG/DL (ref 8–23)
CA-I BLD-SCNC: 0.68 MMOL/L (ref 1.15–1.33)
CA-I BLD-SCNC: 1.13 MMOL/L (ref 1.15–1.33)
CA-I BLD-SCNC: 1.14 MMOL/L (ref 1.13–1.33)
CA-I BLD-SCNC: 1.14 MMOL/L (ref 1.15–1.33)
CA-I BLD-SCNC: 1.15 MMOL/L (ref 1.15–1.33)
CA-I BLD-SCNC: 1.17 MMOL/L (ref 1.13–1.33)
CA-I BLD-SCNC: 1.17 MMOL/L (ref 1.15–1.33)
CA-I BLD-SCNC: 1.21 MMOL/L (ref 1.15–1.33)
CA-I BLD-SCNC: 1.33 MMOL/L (ref 1.15–1.33)
CALCIUM SERPL-MCNC: 8.1 MG/DL (ref 8.6–10.4)
CALCIUM SERPL-MCNC: 8.4 MG/DL (ref 8.6–10.4)
CHLORIDE BLD-SCNC: 107 MMOL/L (ref 98–107)
CHLORIDE BLD-SCNC: 110 MMOL/L (ref 98–107)
CHLORIDE SERPL-SCNC: 110 MMOL/L (ref 98–107)
CHLORIDE SERPL-SCNC: 111 MMOL/L (ref 98–107)
CLOT ANGLE.KAOLIN INDUCED BLD RES TEG: 70.9 DEG (ref 63–78)
CLOT ANGLE.KAOLIN INDUCED BLD RES TEG: 74.7 DEG (ref 63–78)
CO2 BLD CALC-SCNC: 20 MMOL/L (ref 22–30)
CO2 BLD CALC-SCNC: 23 MMOL/L (ref 22–30)
CO2 SERPL-SCNC: 19 MMOL/L (ref 20–31)
CO2 SERPL-SCNC: 20 MMOL/L (ref 20–31)
CREAT SERPL-MCNC: 1 MG/DL (ref 0.7–1.2)
CREAT SERPL-MCNC: 1 MG/DL (ref 0.7–1.2)
EGFR, POC: 81 ML/MIN/1.73M2
ERYTHROCYTE [DISTWIDTH] IN BLOOD BY AUTOMATED COUNT: 11.9 % (ref 11.8–14.4)
FIBRINOGEN, FUNCTIONAL TEG: 19.2 MM (ref 15–32)
FIBRINOGEN, FUNCTIONAL TEG: 21.6 MM (ref 15–32)
FIO2: 100
FIO2: 40
GFR, ESTIMATED: 81 ML/MIN/1.73M2
GFR, ESTIMATED: 81 ML/MIN/1.73M2
GLUCOSE BLD-MCNC: 138 MG/DL (ref 75–110)
GLUCOSE BLD-MCNC: 140 MG/DL (ref 75–110)
GLUCOSE BLD-MCNC: 143 MG/DL (ref 75–110)
GLUCOSE BLD-MCNC: 153 MG/DL (ref 75–110)
GLUCOSE BLD-MCNC: 155 MG/DL (ref 75–110)
GLUCOSE BLD-MCNC: 158 MG/DL (ref 75–110)
GLUCOSE BLD-MCNC: 161 MG/DL (ref 75–110)
GLUCOSE BLD-MCNC: 163 MG/DL (ref 75–110)
GLUCOSE BLD-MCNC: 167 MG/DL (ref 74–100)
GLUCOSE BLD-MCNC: 170 MG/DL (ref 74–100)
GLUCOSE BLD-MCNC: 174 MG/DL (ref 74–100)
GLUCOSE BLD-MCNC: 183 MG/DL (ref 74–100)
GLUCOSE BLD-MCNC: 213 MG/DL (ref 74–100)
GLUCOSE BLD-MCNC: 220 MG/DL (ref 74–100)
GLUCOSE BLD-MCNC: 225 MG/DL (ref 74–100)
GLUCOSE BLD-MCNC: 243 MG/DL (ref 74–100)
GLUCOSE SERPL-MCNC: 169 MG/DL (ref 74–99)
GLUCOSE SERPL-MCNC: 178 MG/DL (ref 74–99)
HCT VFR BLD AUTO: 27 % (ref 41–53)
HCT VFR BLD AUTO: 28 % (ref 41–53)
HCT VFR BLD AUTO: 28 % (ref 41–53)
HCT VFR BLD AUTO: 30 % (ref 41–53)
HCT VFR BLD AUTO: 32 % (ref 41–53)
HCT VFR BLD AUTO: 36.5 % (ref 40.7–50.3)
HCT VFR BLD AUTO: 37 % (ref 41–53)
HCT VFR BLD AUTO: 39 % (ref 41–53)
HGB BLD-MCNC: 13 G/DL (ref 13–17)
INR PPP: 1.8
KINETICS TEG: 1.4 MIN (ref 0.8–2.1)
KINETICS TEG: 1.6 MIN (ref 0.8–2.1)
MA (MAX CLOT) TEG: 58.8 MM (ref 52–69)
MA (MAX CLOT) TEG: 59.2 MM (ref 52–69)
MA(MAX CLOT) RAPID TEG: 58.1 MM (ref 52–70)
MA(MAX CLOT) RAPID TEG: 58.7 MM (ref 52–70)
MAGNESIUM SERPL-MCNC: 2.7 MG/DL (ref 1.6–2.4)
MCH RBC QN AUTO: 33.7 PG (ref 25.2–33.5)
MCHC RBC AUTO-ENTMCNC: 35.6 G/DL (ref 28.4–34.8)
MCV RBC AUTO: 94.6 FL (ref 82.6–102.9)
MODE: ABNORMAL
MODE: NORMAL
NEGATIVE BASE EXCESS, ART: 1 MMOL/L (ref 0–2)
NEGATIVE BASE EXCESS, ART: 1.3 MMOL/L (ref 0–2)
NEGATIVE BASE EXCESS, ART: 1.6 MMOL/L (ref 0–2)
NEGATIVE BASE EXCESS, ART: 1.7 MMOL/L (ref 0–2)
NEGATIVE BASE EXCESS, ART: 2 MMOL/L (ref 0–2)
NEGATIVE BASE EXCESS, ART: 3 MMOL/L (ref 0–2)
NEGATIVE BASE EXCESS, ART: 3.3 MMOL/L (ref 0–2)
NEGATIVE BASE EXCESS, ART: 7.9 MMOL/L (ref 0–2)
NRBC BLD-RTO: 0 PER 100 WBC
O2 DELIVERY DEVICE: ABNORMAL
O2 DELIVERY DEVICE: NORMAL
PARTIAL THROMBOPLASTIN TIME: 28.7 SEC (ref 23–36.5)
PERFORMING LOCATION: ABNORMAL
PERFORMING LOCATION: NORMAL
PLATELET # BLD AUTO: 158 K/UL (ref 138–453)
PMV BLD AUTO: 10.6 FL (ref 8.1–13.5)
POC ANION GAP: 12 MMOL/L (ref 7–16)
POC ANION GAP: 18 MMOL/L (ref 7–16)
POC CREATININE: 1 MG/DL (ref 0.51–1.19)
POC HCO3: 19.8 MMOL/L (ref 21–28)
POC HCO3: 22.5 MMOL/L (ref 21–28)
POC HCO3: 22.7 MMOL/L (ref 21–28)
POC HCO3: 23.3 MMOL/L (ref 21–28)
POC HCO3: 23.3 MMOL/L (ref 21–28)
POC HCO3: 23.5 MMOL/L (ref 21–28)
POC HCO3: 23.6 MMOL/L (ref 21–28)
POC HCO3: 24.2 MMOL/L (ref 21–28)
POC HEMOGLOBIN (CALC): 10.2 G/DL (ref 13.5–17.5)
POC HEMOGLOBIN (CALC): 10.8 G/DL (ref 13.5–17.5)
POC HEMOGLOBIN (CALC): 12.5 G/DL (ref 13.5–17.5)
POC HEMOGLOBIN (CALC): 13.3 G/DL (ref 13.5–17.5)
POC HEMOGLOBIN (CALC): 9.3 G/DL (ref 13.5–17.5)
POC HEMOGLOBIN (CALC): 9.6 G/DL (ref 13.5–17.5)
POC HEMOGLOBIN (CALC): 9.7 G/DL (ref 13.5–17.5)
POC LACTIC ACID: 2.2 MMOL/L (ref 0.56–1.39)
POC LACTIC ACID: 2.8 MMOL/L (ref 0.56–1.39)
POC O2 SATURATION: 100 % (ref 94–98)
POC O2 SATURATION: 95.8 % (ref 94–98)
POC O2 SATURATION: 96.6 % (ref 94–98)
POC O2 SATURATION: 99.6 % (ref 94–98)
POC O2 SATURATION: 99.9 % (ref 94–98)
POC PCO2: 36.7 MM HG (ref 35–48)
POC PCO2: 38.7 MM HG (ref 35–48)
POC PCO2: 39.3 MM HG (ref 35–48)
POC PCO2: 41.3 MM HG (ref 35–48)
POC PCO2: 41.4 MM HG (ref 35–48)
POC PCO2: 42.3 MM HG (ref 35–48)
POC PCO2: 46.9 MM HG (ref 35–48)
POC PCO2: 49 MM HG (ref 35–48)
POC PH: 7.21 (ref 7.35–7.45)
POC PH: 7.31 (ref 7.35–7.45)
POC PH: 7.33 (ref 7.35–7.45)
POC PH: 7.36 (ref 7.35–7.45)
POC PH: 7.38 (ref 7.35–7.45)
POC PH: 7.38 (ref 7.35–7.45)
POC PH: 7.39 (ref 7.35–7.45)
POC PH: 7.4 (ref 7.35–7.45)
POC PO2: 222.6 MM HG (ref 83–108)
POC PO2: 307.7 MM HG (ref 83–108)
POC PO2: 389 MM HG (ref 83–108)
POC PO2: 396.4 MM HG (ref 83–108)
POC PO2: 396.8 MM HG (ref 83–108)
POC PO2: 399.4 MM HG (ref 83–108)
POC PO2: 79.8 MM HG (ref 83–108)
POC PO2: 89.8 MM HG (ref 83–108)
POTASSIUM BLD-SCNC: 3.9 MMOL/L (ref 3.5–4.5)
POTASSIUM BLD-SCNC: 4 MMOL/L (ref 3.5–4.5)
POTASSIUM BLD-SCNC: 4.1 MMOL/L (ref 3.5–4.5)
POTASSIUM BLD-SCNC: 4.8 MMOL/L (ref 3.5–4.5)
POTASSIUM BLD-SCNC: 5.6 MMOL/L (ref 3.5–4.5)
POTASSIUM BLD-SCNC: 5.9 MMOL/L (ref 3.5–4.5)
POTASSIUM BLD-SCNC: 6 MMOL/L (ref 3.5–4.5)
POTASSIUM BLD-SCNC: 6 MMOL/L (ref 3.5–4.5)
POTASSIUM SERPL-SCNC: 4.6 MMOL/L (ref 3.7–5.3)
POTASSIUM SERPL-SCNC: 5 MMOL/L (ref 3.7–5.3)
PROTHROMBIN TIME: 20.2 SEC (ref 11.7–14.9)
RBC # BLD AUTO: 3.86 M/UL (ref 4.21–5.77)
REACTION TIME TEG W HEPARIN: 4.1 MIN (ref 4.3–8.3)
REACTION TIME TEG W HEPARIN: 7.7 MIN (ref 4.3–8.3)
REACTION TIME TEG: 4.3 MIN (ref 4.6–9.1)
REACTION TIME TEG: 8.3 MIN (ref 4.6–9.1)
SAMPLE SITE: ABNORMAL
SAMPLE SITE: NORMAL
SODIUM BLD-SCNC: 143 MMOL/L (ref 138–146)
SODIUM BLD-SCNC: 144 MMOL/L (ref 138–146)
SODIUM SERPL-SCNC: 139 MMOL/L (ref 136–145)
SODIUM SERPL-SCNC: 141 MMOL/L (ref 136–145)
WBC OTHER # BLD: 23.9 K/UL (ref 3.5–11.3)

## 2025-02-21 PROCEDURE — 2100000001 HC CVICU R&B

## 2025-02-21 PROCEDURE — 94761 N-INVAS EAR/PLS OXIMETRY MLT: CPT

## 2025-02-21 PROCEDURE — 33508 ENDOSCOPIC VEIN HARVEST: CPT | Performed by: THORACIC SURGERY (CARDIOTHORACIC VASCULAR SURGERY)

## 2025-02-21 PROCEDURE — P9041 ALBUMIN (HUMAN),5%, 50ML: HCPCS

## 2025-02-21 PROCEDURE — 3700000000 HC ANESTHESIA ATTENDED CARE: Performed by: THORACIC SURGERY (CARDIOTHORACIC VASCULAR SURGERY)

## 2025-02-21 PROCEDURE — 80048 BASIC METABOLIC PNL TOTAL CA: CPT

## 2025-02-21 PROCEDURE — 85384 FIBRINOGEN ACTIVITY: CPT

## 2025-02-21 PROCEDURE — 02100Z9 BYPASS CORONARY ARTERY, ONE ARTERY FROM LEFT INTERNAL MAMMARY, OPEN APPROACH: ICD-10-PCS | Performed by: THORACIC SURGERY (CARDIOTHORACIC VASCULAR SURGERY)

## 2025-02-21 PROCEDURE — 86900 BLOOD TYPING SEROLOGIC ABO: CPT

## 2025-02-21 PROCEDURE — 2580000003 HC RX 258: Performed by: NURSE ANESTHETIST, CERTIFIED REGISTERED

## 2025-02-21 PROCEDURE — 6370000000 HC RX 637 (ALT 250 FOR IP): Performed by: NURSE ANESTHETIST, CERTIFIED REGISTERED

## 2025-02-21 PROCEDURE — 82803 BLOOD GASES ANY COMBINATION: CPT

## 2025-02-21 PROCEDURE — 86850 RBC ANTIBODY SCREEN: CPT

## 2025-02-21 PROCEDURE — 6360000002 HC RX W HCPCS: Performed by: NURSE ANESTHETIST, CERTIFIED REGISTERED

## 2025-02-21 PROCEDURE — 2700000000 HC OXYGEN THERAPY PER DAY

## 2025-02-21 PROCEDURE — 83605 ASSAY OF LACTIC ACID: CPT

## 2025-02-21 PROCEDURE — 2500000003 HC RX 250 WO HCPCS: Performed by: THORACIC SURGERY (CARDIOTHORACIC VASCULAR SURGERY)

## 2025-02-21 PROCEDURE — B24BZZ4 ULTRASONOGRAPHY OF HEART WITH AORTA, TRANSESOPHAGEAL: ICD-10-PCS | Performed by: THORACIC SURGERY (CARDIOTHORACIC VASCULAR SURGERY)

## 2025-02-21 PROCEDURE — 5A1221Z PERFORMANCE OF CARDIAC OUTPUT, CONTINUOUS: ICD-10-PCS | Performed by: THORACIC SURGERY (CARDIOTHORACIC VASCULAR SURGERY)

## 2025-02-21 PROCEDURE — 85027 COMPLETE CBC AUTOMATED: CPT

## 2025-02-21 PROCEDURE — 85610 PROTHROMBIN TIME: CPT

## 2025-02-21 PROCEDURE — 3700000001 HC ADD 15 MINUTES (ANESTHESIA): Performed by: THORACIC SURGERY (CARDIOTHORACIC VASCULAR SURGERY)

## 2025-02-21 PROCEDURE — 84132 ASSAY OF SERUM POTASSIUM: CPT

## 2025-02-21 PROCEDURE — 02L70CK OCCLUSION OF LEFT ATRIAL APPENDAGE WITH EXTRALUMINAL DEVICE, OPEN APPROACH: ICD-10-PCS | Performed by: THORACIC SURGERY (CARDIOTHORACIC VASCULAR SURGERY)

## 2025-02-21 PROCEDURE — 80051 ELECTROLYTE PANEL: CPT

## 2025-02-21 PROCEDURE — 85730 THROMBOPLASTIN TIME PARTIAL: CPT

## 2025-02-21 PROCEDURE — 2720000010 HC SURG SUPPLY STERILE: Performed by: THORACIC SURGERY (CARDIOTHORACIC VASCULAR SURGERY)

## 2025-02-21 PROCEDURE — 2500000003 HC RX 250 WO HCPCS: Performed by: NURSE ANESTHETIST, CERTIFIED REGISTERED

## 2025-02-21 PROCEDURE — 82330 ASSAY OF CALCIUM: CPT

## 2025-02-21 PROCEDURE — 2709999900 HC NON-CHARGEABLE SUPPLY: Performed by: THORACIC SURGERY (CARDIOTHORACIC VASCULAR SURGERY)

## 2025-02-21 PROCEDURE — C1729 CATH, DRAINAGE: HCPCS | Performed by: THORACIC SURGERY (CARDIOTHORACIC VASCULAR SURGERY)

## 2025-02-21 PROCEDURE — 84520 ASSAY OF UREA NITROGEN: CPT

## 2025-02-21 PROCEDURE — 33519 CABG ARTERY-VEIN THREE: CPT | Performed by: THORACIC SURGERY (CARDIOTHORACIC VASCULAR SURGERY)

## 2025-02-21 PROCEDURE — 021109W BYPASS CORONARY ARTERY, TWO ARTERIES FROM AORTA WITH AUTOLOGOUS VENOUS TISSUE, OPEN APPROACH: ICD-10-PCS | Performed by: THORACIC SURGERY (CARDIOTHORACIC VASCULAR SURGERY)

## 2025-02-21 PROCEDURE — 85576 BLOOD PLATELET AGGREGATION: CPT

## 2025-02-21 PROCEDURE — 86923 COMPATIBILITY TEST ELECTRIC: CPT

## 2025-02-21 PROCEDURE — 87641 MR-STAPH DNA AMP PROBE: CPT

## 2025-02-21 PROCEDURE — 3600000018 HC SURGERY OHS ADDTL 15MIN: Performed by: THORACIC SURGERY (CARDIOTHORACIC VASCULAR SURGERY)

## 2025-02-21 PROCEDURE — 85014 HEMATOCRIT: CPT

## 2025-02-21 PROCEDURE — 36415 COLL VENOUS BLD VENIPUNCTURE: CPT

## 2025-02-21 PROCEDURE — 33268 EXCL LAA OPN OTH PX ANY METH: CPT | Performed by: THORACIC SURGERY (CARDIOTHORACIC VASCULAR SURGERY)

## 2025-02-21 PROCEDURE — 6360000002 HC RX W HCPCS: Performed by: THORACIC SURGERY (CARDIOTHORACIC VASCULAR SURGERY)

## 2025-02-21 PROCEDURE — 93005 ELECTROCARDIOGRAM TRACING: CPT

## 2025-02-21 PROCEDURE — C1713 ANCHOR/SCREW BN/BN,TIS/BN: HCPCS | Performed by: THORACIC SURGERY (CARDIOTHORACIC VASCULAR SURGERY)

## 2025-02-21 PROCEDURE — 94002 VENT MGMT INPAT INIT DAY: CPT

## 2025-02-21 PROCEDURE — 71045 X-RAY EXAM CHEST 1 VIEW: CPT

## 2025-02-21 PROCEDURE — 82947 ASSAY GLUCOSE BLOOD QUANT: CPT

## 2025-02-21 PROCEDURE — C1889 IMPLANT/INSERT DEVICE, NOC: HCPCS | Performed by: THORACIC SURGERY (CARDIOTHORACIC VASCULAR SURGERY)

## 2025-02-21 PROCEDURE — 3600000008 HC SURGERY OHS BASE: Performed by: THORACIC SURGERY (CARDIOTHORACIC VASCULAR SURGERY)

## 2025-02-21 PROCEDURE — 85347 COAGULATION TIME ACTIVATED: CPT

## 2025-02-21 PROCEDURE — 33533 CABG ARTERIAL SINGLE: CPT | Performed by: THORACIC SURGERY (CARDIOTHORACIC VASCULAR SURGERY)

## 2025-02-21 PROCEDURE — 2580000003 HC RX 258

## 2025-02-21 PROCEDURE — 82565 ASSAY OF CREATININE: CPT

## 2025-02-21 PROCEDURE — 6370000000 HC RX 637 (ALT 250 FOR IP)

## 2025-02-21 PROCEDURE — 2500000003 HC RX 250 WO HCPCS

## 2025-02-21 PROCEDURE — 06BP4ZZ EXCISION OF RIGHT SAPHENOUS VEIN, PERCUTANEOUS ENDOSCOPIC APPROACH: ICD-10-PCS | Performed by: THORACIC SURGERY (CARDIOTHORACIC VASCULAR SURGERY)

## 2025-02-21 PROCEDURE — 6360000002 HC RX W HCPCS

## 2025-02-21 PROCEDURE — 37799 UNLISTED PX VASCULAR SURGERY: CPT

## 2025-02-21 PROCEDURE — 86901 BLOOD TYPING SEROLOGIC RH(D): CPT

## 2025-02-21 PROCEDURE — 83735 ASSAY OF MAGNESIUM: CPT

## 2025-02-21 DEVICE — WIRE TMPRRY PCNG ST DBLE ARMD SH K STNLSSSTL BLUE: Type: IMPLANTABLE DEVICE | Status: FUNCTIONAL

## 2025-02-21 DEVICE — CLIP MED SUTURE LESS 40 MM SYS 1 HND STRL ATRICLIP FLX V: Type: IMPLANTABLE DEVICE | Site: HEART | Status: FUNCTIONAL

## 2025-02-21 DEVICE — CLIP INT SM WIDE WECK RED TI TRNSVRS GRV CHEVRON SHP W/ PERCIS TIP 24 PER PK: Type: IMPLANTABLE DEVICE | Status: FUNCTIONAL

## 2025-02-21 DEVICE — ANGIOCCLUDE,SMALL,WIDE,TIT,6/CT,30CT/BX
Type: IMPLANTABLE DEVICE | Status: FUNCTIONAL
Brand: MEDLINE

## 2025-02-21 RX ORDER — DEXMEDETOMIDINE HYDROCHLORIDE 4 UG/ML
INJECTION, SOLUTION INTRAVENOUS
Status: DISCONTINUED
Start: 2025-02-21 | End: 2025-02-21

## 2025-02-21 RX ORDER — ALBUMIN HUMAN 50 G/1000ML
SOLUTION INTRAVENOUS
Status: DISCONTINUED
Start: 2025-02-21 | End: 2025-02-21

## 2025-02-21 RX ORDER — PROPOFOL 10 MG/ML
INJECTION, EMULSION INTRAVENOUS
Status: DISCONTINUED | OUTPATIENT
Start: 2025-02-21 | End: 2025-02-21 | Stop reason: SDUPTHER

## 2025-02-21 RX ORDER — NOREPINEPHRINE BITARTRATE 0.06 MG/ML
.01-.08 INJECTION, SOLUTION INTRAVENOUS CONTINUOUS PRN
Status: DISCONTINUED | OUTPATIENT
Start: 2025-02-21 | End: 2025-03-01 | Stop reason: HOSPADM

## 2025-02-21 RX ORDER — SODIUM CHLORIDE 9 MG/ML
INJECTION, SOLUTION INTRAVENOUS PRN
Status: DISCONTINUED | OUTPATIENT
Start: 2025-02-21 | End: 2025-02-21

## 2025-02-21 RX ORDER — INSULIN LISPRO 100 [IU]/ML
0-12 INJECTION, SOLUTION INTRAVENOUS; SUBCUTANEOUS
Status: DISCONTINUED | OUTPATIENT
Start: 2025-02-22 | End: 2025-03-01 | Stop reason: HOSPADM

## 2025-02-21 RX ORDER — ISOFLURANE 1 ML/ML
LIQUID RESPIRATORY (INHALATION)
Status: DISCONTINUED
Start: 2025-02-21 | End: 2025-02-21

## 2025-02-21 RX ORDER — LIDOCAINE HYDROCHLORIDE 10 MG/ML
INJECTION, SOLUTION EPIDURAL; INFILTRATION; INTRACAUDAL; PERINEURAL
Status: DISCONTINUED | OUTPATIENT
Start: 2025-02-21 | End: 2025-02-21 | Stop reason: SDUPTHER

## 2025-02-21 RX ORDER — PANTOPRAZOLE SODIUM 40 MG/1
40 TABLET, DELAYED RELEASE ORAL DAILY
Status: DISCONTINUED | OUTPATIENT
Start: 2025-02-21 | End: 2025-03-01 | Stop reason: HOSPADM

## 2025-02-21 RX ORDER — VASOPRESSIN IN DEXTROSE 5 % 20/100 ML
.01-.04 PLASTIC BAG, INJECTION (ML) INTRAVENOUS CONTINUOUS
Status: DISCONTINUED | OUTPATIENT
Start: 2025-02-21 | End: 2025-03-01 | Stop reason: HOSPADM

## 2025-02-21 RX ORDER — AMIODARONE HYDROCHLORIDE 200 MG/1
200 TABLET ORAL ONCE
Status: COMPLETED | OUTPATIENT
Start: 2025-02-21 | End: 2025-02-21

## 2025-02-21 RX ORDER — METOPROLOL TARTRATE 25 MG/1
12.5 TABLET, FILM COATED ORAL 2 TIMES DAILY
Status: DISCONTINUED | OUTPATIENT
Start: 2025-02-21 | End: 2025-02-27

## 2025-02-21 RX ORDER — MUPIROCIN 20 MG/G
OINTMENT TOPICAL 2 TIMES DAILY
Status: DISCONTINUED | OUTPATIENT
Start: 2025-02-21 | End: 2025-02-21

## 2025-02-21 RX ORDER — MAGNESIUM SULFATE IN WATER 40 MG/ML
2000 INJECTION, SOLUTION INTRAVENOUS PRN
Status: DISCONTINUED | OUTPATIENT
Start: 2025-02-21 | End: 2025-03-01 | Stop reason: HOSPADM

## 2025-02-21 RX ORDER — SODIUM BICARBONATE 42 MG/ML
INJECTION, SOLUTION INTRAVENOUS
Status: DISCONTINUED | OUTPATIENT
Start: 2025-02-21 | End: 2025-02-21 | Stop reason: SDUPTHER

## 2025-02-21 RX ORDER — POLYETHYLENE GLYCOL 3350 17 G/17G
17 POWDER, FOR SOLUTION ORAL DAILY
Status: DISCONTINUED | OUTPATIENT
Start: 2025-02-21 | End: 2025-03-01 | Stop reason: HOSPADM

## 2025-02-21 RX ORDER — FENTANYL CITRATE 50 UG/ML
25 INJECTION, SOLUTION INTRAMUSCULAR; INTRAVENOUS
Status: DISCONTINUED | OUTPATIENT
Start: 2025-02-21 | End: 2025-03-01 | Stop reason: HOSPADM

## 2025-02-21 RX ORDER — POTASSIUM CHLORIDE 29.8 MG/ML
20 INJECTION INTRAVENOUS PRN
Status: DISCONTINUED | OUTPATIENT
Start: 2025-02-21 | End: 2025-03-01 | Stop reason: HOSPADM

## 2025-02-21 RX ORDER — DIPHENHYDRAMINE HCL 25 MG
25 TABLET ORAL NIGHTLY PRN
Status: DISCONTINUED | OUTPATIENT
Start: 2025-02-22 | End: 2025-03-01 | Stop reason: HOSPADM

## 2025-02-21 RX ORDER — FENTANYL CITRATE 50 UG/ML
50 INJECTION, SOLUTION INTRAMUSCULAR; INTRAVENOUS
Status: DISCONTINUED | OUTPATIENT
Start: 2025-02-21 | End: 2025-03-01 | Stop reason: HOSPADM

## 2025-02-21 RX ORDER — PROTAMINE SULFATE 10 MG/ML
INJECTION, SOLUTION INTRAVENOUS
Status: DISCONTINUED | OUTPATIENT
Start: 2025-02-21 | End: 2025-02-21 | Stop reason: SDUPTHER

## 2025-02-21 RX ORDER — CEFAZOLIN SODIUM 1 G/3ML
INJECTION, POWDER, FOR SOLUTION INTRAMUSCULAR; INTRAVENOUS
Status: DISCONTINUED | OUTPATIENT
Start: 2025-02-21 | End: 2025-02-21 | Stop reason: SDUPTHER

## 2025-02-21 RX ORDER — ASPIRIN 81 MG/1
81 TABLET ORAL DAILY
Status: DISCONTINUED | OUTPATIENT
Start: 2025-02-21 | End: 2025-03-01 | Stop reason: HOSPADM

## 2025-02-21 RX ORDER — FENTANYL CITRATE 0.05 MG/ML
INJECTION, SOLUTION INTRAMUSCULAR; INTRAVENOUS
Status: DISCONTINUED | OUTPATIENT
Start: 2025-02-21 | End: 2025-02-21 | Stop reason: SDUPTHER

## 2025-02-21 RX ORDER — HEPARIN SODIUM 1000 [USP'U]/ML
INJECTION, SOLUTION INTRAVENOUS; SUBCUTANEOUS
Status: DISCONTINUED
Start: 2025-02-21 | End: 2025-02-21

## 2025-02-21 RX ORDER — OXYCODONE HYDROCHLORIDE 5 MG/1
10 TABLET ORAL EVERY 4 HOURS PRN
Status: DISCONTINUED | OUTPATIENT
Start: 2025-02-21 | End: 2025-03-01 | Stop reason: HOSPADM

## 2025-02-21 RX ORDER — ONDANSETRON 2 MG/ML
4 INJECTION INTRAMUSCULAR; INTRAVENOUS EVERY 6 HOURS PRN
Status: DISCONTINUED | OUTPATIENT
Start: 2025-02-21 | End: 2025-03-01 | Stop reason: HOSPADM

## 2025-02-21 RX ORDER — ROCURONIUM BROMIDE 10 MG/ML
INJECTION, SOLUTION INTRAVENOUS
Status: DISCONTINUED | OUTPATIENT
Start: 2025-02-21 | End: 2025-02-21 | Stop reason: SDUPTHER

## 2025-02-21 RX ORDER — OXYCODONE HYDROCHLORIDE 5 MG/1
5 TABLET ORAL EVERY 4 HOURS PRN
Status: DISCONTINUED | OUTPATIENT
Start: 2025-02-21 | End: 2025-03-01 | Stop reason: HOSPADM

## 2025-02-21 RX ORDER — SODIUM CHLORIDE, SODIUM LACTATE, POTASSIUM CHLORIDE, CALCIUM CHLORIDE 600; 310; 30; 20 MG/100ML; MG/100ML; MG/100ML; MG/100ML
INJECTION, SOLUTION INTRAVENOUS
Status: DISCONTINUED | OUTPATIENT
Start: 2025-02-21 | End: 2025-02-21 | Stop reason: SDUPTHER

## 2025-02-21 RX ORDER — ALBUMIN (HUMAN) 12.5 G/50ML
25 SOLUTION INTRAVENOUS PRN
Status: DISCONTINUED | OUTPATIENT
Start: 2025-02-21 | End: 2025-03-01 | Stop reason: HOSPADM

## 2025-02-21 RX ORDER — PHENYLEPHRINE HCL IN 0.9% NACL 1 MG/10 ML
SYRINGE (ML) INTRAVENOUS
Status: DISCONTINUED | OUTPATIENT
Start: 2025-02-21 | End: 2025-02-21 | Stop reason: SDUPTHER

## 2025-02-21 RX ORDER — DEXMEDETOMIDINE HYDROCHLORIDE 4 UG/ML
.1-1.5 INJECTION, SOLUTION INTRAVENOUS CONTINUOUS
Status: DISCONTINUED | OUTPATIENT
Start: 2025-02-21 | End: 2025-03-01 | Stop reason: HOSPADM

## 2025-02-21 RX ORDER — HEPARIN SODIUM 1000 [USP'U]/ML
INJECTION, SOLUTION INTRAVENOUS; SUBCUTANEOUS
Status: DISCONTINUED | OUTPATIENT
Start: 2025-02-21 | End: 2025-02-21 | Stop reason: SDUPTHER

## 2025-02-21 RX ORDER — POTASSIUM CHLORIDE 29.8 MG/ML
INJECTION INTRAVENOUS
Status: DISCONTINUED | OUTPATIENT
Start: 2025-02-21 | End: 2025-02-21 | Stop reason: SDUPTHER

## 2025-02-21 RX ORDER — MIDAZOLAM HYDROCHLORIDE 1 MG/ML
INJECTION, SOLUTION INTRAMUSCULAR; INTRAVENOUS
Status: DISCONTINUED | OUTPATIENT
Start: 2025-02-21 | End: 2025-02-21 | Stop reason: SDUPTHER

## 2025-02-21 RX ORDER — ASPIRIN 81 MG/1
81 TABLET ORAL ONCE
Status: COMPLETED | OUTPATIENT
Start: 2025-02-21 | End: 2025-02-21

## 2025-02-21 RX ORDER — HYDRALAZINE HYDROCHLORIDE 20 MG/ML
5 INJECTION INTRAMUSCULAR; INTRAVENOUS EVERY 5 MIN PRN
Status: DISCONTINUED | OUTPATIENT
Start: 2025-02-21 | End: 2025-03-01 | Stop reason: HOSPADM

## 2025-02-21 RX ORDER — CLOPIDOGREL BISULFATE 75 MG/1
75 TABLET ORAL DAILY
Status: DISCONTINUED | OUTPATIENT
Start: 2025-02-22 | End: 2025-03-01

## 2025-02-21 RX ORDER — GLYCOPYRROLATE 0.2 MG/ML
INJECTION INTRAMUSCULAR; INTRAVENOUS
Status: DISCONTINUED | OUTPATIENT
Start: 2025-02-21 | End: 2025-02-21 | Stop reason: SDUPTHER

## 2025-02-21 RX ORDER — MEPERIDINE HYDROCHLORIDE 50 MG/ML
25 INJECTION INTRAMUSCULAR; INTRAVENOUS; SUBCUTANEOUS
Status: ACTIVE | OUTPATIENT
Start: 2025-02-21 | End: 2025-02-22

## 2025-02-21 RX ORDER — PROPOFOL 10 MG/ML
5-50 INJECTION, EMULSION INTRAVENOUS CONTINUOUS
Status: DISCONTINUED | OUTPATIENT
Start: 2025-02-21 | End: 2025-03-01 | Stop reason: HOSPADM

## 2025-02-21 RX ORDER — VANCOMYCIN HYDROCHLORIDE 1 G/20ML
INJECTION, POWDER, LYOPHILIZED, FOR SOLUTION INTRAVENOUS
Status: DISCONTINUED | OUTPATIENT
Start: 2025-02-21 | End: 2025-02-21 | Stop reason: SDUPTHER

## 2025-02-21 RX ORDER — MAGNESIUM HYDROXIDE 1200 MG/15ML
LIQUID ORAL CONTINUOUS PRN
Status: COMPLETED | OUTPATIENT
Start: 2025-02-21 | End: 2025-02-21

## 2025-02-21 RX ORDER — PROTAMINE SULFATE 10 MG/ML
INJECTION, SOLUTION INTRAVENOUS
Status: COMPLETED
Start: 2025-02-21 | End: 2025-02-21

## 2025-02-21 RX ORDER — SODIUM CHLORIDE 0.9 % (FLUSH) 0.9 %
5-40 SYRINGE (ML) INJECTION EVERY 12 HOURS SCHEDULED
Status: DISCONTINUED | OUTPATIENT
Start: 2025-02-21 | End: 2025-03-01 | Stop reason: HOSPADM

## 2025-02-21 RX ORDER — MUPIROCIN 20 MG/G
OINTMENT TOPICAL 2 TIMES DAILY
Status: COMPLETED | OUTPATIENT
Start: 2025-02-21 | End: 2025-02-25

## 2025-02-21 RX ORDER — SODIUM CHLORIDE 0.9 % (FLUSH) 0.9 %
5-40 SYRINGE (ML) INJECTION PRN
Status: DISCONTINUED | OUTPATIENT
Start: 2025-02-21 | End: 2025-03-01 | Stop reason: HOSPADM

## 2025-02-21 RX ORDER — ALBUMIN HUMAN 50 G/1000ML
25 SOLUTION INTRAVENOUS PRN
Status: DISCONTINUED | OUTPATIENT
Start: 2025-02-21 | End: 2025-03-01 | Stop reason: HOSPADM

## 2025-02-21 RX ORDER — VANCOMYCIN HYDROCHLORIDE 1 G/20ML
INJECTION, POWDER, LYOPHILIZED, FOR SOLUTION INTRAVENOUS
Status: COMPLETED
Start: 2025-02-21 | End: 2025-02-21

## 2025-02-21 RX ORDER — PROPOFOL 10 MG/ML
INJECTION, EMULSION INTRAVENOUS
Status: DISCONTINUED
Start: 2025-02-21 | End: 2025-02-21

## 2025-02-21 RX ORDER — BISACODYL 10 MG
10 SUPPOSITORY, RECTAL RECTAL DAILY PRN
Status: DISCONTINUED | OUTPATIENT
Start: 2025-02-21 | End: 2025-03-01 | Stop reason: HOSPADM

## 2025-02-21 RX ORDER — ONDANSETRON 4 MG/1
4 TABLET, ORALLY DISINTEGRATING ORAL EVERY 8 HOURS PRN
Status: DISCONTINUED | OUTPATIENT
Start: 2025-02-21 | End: 2025-03-01 | Stop reason: HOSPADM

## 2025-02-21 RX ORDER — METOPROLOL TARTRATE 25 MG/1
12.5 TABLET, FILM COATED ORAL ONCE
Status: COMPLETED | OUTPATIENT
Start: 2025-02-21 | End: 2025-02-21

## 2025-02-21 RX ORDER — CHLORHEXIDINE GLUCONATE 40 MG/ML
SOLUTION TOPICAL SEE ADMIN INSTRUCTIONS
Status: DISCONTINUED | OUTPATIENT
Start: 2025-02-21 | End: 2025-02-21

## 2025-02-21 RX ORDER — CHLORHEXIDINE GLUCONATE ORAL RINSE 1.2 MG/ML
15 SOLUTION DENTAL ONCE
Status: COMPLETED | OUTPATIENT
Start: 2025-02-21 | End: 2025-02-21

## 2025-02-21 RX ORDER — METOPROLOL TARTRATE 1 MG/ML
2.5 INJECTION, SOLUTION INTRAVENOUS EVERY 10 MIN PRN
Status: DISCONTINUED | OUTPATIENT
Start: 2025-02-21 | End: 2025-03-01 | Stop reason: HOSPADM

## 2025-02-21 RX ORDER — DEXTROSE MONOHYDRATE 100 MG/ML
INJECTION, SOLUTION INTRAVENOUS CONTINUOUS PRN
Status: DISCONTINUED | OUTPATIENT
Start: 2025-02-21 | End: 2025-03-01 | Stop reason: HOSPADM

## 2025-02-21 RX ORDER — SODIUM CHLORIDE 9 MG/ML
INJECTION, SOLUTION INTRAVENOUS PRN
Status: DISCONTINUED | OUTPATIENT
Start: 2025-02-21 | End: 2025-03-01 | Stop reason: HOSPADM

## 2025-02-21 RX ORDER — DEXTROSE MONOHYDRATE 25 G/50ML
INJECTION, SOLUTION INTRAVENOUS
Status: DISCONTINUED | OUTPATIENT
Start: 2025-02-21 | End: 2025-02-21 | Stop reason: SDUPTHER

## 2025-02-21 RX ORDER — ATORVASTATIN CALCIUM 20 MG/1
20 TABLET, FILM COATED ORAL NIGHTLY
Status: DISCONTINUED | OUTPATIENT
Start: 2025-02-22 | End: 2025-03-01 | Stop reason: HOSPADM

## 2025-02-21 RX ORDER — FUROSEMIDE 10 MG/ML
20 INJECTION INTRAMUSCULAR; INTRAVENOUS 2 TIMES DAILY
Status: DISCONTINUED | OUTPATIENT
Start: 2025-02-22 | End: 2025-02-23

## 2025-02-21 RX ORDER — CALCIUM CHLORIDE 100 MG/ML
INJECTION INTRAVENOUS; INTRAVENTRICULAR
Status: DISCONTINUED | OUTPATIENT
Start: 2025-02-21 | End: 2025-02-21 | Stop reason: SDUPTHER

## 2025-02-21 RX ORDER — SODIUM CHLORIDE 9 MG/ML
INJECTION, SOLUTION INTRAVENOUS CONTINUOUS
Status: DISCONTINUED | OUTPATIENT
Start: 2025-02-21 | End: 2025-02-21

## 2025-02-21 RX ORDER — AMIODARONE HYDROCHLORIDE 200 MG/1
200 TABLET ORAL 3 TIMES DAILY
Status: DISCONTINUED | OUTPATIENT
Start: 2025-02-21 | End: 2025-02-27

## 2025-02-21 RX ORDER — GLUCAGON 1 MG/ML
1 KIT INJECTION PRN
Status: DISCONTINUED | OUTPATIENT
Start: 2025-02-21 | End: 2025-03-01 | Stop reason: HOSPADM

## 2025-02-21 RX ORDER — PAPAVERINE HYDROCHLORIDE 30 MG/ML
INJECTION INTRAMUSCULAR; INTRAVENOUS
Status: DISCONTINUED
Start: 2025-02-21 | End: 2025-02-21

## 2025-02-21 RX ADMIN — SODIUM CHLORIDE: 0.9 INJECTION, SOLUTION INTRAVENOUS at 16:27

## 2025-02-21 RX ADMIN — FENTANYL CITRATE 50 MCG: 50 INJECTION INTRAMUSCULAR; INTRAVENOUS at 20:53

## 2025-02-21 RX ADMIN — FENTANYL CITRATE 100 MCG: 50 INJECTION INTRAVENOUS at 10:13

## 2025-02-21 RX ADMIN — AMIODARONE HYDROCHLORIDE 200 MG: 200 TABLET ORAL at 07:53

## 2025-02-21 RX ADMIN — ROCURONIUM BROMIDE 50 MG: 10 INJECTION, SOLUTION INTRAVENOUS at 09:40

## 2025-02-21 RX ADMIN — SODIUM BICARBONATE 50 MEQ: 42 INJECTION, SOLUTION INTRAVENOUS at 14:48

## 2025-02-21 RX ADMIN — ALBUMIN (HUMAN) 25 G: 12.5 INJECTION, SOLUTION INTRAVENOUS at 16:36

## 2025-02-21 RX ADMIN — DEXTROSE MONOHYDRATE 10 G: 25 INJECTION, SOLUTION INTRAVENOUS at 13:28

## 2025-02-21 RX ADMIN — EPINEPHRINE 50 MCG: 1 INJECTION INTRAMUSCULAR; INTRAVENOUS; SUBCUTANEOUS at 10:00

## 2025-02-21 RX ADMIN — HEPARIN SODIUM 42000 UNITS: 1000 INJECTION INTRAVENOUS; SUBCUTANEOUS at 11:25

## 2025-02-21 RX ADMIN — FENTANYL CITRATE 50 MCG: 50 INJECTION INTRAMUSCULAR; INTRAVENOUS at 22:46

## 2025-02-21 RX ADMIN — INSULIN HUMAN 10 UNITS: 100 INJECTION, SOLUTION PARENTERAL at 12:54

## 2025-02-21 RX ADMIN — SODIUM CHLORIDE 0.01 MCG/KG/MIN: 9 INJECTION, SOLUTION INTRAVENOUS at 14:05

## 2025-02-21 RX ADMIN — INSULIN HUMAN 10 UNITS: 100 INJECTION, SOLUTION PARENTERAL at 13:28

## 2025-02-21 RX ADMIN — SODIUM CHLORIDE 1500 MG: 0.9 INJECTION, SOLUTION INTRAVENOUS at 20:59

## 2025-02-21 RX ADMIN — METOPROLOL TARTRATE 12.5 MG: 25 TABLET, FILM COATED ORAL at 07:53

## 2025-02-21 RX ADMIN — AMINOCAPROIC ACID 5 G/HR: 250 INJECTION, SOLUTION INTRAVENOUS at 09:09

## 2025-02-21 RX ADMIN — FENTANYL CITRATE 100 MCG: 50 INJECTION INTRAVENOUS at 09:47

## 2025-02-21 RX ADMIN — DEXTROSE MONOHYDRATE 10 G: 25 INJECTION, SOLUTION INTRAVENOUS at 12:55

## 2025-02-21 RX ADMIN — SODIUM CHLORIDE 40 MG: 9 INJECTION INTRAMUSCULAR; INTRAVENOUS; SUBCUTANEOUS at 16:48

## 2025-02-21 RX ADMIN — FENTANYL CITRATE 100 MCG: 50 INJECTION INTRAVENOUS at 09:42

## 2025-02-21 RX ADMIN — MIDAZOLAM 20 MG: 1 INJECTION INTRAMUSCULAR; INTRAVENOUS at 14:36

## 2025-02-21 RX ADMIN — EPINEPHRINE 100 MCG: 1 INJECTION INTRAMUSCULAR; INTRAVENOUS; SUBCUTANEOUS at 10:59

## 2025-02-21 RX ADMIN — ROCURONIUM BROMIDE 50 MG: 10 INJECTION, SOLUTION INTRAVENOUS at 10:51

## 2025-02-21 RX ADMIN — MIDAZOLAM 2 MG: 1 INJECTION INTRAMUSCULAR; INTRAVENOUS at 08:46

## 2025-02-21 RX ADMIN — ALBUMIN (HUMAN) 25 G: 12.5 INJECTION, SOLUTION INTRAVENOUS at 22:42

## 2025-02-21 RX ADMIN — PROTAMINE SULFATE 400 MG: 10 INJECTION, SOLUTION INTRAVENOUS at 14:13

## 2025-02-21 RX ADMIN — Medication 2000 MG: at 22:30

## 2025-02-21 RX ADMIN — OXYCODONE 10 MG: 5 TABLET ORAL at 18:47

## 2025-02-21 RX ADMIN — PROPOFOL 25 MCG/KG/MIN: 10 INJECTION, EMULSION INTRAVENOUS at 14:57

## 2025-02-21 RX ADMIN — VANCOMYCIN HYDROCHLORIDE 1000 MG: 1 INJECTION, POWDER, LYOPHILIZED, FOR SOLUTION INTRAVENOUS at 09:20

## 2025-02-21 RX ADMIN — SODIUM CHLORIDE: 0.9 INJECTION, SOLUTION INTRAVENOUS at 16:24

## 2025-02-21 RX ADMIN — POTASSIUM CHLORIDE 10 MEQ: 2 INJECTION, SOLUTION, CONCENTRATE INTRAVENOUS at 14:29

## 2025-02-21 RX ADMIN — EPINEPHRINE 50 MCG: 1 INJECTION INTRAMUSCULAR; INTRAVENOUS; SUBCUTANEOUS at 10:20

## 2025-02-21 RX ADMIN — EPINEPHRINE 100 MCG: 1 INJECTION INTRAMUSCULAR; INTRAVENOUS; SUBCUTANEOUS at 10:51

## 2025-02-21 RX ADMIN — CALCIUM CHLORIDE 1 G: 100 INJECTION INTRAVENOUS; INTRAVENTRICULAR at 14:47

## 2025-02-21 RX ADMIN — SODIUM CHLORIDE, PRESERVATIVE FREE 10 ML: 5 INJECTION INTRAVENOUS at 20:01

## 2025-02-21 RX ADMIN — ROCURONIUM BROMIDE 50 MG: 10 INJECTION, SOLUTION INTRAVENOUS at 14:57

## 2025-02-21 RX ADMIN — EPINEPHRINE 100 MCG: 1 INJECTION INTRAMUSCULAR; INTRAVENOUS; SUBCUTANEOUS at 10:39

## 2025-02-21 RX ADMIN — CEFAZOLIN 3 G: 1 INJECTION, POWDER, FOR SOLUTION INTRAMUSCULAR; INTRAVENOUS at 09:46

## 2025-02-21 RX ADMIN — MIDAZOLAM 2 MG: 1 INJECTION INTRAMUSCULAR; INTRAVENOUS at 12:50

## 2025-02-21 RX ADMIN — Medication 100 MCG: at 09:44

## 2025-02-21 RX ADMIN — FENTANYL CITRATE 50 MCG: 50 INJECTION INTRAMUSCULAR; INTRAVENOUS at 17:51

## 2025-02-21 RX ADMIN — FENTANYL CITRATE 50 MCG: 50 INJECTION INTRAMUSCULAR; INTRAVENOUS at 19:49

## 2025-02-21 RX ADMIN — LIDOCAINE HYDROCHLORIDE 50 MG: 10 INJECTION, SOLUTION EPIDURAL; INFILTRATION; INTRACAUDAL; PERINEURAL at 08:46

## 2025-02-21 RX ADMIN — SODIUM CHLORIDE: 0.9 INJECTION, SOLUTION INTRAVENOUS at 07:53

## 2025-02-21 RX ADMIN — PROPOFOL 150 MG: 10 INJECTION, EMULSION INTRAVENOUS at 08:46

## 2025-02-21 RX ADMIN — ROCURONIUM BROMIDE 30 MG: 10 INJECTION, SOLUTION INTRAVENOUS at 14:01

## 2025-02-21 RX ADMIN — CHLORHEXIDINE GLUCONATE 15 ML: 1.2 SOLUTION ORAL at 07:53

## 2025-02-21 RX ADMIN — MIDAZOLAM 2 MG: 1 INJECTION INTRAMUSCULAR; INTRAVENOUS at 14:01

## 2025-02-21 RX ADMIN — ROCURONIUM BROMIDE 100 MG: 10 INJECTION, SOLUTION INTRAVENOUS at 08:46

## 2025-02-21 RX ADMIN — PROPOFOL 50 MG: 10 INJECTION, EMULSION INTRAVENOUS at 10:01

## 2025-02-21 RX ADMIN — POLYETHYLENE GLYCOL 3350 17 G: 17 POWDER, FOR SOLUTION ORAL at 16:48

## 2025-02-21 RX ADMIN — ASPIRIN 81 MG: 81 TABLET, COATED ORAL at 07:53

## 2025-02-21 RX ADMIN — CEFAZOLIN 3 G: 1 INJECTION, POWDER, FOR SOLUTION INTRAMUSCULAR; INTRAVENOUS at 13:46

## 2025-02-21 RX ADMIN — GLYCOPYRROLATE 0.4 MG: 0.2 INJECTION INTRAMUSCULAR; INTRAVENOUS at 14:03

## 2025-02-21 RX ADMIN — FENTANYL CITRATE 150 MCG: 50 INJECTION INTRAVENOUS at 09:58

## 2025-02-21 RX ADMIN — MUPIROCIN: 20 OINTMENT TOPICAL at 07:53

## 2025-02-21 RX ADMIN — SODIUM CHLORIDE, POTASSIUM CHLORIDE, SODIUM LACTATE AND CALCIUM CHLORIDE: 600; 310; 30; 20 INJECTION, SOLUTION INTRAVENOUS at 08:40

## 2025-02-21 RX ADMIN — FENTANYL CITRATE 100 MCG: 50 INJECTION INTRAVENOUS at 08:46

## 2025-02-21 RX ADMIN — MUPIROCIN: 20 OINTMENT TOPICAL at 20:01

## 2025-02-21 RX ADMIN — SODIUM CHLORIDE, POTASSIUM CHLORIDE, SODIUM LACTATE AND CALCIUM CHLORIDE: 600; 310; 30; 20 INJECTION, SOLUTION INTRAVENOUS at 15:09

## 2025-02-21 RX ADMIN — MUPIROCIN: 20 OINTMENT TOPICAL at 16:49

## 2025-02-21 RX ADMIN — SODIUM CHLORIDE 1 UNITS/HR: 9 INJECTION, SOLUTION INTRAVENOUS at 09:43

## 2025-02-21 RX ADMIN — ALBUMIN (HUMAN) 25 G: 12.5 INJECTION, SOLUTION INTRAVENOUS at 21:00

## 2025-02-21 RX ADMIN — Medication 200 MCG: at 08:53

## 2025-02-21 RX ADMIN — ROCURONIUM BROMIDE 50 MG: 10 INJECTION, SOLUTION INTRAVENOUS at 12:06

## 2025-02-21 RX ADMIN — FENTANYL CITRATE 250 MCG: 50 INJECTION INTRAVENOUS at 08:48

## 2025-02-21 RX ADMIN — FENTANYL CITRATE 200 MCG: 50 INJECTION INTRAVENOUS at 09:56

## 2025-02-21 RX ADMIN — DEXMEDETOMIDINE HYDROCHLORIDE 0.2 MCG/KG/HR: 4 INJECTION, SOLUTION INTRAVENOUS at 20:13

## 2025-02-21 RX ADMIN — ONDANSETRON 4 MG: 2 INJECTION, SOLUTION INTRAMUSCULAR; INTRAVENOUS at 21:22

## 2025-02-21 RX ADMIN — ALBUMIN (HUMAN) 25 G: 12.5 INJECTION, SOLUTION INTRAVENOUS at 17:44

## 2025-02-21 ASSESSMENT — PAIN DESCRIPTION - DESCRIPTORS: DESCRIPTORS: ACHING;PRESSURE

## 2025-02-21 ASSESSMENT — PULMONARY FUNCTION TESTS
PIF_VALUE: 25
PIF_VALUE: 14
PIF_VALUE: 13

## 2025-02-21 ASSESSMENT — PAIN SCALES - GENERAL: PAINLEVEL_OUTOF10: 10

## 2025-02-21 ASSESSMENT — PAIN DESCRIPTION - LOCATION: LOCATION: CHEST

## 2025-02-21 ASSESSMENT — PAIN DESCRIPTION - ORIENTATION: ORIENTATION: OTHER (COMMENT)

## 2025-02-21 ASSESSMENT — ENCOUNTER SYMPTOMS: SHORTNESS OF BREATH: 1

## 2025-02-21 NOTE — OP NOTE
Operative Note    Patient: Earnest Avila  YOB: 1955  MRN: [id number]    Date of Procedure: 2/21/2025    Pre-Op Diagnosis Codes:      * Coronary artery disease [I25.10]    Post-op Diagnosis: Same       Procedure  CABG x 4 LIMA to LAD saphenous vein to first diagonal saphenous vein to OM1 saphenous vein to the PDA  Endo vein harvest  Left atrial appendage  FRAN  Platelet gel    Surgeon(s):  Ronnie Jamison MD    Assistant:   First Assistant: Cathy Douglass RN    Anesthesia: General    Estimated Blood Loss (mL): 500    Complications: None    Specimens:   * No specimens in log *    Implants:  Implant Name Type Inv. Item Serial No.  Lot No. LRB No. Used Action   CLIP INT SM WIDE WECK RED TI TRNSVRS GRV CHEVRON SHP W/ PERCIS TIP 24 PER PK - [id number]  CLIP INT SM WIDE WECK RED TI TRNSVRS GRV CHEVRON SHP W/ PERCIS TIP 24 PER PK  [employer]-  N/A 1 Implanted   WIRE TMPRRY PCNG ST DBLE ARMD SH K STNLSSSTL BLUE - [id number] Cardiac Lead WIRE TMPRRY PCNG ST DBLE ARMD SH K STNLSSSTL BLUE  [employer]WD  N/A 1 Implanted   CLIP INT SM WIDE WECK RED TI TRNSVRS GRV CHEVRON SHP W/ PERCIS TIP 24 PER PK - [id number]  CLIP INT SM WIDE WECK RED TI TRNSVRS GRV CHEVRON SHP W/ PERCIS TIP 24 PER PK  [employer]WD  N/A 1 Implanted   CLIP INT SM WIDE WECK RED TI TRNSVRS GRV CHEVRON SHP W/ PERCIS TIP 24 PER PK - [id number]  CLIP INT SM WIDE WECK RED TI TRNSVRS GRV CHEVRON SHP W/ PERCIS TIP 24 PER PK  [employer]WD  N/A 1 Implanted   CLIP MED INT USE SM TI SMALLER TIGHTER LEG GAP [facility] RED - [id number]  CLIP MED INT USE SM TI SMALLER TIGHTER LEG GAP TH RED  [employer]WD  N/A 1 Implanted   CLIP MED SUTURE LESS 40 MM SYS 1 HND STRL ATRICLIP FLX V - [id number]  CLIP MED SUTURE LESS 40 MM SYS 1 HND STRL ATRICLIP FLX V  [employer]-WD [id number] N/A 1 Implanted         Drains:   Chest Tube Anterior 1 (Active)       Chest Tube Anterior 2 (Active)       Chest Tube  circumferential fashion.  The heart is distended and the veins measured appropriate length and cut.  The patient then had the first diagonal identified.  The vein was spatulated and anastomosed to an end to side fashion to the first diagonal.  This was measured back to the appropriate length and orientation and cut.  And the proximal anastomosis was done using a 5-0 Prolene running suture.    At this time with a heart empty antegrade blood cardioplegia is given upon completion of this dose of cardioplegia the aorta is open using 11 blade and a 4 mm punch.  The aorta is cleaned of fibrofatty tissue and the anastomosis of the vein to the aorta is done using a 5-0 Prolene running suture.  At this time the silk sutures on the left side the chest are cut and the pericardium is notched.    The LIMA was retrieved from the left chest brought up to the field inspected and noted to have good flow.  It is snapped using a hemostat to the drape and the heart is elevated with a lap behind the heart.  The LAD is identified in the midportion it is open using a 15 blade and a Foster blade.  Ramirez scissors were used to open the artery further in each direction.  At this point the patient has the LIMA to LAD anastomosis done using a 7-0 Prolene running suture.  The anastomosis started at the heel the LIMA is parachuted down to the opened LAD.  The 7-0 Prolene's were used to complete the anastomoses and the artery does probe well proximally and distally prior to completion of this.  Should be noted that all the vein grafts were also probed and they probe well proximally and distally using a 1 mm probe.  Upon completion of the LIMA to LAD anastomosis the pedicles are tacked to the surface of the heart using 6-0 Prolene figure-of-eight suture.  The bulldog is removed and flow was restored down the LAD.  The left atrial appendage was then clipped using a 40 mm atrial clip.    Hotshot blood was given in antegrade fashion the heart begins

## 2025-02-21 NOTE — ANESTHESIA PRE PROCEDURE
There were no vitals filed for this visit.                                           BP Readings from Last 3 Encounters:   02/18/25 (!) 125/50   02/17/25 (!) 143/79   02/04/25 (!) 114/58       NPO Status:                                                                                 BMI:   Wt Readings from Last 3 Encounters:   02/17/25 121.1 kg (266 lb 15.6 oz)   02/14/25 117.9 kg (260 lb)   02/04/25 120.2 kg (265 lb)     There is no height or weight on file to calculate BMI.    CBC:   Lab Results   Component Value Date/Time    WBC 7.2 02/18/2025 05:58 AM    RBC 4.55 02/18/2025 05:58 AM    HGB 15.0 02/18/2025 05:58 AM    HCT 43.3 02/18/2025 05:58 AM    MCV 95.2 02/18/2025 05:58 AM    RDW 11.9 02/18/2025 05:58 AM     02/18/2025 05:58 AM       CMP:   Lab Results   Component Value Date/Time     02/18/2025 05:58 AM    K 4.4 02/18/2025 05:58 AM     02/18/2025 05:58 AM    CO2 19 02/18/2025 05:58 AM    BUN 17 02/18/2025 05:58 AM    CREATININE 0.9 02/18/2025 05:58 AM    GFRAA >60 05/24/2022 06:54 AM    LABGLOM >90 02/18/2025 05:58 AM    GLUCOSE 162 02/18/2025 05:58 AM    CALCIUM 9.0 02/18/2025 05:58 AM    BILITOT 2.3 02/14/2025 12:20 PM    ALKPHOS 125 02/14/2025 12:20 PM    AST 80 02/14/2025 12:20 PM     02/14/2025 12:20 PM       POC Tests:   Recent Labs     02/18/25  1045   POCGLU 177*       Coags:   Lab Results   Component Value Date/Time    PROTIME 15.2 02/14/2025 12:20 PM    INR 1.1 02/14/2025 12:20 PM    APTT 28.7 01/27/2014 08:15 AM       HCG (If Applicable): No results found for: \"PREGTESTUR\", \"PREGSERUM\", \"HCG\", \"HCGQUANT\"     ABGs: No results found for: \"PHART\", \"PO2ART\", \"GFM9FWE\", \"IMS0RWY\", \"BEART\", \"I9HTYKMX\"     Type & Screen (If Applicable):  No results found for: \"ABORH\", \"LABANTI\"    Drug/Infectious Status (If Applicable):  Lab Results   Component Value Date/Time    HEPCAB NONREACTIVE 02/14/2025 08:35 AM       COVID-19 Screening (If Applicable):   Lab Results   Component Value

## 2025-02-21 NOTE — CONSENT
Informed Consent for Blood Component Transfusion Note    I have discussed with the patient the rationale for blood component transfusion; its benefits in treating or preventing fatigue, organ damage, or death; and its risk which includes mild transfusion reactions, rare risk of blood borne infection, or more serious but rare reactions. I have discussed the alternatives to transfusion, including the risk and consequences of not receiving transfusion. The patient had an opportunity to ask questions and had agreed to proceed with transfusion of blood components.    Electronically signed by Dalia Krishna MD on 2/21/25 at 7:56 AM EST

## 2025-02-21 NOTE — CONSENT
Informed Consent for Blood Component Transfusion Note    I have discussed with the patient the rationale for blood component transfusion; its benefits in treating or preventing fatigue, organ damage, or death; and its risk which includes mild transfusion reactions, rare risk of blood borne infection, or more serious but rare reactions. I have discussed the alternatives to transfusion, including the risk and consequences of not receiving transfusion. The patient had an opportunity to ask questions and had agreed to proceed with transfusion of blood components.    Electronically signed by TSERING Mohamud CNP on 2/21/25 at 8:18 AM EST

## 2025-02-21 NOTE — ANESTHESIA PROCEDURE NOTES
Procedure Performed: FRAN       Start Time:  2/21/2025 9:20 AM       End Time:      Anesthesia Information  Performed Personally  Anesthesiologist:  Dalia Krishna MD      Echocardiogram Comments:         PRE  surgical FRAN    Diagnostic FRAN per surgeon's request          PRE  surgical FRAN       1-   Low normal  LV systolic  function  EF 50 %,     GLS mildly reduced -17 %  average.  Mildly hypokinetic basal inferior, posterolateral and apical segments  2-  LV diastolic dysfunction E'  8 cm/sec  3- Moderately   dilated RA     5.0   4- Moderately dilated  LA 5.0 cm -  No smoke- no clots  in MANDO   5-  Normal overall  RV systolic function,  6-   Trace MR- thickened leaflets with excessive motion and mild cordal EMY - NO  LVOT obstruction -  Gradient == 4    7-  Trace TR    8-  Mild aortic atherosclerosis -  epiaortic scan available        Post  CPB FRAN     1-  FRAN guided de-airing maneuvers till resolution of intracardiac air  2-  FRAN guided hemodynamic management and volume replacement  3- Good LV systolic function  EF 60 % with low dose inotropic support, improved contractility , no RWMA  4- Normal RV function  5-  Mild MR,  mild cordal EMY without LVOT obstruction  6- Bi-atrial enlargement  7-  Left atrial Appendage has been excluded - no residual flow  7- No aortic dissection      Preanesthesia Checklist:  Patient identified, IV assessed, risks and benefits discussed, monitors and equipment assessed, procedure being performed at surgeon's request and anesthesia consent obtained.    General Procedure Information  Diagnostic Indications for Echo:  assessment of ascending aorta, assessment of surgical repair, hemodynamic monitoring and assessment of valve function  Physician Requesting Echo: Ronnie Jamison MD  Location performed:  OR  Intubated  Bite block placed  Heart visualized  Probe Insertion:  Easy  Probe Type:  3D and mulitplane  Modalities:  2D, 3D, pulse wave Doppler, M-mode, color flow mapping and

## 2025-02-21 NOTE — CASE COMMUNICATION
2/21/25  0815    -Surgical consent signed at bedside with patient.  -All questions asked and answered at this time.  -Postop orders placed under signed and held.    TSERING Mohamud - CNP

## 2025-02-21 NOTE — H&P
Normal wall motion. Normal diastolic function.   Left Atrium Left atrium size is normal.   Interatrial Septum No interatrial shunt visualized with color Doppler.   Right Ventricle Right ventricle size is normal. Normal systolic function. TAPSE is normal.   Right Atrium Right atrium size is normal.   Aortic Valve Valve structure is normal. No regurgitation. No stenosis.   Mitral Valve Valve structure is normal. Trace regurgitation. No stenosis noted.   Tricuspid Valve Valve structure is normal. Trace regurgitation. Normal RVSP.   Pulmonic Valve Valve structure is normal. Trace regurgitation.   Aorta Normal sized aortic root.   IVC/Hepatic Veins IVC was not well visualized.   Pericardium No pericardial effusion.       CT:1.  Central airway thickening which could reflect bronchitis or reactive  airways disease.  2. No pulmonary embolism or other acute finding elsewhere in the chest.       Prior Labs reviewed:   Stable BMP.  Stable CBC.Elevated bilirubin history of Gilbert's  Negative troponins elevated 33 at peak    Assessment   Patient Active Problem List   Diagnosis    Type 2 diabetes, controlled, with neuropathy (HCC)    Essential hypertension    Osteoarthritis    Anxiety    BRIDGETTE (obstructive sleep apnea)    Neuropathy    Obesity, Class I, BMI 30-34.9    Chronic right shoulder pain    Tubular adenoma of colon    History of colon polyps    Hepatitis C carrier (HCC)    Weight loss counseling, encounter for    Stomach ulcer    Gilbert's disease    Class 1 obesity due to excess calories with serious comorbidity and body mass index (BMI) of 30.0 to 30.9 in adult    H/O urinary retention    History of suprapubic catheter    Diastasis recti    DDD (degenerative disc disease), cervical    Neck pain    Cervical stenosis of spine    BPH (benign prostatic hyperplasia)    Chronic constipation    Spondylolisthesis of lumbar region    Microalbuminuria    Chronic suprapubic catheter (HCC)    Irregular heart beats    Spinal stenosis  of lumbar region at multiple levels    Disorder of rotator cuff    Angina pectoris    Chronic dyspnea    Dyspnea    CAD, multiple vessel    Coronary artery disease       Risks Reviewed w/pt  - Risk for stroke: Yes  - Risk for bleeding:Yes  - Risk for cardiac arrythmias: Yes  - Small risk of death: Yes  - Risks of pneumonia from ventilator: Yes  - Risk for infection: Yes        PLAN:  Patient is currently stable with no chest pain or shortness of breath resting in the PCC.  At this time we will get preop testing vein mapping, carotid ultrasound, upper extremity ultrasound  Reviewed CT chest no concerns noted.  Recheck limited echocardiogram last echo was almost 1 year ago  Pending surgical date and timing at this point.  All questions and concerns answered with patient.    No change in H and P     Torrie Mckinley, APRN - CNP

## 2025-02-21 NOTE — PLAN OF CARE
Problem: Discharge Planning  Goal: Discharge to home or other facility with appropriate resources  Outcome: Progressing  Flowsheets (Taken 2/21/2025 0800 by Pete Juarez, RN)  Discharge to home or other facility with appropriate resources:   Identify barriers to discharge with patient and caregiver   Arrange for needed discharge resources and transportation as appropriate   Identify discharge learning needs (meds, wound care, etc)   Arrange for interpreters to assist at discharge as needed   Refer to discharge planning if patient needs post-hospital services based on physician order or complex needs related to functional status, cognitive ability or social support system     Problem: Chronic Conditions and Co-morbidities  Goal: Patient's chronic conditions and co-morbidity symptoms are monitored and maintained or improved  Outcome: Progressing  Flowsheets (Taken 2/21/2025 0800 by Pete Juarez, RN)  Care Plan - Patient's Chronic Conditions and Co-Morbidity Symptoms are Monitored and Maintained or Improved:   Monitor and assess patient's chronic conditions and comorbid symptoms for stability, deterioration, or improvement   Collaborate with multidisciplinary team to address chronic and comorbid conditions and prevent exacerbation or deterioration   Update acute care plan with appropriate goals if chronic or comorbid symptoms are exacerbated and prevent overall improvement and discharge     Problem: Safety - Adult  Goal: Free from fall injury  Outcome: Progressing     Problem: Respiratory - Adult  Goal: Achieves optimal ventilation and oxygenation  2/21/2025 1841 by Juan Crowder RN  Outcome: Progressing  2/21/2025 1604 by Aimee Trinh RCP  Outcome: Progressing

## 2025-02-21 NOTE — PROGRESS NOTES
[facility] Cardiothoracic Surgery  Pre-op Note    2/21/2025    Earnest Avila is scheduled for surgery today.  All of the pertinent studies have been reviewed and patient is NPO.      I have discussed the procedure with the patient and family, and they have been given opportunity to ask questions.     The attendant risks, benefits, and possible outcomes have been discussed with them.  They understand and have signed informed consent.      Ronnie Jamison MD

## 2025-02-21 NOTE — ANESTHESIA PROCEDURE NOTES
Central Venous Line:    A central venous line was placed using ultrasound guidance, in the OR for the following indication(s): central venous access and CVP monitoring.2/21/2025 9:02 AM2/21/2025 9:10 AM    Sterility preparation included the following: provider used sterile gloves, gown, hat and mask and maximum sterile barriers used during central venous catheter insertion.    The patient was placed in Trendelenburg position.The right internal jugular vein was prepped.    The site was prepped with Chloraprep.  A 9 Fr (size), 11.5 (length), introducer SLIC was placed.    During the procedure, the following specific steps were taken: target vein identified, needle advanced into vein and blood aspirated and guidewire advanced into vein.    Intravenous verification was obtained by ultrasound and venous blood return.    Post insertion care included: all ports aspirated, all ports flushed easily, guidewire removed intact, Biopatch applied, line sutured in place and dressing applied.        Outcomes: uncomplicated  Anesthesia type: general..No  Staffing  Performed: Anesthesiologist   Anesthesiologist: Dalia Krishna MD  Performed by: Dalia Krishna MD  Authorized by: Dalia Krishna MD

## 2025-02-21 NOTE — ANESTHESIA PROCEDURE NOTES
Arterial Line:    An arterial line was placed using ultrasound guidance, in the OR for the following indication(s): continuous blood pressure monitoring and blood sampling needed.    A 20 gauge (size), 1 and 3/4 inch (length), Arrow (type) catheter was placed, Seldinger technique used, into the left radial artery, secured by Tegaderm.  Anesthesia type: General    Events:  patient tolerated procedure well with no complications.  Anesthesiologist: Dalia Krishna MD  Performed: Anesthesiologist

## 2025-02-22 ENCOUNTER — APPOINTMENT (OUTPATIENT)
Dept: GENERAL RADIOLOGY | Age: 70
End: 2025-02-22
Attending: THORACIC SURGERY (CARDIOTHORACIC VASCULAR SURGERY)
Payer: COMMERCIAL

## 2025-02-22 PROBLEM — Z95.1 S/P CABG (CORONARY ARTERY BYPASS GRAFT): Status: ACTIVE | Noted: 2025-02-22

## 2025-02-22 LAB
ANION GAP SERPL CALCULATED.3IONS-SCNC: 12 MMOL/L (ref 9–16)
BUN SERPL-MCNC: 16 MG/DL (ref 8–23)
CA-I BLD-SCNC: 1.12 MMOL/L (ref 1.13–1.33)
CALCIUM SERPL-MCNC: 7.8 MG/DL (ref 8.6–10.4)
CHLORIDE SERPL-SCNC: 111 MMOL/L (ref 98–107)
CO2 SERPL-SCNC: 18 MMOL/L (ref 20–31)
CREAT SERPL-MCNC: 0.9 MG/DL (ref 0.7–1.2)
ERYTHROCYTE [DISTWIDTH] IN BLOOD BY AUTOMATED COUNT: 12.3 % (ref 11.8–14.4)
GFR, ESTIMATED: >90 ML/MIN/1.73M2
GLUCOSE BLD-MCNC: 102 MG/DL (ref 75–110)
GLUCOSE BLD-MCNC: 103 MG/DL (ref 75–110)
GLUCOSE BLD-MCNC: 104 MG/DL (ref 75–110)
GLUCOSE BLD-MCNC: 114 MG/DL (ref 75–110)
GLUCOSE BLD-MCNC: 124 MG/DL (ref 75–110)
GLUCOSE BLD-MCNC: 138 MG/DL (ref 75–110)
GLUCOSE BLD-MCNC: 164 MG/DL (ref 75–110)
GLUCOSE BLD-MCNC: 187 MG/DL (ref 75–110)
GLUCOSE BLD-MCNC: 82 MG/DL (ref 75–110)
GLUCOSE BLD-MCNC: 84 MG/DL (ref 75–110)
GLUCOSE SERPL-MCNC: 124 MG/DL (ref 74–99)
HCT VFR BLD AUTO: 28.3 % (ref 40.7–50.3)
HGB BLD-MCNC: 9.6 G/DL (ref 13–17)
INR PPP: 1.6
MAGNESIUM SERPL-MCNC: 2.1 MG/DL (ref 1.6–2.4)
MCH RBC QN AUTO: 33 PG (ref 25.2–33.5)
MCHC RBC AUTO-ENTMCNC: 33.9 G/DL (ref 28.4–34.8)
MCV RBC AUTO: 97.3 FL (ref 82.6–102.9)
MRSA, DNA, NASAL: NEGATIVE
NRBC BLD-RTO: 0 PER 100 WBC
PLATELET # BLD AUTO: ABNORMAL K/UL (ref 138–453)
PLATELET, FLUORESCENCE: 108 K/UL (ref 138–453)
PLATELETS.RETICULATED NFR BLD AUTO: 2.2 % (ref 1.1–10.3)
POTASSIUM SERPL-SCNC: 4.4 MMOL/L (ref 3.7–5.3)
PROTHROMBIN TIME: 19.1 SEC (ref 11.7–14.9)
RBC # BLD AUTO: 2.91 M/UL (ref 4.21–5.77)
SODIUM SERPL-SCNC: 141 MMOL/L (ref 136–145)
SPECIMEN DESCRIPTION: NORMAL
WBC OTHER # BLD: 9.8 K/UL (ref 3.5–11.3)

## 2025-02-22 PROCEDURE — 97162 PT EVAL MOD COMPLEX 30 MIN: CPT

## 2025-02-22 PROCEDURE — 85055 RETICULATED PLATELET ASSAY: CPT

## 2025-02-22 PROCEDURE — 99222 1ST HOSP IP/OBS MODERATE 55: CPT | Performed by: INTERNAL MEDICINE

## 2025-02-22 PROCEDURE — 83735 ASSAY OF MAGNESIUM: CPT

## 2025-02-22 PROCEDURE — 85027 COMPLETE CBC AUTOMATED: CPT

## 2025-02-22 PROCEDURE — 93005 ELECTROCARDIOGRAM TRACING: CPT | Performed by: THORACIC SURGERY (CARDIOTHORACIC VASCULAR SURGERY)

## 2025-02-22 PROCEDURE — 6360000002 HC RX W HCPCS

## 2025-02-22 PROCEDURE — 85610 PROTHROMBIN TIME: CPT

## 2025-02-22 PROCEDURE — 82330 ASSAY OF CALCIUM: CPT

## 2025-02-22 PROCEDURE — 99024 POSTOP FOLLOW-UP VISIT: CPT

## 2025-02-22 PROCEDURE — 6360000002 HC RX W HCPCS: Performed by: THORACIC SURGERY (CARDIOTHORACIC VASCULAR SURGERY)

## 2025-02-22 PROCEDURE — 97166 OT EVAL MOD COMPLEX 45 MIN: CPT

## 2025-02-22 PROCEDURE — 2500000003 HC RX 250 WO HCPCS

## 2025-02-22 PROCEDURE — 97530 THERAPEUTIC ACTIVITIES: CPT

## 2025-02-22 PROCEDURE — 82947 ASSAY GLUCOSE BLOOD QUANT: CPT

## 2025-02-22 PROCEDURE — 2100000001 HC CVICU R&B

## 2025-02-22 PROCEDURE — 6370000000 HC RX 637 (ALT 250 FOR IP)

## 2025-02-22 PROCEDURE — 80048 BASIC METABOLIC PNL TOTAL CA: CPT

## 2025-02-22 PROCEDURE — 2580000003 HC RX 258

## 2025-02-22 PROCEDURE — 71045 X-RAY EXAM CHEST 1 VIEW: CPT

## 2025-02-22 PROCEDURE — 2500000003 HC RX 250 WO HCPCS: Performed by: INTERNAL MEDICINE

## 2025-02-22 PROCEDURE — 97535 SELF CARE MNGMENT TRAINING: CPT

## 2025-02-22 RX ORDER — KETOROLAC TROMETHAMINE 15 MG/ML
15 INJECTION, SOLUTION INTRAMUSCULAR; INTRAVENOUS EVERY 6 HOURS PRN
Status: COMPLETED | OUTPATIENT
Start: 2025-02-22 | End: 2025-02-23

## 2025-02-22 RX ORDER — MELOXICAM 7.5 MG/1
15 TABLET ORAL DAILY
Status: DISCONTINUED | OUTPATIENT
Start: 2025-02-22 | End: 2025-03-01 | Stop reason: HOSPADM

## 2025-02-22 RX ORDER — CHLORZOXAZONE 500 MG/1
500 TABLET ORAL 2 TIMES DAILY
Status: DISCONTINUED | OUTPATIENT
Start: 2025-02-22 | End: 2025-03-01 | Stop reason: HOSPADM

## 2025-02-22 RX ADMIN — MUPIROCIN: 20 OINTMENT TOPICAL at 08:26

## 2025-02-22 RX ADMIN — OXYCODONE 10 MG: 5 TABLET ORAL at 08:29

## 2025-02-22 RX ADMIN — OXYCODONE 10 MG: 5 TABLET ORAL at 12:27

## 2025-02-22 RX ADMIN — FENTANYL CITRATE 50 MCG: 50 INJECTION INTRAMUSCULAR; INTRAVENOUS at 09:32

## 2025-02-22 RX ADMIN — FENTANYL CITRATE 50 MCG: 50 INJECTION INTRAMUSCULAR; INTRAVENOUS at 13:08

## 2025-02-22 RX ADMIN — METOPROLOL TARTRATE 12.5 MG: 25 TABLET, FILM COATED ORAL at 21:34

## 2025-02-22 RX ADMIN — PANTOPRAZOLE SODIUM 40 MG: 40 TABLET, DELAYED RELEASE ORAL at 08:25

## 2025-02-22 RX ADMIN — SODIUM CHLORIDE, PRESERVATIVE FREE 10 ML: 5 INJECTION INTRAVENOUS at 08:25

## 2025-02-22 RX ADMIN — FUROSEMIDE 20 MG: 10 INJECTION, SOLUTION INTRAMUSCULAR; INTRAVENOUS at 05:53

## 2025-02-22 RX ADMIN — FENTANYL CITRATE 50 MCG: 50 INJECTION INTRAMUSCULAR; INTRAVENOUS at 04:15

## 2025-02-22 RX ADMIN — CHLORZOXAZONE 500 MG: 500 TABLET ORAL at 20:44

## 2025-02-22 RX ADMIN — AMIODARONE HYDROCHLORIDE 150 MG: 1.5 INJECTION, SOLUTION INTRAVENOUS at 22:31

## 2025-02-22 RX ADMIN — INSULIN LISPRO 2 UNITS: 100 INJECTION, SOLUTION INTRAVENOUS; SUBCUTANEOUS at 17:02

## 2025-02-22 RX ADMIN — POLYETHYLENE GLYCOL 3350 17 G: 17 POWDER, FOR SOLUTION ORAL at 08:24

## 2025-02-22 RX ADMIN — FENTANYL CITRATE 50 MCG: 50 INJECTION INTRAMUSCULAR; INTRAVENOUS at 05:49

## 2025-02-22 RX ADMIN — AMIODARONE HYDROCHLORIDE 200 MG: 200 TABLET ORAL at 08:23

## 2025-02-22 RX ADMIN — SODIUM CHLORIDE 1500 MG: 0.9 INJECTION, SOLUTION INTRAVENOUS at 10:00

## 2025-02-22 RX ADMIN — AMIODARONE HYDROCHLORIDE 200 MG: 200 TABLET ORAL at 15:09

## 2025-02-22 RX ADMIN — Medication 2000 MG: at 21:32

## 2025-02-22 RX ADMIN — SODIUM CHLORIDE, PRESERVATIVE FREE 10 ML: 5 INJECTION INTRAVENOUS at 21:32

## 2025-02-22 RX ADMIN — FENTANYL CITRATE 50 MCG: 50 INJECTION INTRAMUSCULAR; INTRAVENOUS at 02:10

## 2025-02-22 RX ADMIN — AMIODARONE HYDROCHLORIDE 200 MG: 200 TABLET ORAL at 20:43

## 2025-02-22 RX ADMIN — MELOXICAM 15 MG: 7.5 TABLET ORAL at 17:03

## 2025-02-22 RX ADMIN — METOPROLOL TARTRATE 12.5 MG: 25 TABLET, FILM COATED ORAL at 09:53

## 2025-02-22 RX ADMIN — CLOPIDOGREL BISULFATE 75 MG: 75 TABLET ORAL at 08:22

## 2025-02-22 RX ADMIN — Medication 2000 MG: at 13:59

## 2025-02-22 RX ADMIN — ASPIRIN 81 MG: 81 TABLET, COATED ORAL at 08:22

## 2025-02-22 RX ADMIN — KETOROLAC TROMETHAMINE 15 MG: 15 INJECTION, SOLUTION INTRAMUSCULAR; INTRAVENOUS at 15:24

## 2025-02-22 RX ADMIN — KETOROLAC TROMETHAMINE 15 MG: 15 INJECTION, SOLUTION INTRAMUSCULAR; INTRAVENOUS at 09:59

## 2025-02-22 RX ADMIN — FUROSEMIDE 20 MG: 10 INJECTION, SOLUTION INTRAMUSCULAR; INTRAVENOUS at 20:43

## 2025-02-22 RX ADMIN — ATORVASTATIN CALCIUM 20 MG: 20 TABLET, FILM COATED ORAL at 20:43

## 2025-02-22 RX ADMIN — SODIUM CHLORIDE 1500 MG: 0.9 INJECTION, SOLUTION INTRAVENOUS at 21:13

## 2025-02-22 RX ADMIN — AMIODARONE HYDROCHLORIDE 1 MG/MIN: 1.8 INJECTION, SOLUTION INTRAVENOUS at 22:46

## 2025-02-22 RX ADMIN — KETOROLAC TROMETHAMINE 15 MG: 15 INJECTION, SOLUTION INTRAMUSCULAR; INTRAVENOUS at 21:12

## 2025-02-22 RX ADMIN — OXYCODONE 10 MG: 5 TABLET ORAL at 18:40

## 2025-02-22 RX ADMIN — OXYCODONE 10 MG: 5 TABLET ORAL at 02:53

## 2025-02-22 RX ADMIN — MUPIROCIN: 20 OINTMENT TOPICAL at 20:43

## 2025-02-22 RX ADMIN — PREGABALIN 200 MG: 100 CAPSULE ORAL at 20:43

## 2025-02-22 RX ADMIN — Medication 2000 MG: at 05:40

## 2025-02-22 ASSESSMENT — PAIN DESCRIPTION - ORIENTATION
ORIENTATION: LEFT
ORIENTATION: MID
ORIENTATION: POSTERIOR
ORIENTATION: LEFT
ORIENTATION: POSTERIOR
ORIENTATION: RIGHT
ORIENTATION: MID
ORIENTATION: LEFT
ORIENTATION: MID
ORIENTATION: POSTERIOR
ORIENTATION: POSTERIOR;OTHER (COMMENT)
ORIENTATION: POSTERIOR

## 2025-02-22 ASSESSMENT — PAIN DESCRIPTION - DESCRIPTORS
DESCRIPTORS: ACHING;SORE
DESCRIPTORS: ACHING;DISCOMFORT
DESCRIPTORS: ACHING;DISCOMFORT
DESCRIPTORS: ACHING;STABBING
DESCRIPTORS: ACHING;HEAVINESS
DESCRIPTORS: BURNING
DESCRIPTORS: ACHING;DISCOMFORT
DESCRIPTORS: ACHING;DISCOMFORT
DESCRIPTORS: ACHING;DISCOMFORT;SORE
DESCRIPTORS: ACHING;DISCOMFORT;SORE
DESCRIPTORS: ACHING;SORE

## 2025-02-22 ASSESSMENT — PAIN - FUNCTIONAL ASSESSMENT
PAIN_FUNCTIONAL_ASSESSMENT: PREVENTS OR INTERFERES WITH ALL ACTIVE AND SOME PASSIVE ACTIVITIES
PAIN_FUNCTIONAL_ASSESSMENT: PREVENTS OR INTERFERES WITH MANY ACTIVE NOT PASSIVE ACTIVITIES
PAIN_FUNCTIONAL_ASSESSMENT: ACTIVITIES ARE NOT PREVENTED
PAIN_FUNCTIONAL_ASSESSMENT: PREVENTS OR INTERFERES WITH MANY ACTIVE NOT PASSIVE ACTIVITIES
PAIN_FUNCTIONAL_ASSESSMENT: PREVENTS OR INTERFERES WITH MANY ACTIVE NOT PASSIVE ACTIVITIES

## 2025-02-22 ASSESSMENT — PAIN DESCRIPTION - LOCATION
LOCATION: CHEST
LOCATION: CHEST;SHOULDER;STERNUM
LOCATION: CHEST;RIB CAGE
LOCATION: CHEST;LEG
LOCATION: BACK;STERNUM
LOCATION: CHEST
LOCATION: BACK;STERNUM
LOCATION: LEG;CHEST
LOCATION: BACK;CHEST
LOCATION: CHEST
LOCATION: CHEST
LOCATION: CHEST;STERNUM;BACK

## 2025-02-22 ASSESSMENT — PAIN SCALES - GENERAL
PAINLEVEL_OUTOF10: 1
PAINLEVEL_OUTOF10: 7
PAINLEVEL_OUTOF10: 7
PAINLEVEL_OUTOF10: 4
PAINLEVEL_OUTOF10: 4
PAINLEVEL_OUTOF10: 8
PAINLEVEL_OUTOF10: 6
PAINLEVEL_OUTOF10: 5
PAINLEVEL_OUTOF10: 6
PAINLEVEL_OUTOF10: 8
PAINLEVEL_OUTOF10: 8
PAINLEVEL_OUTOF10: 7
PAINLEVEL_OUTOF10: 4
PAINLEVEL_OUTOF10: 10
PAINLEVEL_OUTOF10: 4
PAINLEVEL_OUTOF10: 8
PAINLEVEL_OUTOF10: 10
PAINLEVEL_OUTOF10: 7
PAINLEVEL_OUTOF10: 3
PAINLEVEL_OUTOF10: 8
PAINLEVEL_OUTOF10: 3
PAINLEVEL_OUTOF10: 0

## 2025-02-22 ASSESSMENT — PULMONARY FUNCTION TESTS
PIF_VALUE: 9
PIF_VALUE: 8
PIF_VALUE: 9
PIF_VALUE: 10
PIF_VALUE: 12
PIF_VALUE: 11
PIF_VALUE: 9
PIF_VALUE: 11

## 2025-02-22 NOTE — PROGRESS NOTES
Pt. Extubated with 2 RN'S and respiratory at bedside. All extubation requirement met. See ABG results. Pt follows all commands and able to lift head off pillow. Pt tolerated extubation well. Extubated to NC 6L. Care ongoing

## 2025-02-22 NOTE — CARE COORDINATION
02/22/25 1554   Readmission Assessment   Number of Days since last admission? 1-7 days   Previous Disposition Home with Family   Who is being Interviewed Patient   What was the patient's/caregiver's perception as to why they think they needed to return back to the hospital? Other (Comment)  (Readmit for surgical procedure)   Did you visit your Primary Care Physician after you left the hospital, before you returned this time? No   Why weren't you able to visit your PCP? Did not have an appointment   Did you see a specialist, such as Cardiac, Pulmonary, Orthopedic Physician, etc. after you left the hospital? No   Who advised the patient to return to the hospital? Physician   Does the patient report anything that got in the way of taking their medications? No   In our efforts to provide the best possible care to you and others like you, can you think of anything that we could have done to help you after you left the hospital the first time, so that you might not have needed to return so soon? Other (Comment)  (readmit due to surgery)

## 2025-02-22 NOTE — PROGRESS NOTES
Occupational Therapy  Occupational Therapy Initial Evaluation  Facility/Department: [facility] [[email or web address]   Patient Name: Earnest Avila        MRN: [id number]    : 1955    Date of Service: 2025    Past Medical History:  has a past medical history of Allergic rhinitis, Anxiety, Borderline hypertension, CAD (coronary artery disease), Controlled diabetes mellitus type II without complication (HCC), Dental abscess, Depression, History of colon polyps, Intermittent self-catheterization of bladder, Kidney stones, Neuropathy, Obesity, Obstructive sleep apnea of adult, Osteoarthritis, Prostate enlargement, Restless legs syndrome, Tubular adenoma of colon, Type II or unspecified type diabetes mellitus without mention of complication, not stated as uncontrolled, UTI (urinary tract infection), and Wears glasses.  Past Surgical History:  has a past surgical history that includes Cholecystectomy (); Lithotripsy (2016); Lithotripsy; Cystocopy (2016); Prostate surgery (2016); Cystoscopy (2016); Colonoscopy (2016); Shoulder Arthroplasty (Right, 2016); Shoulder arthroscopy (Right, ); Vasectomy (); Cystoscopy (2016); Cystocopy; Cystoscopy (N/A, 2017); Cystoscopy (N/A, 2017); joint replacement; Cystourethroscopy/Urethral Dilation (11/15/2017); pr cystourethroscopy (N/A, 11/15/2017); Cystoscopy (N/A, 11/15/2017); eye surgery (Bilateral, ); Bladder surgery; Colonoscopy (N/A, 2019); Cardiac catheterization (2025); and Cardiac procedure (N/A, 2025).    Discharge Recommendations  Discharge Recommendations: Patient would benefit from continued therapy after discharge  OT Equipment Recommendations  Equipment Needed: No    Assessment  Performance deficits / Impairments: Decreased functional mobility ;Decreased ADL status;Decreased endurance;Decreased balance;Decreased high-level IADLs  Assessment: Pt would continue to benefit from OT services to

## 2025-02-22 NOTE — PROGRESS NOTES
02/21/25 2120   Vent Information   Ventilator Discontinue (S)  Yes         Order obtained for extubation.  SpO2 of 100% on 40% FiO2.   Patient extubated and placed on 6 liters/min via nasal cannula.   Post extubation SpO2 is 99% with HR  78 bpm and RR 20 breaths/min.    Patient had strong cough that was non-productive.  Extubation Well tolerated by patient..   Breath Sounds: clear    ANNEMARIE DOBSON RCP   9:22 PM

## 2025-02-22 NOTE — PROGRESS NOTES
Physical Therapy  Facility/Department: [facility] CAR 1- SICU   Physical Therapy Initial Evaluation    Patient Name: Earnest Avila        MRN: [id number]    : 1955    Date of Service: 2025    Past Medical History:  has a past medical history of Allergic rhinitis, Anxiety, Borderline hypertension, CAD (coronary artery disease), Controlled diabetes mellitus type II without complication (HCC), Dental abscess, Depression, History of colon polyps, Intermittent self-catheterization of bladder, Kidney stones, Neuropathy, Obesity, Obstructive sleep apnea of adult, Osteoarthritis, Prostate enlargement, Restless legs syndrome, Tubular adenoma of colon, Type II or unspecified type diabetes mellitus without mention of complication, not stated as uncontrolled, UTI (urinary tract infection), and Wears glasses.  Past Surgical History:  has a past surgical history that includes Cholecystectomy (); Lithotripsy (2016); Lithotripsy; Cystocopy (2016); Prostate surgery (2016); Cystoscopy (2016); Colonoscopy (2016); Shoulder Arthroplasty (Right, 2016); Shoulder arthroscopy (Right, ); Vasectomy (); Cystoscopy (2016); Cystocopy; Cystoscopy (N/A, 2017); Cystoscopy (N/A, 2017); joint replacement; Cystourethroscopy/Urethral Dilation (11/15/2017); pr cystourethroscopy (N/A, 11/15/2017); Cystoscopy (N/A, 11/15/2017); eye surgery (Bilateral, ); Bladder surgery; Colonoscopy (N/A, 2019); Cardiac catheterization (2025); and Cardiac procedure (N/A, 2025).    Discharge Recommendations   Further therapy recommended at discharge.    PT Equipment Recommendations  Equipment Needed: No    Assessment  Body Structures, Functions, Activity Limitations Requiring Skilled Therapeutic Intervention: Decreased functional mobility , Decreased strength, Decreased endurance, Decreased balance, Increased pain  Assessment: Pt amb 10' CGA RW. Further ambulation distance limited  (2) good, crying

## 2025-02-22 NOTE — CARE COORDINATION
Case Management Assessment  Initial Evaluation    Date/Time of Evaluation: 2/22/2025 3:51 PM  Assessment Completed by: LUKE GRAVES    If patient is discharged prior to next notation, then this note serves as note for discharge by case management.    Patient Name: Earnest Avila                   YOB: 1955  Diagnosis: Coronary artery disease [I25.10]  CAD, multiple vessel [I25.10]                   Date / Time: 2/21/2025  7:12 AM    Patient Admission Status: Inpatient   Readmission Risk (Low < 19, Mod (19-27), High > 27): Readmission Risk Score: 11.4    Current PCP: No primary care provider on file.  PCP verified by CM? Yes    Chart Reviewed: Yes      History Provided by: Patient  Patient Orientation: Alert and Oriented    Patient Cognition: Alert    Hospitalization in the last 30 days (Readmission):  Yes    If yes, Readmission Assessment in CM Navigator will be completed.    Advance Directives:      Code Status: Full Code   Patient's Primary Decision Maker is: Legal Next of Kin    Primary Decision Maker: Alyssa Avila - Spouse - [phone]    Discharge Planning:    Patient lives with: (P) Spouse/Significant Other Type of Home: (P) House  Primary Care Giver: Self  Patient Support Systems include: Spouse/Significant Other, Family Members   Current Financial resources: (P) Medicare  Current community resources: (P) None  Current services prior to admission: (P) Durable Medical Equipment            Current DME: (P) Walker, Cane, Other (Comment) (Rollator)            Type of Home Care services:  (P) Skilled Therapy, Nursing Services    ADLS  Prior functional level: (P) Independent in ADLs/IADLs  Current functional level: (P) Assistance with the following:, Mobility, Bathing, Dressing, Toileting    PT AM-PAC: 14 /24  OT AM-PAC: 16 /24    Family can provide assistance at DC: (P) Yes  Would you like Case Management to discuss the discharge plan with any other family members/significant others, and

## 2025-02-22 NOTE — PLAN OF CARE
Problem: Discharge Planning  Goal: Discharge to home or other facility with appropriate resources  2/22/2025 0649 by Payam Metcalf RN  Outcome: Progressing  2/22/2025 0648 by Paaym Metcalf RN  Outcome: Progressing  2/21/2025 1841 by Juan Crowder RN  Outcome: Progressing  Flowsheets (Taken 2/21/2025 0800 by Pete Juarez, RN)  Discharge to home or other facility with appropriate resources:   Identify barriers to discharge with patient and caregiver   Arrange for needed discharge resources and transportation as appropriate   Identify discharge learning needs (meds, wound care, etc)   Arrange for interpreters to assist at discharge as needed   Refer to discharge planning if patient needs post-hospital services based on physician order or complex needs related to functional status, cognitive ability or social support system     Problem: Chronic Conditions and Co-morbidities  Goal: Patient's chronic conditions and co-morbidity symptoms are monitored and maintained or improved  2/22/2025 0649 by Payam Metcalf RN  Outcome: Progressing  2/22/2025 0648 by Payam Metcalf RN  Outcome: Progressing  2/21/2025 1841 by Juan Crowder, RN  Outcome: Progressing  Flowsheets (Taken 2/21/2025 0800 by Pete Juarez, RN)  Care Plan - Patient's Chronic Conditions and Co-Morbidity Symptoms are Monitored and Maintained or Improved:   Monitor and assess patient's chronic conditions and comorbid symptoms for stability, deterioration, or improvement   Collaborate with multidisciplinary team to address chronic and comorbid conditions and prevent exacerbation or deterioration   Update acute care plan with appropriate goals if chronic or comorbid symptoms are exacerbated and prevent overall improvement and discharge     Problem: Safety - Adult  Goal: Free from fall injury  2/22/2025 0649 by Payam Metcalf RN  Outcome: Progressing  2/22/2025 0648 by Payam Metcalf RN  Outcome: Progressing  Flowsheets (Taken 2/21/2025  2200)  Free From Fall Injury: Based on caregiver fall risk screen, instruct family/caregiver to ask for assistance with transferring infant if caregiver noted to have fall risk factors  2/21/2025 1841 by Juan Crowder RN  Outcome: Progressing     Problem: Respiratory - Adult  Goal: Achieves optimal ventilation and oxygenation  2/22/2025 0649 by Payam Metcalf RN  Outcome: Progressing  2/22/2025 0648 by Payam Metcalf RN  Outcome: Progressing  Flowsheets (Taken 2/21/2025 2100)  Achieves optimal ventilation and oxygenation: Assess for changes in respiratory status  2/21/2025 1841 by Juan Crowder RN  Outcome: Progressing     Problem: Pain  Goal: Verbalizes/displays adequate comfort level or baseline comfort level  2/22/2025 0649 by Payam Metcalf RN  Outcome: Progressing  2/22/2025 0648 by Payam Mtecalf RN  Outcome: Progressing  Flowsheets  Taken 2/22/2025 0000  Verbalizes/displays adequate comfort level or baseline comfort level: Encourage patient to monitor pain and request assistance  Taken 2/21/2025 2000  Verbalizes/displays adequate comfort level or baseline comfort level: Assess pain using appropriate pain scale

## 2025-02-22 NOTE — PLAN OF CARE
Problem: Discharge Planning  Goal: Discharge to home or other facility with appropriate resources  2/22/2025 1132 by Tavo Pitts RN  Outcome: Progressing  2/22/2025 0649 by Payam Metcalf RN  Outcome: Progressing  2/22/2025 0648 by Payam Metcalf RN  Outcome: Progressing     Problem: Safety - Adult  Goal: Free from fall injury  2/22/2025 1132 by Tavo Pitts RN  Outcome: Progressing  2/22/2025 0649 by Payam Metcalf RN  Outcome: Progressing  2/22/2025 0648 by Payam Metcalf RN  Outcome: Progressing  Flowsheets (Taken 2/21/2025 2200)  Free From Fall Injury: Based on caregiver fall risk screen, instruct family/caregiver to ask for assistance with transferring infant if caregiver noted to have fall risk factors     Problem: Chronic Conditions and Co-morbidities  Goal: Patient's chronic conditions and co-morbidity symptoms are monitored and maintained or improved  2/22/2025 1132 by Tavo Pitts RN  Outcome: Progressing  2/22/2025 0649 by Payam Metcalf RN  Outcome: Progressing  2/22/2025 0648 by Payam Metcalf RN  Outcome: Progressing     Problem: Pain  Goal: Verbalizes/displays adequate comfort level or baseline comfort level  2/22/2025 1132 by Tavo Pitts RN  Outcome: Progressing  2/22/2025 0649 by Payam Metcalf RN  Outcome: Progressing  2/22/2025 0648 by Payam Metcalf RN  Outcome: Progressing  Flowsheets  Taken 2/22/2025 0000  Verbalizes/displays adequate comfort level or baseline comfort level: Encourage patient to monitor pain and request assistance  Taken 2/21/2025 2000  Verbalizes/displays adequate comfort level or baseline comfort level: Assess pain using appropriate pain scale

## 2025-02-22 NOTE — ANESTHESIA POSTPROCEDURE EVALUATION
Department of Anesthesiology  Postprocedure Note    Patient: Earnest Avila  MRN: [id number]  YOB: 1955  Date of evaluation: 2/21/2025    Procedure Summary       Date: 02/21/25 Room / Location: [place] [address] / [facility]    Anesthesia Start: 0840 Anesthesia Stop: 1539    Procedure: CORONARY ARTERY BYPASS GRAFT X3, MANDO CLIP 40MM, ON PUMP, FRAN (Chest) Diagnosis:       Coronary artery disease      (Coronary artery disease [I25.10])    Surgeons: Ronnie Jamison MD Responsible Provider: Dalia Krishna MD    Anesthesia Type: general ASA Status: 4            Anesthesia Type: No value filed.    Iram Phase I:      Iram Phase II:      Anesthesia Post Evaluation    Patient location during evaluation: ICU  Patient participation: complete - patient cannot participate  Level of consciousness: sedated and ventilated  Airway patency: patent  Cardiovascular status: blood pressure returned to baseline  Respiratory status: intubated and ventilator  Hydration status: euvolemic  Comments: No known anesthesia related complications    No notable events documented.

## 2025-02-22 NOTE — CONSULTS
Cardiology Consultation         Date:   2/22/2025  Patient name: Earnest Avila  Date of admission:  2/21/2025  7:12 AM  MRN:   [id number]  YOB: 1955    Reason for Admission: CABG    Chief Complaint: RUIZ    History of present illness:    69 y.o. male with hx RUIZ, hyperplasia of prostate, Gilbert's disease, DM2, BRIDGETTE, MVD s/p CABG x4 POD 1.  Complains of surgical pain this morning. SR on telemetry with frequent PVCs, BP stable, off pressors, on BiPAP, Hgb 9.6, plt 108        Past Medical History:   has a past medical history of Allergic rhinitis, Anxiety, Borderline hypertension, CAD (coronary artery disease), Controlled diabetes mellitus type II without complication (HCC), Dental abscess, Depression, History of colon polyps, Intermittent self-catheterization of bladder, Kidney stones, Neuropathy, Obesity, Obstructive sleep apnea of adult, Osteoarthritis, Prostate enlargement, Restless legs syndrome, Tubular adenoma of colon, Type II or unspecified type diabetes mellitus without mention of complication, not stated as uncontrolled, UTI (urinary tract infection), and Wears glasses.    Past Surgical History:   has a past surgical history that includes Cholecystectomy (1992); Lithotripsy (01/25/2016); Lithotripsy; Cystocopy (03/04/2016); Prostate surgery (01/2016); Cystoscopy (06/18/2016); Colonoscopy (06/30/2016); Shoulder Arthroplasty (Right, 06/16/2016); Shoulder arthroscopy (Right, 2002); Vasectomy (1995); Cystoscopy (07/29/2016); Cystocopy; Cystoscopy (N/A, 02/13/2017); Cystoscopy (N/A, 03/13/2017); joint replacement; Cystourethroscopy/Urethral Dilation (11/15/2017); pr cystourethroscopy (N/A, 11/15/2017); Cystoscopy (N/A, 11/15/2017); eye surgery (Bilateral, 1992); Bladder surgery; Colonoscopy (N/A, 12/20/2019); Cardiac catheterization (02/17/2025); and Cardiac procedure (N/A, 2/17/2025).     Home Medications:    Prior to Admission medications    Medication Sig Start Date End Date      LIVER PROFILE:No results for input(s): \"AST\", \"ALT\", \"LABALBU\" in the last 72 hours.      IMPRESSION:    RUIZ  MVD s/p CABG x4 POD 1  hyperplasia of prostate  Gilbert's disease  DM2  BRIDGETTE      PLAN:  Routine post op care per CTS  Continue asa Plavix  Continue statin bb  Continue lasix  Monitor electrolytes  Strict I&Os  Encourage IS  PT/OT      Discussed with patient and nursing.        Celia Victor, APRN - CNP        [facility] [place]    Attending Physician Statement    I have discussed the care of Earnest Avila, including pertinent history and exam findings,  with the cardiology NP/fellow/resident. I have seen and examined the patient and the key elements of all parts of the encounter have been performed by me. I have completed at least one if not all key elements of the E/M (history, physical exam, and MDM). I agree with the assessment, plan and orders as documented with additional recommendations as below:     Multivessel coronary artery disease status post CABG, postoperative day 1, hemodynamically stable and in sinus rhythm, off vasopressors, reported left-sided chest and arm pain, tender on palpation, no new ECG changes, will continue routine postop care per CT surgery recommendations, medical therapy with aspirin, Plavix, statin and beta-blocker.  Started on gentle IV diuresis.    Thank you for allowing me to participate in the care of this patient, please do not hesitate to call with questions.        Lemuel Spaulding MD  [facility]

## 2025-02-22 NOTE — PROGRESS NOTES
[facility] Cardiothoracic Surgery  Progress Note    2/22/2025 10:01 AM  Surgeon: Surgeon CTS: Dr. Yaquelin CHESTER   POD # 1  S/P: CABG x 4 LIMA to LAD saphenous vein to first diagonal saphenous vein to OM1 saphenous vein to the PDA  Endo vein harvest  Left atrial appendage  FRAN  Platelet gel     Subjective:  Mr. Avila up in chair.  Complains of pain.     Objective:  BP (!) 134/47   Pulse 81   Temp 98.2 °F (36.8 °C) (Axillary)   Resp 15   Wt 125.1 kg (275 lb 12.7 oz)   SpO2 96%   BMI 34.47 kg/m²   Chest: pacing wires: yes, chest tubes:yes, air leak no, 1 +  CV: no murmur noted, Normal S1, S2, NSR  Lungs: clear to auscultation, no wheezes, rales, or rhonchi  Abd: normal bowel sounds   Lower Extremities: Trace edema  Saph Incison: no sign of drainage or infection  Sternal Incison: dressing applied-no excessive drainage or signs of infection noted    Labs: Labs reviewed today  CBC:   Recent Labs     02/21/25  1548 02/22/25  0509   WBC 23.9* 9.8   HGB 13.0 9.6*   HCT 36.5* 28.3*   MCV 94.6 97.3    See Reflexed IPF Result     BMP:   Recent Labs     02/21/25  1548 02/21/25  2214 02/22/25  0509    141 141   K 4.6 5.0 4.4   * 111* 111*   CO2 19* 20 18*   BUN 18 18 16   CREATININE 1.0 1.0 0.9     Magnesium: @LABCNT(MG:3)@  APTT:   Recent Labs     02/21/25  1548   APTT 28.7     PT/INR:   Recent Labs     02/21/25  1548 02/22/25  0509   PROTIME 20.2* 19.1*   INR 1.8 1.6       Imaging:   XR CHEST PORTABLE 02/22/2025    Narrative  EXAMINATION:  ONE XRAY VIEW OF THE CHEST    2/22/2025 4:47 am    COMPARISON:  02/21/2025, 1603 hours    HISTORY:  ORDERING SYSTEM PROVIDED HISTORY: Post op open heart surgery  TECHNOLOGIST PROVIDED HISTORY:  Post op open heart surgery  Reason for Exam: POST OP HEART SURGERY    69-year-old male; postop open-heart surgery    FINDINGS:  Portable upright view of the chest.    Cardiac monitor leads overlie the chest.    Prior median sternotomy.    Right shoulder arthroplasty

## 2025-02-23 ENCOUNTER — APPOINTMENT (OUTPATIENT)
Dept: GENERAL RADIOLOGY | Age: 70
End: 2025-02-23
Attending: THORACIC SURGERY (CARDIOTHORACIC VASCULAR SURGERY)
Payer: COMMERCIAL

## 2025-02-23 LAB
ABO/RH: NORMAL
ANION GAP SERPL CALCULATED.3IONS-SCNC: 11 MMOL/L (ref 9–16)
ANTIBODY SCREEN: NEGATIVE
ARM BAND NUMBER: NORMAL
BLOOD BANK DISPENSE STATUS: NORMAL
BLOOD BANK DISPENSE STATUS: NORMAL
BLOOD BANK SAMPLE EXPIRATION: NORMAL
BPU ID: NORMAL
BPU ID: NORMAL
BUN SERPL-MCNC: 23 MG/DL (ref 8–23)
CALCIUM SERPL-MCNC: 8.3 MG/DL (ref 8.6–10.4)
CHLORIDE SERPL-SCNC: 104 MMOL/L (ref 98–107)
CO2 SERPL-SCNC: 22 MMOL/L (ref 20–31)
COMPONENT: NORMAL
COMPONENT: NORMAL
CREAT SERPL-MCNC: 1.1 MG/DL (ref 0.7–1.2)
CROSSMATCH RESULT: NORMAL
CROSSMATCH RESULT: NORMAL
EKG ATRIAL RATE: 63 BPM
EKG ATRIAL RATE: 69 BPM
EKG ATRIAL RATE: 83 BPM
EKG P AXIS: 10 DEGREES
EKG P AXIS: 2 DEGREES
EKG P AXIS: 24 DEGREES
EKG P-R INTERVAL: 158 MS
EKG P-R INTERVAL: 166 MS
EKG P-R INTERVAL: 176 MS
EKG Q-T INTERVAL: 400 MS
EKG Q-T INTERVAL: 404 MS
EKG Q-T INTERVAL: 432 MS
EKG QRS DURATION: 88 MS
EKG QRS DURATION: 92 MS
EKG QRS DURATION: 96 MS
EKG QTC CALCULATION (BAZETT): 462 MS
EKG QTC CALCULATION (BAZETT): 474 MS
EKG QTC CALCULATION (BAZETT): 478 MS
EKG R AXIS: 11 DEGREES
EKG R AXIS: 25 DEGREES
EKG R AXIS: 4 DEGREES
EKG T AXIS: -2 DEGREES
EKG T AXIS: 3 DEGREES
EKG T AXIS: 42 DEGREES
EKG VENTRICULAR RATE: 69 BPM
EKG VENTRICULAR RATE: 83 BPM
EKG VENTRICULAR RATE: 86 BPM
ERYTHROCYTE [DISTWIDTH] IN BLOOD BY AUTOMATED COUNT: 12.6 % (ref 11.8–14.4)
GFR, ESTIMATED: 73 ML/MIN/1.73M2
GLUCOSE BLD-MCNC: 133 MG/DL (ref 75–110)
GLUCOSE BLD-MCNC: 165 MG/DL (ref 75–110)
GLUCOSE BLD-MCNC: 171 MG/DL (ref 75–110)
GLUCOSE BLD-MCNC: 200 MG/DL (ref 75–110)
GLUCOSE SERPL-MCNC: 175 MG/DL (ref 74–99)
HCT VFR BLD AUTO: 30.2 % (ref 40.7–50.3)
HGB BLD-MCNC: 10.2 G/DL (ref 13–17)
INR PPP: 1.5
MAGNESIUM SERPL-MCNC: 2.2 MG/DL (ref 1.6–2.4)
MCH RBC QN AUTO: 33.7 PG (ref 25.2–33.5)
MCHC RBC AUTO-ENTMCNC: 33.8 G/DL (ref 28.4–34.8)
MCV RBC AUTO: 99.7 FL (ref 82.6–102.9)
NRBC BLD-RTO: 0 PER 100 WBC
PLATELET # BLD AUTO: 103 K/UL (ref 138–453)
PMV BLD AUTO: 10.8 FL (ref 8.1–13.5)
POTASSIUM SERPL-SCNC: 3.6 MMOL/L (ref 3.7–5.3)
POTASSIUM SERPL-SCNC: 4.4 MMOL/L (ref 3.7–5.3)
PROTHROMBIN TIME: 18.2 SEC (ref 11.7–14.9)
RBC # BLD AUTO: 3.03 M/UL (ref 4.21–5.77)
SODIUM SERPL-SCNC: 137 MMOL/L (ref 136–145)
TRANSFUSION STATUS: NORMAL
TRANSFUSION STATUS: NORMAL
UNIT DIVISION: 0
UNIT DIVISION: 0
WBC OTHER # BLD: 11.8 K/UL (ref 3.5–11.3)

## 2025-02-23 PROCEDURE — 6370000000 HC RX 637 (ALT 250 FOR IP)

## 2025-02-23 PROCEDURE — 82947 ASSAY GLUCOSE BLOOD QUANT: CPT

## 2025-02-23 PROCEDURE — 36415 COLL VENOUS BLD VENIPUNCTURE: CPT

## 2025-02-23 PROCEDURE — 6360000002 HC RX W HCPCS: Performed by: THORACIC SURGERY (CARDIOTHORACIC VASCULAR SURGERY)

## 2025-02-23 PROCEDURE — 97110 THERAPEUTIC EXERCISES: CPT

## 2025-02-23 PROCEDURE — 6360000002 HC RX W HCPCS

## 2025-02-23 PROCEDURE — 2500000003 HC RX 250 WO HCPCS: Performed by: INTERNAL MEDICINE

## 2025-02-23 PROCEDURE — 2500000003 HC RX 250 WO HCPCS

## 2025-02-23 PROCEDURE — 93010 ELECTROCARDIOGRAM REPORT: CPT | Performed by: INTERNAL MEDICINE

## 2025-02-23 PROCEDURE — 84132 ASSAY OF SERUM POTASSIUM: CPT

## 2025-02-23 PROCEDURE — 83735 ASSAY OF MAGNESIUM: CPT

## 2025-02-23 PROCEDURE — 85027 COMPLETE CBC AUTOMATED: CPT

## 2025-02-23 PROCEDURE — 80048 BASIC METABOLIC PNL TOTAL CA: CPT

## 2025-02-23 PROCEDURE — 71045 X-RAY EXAM CHEST 1 VIEW: CPT

## 2025-02-23 PROCEDURE — 85610 PROTHROMBIN TIME: CPT

## 2025-02-23 PROCEDURE — 99024 POSTOP FOLLOW-UP VISIT: CPT

## 2025-02-23 PROCEDURE — 2100000001 HC CVICU R&B

## 2025-02-23 PROCEDURE — 97116 GAIT TRAINING THERAPY: CPT

## 2025-02-23 PROCEDURE — 2580000003 HC RX 258

## 2025-02-23 PROCEDURE — 99232 SBSQ HOSP IP/OBS MODERATE 35: CPT | Performed by: INTERNAL MEDICINE

## 2025-02-23 PROCEDURE — 97530 THERAPEUTIC ACTIVITIES: CPT

## 2025-02-23 RX ORDER — FUROSEMIDE 10 MG/ML
40 INJECTION INTRAMUSCULAR; INTRAVENOUS 2 TIMES DAILY
Status: DISCONTINUED | OUTPATIENT
Start: 2025-02-23 | End: 2025-02-24

## 2025-02-23 RX ORDER — MAGNESIUM SULFATE IN WATER 40 MG/ML
2000 INJECTION, SOLUTION INTRAVENOUS ONCE
Status: COMPLETED | OUTPATIENT
Start: 2025-02-23 | End: 2025-02-23

## 2025-02-23 RX ADMIN — FUROSEMIDE 20 MG: 10 INJECTION, SOLUTION INTRAMUSCULAR; INTRAVENOUS at 07:45

## 2025-02-23 RX ADMIN — AMIODARONE HYDROCHLORIDE 150 MG: 1.5 INJECTION, SOLUTION INTRAVENOUS at 22:09

## 2025-02-23 RX ADMIN — OXYCODONE 10 MG: 5 TABLET ORAL at 10:04

## 2025-02-23 RX ADMIN — AMIODARONE HYDROCHLORIDE 0.5 MG/MIN: 1.8 INJECTION, SOLUTION INTRAVENOUS at 04:43

## 2025-02-23 RX ADMIN — CLOPIDOGREL BISULFATE 75 MG: 75 TABLET ORAL at 07:45

## 2025-02-23 RX ADMIN — OXYCODONE 10 MG: 5 TABLET ORAL at 16:54

## 2025-02-23 RX ADMIN — SODIUM CHLORIDE, PRESERVATIVE FREE 10 ML: 5 INJECTION INTRAVENOUS at 07:46

## 2025-02-23 RX ADMIN — AMIODARONE HYDROCHLORIDE 1 MG/MIN: 1.8 INJECTION, SOLUTION INTRAVENOUS at 22:36

## 2025-02-23 RX ADMIN — ASPIRIN 81 MG: 81 TABLET, COATED ORAL at 07:45

## 2025-02-23 RX ADMIN — KETOROLAC TROMETHAMINE 15 MG: 15 INJECTION, SOLUTION INTRAMUSCULAR; INTRAVENOUS at 11:42

## 2025-02-23 RX ADMIN — Medication 2000 MG: at 06:33

## 2025-02-23 RX ADMIN — MAGNESIUM SULFATE HEPTAHYDRATE 2000 MG: 40 INJECTION, SOLUTION INTRAVENOUS at 11:44

## 2025-02-23 RX ADMIN — OXYCODONE 10 MG: 5 TABLET ORAL at 06:25

## 2025-02-23 RX ADMIN — PREGABALIN 200 MG: 100 CAPSULE ORAL at 20:57

## 2025-02-23 RX ADMIN — SODIUM CHLORIDE, PRESERVATIVE FREE 10 ML: 5 INJECTION INTRAVENOUS at 20:59

## 2025-02-23 RX ADMIN — CHLORZOXAZONE 500 MG: 500 TABLET ORAL at 07:46

## 2025-02-23 RX ADMIN — SODIUM CHLORIDE: 0.9 INJECTION, SOLUTION INTRAVENOUS at 22:04

## 2025-02-23 RX ADMIN — CHLORZOXAZONE 500 MG: 500 TABLET ORAL at 20:59

## 2025-02-23 RX ADMIN — MUPIROCIN: 20 OINTMENT TOPICAL at 20:59

## 2025-02-23 RX ADMIN — PREGABALIN 200 MG: 100 CAPSULE ORAL at 07:45

## 2025-02-23 RX ADMIN — OXYCODONE 10 MG: 5 TABLET ORAL at 20:58

## 2025-02-23 RX ADMIN — AMIODARONE HYDROCHLORIDE 200 MG: 200 TABLET ORAL at 20:59

## 2025-02-23 RX ADMIN — KETOROLAC TROMETHAMINE 15 MG: 15 INJECTION, SOLUTION INTRAMUSCULAR; INTRAVENOUS at 05:35

## 2025-02-23 RX ADMIN — AMIODARONE HYDROCHLORIDE 200 MG: 200 TABLET ORAL at 15:49

## 2025-02-23 RX ADMIN — PANTOPRAZOLE SODIUM 40 MG: 40 TABLET, DELAYED RELEASE ORAL at 07:45

## 2025-02-23 RX ADMIN — POTASSIUM CHLORIDE 20 MEQ: 29.8 INJECTION, SOLUTION INTRAVENOUS at 22:05

## 2025-02-23 RX ADMIN — MUPIROCIN: 20 OINTMENT TOPICAL at 07:46

## 2025-02-23 RX ADMIN — POLYETHYLENE GLYCOL 3350 17 G: 17 POWDER, FOR SOLUTION ORAL at 07:45

## 2025-02-23 RX ADMIN — ATORVASTATIN CALCIUM 20 MG: 20 TABLET, FILM COATED ORAL at 20:57

## 2025-02-23 RX ADMIN — INSULIN LISPRO 2 UNITS: 100 INJECTION, SOLUTION INTRAVENOUS; SUBCUTANEOUS at 16:48

## 2025-02-23 RX ADMIN — POTASSIUM CHLORIDE 20 MEQ: 29.8 INJECTION, SOLUTION INTRAVENOUS at 23:07

## 2025-02-23 RX ADMIN — INSULIN LISPRO 2 UNITS: 100 INJECTION, SOLUTION INTRAVENOUS; SUBCUTANEOUS at 07:48

## 2025-02-23 RX ADMIN — INSULIN LISPRO 4 UNITS: 100 INJECTION, SOLUTION INTRAVENOUS; SUBCUTANEOUS at 11:42

## 2025-02-23 RX ADMIN — FUROSEMIDE 40 MG: 10 INJECTION, SOLUTION INTRAMUSCULAR; INTRAVENOUS at 16:48

## 2025-02-23 RX ADMIN — MELOXICAM 15 MG: 7.5 TABLET ORAL at 07:46

## 2025-02-23 ASSESSMENT — PAIN SCALES - GENERAL
PAINLEVEL_OUTOF10: 4
PAINLEVEL_OUTOF10: 7
PAINLEVEL_OUTOF10: 3
PAINLEVEL_OUTOF10: 6
PAINLEVEL_OUTOF10: 7
PAINLEVEL_OUTOF10: 4
PAINLEVEL_OUTOF10: 2
PAINLEVEL_OUTOF10: 6
PAINLEVEL_OUTOF10: 7
PAINLEVEL_OUTOF10: 7

## 2025-02-23 ASSESSMENT — PAIN DESCRIPTION - ORIENTATION
ORIENTATION: MID
ORIENTATION: MID

## 2025-02-23 ASSESSMENT — PAIN DESCRIPTION - LOCATION
LOCATION: BACK
LOCATION: RIB CAGE;CHEST
LOCATION: CHEST;BACK
LOCATION: CHEST;RIB CAGE;LEG

## 2025-02-23 ASSESSMENT — PAIN DESCRIPTION - PAIN TYPE: TYPE: CHRONIC PAIN

## 2025-02-23 ASSESSMENT — PAIN DESCRIPTION - DESCRIPTORS
DESCRIPTORS: ACHING
DESCRIPTORS: ACHING;DISCOMFORT

## 2025-02-23 NOTE — PLAN OF CARE
Problem: Discharge Planning  Goal: Discharge to home or other facility with appropriate resources  Outcome: Progressing  Flowsheets (Taken 2/23/2025 0800)  Discharge to home or other facility with appropriate resources: Identify barriers to discharge with patient and caregiver     Problem: Chronic Conditions and Co-morbidities  Goal: Patient's chronic conditions and co-morbidity symptoms are monitored and maintained or improved  Outcome: Progressing  Flowsheets (Taken 2/23/2025 0800)  Care Plan - Patient's Chronic Conditions and Co-Morbidity Symptoms are Monitored and Maintained or Improved: Monitor and assess patient's chronic conditions and comorbid symptoms for stability, deterioration, or improvement     Problem: Safety - Adult  Goal: Free from fall injury  Outcome: Progressing     Problem: Respiratory - Adult  Goal: Achieves optimal ventilation and oxygenation  Outcome: Progressing  Flowsheets (Taken 2/23/2025 0800)  Achieves optimal ventilation and oxygenation: Assess for changes in respiratory status

## 2025-02-23 NOTE — PROGRESS NOTES
Physical Therapy  Facility/Department: [facility] CAR 1- SICU  Physical Therapy Daily Note    Name: Earnest Avila  : 1955  MRN: [id number]  Date of Service: 2025    Discharge Recommendations:  Patient would benefit from continued therapy after discharge   PT Equipment Recommendations  Other: TBD        Past Medical History:  has a past medical history of Allergic rhinitis, Anxiety, Borderline hypertension, CAD (coronary artery disease), Controlled diabetes mellitus type II without complication ([facility]), Dental abscess, Depression, History of colon polyps, Intermittent self-catheterization of bladder, Kidney stones, Neuropathy, Obesity, Obstructive sleep apnea of adult, Osteoarthritis, Prostate enlargement, Restless legs syndrome, Tubular adenoma of colon, Type II or unspecified type diabetes mellitus without mention of complication, not stated as uncontrolled, UTI (urinary tract infection), and Wears glasses.  Past Surgical History:  has a past surgical history that includes Cholecystectomy (); Lithotripsy (2016); Lithotripsy; Cystocopy (2016); Prostate surgery (2016); Cystoscopy (2016); Colonoscopy (2016); Shoulder Arthroplasty (Right, 2016); Shoulder arthroscopy (Right, ); Vasectomy (); Cystoscopy (2016); Cystocopy; Cystoscopy (N/A, 2017); Cystoscopy (N/A, 2017); joint replacement; Cystourethroscopy/Urethral Dilation (11/15/2017); pr cystourethroscopy (N/A, 11/15/2017); Cystoscopy (N/A, 11/15/2017); eye surgery (Bilateral, ); Bladder surgery; Colonoscopy (N/A, 2019); Cardiac catheterization (2025); and Cardiac procedure (N/A, 2025).    Assessment  Assessment: Transfers with CGA for safety. Pt amb 20' CGA RW. Further ambulation distance limited by fatigue, pain and dizziness.  SpO2% 95, /59.  Increaesed dizziness after sitting down s/p AMB which subsided after approx 45 seconds.  Pt polite and cooperative.  Pt verbalizes

## 2025-02-23 NOTE — PROGRESS NOTES
[facility] Cardiothoracic Surgery  Progress Note    2/23/2025 9:31 AM  Surgeon: Surgeon CTS: Dr. Yaquelin CHESTER   POD # 1  S/P: CABG x 4 LIMA to LAD saphenous vein to first diagonal saphenous vein to OM1 saphenous vein to the PDA  Endo vein harvest  Left atrial appendage  FRAN  Platelet gel     Subjective:  Mr. Avila up in chair.  Complains of pain.     Objective:  BP (!) 99/53   Pulse 54   Temp 98.4 °F (36.9 °C)   Resp 16   Wt 124.7 kg (274 lb 14.6 oz)   SpO2 96%   BMI 34.36 kg/m²   Chest: pacing wires: yes, chest tubes:yes, air leak no, 1 +  CV: no murmur noted, Normal S1, S2, NSR  Lungs: clear to auscultation, no wheezes, rales, or rhonchi  Abd: normal bowel sounds   Lower Extremities: Trace edema  Saph Incison: no sign of drainage or infection  Sternal Incison: dressing applied-no excessive drainage or signs of infection noted    Labs: Labs reviewed today  CBC:   Recent Labs     02/21/25  1548 02/22/25  0509 02/23/25 0223   WBC 23.9* 9.8 11.8*   HGB 13.0 9.6* 10.2*   HCT 36.5* 28.3* 30.2*   MCV 94.6 97.3 99.7    See Reflexed IPF Result 103*     BMP:   Recent Labs     02/21/25  2214 02/22/25  0509 02/23/25 0223    141 137   K 5.0 4.4 4.4   * 111* 104   CO2 20 18* 22   BUN 18 16 23   CREATININE 1.0 0.9 1.1     Magnesium: @LABCNT(MG:3)@  APTT:   Recent Labs     02/21/25  1548   APTT 28.7     PT/INR:   Recent Labs     02/21/25  1548 02/22/25  0509 02/23/25  0223   PROTIME 20.2* 19.1* 18.2*   INR 1.8 1.6 1.5       Imaging:   XR CHEST PORTABLE 02/22/2025    Narrative  EXAMINATION:  ONE XRAY VIEW OF THE CHEST    2/22/2025 4:47 am    COMPARISON:  02/21/2025, 1603 hours    HISTORY:  ORDERING SYSTEM PROVIDED HISTORY: Post op open heart surgery  TECHNOLOGIST PROVIDED HISTORY:  Post op open heart surgery  Reason for Exam: POST OP HEART SURGERY    69-year-old male; postop open-heart surgery    FINDINGS:  Portable upright view of the chest.    Cardiac monitor leads overlie the chest.    Prior median  and has only walked to the door.  -Continue aggressive ICS.  -Keep chest tubes for today. 590/32 overnight  -Patient has suprapubic catheter.  Continue to monitor strict I's and O's will increase diuretic.  600 mL out overnight.  X-ray reviewed.  -Keep central line 1 more day      2/22/25  Postop day 1 (Yaquelin)  -Patient has complaints of sternal pain.  All PRNs given.  Added Toradol for pain control.  -EKG done this morning NSR.  Cardiology notified and reviewed.  -Patient up to chair and alert and oriented x 4.  On BiPAP.  Continue as patient tolerates.  Will help with pulmonary recruitment  -Keep chest tubes.  -Keep central line  -Remove cortes Leyva well.  -Okay to DC arterial line if patient has remained off vasopressors for 6 hours per protocol  -Monitor and replace electrolytes per protocol  -ABLA : hemoglobin 9.6  -Continue routine postop care    TSERING Mohamud - CNP

## 2025-02-23 NOTE — PLAN OF CARE
Problem: Discharge Planning  Goal: Discharge to home or other facility with appropriate resources  2/22/2025 2327 by Alton Douglas RN  Outcome: Progressing  2/22/2025 1132 by Tavo Pitts RN  Outcome: Progressing     Problem: Chronic Conditions and Co-morbidities  Goal: Patient's chronic conditions and co-morbidity symptoms are monitored and maintained or improved  2/22/2025 2327 by Alton Douglas RN  Outcome: Progressing  2/22/2025 1132 by Tavo Pitts RN  Outcome: Progressing     Problem: Safety - Adult  Goal: Free from fall injury  2/22/2025 2327 by Alton Douglas RN  Outcome: Progressing  2/22/2025 1132 by Tavo Pitts RN  Outcome: Progressing     Problem: Respiratory - Adult  Goal: Achieves optimal ventilation and oxygenation  2/22/2025 2327 by Alton Douglas RN  Outcome: Progressing  2/22/2025 1132 by Tavo Pitts RN  Outcome: Progressing     Problem: Pain  Goal: Verbalizes/displays adequate comfort level or baseline comfort level  2/22/2025 2327 by Alton Douglas RN  Outcome: Progressing  2/22/2025 1132 by Tavo Pitts RN  Outcome: Progressing

## 2025-02-23 NOTE — PROGRESS NOTES
Cardiology Progress Note                     Date:   2/23/2025  Patient name: Earnest Avila  Date of admission:  2/21/2025  7:12 AM  MRN:   [id number]  YOB: 1955  PCP: No primary care provider on file.    Reason for Admission:  MV CAD post CABG     Subjective:       Overnight patient went into atrial fib with rvr, started on amio infusion with spont conversion to sinus rhythm this am, remained hemodynamically stable, chest and arm pain is improved, on 3 L NC O2. Hb and platelets counts stable.       Scheduled Meds:   furosemide  40 mg IntraVENous BID    magnesium sulfate  2,000 mg IntraVENous Once    meloxicam  15 mg Oral Daily    pregabalin  200 mg Oral BID    chlorzoxazone  500 mg Oral BID    sodium chloride flush  5-40 mL IntraVENous 2 times per day    aspirin  81 mg Oral Daily    clopidogrel  75 mg Oral Daily    amiodarone  200 mg Oral TID    mupirocin   Each Nostril BID    polyethylene glycol  17 g Oral Daily    metoprolol tartrate  12.5 mg Oral BID    atorvastatin  20 mg Oral Nightly    pantoprazole  40 mg Oral Daily    Or    pantoprazole (PROTONIX) 40 mg in sodium chloride (PF) 0.9 % 10 mL injection  40 mg IntraVENous Daily    insulin lispro  0-12 Units SubCUTAneous TID WC       Continuous Infusions:   amiodarone 0.5 mg/min (02/23/25 0701)    sodium chloride Stopped (02/22/25 0350)    sodium chloride Stopped (02/22/25 0834)    propofol Stopped (02/21/25 2104)    norepinephrine Stopped (02/22/25 0221)    EPINEPHrine Stopped (02/22/25 0103)    VASOpressin Stopped (02/21/25 1624)    insulin Stopped (02/22/25 0818)    dextrose      dexmedeTOMIDine Stopped (02/21/25 2158)       Labs:     CBC:   Recent Labs     02/21/25  1548 02/22/25  0509 02/23/25  0223   WBC 23.9* 9.8 11.8*   HGB 13.0 9.6* 10.2*    See Reflexed IPF Result 103*     BMP:    Recent Labs     02/21/25  2214 02/22/25  0509 02/23/25  0223    141 137   K 5.0 4.4 4.4   * 111* 104   CO2 20 18* 22   BUN 18

## 2025-02-24 ENCOUNTER — APPOINTMENT (OUTPATIENT)
Dept: GENERAL RADIOLOGY | Age: 70
End: 2025-02-24
Attending: THORACIC SURGERY (CARDIOTHORACIC VASCULAR SURGERY)
Payer: COMMERCIAL

## 2025-02-24 LAB
ANION GAP SERPL CALCULATED.3IONS-SCNC: 9 MMOL/L (ref 9–16)
BUN SERPL-MCNC: 22 MG/DL (ref 8–23)
CALCIUM SERPL-MCNC: 8.5 MG/DL (ref 8.6–10.4)
CHLORIDE SERPL-SCNC: 103 MMOL/L (ref 98–107)
CO2 SERPL-SCNC: 24 MMOL/L (ref 20–31)
CREAT SERPL-MCNC: 1 MG/DL (ref 0.7–1.2)
ERYTHROCYTE [DISTWIDTH] IN BLOOD BY AUTOMATED COUNT: 12.2 % (ref 11.8–14.4)
GFR, ESTIMATED: 81 ML/MIN/1.73M2
GLUCOSE BLD-MCNC: 145 MG/DL (ref 75–110)
GLUCOSE BLD-MCNC: 165 MG/DL (ref 75–110)
GLUCOSE BLD-MCNC: 185 MG/DL (ref 75–110)
GLUCOSE SERPL-MCNC: 181 MG/DL (ref 74–99)
HCT VFR BLD AUTO: 29.7 % (ref 40.7–50.3)
HGB BLD-MCNC: 10 G/DL (ref 13–17)
MAGNESIUM SERPL-MCNC: 2.2 MG/DL (ref 1.6–2.4)
MCH RBC QN AUTO: 33 PG (ref 25.2–33.5)
MCHC RBC AUTO-ENTMCNC: 33.7 G/DL (ref 28.4–34.8)
MCV RBC AUTO: 98 FL (ref 82.6–102.9)
NRBC BLD-RTO: 0 PER 100 WBC
PLATELET # BLD AUTO: 116 K/UL (ref 138–453)
PMV BLD AUTO: 10.7 FL (ref 8.1–13.5)
POTASSIUM SERPL-SCNC: 4 MMOL/L (ref 3.7–5.3)
RBC # BLD AUTO: 3.03 M/UL (ref 4.21–5.77)
SODIUM SERPL-SCNC: 136 MMOL/L (ref 136–145)
WBC OTHER # BLD: 8.2 K/UL (ref 3.5–11.3)

## 2025-02-24 PROCEDURE — 2100000001 HC CVICU R&B

## 2025-02-24 PROCEDURE — 97535 SELF CARE MNGMENT TRAINING: CPT

## 2025-02-24 PROCEDURE — 6370000000 HC RX 637 (ALT 250 FOR IP): Performed by: THORACIC SURGERY (CARDIOTHORACIC VASCULAR SURGERY)

## 2025-02-24 PROCEDURE — 6360000002 HC RX W HCPCS

## 2025-02-24 PROCEDURE — 83735 ASSAY OF MAGNESIUM: CPT

## 2025-02-24 PROCEDURE — 97110 THERAPEUTIC EXERCISES: CPT

## 2025-02-24 PROCEDURE — 36415 COLL VENOUS BLD VENIPUNCTURE: CPT

## 2025-02-24 PROCEDURE — 85027 COMPLETE CBC AUTOMATED: CPT

## 2025-02-24 PROCEDURE — 97530 THERAPEUTIC ACTIVITIES: CPT

## 2025-02-24 PROCEDURE — 6360000002 HC RX W HCPCS: Performed by: NURSE PRACTITIONER

## 2025-02-24 PROCEDURE — 71045 X-RAY EXAM CHEST 1 VIEW: CPT

## 2025-02-24 PROCEDURE — 2500000003 HC RX 250 WO HCPCS: Performed by: INTERNAL MEDICINE

## 2025-02-24 PROCEDURE — 6370000000 HC RX 637 (ALT 250 FOR IP)

## 2025-02-24 PROCEDURE — 80048 BASIC METABOLIC PNL TOTAL CA: CPT

## 2025-02-24 PROCEDURE — 99024 POSTOP FOLLOW-UP VISIT: CPT | Performed by: NURSE PRACTITIONER

## 2025-02-24 PROCEDURE — 82947 ASSAY GLUCOSE BLOOD QUANT: CPT

## 2025-02-24 PROCEDURE — 6370000000 HC RX 637 (ALT 250 FOR IP): Performed by: NURSE PRACTITIONER

## 2025-02-24 PROCEDURE — 74018 RADEX ABDOMEN 1 VIEW: CPT

## 2025-02-24 PROCEDURE — 97116 GAIT TRAINING THERAPY: CPT

## 2025-02-24 RX ORDER — FUROSEMIDE 10 MG/ML
20 INJECTION INTRAMUSCULAR; INTRAVENOUS 2 TIMES DAILY
Status: DISCONTINUED | OUTPATIENT
Start: 2025-02-24 | End: 2025-02-26

## 2025-02-24 RX ORDER — ALBUTEROL SULFATE 0.83 MG/ML
2.5 SOLUTION RESPIRATORY (INHALATION) EVERY 6 HOURS PRN
Status: DISCONTINUED | OUTPATIENT
Start: 2025-02-24 | End: 2025-03-01 | Stop reason: HOSPADM

## 2025-02-24 RX ORDER — MAGNESIUM HYDROXIDE/ALUMINUM HYDROXICE/SIMETHICONE 120; 1200; 1200 MG/30ML; MG/30ML; MG/30ML
30 SUSPENSION ORAL EVERY 6 HOURS PRN
Status: DISCONTINUED | OUTPATIENT
Start: 2025-02-24 | End: 2025-03-01 | Stop reason: HOSPADM

## 2025-02-24 RX ORDER — BENZOCAINE/MENTHOL 6 MG-10 MG
LOZENGE MUCOUS MEMBRANE 2 TIMES DAILY
Status: DISCONTINUED | OUTPATIENT
Start: 2025-02-24 | End: 2025-03-01 | Stop reason: HOSPADM

## 2025-02-24 RX ORDER — POTASSIUM CHLORIDE 1500 MG/1
40 TABLET, EXTENDED RELEASE ORAL PRN
Status: DISCONTINUED | OUTPATIENT
Start: 2025-02-24 | End: 2025-03-01 | Stop reason: HOSPADM

## 2025-02-24 RX ORDER — POTASSIUM CHLORIDE 7.45 MG/ML
10 INJECTION INTRAVENOUS PRN
Status: DISCONTINUED | OUTPATIENT
Start: 2025-02-24 | End: 2025-03-01 | Stop reason: HOSPADM

## 2025-02-24 RX ADMIN — INSULIN LISPRO 2 UNITS: 100 INJECTION, SOLUTION INTRAVENOUS; SUBCUTANEOUS at 09:05

## 2025-02-24 RX ADMIN — DIPHENHYDRAMINE HYDROCHLORIDE 25 MG: 25 TABLET ORAL at 15:49

## 2025-02-24 RX ADMIN — DIPHENHYDRAMINE HYDROCHLORIDE 25 MG: 25 TABLET ORAL at 20:19

## 2025-02-24 RX ADMIN — OXYCODONE 5 MG: 5 TABLET ORAL at 05:57

## 2025-02-24 RX ADMIN — MELOXICAM 15 MG: 7.5 TABLET ORAL at 09:05

## 2025-02-24 RX ADMIN — AMIODARONE HYDROCHLORIDE 200 MG: 200 TABLET ORAL at 19:57

## 2025-02-24 RX ADMIN — CLOPIDOGREL BISULFATE 75 MG: 75 TABLET ORAL at 09:05

## 2025-02-24 RX ADMIN — AMIODARONE HYDROCHLORIDE 0.5 MG/MIN: 1.8 INJECTION, SOLUTION INTRAVENOUS at 17:16

## 2025-02-24 RX ADMIN — MAGNESIUM CITRATE 296 ML: 1.75 LIQUID ORAL at 10:57

## 2025-02-24 RX ADMIN — MUPIROCIN: 20 OINTMENT TOPICAL at 09:06

## 2025-02-24 RX ADMIN — CHLORZOXAZONE 500 MG: 500 TABLET ORAL at 09:05

## 2025-02-24 RX ADMIN — INSULIN LISPRO 2 UNITS: 100 INJECTION, SOLUTION INTRAVENOUS; SUBCUTANEOUS at 16:54

## 2025-02-24 RX ADMIN — PREGABALIN 200 MG: 100 CAPSULE ORAL at 19:57

## 2025-02-24 RX ADMIN — AMIODARONE HYDROCHLORIDE 1 MG/MIN: 1.8 INJECTION, SOLUTION INTRAVENOUS at 08:13

## 2025-02-24 RX ADMIN — ATORVASTATIN CALCIUM 20 MG: 20 TABLET, FILM COATED ORAL at 19:57

## 2025-02-24 RX ADMIN — FUROSEMIDE 20 MG: 10 INJECTION, SOLUTION INTRAMUSCULAR; INTRAVENOUS at 09:05

## 2025-02-24 RX ADMIN — AMIODARONE HYDROCHLORIDE 200 MG: 200 TABLET ORAL at 14:17

## 2025-02-24 RX ADMIN — INSULIN LISPRO 2 UNITS: 100 INJECTION, SOLUTION INTRAVENOUS; SUBCUTANEOUS at 12:15

## 2025-02-24 RX ADMIN — PREGABALIN 200 MG: 100 CAPSULE ORAL at 09:05

## 2025-02-24 RX ADMIN — OXYCODONE 10 MG: 5 TABLET ORAL at 09:57

## 2025-02-24 RX ADMIN — PANTOPRAZOLE SODIUM 40 MG: 40 TABLET, DELAYED RELEASE ORAL at 09:05

## 2025-02-24 RX ADMIN — AMIODARONE HYDROCHLORIDE 200 MG: 200 TABLET ORAL at 09:04

## 2025-02-24 RX ADMIN — AMIODARONE HYDROCHLORIDE 1 MG/MIN: 1.8 INJECTION, SOLUTION INTRAVENOUS at 00:59

## 2025-02-24 RX ADMIN — Medication 12.5 MG: at 10:57

## 2025-02-24 RX ADMIN — CHLORZOXAZONE 500 MG: 500 TABLET ORAL at 20:00

## 2025-02-24 RX ADMIN — MUPIROCIN: 20 OINTMENT TOPICAL at 19:57

## 2025-02-24 RX ADMIN — POTASSIUM CHLORIDE 20 MEQ: 29.8 INJECTION, SOLUTION INTRAVENOUS at 05:58

## 2025-02-24 RX ADMIN — POLYETHYLENE GLYCOL 3350 17 G: 17 POWDER, FOR SOLUTION ORAL at 11:50

## 2025-02-24 RX ADMIN — FENTANYL CITRATE 25 MCG: 50 INJECTION INTRAMUSCULAR; INTRAVENOUS at 07:36

## 2025-02-24 RX ADMIN — HYDROCORTISONE: 10 CREAM TOPICAL at 19:30

## 2025-02-24 RX ADMIN — FUROSEMIDE 20 MG: 10 INJECTION, SOLUTION INTRAMUSCULAR; INTRAVENOUS at 16:54

## 2025-02-24 RX ADMIN — ASPIRIN 81 MG: 81 TABLET, COATED ORAL at 09:04

## 2025-02-24 ASSESSMENT — PAIN SCALES - GENERAL
PAINLEVEL_OUTOF10: 2
PAINLEVEL_OUTOF10: 6
PAINLEVEL_OUTOF10: 7
PAINLEVEL_OUTOF10: 5

## 2025-02-24 ASSESSMENT — PAIN DESCRIPTION - ORIENTATION: ORIENTATION: MID

## 2025-02-24 ASSESSMENT — PAIN DESCRIPTION - DESCRIPTORS: DESCRIPTORS: ACHING;DISCOMFORT

## 2025-02-24 ASSESSMENT — PAIN DESCRIPTION - LOCATION: LOCATION: CHEST

## 2025-02-24 NOTE — PROGRESS NOTES
Occupational Therapy  Occupational Therapy Daily Treatment Note  Facility/Department: [facility] CAR 1- SIC   Patient Name: Earnest Avila        MRN: [id number]    : 1955    Date of Service: 2025    No chief complaint on file.    Past Medical History:  has a past medical history of Allergic rhinitis, Anxiety, Borderline hypertension, CAD (coronary artery disease), Controlled diabetes mellitus type II without complication (HCC), Dental abscess, Depression, History of colon polyps, Intermittent self-catheterization of bladder, Kidney stones, Neuropathy, Obesity, Obstructive sleep apnea of adult, Osteoarthritis, Prostate enlargement, Restless legs syndrome, Tubular adenoma of colon, Type II or unspecified type diabetes mellitus without mention of complication, not stated as uncontrolled, UTI (urinary tract infection), and Wears glasses.  Past Surgical History:  has a past surgical history that includes Cholecystectomy (); Lithotripsy (2016); Lithotripsy; Cystocopy (2016); Prostate surgery (2016); Cystoscopy (2016); Colonoscopy (2016); Shoulder Arthroplasty (Right, 2016); Shoulder arthroscopy (Right, ); Vasectomy (); Cystoscopy (2016); Cystocopy; Cystoscopy (N/A, 2017); Cystoscopy (N/A, 2017); joint replacement; Cystourethroscopy/Urethral Dilation (11/15/2017); pr cystourethroscopy (N/A, 11/15/2017); Cystoscopy (N/A, 11/15/2017); eye surgery (Bilateral, ); Bladder surgery; Colonoscopy (N/A, 2019); Cardiac catheterization (2025); and Cardiac procedure (N/A, 2025).    Discharge Recommendations  Discharge Recommendations: Patient would benefit from continued therapy after discharge       Assessment  Performance deficits / Impairments: Decreased functional mobility ;Decreased ADL status;Decreased endurance;Decreased balance;Decreased high-level IADLs  Assessment: Pt limited by above deficits impacting ADL completion, ADL transfers

## 2025-02-24 NOTE — PROGRESS NOTES
Physical Therapy  Facility/Department: [facility]- SICU   Physical Therapy Daily Treatment Note    Patient Name: Earnest Avila        MRN: [id number]    : 1955    Date of Service: 2025    Past Medical History:  has a past medical history of Allergic rhinitis, Anxiety, Borderline hypertension, CAD (coronary artery disease), Controlled diabetes mellitus type II without complication (HCC), Dental abscess, Depression, History of colon polyps, Intermittent self-catheterization of bladder, Kidney stones, Neuropathy, Obesity, Obstructive sleep apnea of adult, Osteoarthritis, Prostate enlargement, Restless legs syndrome, Tubular adenoma of colon, Type II or unspecified type diabetes mellitus without mention of complication, not stated as uncontrolled, UTI (urinary tract infection), and Wears glasses.  Past Surgical History:  has a past surgical history that includes Cholecystectomy (); Lithotripsy (2016); Lithotripsy; Cystocopy (2016); Prostate surgery (2016); Cystoscopy (2016); Colonoscopy (2016); Shoulder Arthroplasty (Right, 2016); Shoulder arthroscopy (Right, ); Vasectomy (); Cystoscopy (2016); Cystocopy; Cystoscopy (N/A, 2017); Cystoscopy (N/A, 2017); joint replacement; Cystourethroscopy/Urethral Dilation (11/15/2017); pr cystourethroscopy (N/A, 11/15/2017); Cystoscopy (N/A, 11/15/2017); eye surgery (Bilateral, ); Bladder surgery; Colonoscopy (N/A, 2019); Cardiac catheterization (2025); Cardiac procedure (N/A, 2025); and Coronary artery bypass graft (N/A, 2025).    Discharge Recommendations  Discharge Recommendations: Patient would benefit from continued therapy after discharge  PT Equipment Recommendations  Equipment Needed: Yes  Mobility Devices: Walker  Walker: Rolling  Other: TBD    Assessment  Body Structures, Functions, Activity Limitations Requiring Skilled Therapeutic Intervention: Decreased functional  EOB    Exercise  PT Exercises  Exercise Treatment: Seated LE exercise program: Long Arc Quads, hip abduction/adduction, heel/toe raises, and marches. Reps: x10    Patient given and educated on cardiac HEP, pt with no further questions at this time.     Patient Education  Patient Education  Education Given To: Patient  Education Provided: Role of Therapy;Plan of Care;Precautions;Transfer Training  Education Provided Comments: heart hugger use  Education Method: Demonstration;Verbal  Barriers to Learning: None  Education Outcome: Verbalized understanding;Demonstrated understanding    Plan  Physical Therapy Plan  General Plan: 6-7 times per week  Current Treatment Recommendations: Strengthening, Balance training, Functional mobility training, Transfer training, Endurance training, Gait training, Stair training, Neuromuscular re-education, Home exercise program, Safety education & training, Patient/Caregiver education & training, Equipment evaluation, education, & procurement, Therapeutic activities    Goals  Short Term Goals  Time Frame for Short Term Goals: 14 visits  Short Term Goal 1: Pt will be Antonio in all bed mobility tasks  Short Term Goal 2: Pt will be Antonio in transfers  Short Term Goal 3: Pt will amb 300' Antonio w/ RW  Short Term Goal 4: Pt will negotiate 1 step Antonio w/ unilateral UE assist  Additional Goals?: No    Minutes  PT Individual Minutes  Time In: 1502  Time Out: 1545  Minutes: 43  Time Code Minutes  Timed Code Treatment Minutes: 38 Minutes    Electronically signed by MADELINE Murray on 2/24/25 at 4:10 PM EST  Treatment performed by Student PTA under the supervision of co-signing PTA who agrees with all treatment and documentation.   Tracey Lemus PTA

## 2025-02-24 NOTE — PLAN OF CARE
Problem: Discharge Planning  Goal: Discharge to home or other facility with appropriate resources  2/24/2025 0417 by Aria Tapia RN  Outcome: Progressing  2/23/2025 1828 by Melodie Dela Cruz RN  Outcome: Progressing  Flowsheets (Taken 2/23/2025 0800)  Discharge to home or other facility with appropriate resources: Identify barriers to discharge with patient and caregiver     Problem: Chronic Conditions and Co-morbidities  Goal: Patient's chronic conditions and co-morbidity symptoms are monitored and maintained or improved  2/24/2025 0417 by Aria Tapia RN  Outcome: Progressing  2/23/2025 1828 by Melodie Dela Cruz RN  Outcome: Progressing  Flowsheets (Taken 2/23/2025 0800)  Care Plan - Patient's Chronic Conditions and Co-Morbidity Symptoms are Monitored and Maintained or Improved: Monitor and assess patient's chronic conditions and comorbid symptoms for stability, deterioration, or improvement     Problem: Safety - Adult  Goal: Free from fall injury  2/24/2025 0417 by Aria Tapia RN  Outcome: Progressing  2/23/2025 1828 by Melodie Dela Cruz RN  Outcome: Progressing     Problem: Respiratory - Adult  Goal: Achieves optimal ventilation and oxygenation  2/24/2025 0417 by Aria Tapia RN  Outcome: Progressing  2/23/2025 1828 by Melodie Dela Cruz RN  Outcome: Progressing  Flowsheets (Taken 2/23/2025 0800)  Achieves optimal ventilation and oxygenation: Assess for changes in respiratory status     Problem: Pain  Goal: Verbalizes/displays adequate comfort level or baseline comfort level  Outcome: Progressing

## 2025-02-24 NOTE — PROGRESS NOTES
[facility] Cardiothoracic Surgery  Progress Note    2/24/2025 8:40 AM  Surgeon: Surgeon CTS: Dr. Yaquelin CHESTER     S/P: CABG x 4 LIMA to LAD saphenous vein to first diagonal saphenous vein to OM1 saphenous vein to the PDA  Endo vein harvest  Left atrial appendage  FRAN  Platelet gel     Subjective:  Mr. Avila up in chair.  Complains of pain.   Abd distention with pain noted with palpation.   Objective:  BP (!) 123/54   Pulse 64   Temp 98 °F (36.7 °C) (Oral)   Resp 16   Wt 124.8 kg (275 lb 2.2 oz)   SpO2 95%   BMI 34.39 kg/m²   Chest: pacing wires: yes, chest tubes:yes, air leak no, 1 +  CV: no murmur noted, Normal S1, S2, NSR  Lungs: clear to auscultation, no wheezes, rales, or rhonchi  Abd: normal bowel sounds   Lower Extremities: Trace edema  Saph Incison: no sign of drainage or infection  Sternal Incison: dressing applied-no excessive drainage or signs of infection noted    CXR-stable CXR no penumothorax.   Still needs pulm recruiting. Questionable gas pattern in stomach noted.   No large pl effusions     Labs: Labs reviewed today  CBC:   Recent Labs     02/22/25  0509 02/23/25 0223 02/24/25 0438   WBC 9.8 11.8* 8.2   HGB 9.6* 10.2* 10.0*   HCT 28.3* 30.2* 29.7*   MCV 97.3 99.7 98.0   PLT See Reflexed IPF Result 103* 116*     BMP:   Recent Labs     02/22/25  0509 02/23/25 0223 02/23/25 1947 02/24/25 0438    137  --  136   K 4.4 4.4 3.6* 4.0   * 104  --  103   CO2 18* 22  --  24   BUN 16 23  --  22   CREATININE 0.9 1.1  --  1.0     Magnesium: @LABCNT(MG:3)@  APTT:   Recent Labs     02/21/25 1548   APTT 28.7     PT/INR:   Recent Labs     02/21/25  1548 02/22/25  0509 02/23/25 0223   PROTIME 20.2* 19.1* 18.2*   INR 1.8 1.6 1.5       Imaging:   XR CHEST PORTABLE 02/22/2025    Narrative  EXAMINATION:  ONE XRAY VIEW OF THE CHEST    2/22/2025 4:47 am    COMPARISON:  02/21/2025, 1603 hours    HISTORY:  ORDERING SYSTEM PROVIDED HISTORY: Post op open heart surgery  TECHNOLOGIST PROVIDED HISTORY:  Post

## 2025-02-25 ENCOUNTER — APPOINTMENT (OUTPATIENT)
Dept: GENERAL RADIOLOGY | Age: 70
End: 2025-02-25
Attending: THORACIC SURGERY (CARDIOTHORACIC VASCULAR SURGERY)
Payer: COMMERCIAL

## 2025-02-25 PROBLEM — R53.81 DEBILITY: Status: ACTIVE | Noted: 2025-02-25

## 2025-02-25 LAB
ANION GAP SERPL CALCULATED.3IONS-SCNC: 11 MMOL/L (ref 9–16)
BUN SERPL-MCNC: 17 MG/DL (ref 8–23)
CALCIUM SERPL-MCNC: 8.7 MG/DL (ref 8.6–10.4)
CHLORIDE SERPL-SCNC: 104 MMOL/L (ref 98–107)
CO2 SERPL-SCNC: 24 MMOL/L (ref 20–31)
CREAT SERPL-MCNC: 0.8 MG/DL (ref 0.7–1.2)
EKG ATRIAL RATE: 153 BPM
EKG Q-T INTERVAL: 300 MS
EKG QRS DURATION: 90 MS
EKG QTC CALCULATION (BAZETT): 466 MS
EKG R AXIS: 29 DEGREES
EKG T AXIS: -37 DEGREES
EKG VENTRICULAR RATE: 145 BPM
ERYTHROCYTE [DISTWIDTH] IN BLOOD BY AUTOMATED COUNT: 12.4 % (ref 11.8–14.4)
GFR, ESTIMATED: >90 ML/MIN/1.73M2
GLUCOSE BLD-MCNC: 156 MG/DL (ref 75–110)
GLUCOSE BLD-MCNC: 167 MG/DL (ref 75–110)
GLUCOSE BLD-MCNC: 176 MG/DL (ref 75–110)
GLUCOSE SERPL-MCNC: 137 MG/DL (ref 74–99)
HCT VFR BLD AUTO: 30.1 % (ref 40.7–50.3)
HGB BLD-MCNC: 10.5 G/DL (ref 13–17)
MAGNESIUM SERPL-MCNC: 2.1 MG/DL (ref 1.6–2.4)
MCH RBC QN AUTO: 33.9 PG (ref 25.2–33.5)
MCHC RBC AUTO-ENTMCNC: 34.9 G/DL (ref 28.4–34.8)
MCV RBC AUTO: 97.1 FL (ref 82.6–102.9)
NRBC BLD-RTO: 0 PER 100 WBC
PLATELET # BLD AUTO: 172 K/UL (ref 138–453)
PMV BLD AUTO: 10.1 FL (ref 8.1–13.5)
POTASSIUM SERPL-SCNC: 3.7 MMOL/L (ref 3.7–5.3)
RBC # BLD AUTO: 3.1 M/UL (ref 4.21–5.77)
SODIUM SERPL-SCNC: 139 MMOL/L (ref 136–145)
WBC OTHER # BLD: 6.6 K/UL (ref 3.5–11.3)

## 2025-02-25 PROCEDURE — 2060000000 HC ICU INTERMEDIATE R&B

## 2025-02-25 PROCEDURE — 99024 POSTOP FOLLOW-UP VISIT: CPT | Performed by: NURSE PRACTITIONER

## 2025-02-25 PROCEDURE — 6360000002 HC RX W HCPCS: Performed by: NURSE PRACTITIONER

## 2025-02-25 PROCEDURE — 94761 N-INVAS EAR/PLS OXIMETRY MLT: CPT

## 2025-02-25 PROCEDURE — 99232 SBSQ HOSP IP/OBS MODERATE 35: CPT | Performed by: INTERNAL MEDICINE

## 2025-02-25 PROCEDURE — 6370000000 HC RX 637 (ALT 250 FOR IP)

## 2025-02-25 PROCEDURE — 2500000003 HC RX 250 WO HCPCS

## 2025-02-25 PROCEDURE — 36415 COLL VENOUS BLD VENIPUNCTURE: CPT

## 2025-02-25 PROCEDURE — 97110 THERAPEUTIC EXERCISES: CPT

## 2025-02-25 PROCEDURE — 99222 1ST HOSP IP/OBS MODERATE 55: CPT | Performed by: GENERAL PRACTICE

## 2025-02-25 PROCEDURE — 85027 COMPLETE CBC AUTOMATED: CPT

## 2025-02-25 PROCEDURE — 6370000000 HC RX 637 (ALT 250 FOR IP): Performed by: NURSE PRACTITIONER

## 2025-02-25 PROCEDURE — 71045 X-RAY EXAM CHEST 1 VIEW: CPT

## 2025-02-25 PROCEDURE — 97116 GAIT TRAINING THERAPY: CPT

## 2025-02-25 PROCEDURE — 83735 ASSAY OF MAGNESIUM: CPT

## 2025-02-25 PROCEDURE — 2700000000 HC OXYGEN THERAPY PER DAY

## 2025-02-25 PROCEDURE — 93010 ELECTROCARDIOGRAM REPORT: CPT | Performed by: INTERNAL MEDICINE

## 2025-02-25 PROCEDURE — 97535 SELF CARE MNGMENT TRAINING: CPT

## 2025-02-25 PROCEDURE — 80048 BASIC METABOLIC PNL TOTAL CA: CPT

## 2025-02-25 PROCEDURE — 97530 THERAPEUTIC ACTIVITIES: CPT

## 2025-02-25 PROCEDURE — 82947 ASSAY GLUCOSE BLOOD QUANT: CPT

## 2025-02-25 RX ADMIN — FUROSEMIDE 20 MG: 10 INJECTION, SOLUTION INTRAMUSCULAR; INTRAVENOUS at 08:25

## 2025-02-25 RX ADMIN — MAGNESIUM CITRATE 296 ML: 1.75 LIQUID ORAL at 12:47

## 2025-02-25 RX ADMIN — PANTOPRAZOLE SODIUM 40 MG: 40 TABLET, DELAYED RELEASE ORAL at 08:25

## 2025-02-25 RX ADMIN — SODIUM CHLORIDE, PRESERVATIVE FREE 10 ML: 5 INJECTION INTRAVENOUS at 08:30

## 2025-02-25 RX ADMIN — AMIODARONE HYDROCHLORIDE 200 MG: 200 TABLET ORAL at 22:30

## 2025-02-25 RX ADMIN — AMIODARONE HYDROCHLORIDE 200 MG: 200 TABLET ORAL at 08:25

## 2025-02-25 RX ADMIN — CHLORZOXAZONE 500 MG: 500 TABLET ORAL at 22:31

## 2025-02-25 RX ADMIN — MELOXICAM 15 MG: 7.5 TABLET ORAL at 08:35

## 2025-02-25 RX ADMIN — METOPROLOL TARTRATE 12.5 MG: 25 TABLET, FILM COATED ORAL at 22:30

## 2025-02-25 RX ADMIN — CLOPIDOGREL BISULFATE 75 MG: 75 TABLET ORAL at 08:25

## 2025-02-25 RX ADMIN — ATORVASTATIN CALCIUM 20 MG: 20 TABLET, FILM COATED ORAL at 22:30

## 2025-02-25 RX ADMIN — MUPIROCIN: 20 OINTMENT TOPICAL at 22:31

## 2025-02-25 RX ADMIN — METOPROLOL TARTRATE 12.5 MG: 25 TABLET, FILM COATED ORAL at 08:25

## 2025-02-25 RX ADMIN — CHLORZOXAZONE 500 MG: 500 TABLET ORAL at 08:27

## 2025-02-25 RX ADMIN — PREGABALIN 200 MG: 100 CAPSULE ORAL at 22:29

## 2025-02-25 RX ADMIN — MUPIROCIN: 20 OINTMENT TOPICAL at 08:36

## 2025-02-25 RX ADMIN — FUROSEMIDE 20 MG: 10 INJECTION, SOLUTION INTRAMUSCULAR; INTRAVENOUS at 18:52

## 2025-02-25 RX ADMIN — INSULIN LISPRO 2 UNITS: 100 INJECTION, SOLUTION INTRAVENOUS; SUBCUTANEOUS at 17:23

## 2025-02-25 RX ADMIN — POLYETHYLENE GLYCOL 3350 17 G: 17 POWDER, FOR SOLUTION ORAL at 08:24

## 2025-02-25 RX ADMIN — INSULIN LISPRO 2 UNITS: 100 INJECTION, SOLUTION INTRAVENOUS; SUBCUTANEOUS at 12:42

## 2025-02-25 RX ADMIN — ASPIRIN 81 MG: 81 TABLET, COATED ORAL at 08:25

## 2025-02-25 RX ADMIN — FLUOXETINE HYDROCHLORIDE 20 MG: 20 CAPSULE ORAL at 12:42

## 2025-02-25 RX ADMIN — HYDROCORTISONE: 10 CREAM TOPICAL at 08:35

## 2025-02-25 RX ADMIN — AMIODARONE HYDROCHLORIDE 200 MG: 200 TABLET ORAL at 12:42

## 2025-02-25 RX ADMIN — SODIUM CHLORIDE, PRESERVATIVE FREE 10 ML: 5 INJECTION INTRAVENOUS at 22:31

## 2025-02-25 RX ADMIN — PREGABALIN 200 MG: 100 CAPSULE ORAL at 09:27

## 2025-02-25 RX ADMIN — Medication 12.5 MG: at 06:25

## 2025-02-25 RX ADMIN — INSULIN LISPRO 2 UNITS: 100 INJECTION, SOLUTION INTRAVENOUS; SUBCUTANEOUS at 09:25

## 2025-02-25 ASSESSMENT — PAIN SCALES - GENERAL
PAINLEVEL_OUTOF10: 0

## 2025-02-25 NOTE — PROGRESS NOTES
Cardiology progress note        Date:  2/25/2025  Patient: Earnest Avila  Admission:  2/21/2025  7:12 AM  Admit DX: Coronary artery disease [I25.10]  CAD, multiple vessel [I25.10]  Age:  69 y.o., 1955     LOS: 4 days     Reason for evaluation:   Atherosclerotic CAD status post CABG      SUBJECTIVE:     The patient was seen and examined. Notes and labs reviewed.    Patient denies any chest pain or shortness of breath    He c/o abdominal distention, although passing gases but waiting for bowel movement      OBJECTIVE:      EXAM:   Vitals:    VITALS:  BP (!) 117/56   Pulse 65   Temp 98.2 °F (36.8 °C) (Oral)   Resp 12   Ht 1.905 m (6' 3\")   Wt 125 kg (275 lb 9.2 oz)   SpO2 95%   BMI 34.44 kg/m²    24HR INTAKE/OUTPUT:    Intake/Output Summary (Last 24 hours) at 2/25/2025 1237  Last data filed at 2/25/2025 1150  Gross per 24 hour   Intake 1040.92 ml   Output 3375 ml   Net -2334.08 ml       General appearance: awake, alert, no apparent discomfort  HEENT: atraumatic, normocephalic, no JVD, no significant carotid bruit  Lungs: coarse breathing bilaterally  Heart: regular rate and rhythm, S1, S2, 2/6 systolic murmur  Abdomen: soft, distended; bowel sounds hypoactive  Extremities: no significant lower extremity edema  Neurologic: no obvious neurologic deficit    Current Inpatient Medications:   magnesium citrate  296 mL Oral Once    FLUoxetine  20 mg Oral QAM    naloxegol  12.5 mg Oral QAM AC    furosemide  20 mg IntraVENous BID    hydrocortisone   Topical BID    meloxicam  15 mg Oral Daily    pregabalin  200 mg Oral BID    chlorzoxazone  500 mg Oral BID    sodium chloride flush  5-40 mL IntraVENous 2 times per day    aspirin  81 mg Oral Daily    clopidogrel  75 mg Oral Daily    amiodarone  200 mg Oral TID    mupirocin   Each Nostril BID    polyethylene glycol  17 g Oral Daily    metoprolol tartrate  12.5 mg Oral BID    atorvastatin  20 mg Oral Nightly    pantoprazole  40 mg Oral Daily    Or     discussed with nursing staff, medicine team and family at the bedside    Adriana Pandya MD, FACC

## 2025-02-25 NOTE — PROGRESS NOTES
Occupational Therapy  Occupational Therapy Daily Treatment Note  Facility/Department: [facility] CAR 2- STEPDOWN   Patient Name: Earnest Avila        MRN: [id number]    : 1955    Date of Service: 2025    No chief complaint on file.    Past Medical History:  has a past medical history of Allergic rhinitis, Anxiety, Borderline hypertension, CAD (coronary artery disease), Controlled diabetes mellitus type II without complication (HCC), Dental abscess, Depression, History of colon polyps, Intermittent self-catheterization of bladder, Kidney stones, Neuropathy, Obesity, Obstructive sleep apnea of adult, Osteoarthritis, Prostate enlargement, Restless legs syndrome, Tubular adenoma of colon, Type II or unspecified type diabetes mellitus without mention of complication, not stated as uncontrolled, UTI (urinary tract infection), and Wears glasses.  Past Surgical History:  has a past surgical history that includes Cholecystectomy (); Lithotripsy (2016); Lithotripsy; Cystocopy (2016); Prostate surgery (2016); Cystoscopy (2016); Colonoscopy (2016); Shoulder Arthroplasty (Right, 2016); Shoulder arthroscopy (Right, ); Vasectomy (); Cystoscopy (2016); Cystocopy; Cystoscopy (N/A, 2017); Cystoscopy (N/A, 2017); joint replacement; Cystourethroscopy/Urethral Dilation (11/15/2017); pr cystourethroscopy (N/A, 11/15/2017); Cystoscopy (N/A, 11/15/2017); eye surgery (Bilateral, ); Bladder surgery; Colonoscopy (N/A, 2019); Cardiac catheterization (2025); Cardiac procedure (N/A, 2025); and Coronary artery bypass graft (N/A, 2025).    Discharge Recommendations  Discharge Recommendations: Patient would benefit from continued therapy after discharge    Assessment  Performance deficits / Impairments: Decreased functional mobility ;Decreased ADL status;Decreased endurance;Decreased balance;Decreased high-level IADLs  Prognosis: Good  Activity  time.)  Functional Mobility Skilled Clinical Factors: Gait belt; RW.     Patient Education  Patient Education  Education Given To: Patient  Education Provided Comments: OT POC, transfer/walker safety, importance of participation in therapy, sternal precautions, heart hugger, surgical soap, incentive spirometer, acapella with good return.    Goals  Short Term Goals  Time Frame for Short Term Goals: By discharge, pt will:  Short Term Goal 1: demo UB ADLs Independently, including heart hugger.  Short Term Goal 2: demo LB ADLs with Min A, adaptive techniques PRN.  Short Term Goal 3: demo safe functional transfers/mobility with SUP, LRD PRN for engagement in ADLs.  Short Term Goal 4: demo dynamic standing during functional activities for 10+ mins with SUP, LRD PRN.  Short Term Goal 5: demo ability to adhere to all sternal precautions and proper use of heart hugger during functional activities independently with no vc's.    Plan  Occupational Therapy Plan  Times Per Week: 4-6x/wk (CABG)  Current Treatment Recommendations: Balance training, Functional mobility training, Endurance training, Pain management, Safety education & training, Patient/Caregiver education & training, Equipment evaluation, education, & procurement, Self-Care / ADL, Home management training    Minutes  OT Individual Minutes  Time In: 1030  Time Out: 1123  Minutes: 53  Time Code Minutes   Timed Code Treatment Minutes: 53 Minutes    Electronically signed by DION Soares on 2/25/25 at 2:51 PM EST

## 2025-02-25 NOTE — CARE COORDINATION
Met with patient to discuss transitional planning. He met with Dr Cooper and would like to go to [facility]. [place] of choice provided. Referral sent to [facility]    Patient has a qualifying diagnosis for cardiac rehabilitation.     Education provided to patient regarding the health benefits of participating in a cardiac rehabilitation program. Handout provided with an overview of cardiac rehabilitation from the American [employer] Association.     Patient agreeable: Yes    Freedom of choice provided.     Referral made to :   [x] [facility]  [] [facility]  [] [facility]  [] [facility]  [] [facility]  [] Other    0150 spoke to Deborah from [facility]. They are able to accept. She will submit precert

## 2025-02-25 NOTE — PLAN OF CARE
BRONCHOSPASM/BRONCHOCONSTRICTION     [x]         IMPROVE AERATION/BREATH SOUNDS  [x]   ADMINISTER BRONCHODILATOR THERAPY AS APPROPRIATE  [x]   ASSESS BREATH SOUNDS  [x]   IMPLEMENT AEROSOL/MDI PROTOCOL  [x]   PATIENT EDUCATION AS NEEDED      NON INVASIVE VENTILATION  PROVIDE OPTIMAL VENTILATION/ACCEPTABLE SP02  IMPLEMENT NON INVASIVE VENTILATION PROTOCOL  ASSESSMENT SKIN INTEGRITY  PATIENT EDUCATION AS NEEDED  BIPAP AS NEEDED

## 2025-02-25 NOTE — CASE COMMUNICATION
ACUTE INPATIENT REHABILITATION  Financial Disclosure Statement: Eligibility and Benefit Verification    Patient Name: Earnest Avila MRN: [id number]     Disclosure Statement  [facility] Acute Inpatient Rehabilitation at [facility] provides 24 hour individualized service to patients with functional limitations due to but not limited to stroke, brain injury, spinal cord injury, major multiple trauma, hip fracture, amputation, and neurological disorders.  Acute Inpatient Rehabilitation provides rehabilitative nursing, physician coverage, as well as physical therapy, occupational therapy, speech therapy, recreational therapy and other services as deemed necessary by our Board Certified Physical Medicine and Rehabilitation Physicians Dr. Corbin Webb, Dr. Samantha Basilio, Dr. Alecia Cristina and Dr. Jeff Cooper.    [facility] Acute Inpatient Rehabilitation at [facility] is fully accredited by the Commission on Accreditation of Rehabilitation Facilities (CARF) and The Joint Commission (TJC).    At a minimum, you will receive 5 days of therapy services totaling at least 15 hours per week. Your treatment program will consist of physical therapy at least 7.5 hours per week; occupational therapy 7.5 hours per week; and speech therapy 1.5 hours per week, as applicable.    Your estimated length of stay is currently:  Approximately 2 Weeks  Please Note: Estimated length of stay is based on individual condition and Acute Inpatient Rehabilitation specific needs. Length of stay may vary based upon interdisciplinary team assessment, insurance approval, and patient progression.    Your insurance coverage has been verified as follows:   Date Verified: 02/25/2025   Method:  Online Portal    Primary Insurance: [employer] [place]   Member/Subscriber ID: [id number]  Coverage: Per Benefit Period  Plan Effective Date: 8/01/2019 Status: active  Deductible: $800.00 (Amount Currently Met: $800.00)  Co-Pay:

## 2025-02-25 NOTE — PROGRESS NOTES
[facility] Cardiothoracic Surgery  Progress Note    2/25/2025 9:19 AM  Surgeon: Surgeon CTS: Dr. Yaquelin CHESTER     S/P: CABG x 4 LIMA to LAD saphenous vein to first diagonal saphenous vein to OM1 saphenous vein to the PDA  Endo vein harvest  Left atrial appendage  FRAN  Platelet gel     Subjective:  Mr. Avila up in chair.  Complains of pain.   Abd distention with pain noted with palpation.   Objective:  BP (!) 146/74   Pulse 62   Temp 98.3 °F (36.8 °C) (Oral)   Resp 14   Wt 125 kg (275 lb 9.2 oz)   SpO2 98%   BMI 34.44 kg/m²   Chest: pacing wires: yes, chest tubes:yes, air leak no, 1 +  CV: no murmur noted, Normal S1, S2, NSR  Lungs: clear to auscultation, no wheezes, rales, or rhonchi  Abd: normal bowel sounds   Lower Extremities: Trace edema  Saph Incison: no sign of drainage or infection  Sternal Incison: dressing applied-no excessive drainage or signs of infection noted    CXR-stable CXR no penumothorax.   Still needs pulm recruiting. Questionable gas pattern in stomach noted.   No large pl effusions     Labs: Labs reviewed today  CBC:   Recent Labs     02/23/25 0223 02/24/25 0438 02/25/25  0540   WBC 11.8* 8.2 6.6   HGB 10.2* 10.0* 10.5*   HCT 30.2* 29.7* 30.1*   MCV 99.7 98.0 97.1   * 116* 172     BMP:   Recent Labs     02/23/25 0223 02/23/25 1947 02/24/25 0438 02/25/25  0540     --  136 139   K 4.4 3.6* 4.0 3.7     --  103 104   CO2 22  --  24 24   BUN 23  --  22 17   CREATININE 1.1  --  1.0 0.8     Magnesium: @LABCNT(MG:3)@  APTT:   No results for input(s): \"APTT\" in the last 72 hours.    PT/INR:   Recent Labs     02/23/25 0223   PROTIME 18.2*   INR 1.5       Imaging:   XR CHEST PORTABLE 02/22/2025    Narrative  EXAMINATION:  ONE XRAY VIEW OF THE CHEST    2/22/2025 4:47 am    COMPARISON:  02/21/2025, 1603 hours    HISTORY:  ORDERING SYSTEM PROVIDED HISTORY: Post op open heart surgery  TECHNOLOGIST PROVIDED HISTORY:  Post op open heart surgery  Reason for Exam: POST OP HEART  now passing a lot of gas.  His abdomen is much more soft but still has mild distention.  No significant pain with palpation.  Will continue to monitor for bowel pattern and bowel movement  KUB yesterday showed diffuse large gas pattern loops with nothing definitive of ileus  Continue with bowel regiment and liquid diet  Please continue with PT OT around-the-clock along with aggressive I-S and Acapella  DC planning in progress- HCC vs need for rehab    2/24/25  Patient has some abdominal distention and pain with palpation and hypoactive GI tones.  Patient admits that his abdomen does feels distended.  Ordered a KUB this morning.  Sips with chips and we will evaluate advance diet slowly.  Added some bowel regiment and Mylanta and mag citrate  Please discontinue the Amio infusion convert to p.o. as patient is currently in normal sinus rhythm bradycardic.  Evaluate remove chest tubes today.  No large pleural effusion or pneumothorax on chest x-ray.  Continue with PT OT today.  DC planning in progress-plan for HHC when medically stable    2/23/25  Postop day 2 ([place]orsody)  -Patient went into A-fib RVR overnight, cardiology started amiodarone gtt. with bolus.  He then converted back to normal sinus rhythm.  Amio gtt. was DC'd due to bradycardia.  Patient remains in normal sinus rhythm now.  -Patient complains of chronic back pain.  All home meds restarted.  Patient receiving all as needed medications for pain management.  -Please encourage PT with patient.  He is postop day 2 now and has only walked to the door.  -Continue aggressive ICS.  -Keep chest tubes for today. 590/32 overnight  -Patient has suprapubic catheter.  Continue to monitor strict I's and O's will increase diuretic.  600 mL out overnight.  X-ray reviewed.  -Keep central line 1 more day      2/22/25  Postop day 1 (Borsody)  -Patient has complaints of sternal pain.  All PRNs given.  Added Toradol for pain control.  -EKG done this morning NSR.  Cardiology

## 2025-02-25 NOTE — CARE COORDINATION
Acute Inpatient Rehabilitation referral received via Fax    \"Inpatient Consult to PM&R - Physiatry [[id number]]\" Consult Order Status: PM&R consult completed on 2/25/2025, current recommendation is ARU     PM&R physiatrist Dr. Jeff Cooper on service for PM&R consult.    Updated Holly SINGH 2 CM:  message  at this time.    Per PM&R physiatrist Dr. Jeff Cooper, patient appropriate for Acute Inpatient Rehabilitation level of care.    Eligibility & Benefits Verified - See Note    Prior authorization request for admission initiated with [facility] via: Online Portal: IPextreme     Pending Case Reference/Authorization # The Authorization submission (Auth #[id number]) was successful.     Clinical documentation submitted via Online Portal: IPextreme    Updated Holly CM:  message  at this time.

## 2025-02-25 NOTE — CONSULTS
Physical Medicine & Rehabilitation  Consult Note      Admitting Physician:   Ronnie Jamison MD    Primary Care Provider:   No primary care provider on file.     Reason for Consult:  Acute Inpatient Rehabilitation    Chief Complaint: Low endurance    History of Present Illness:  Referring Provider is requesting an evaluation for appropriate placement upon discharge from acute care. History from chart review and patient.    Earnest Avila is a 69 y.o. RHD male admitted to [facility] on 2/21/2025.      69-year-old male presented to the hospital with complaints of shortness of breath and mild chest pressure.  Previous stress test have been negative.  Patient underwent cardiac catheterization and noted to have multivessel coronary artery disease.  Cardiothoracic surgery was consulted for bypass surgery.  Patient underwent CABG x4 2/21.  Patient went to [facility] cardiology started amiodarone drip, subsequently converted back to sinus rhythm and amiodarone discontinued due to bradycardia.  Patient has no complaints of abdominal distention, KUB ordered showed diffuse large gas pattern loops, improved    Review of Systems:  Constitutional: negative for anorexia, chills, fatigue, fevers, sweats and weight loss  Eyes: negative for redness and visual disturbance  Ears, nose, mouth, throat, and face: negative for earaches, sore throat and tinnitus  Respiratory: negative for cough and shortness of breath  Cardiovascular: negative for chest pain, dyspnea and palpitations  Gastrointestinal: negative for abdominal pain, change in bowel habits, constipation, nausea and vomiting  Genitourinary:negative for dysuria, frequency, hesitancy and urinary incontinence  Integument/breast: negative for pruritus and rash  Musculoskeletal:negative for stiff joints; +sternal pain  Neurological: negative for dizziness, headaches   Behavioral/Psych: negative for decreased appetite, depression        Premorbid

## 2025-02-25 NOTE — PLAN OF CARE
Problem: Discharge Planning  Goal: Discharge to home or other facility with appropriate resources  Outcome: Progressing  Flowsheets  Taken 2/25/2025 1015 by Hailey Goddard RN  Discharge to home or other facility with appropriate resources: Identify barriers to discharge with patient and caregiver  Taken 2/25/2025 0800 by Max Hernandez RN  Discharge to home or other facility with appropriate resources:   Identify barriers to discharge with patient and caregiver   Identify discharge learning needs (meds, wound care, etc)   Arrange for needed discharge resources and transportation as appropriate   Arrange for interpreters to assist at discharge as needed   Refer to discharge planning if patient needs post-hospital services based on physician order or complex needs related to functional status, cognitive ability or social support system     Problem: Chronic Conditions and Co-morbidities  Goal: Patient's chronic conditions and co-morbidity symptoms are monitored and maintained or improved  Outcome: Progressing  Flowsheets  Taken 2/25/2025 1015 by Hailey Goddard RN  Care Plan - Patient's Chronic Conditions and Co-Morbidity Symptoms are Monitored and Maintained or Improved: Monitor and assess patient's chronic conditions and comorbid symptoms for stability, deterioration, or improvement  Taken 2/25/2025 0800 by Max Hernandez RN  Care Plan - Patient's Chronic Conditions and Co-Morbidity Symptoms are Monitored and Maintained or Improved:   Monitor and assess patient's chronic conditions and comorbid symptoms for stability, deterioration, or improvement   Collaborate with multidisciplinary team to address chronic and comorbid conditions and prevent exacerbation or deterioration   Update acute care plan with appropriate goals if chronic or comorbid symptoms are exacerbated and prevent overall improvement and discharge     Problem: Safety - Adult  Goal: Free from fall injury  Outcome: Progressing     Problem:

## 2025-02-25 NOTE — PROGRESS NOTES
Physical Therapy  Facility/Department: [facility] CAR 2- STEPDOWN   Physical Therapy Daily Treatment Note    Patient Name: Earnest Avila        MRN: [id number]    : 1955    Date of Service: 2025    Past Medical History:  has a past medical history of Allergic rhinitis, Anxiety, Borderline hypertension, CAD (coronary artery disease), Controlled diabetes mellitus type II without complication (HCC), Dental abscess, Depression, History of colon polyps, Intermittent self-catheterization of bladder, Kidney stones, Neuropathy, Obesity, Obstructive sleep apnea of adult, Osteoarthritis, Prostate enlargement, Restless legs syndrome, Tubular adenoma of colon, Type II or unspecified type diabetes mellitus without mention of complication, not stated as uncontrolled, UTI (urinary tract infection), and Wears glasses.  Past Surgical History:  has a past surgical history that includes Cholecystectomy (); Lithotripsy (2016); Lithotripsy; Cystocopy (2016); Prostate surgery (2016); Cystoscopy (2016); Colonoscopy (2016); Shoulder Arthroplasty (Right, 2016); Shoulder arthroscopy (Right, ); Vasectomy (); Cystoscopy (2016); Cystocopy; Cystoscopy (N/A, 2017); Cystoscopy (N/A, 2017); joint replacement; Cystourethroscopy/Urethral Dilation (11/15/2017); pr cystourethroscopy (N/A, 11/15/2017); Cystoscopy (N/A, 11/15/2017); eye surgery (Bilateral, ); Bladder surgery; Colonoscopy (N/A, 2019); Cardiac catheterization (2025); Cardiac procedure (N/A, 2025); and Coronary artery bypass graft (N/A, 2025).    Discharge Recommendations  Discharge Recommendations: Patient would benefit from continued therapy after discharge  PT Equipment Recommendations  Equipment Needed: Yes  Mobility Devices: Walker  Walker: Rolling  Other: TBD    Assessment  Body Structures, Functions, Activity Limitations Requiring Skilled Therapeutic Intervention: Decreased functional  2/21/25    Subjective  General  Patient assessed for rehabilitation services?: Yes  Response To Previous Treatment: Patient with no complaints from previous session.  Family/Caregiver Present: No  Follows Commands: Within Functional Limits  General  General Comments: Pt seated in chair upon exit, positioned for comfort, call light given. Pt pleasant work with.  Subjective  Subjective: Pt and RN agreeable for treatment, seated in chair upon entry.  Pain  Pre-Pain: 0  Post-Pain: 0    Objective  Orientation  Overall Orientation Status: Within Functional Limits  Orientation Level: Oriented X4  Cognition  Overall Cognitive Status: WFL    Mobility   Bed mobility  Supine to Sit: Unable to assess  Sit to Supine: Unable to assess  Scooting: Unable to assess  Bed Mobility Comments: Pt seated in chair upon arrival / exit.    Transfers  Sit to Stand: Minimal Assistance  Stand to Sit: Minimal Assistance  Comment: Pt performed x1 STS transfer from bedside chair with RW Haider.    Ambulation  Surface: Level tile  Device: Rolling Walker  Assistance: Contact guard assistance  Quality of Gait: Pt amb w/ greatly reduced gait speed, greatly reduced beulah, greatly reduced step length YAO, mild anterior trunk lean, no LOB.  Distance: 75ft  Comments: Pt limited by fatigue, verbal cues for safety. Verbal cues given to increase step length.  More Ambulation?: No    Stairs/Curb  Stairs?: No    Balance   Balance  Posture: Fair  Sitting - Static: Fair;+  Sitting - Dynamic: Fair;+  Standing - Static: Fair  Standing - Dynamic: Fair  Comments: Standing balance assessed w/ RW, seated balance assessed in bedside chair unsupported    Exercise  PT Exercises  Exercise Treatment: Seated LE exercise program: Long Arc Quads, hip abduction/adduction, heel/toe raises, marches, SLR   Reps: x10    Patient Education  Patient Education  Education Given To: Patient  Education Provided: Role of Therapy;Plan of Care;Precautions;Transfer Training  Education

## 2025-02-25 NOTE — PLAN OF CARE
Problem: Discharge Planning  Goal: Discharge to home or other facility with appropriate resources  Outcome: Progressing     Problem: Chronic Conditions and Co-morbidities  Goal: Patient's chronic conditions and co-morbidity symptoms are monitored and maintained or improved  Outcome: Progressing     Problem: Safety - Adult  Goal: Free from fall injury  Outcome: Progressing     Problem: Respiratory - Adult  Goal: Achieves optimal ventilation and oxygenation  Outcome: Progressing     Problem: Pain  Goal: Verbalizes/displays adequate comfort level or baseline comfort level  Outcome: Progressing

## 2025-02-26 ENCOUNTER — APPOINTMENT (OUTPATIENT)
Dept: GENERAL RADIOLOGY | Age: 70
End: 2025-02-26
Attending: THORACIC SURGERY (CARDIOTHORACIC VASCULAR SURGERY)
Payer: COMMERCIAL

## 2025-02-26 LAB
ANION GAP SERPL CALCULATED.3IONS-SCNC: 11 MMOL/L (ref 9–16)
BUN SERPL-MCNC: 18 MG/DL (ref 8–23)
CALCIUM SERPL-MCNC: 8.5 MG/DL (ref 8.6–10.4)
CHLORIDE SERPL-SCNC: 103 MMOL/L (ref 98–107)
CO2 SERPL-SCNC: 25 MMOL/L (ref 20–31)
CREAT SERPL-MCNC: 1 MG/DL (ref 0.7–1.2)
ERYTHROCYTE [DISTWIDTH] IN BLOOD BY AUTOMATED COUNT: 12.8 % (ref 11.8–14.4)
GFR, ESTIMATED: 81 ML/MIN/1.73M2
GLUCOSE BLD-MCNC: 145 MG/DL (ref 75–110)
GLUCOSE BLD-MCNC: 164 MG/DL (ref 75–110)
GLUCOSE BLD-MCNC: 179 MG/DL (ref 75–110)
GLUCOSE SERPL-MCNC: 162 MG/DL (ref 74–99)
HCT VFR BLD AUTO: 32.4 % (ref 40.7–50.3)
HGB BLD-MCNC: 11.1 G/DL (ref 13–17)
MAGNESIUM SERPL-MCNC: 2.3 MG/DL (ref 1.6–2.4)
MCH RBC QN AUTO: 33.5 PG (ref 25.2–33.5)
MCHC RBC AUTO-ENTMCNC: 34.3 G/DL (ref 28.4–34.8)
MCV RBC AUTO: 97.9 FL (ref 82.6–102.9)
NRBC BLD-RTO: 0 PER 100 WBC
PLATELET # BLD AUTO: 243 K/UL (ref 138–453)
PMV BLD AUTO: 9.8 FL (ref 8.1–13.5)
POTASSIUM SERPL-SCNC: 3.6 MMOL/L (ref 3.7–5.3)
RBC # BLD AUTO: 3.31 M/UL (ref 4.21–5.77)
SODIUM SERPL-SCNC: 139 MMOL/L (ref 136–145)
WBC OTHER # BLD: 7.9 K/UL (ref 3.5–11.3)

## 2025-02-26 PROCEDURE — 2140000001 HC CVICU INTERMEDIATE R&B

## 2025-02-26 PROCEDURE — 97110 THERAPEUTIC EXERCISES: CPT

## 2025-02-26 PROCEDURE — 99024 POSTOP FOLLOW-UP VISIT: CPT | Performed by: PHYSICIAN ASSISTANT

## 2025-02-26 PROCEDURE — 82947 ASSAY GLUCOSE BLOOD QUANT: CPT

## 2025-02-26 PROCEDURE — 99232 SBSQ HOSP IP/OBS MODERATE 35: CPT | Performed by: INTERNAL MEDICINE

## 2025-02-26 PROCEDURE — 6370000000 HC RX 637 (ALT 250 FOR IP): Performed by: THORACIC SURGERY (CARDIOTHORACIC VASCULAR SURGERY)

## 2025-02-26 PROCEDURE — 6370000000 HC RX 637 (ALT 250 FOR IP): Performed by: NURSE PRACTITIONER

## 2025-02-26 PROCEDURE — 97116 GAIT TRAINING THERAPY: CPT

## 2025-02-26 PROCEDURE — 6370000000 HC RX 637 (ALT 250 FOR IP)

## 2025-02-26 PROCEDURE — 94660 CPAP INITIATION&MGMT: CPT

## 2025-02-26 PROCEDURE — 71045 X-RAY EXAM CHEST 1 VIEW: CPT

## 2025-02-26 PROCEDURE — 85027 COMPLETE CBC AUTOMATED: CPT

## 2025-02-26 PROCEDURE — 80048 BASIC METABOLIC PNL TOTAL CA: CPT

## 2025-02-26 PROCEDURE — 36415 COLL VENOUS BLD VENIPUNCTURE: CPT

## 2025-02-26 PROCEDURE — 5A09457 ASSISTANCE WITH RESPIRATORY VENTILATION, 24-96 CONSECUTIVE HOURS, CONTINUOUS POSITIVE AIRWAY PRESSURE: ICD-10-PCS | Performed by: NURSE PRACTITIONER

## 2025-02-26 PROCEDURE — 6360000002 HC RX W HCPCS: Performed by: NURSE PRACTITIONER

## 2025-02-26 PROCEDURE — 2500000003 HC RX 250 WO HCPCS

## 2025-02-26 PROCEDURE — 83735 ASSAY OF MAGNESIUM: CPT

## 2025-02-26 RX ORDER — ONDANSETRON 4 MG/1
4 TABLET, ORALLY DISINTEGRATING ORAL EVERY 8 HOURS PRN
OUTPATIENT
Start: 2025-02-26

## 2025-02-26 RX ORDER — METOPROLOL TARTRATE 25 MG/1
12.5 TABLET, FILM COATED ORAL 2 TIMES DAILY
Status: CANCELLED | OUTPATIENT
Start: 2025-02-26

## 2025-02-26 RX ORDER — ONDANSETRON 2 MG/ML
4 INJECTION INTRAMUSCULAR; INTRAVENOUS EVERY 6 HOURS PRN
OUTPATIENT
Start: 2025-02-26

## 2025-02-26 RX ORDER — OXYCODONE HYDROCHLORIDE 5 MG/1
5 TABLET ORAL EVERY 4 HOURS PRN
OUTPATIENT
Start: 2025-02-26

## 2025-02-26 RX ORDER — CLOPIDOGREL BISULFATE 75 MG/1
75 TABLET ORAL DAILY
Status: CANCELLED | OUTPATIENT
Start: 2025-02-27

## 2025-02-26 RX ORDER — CLOPIDOGREL BISULFATE 75 MG/1
75 TABLET ORAL DAILY
Qty: 30 TABLET | Refills: 3 | Status: CANCELLED | OUTPATIENT
Start: 2025-02-27

## 2025-02-26 RX ORDER — ATORVASTATIN CALCIUM 20 MG/1
20 TABLET, FILM COATED ORAL NIGHTLY
OUTPATIENT
Start: 2025-02-26

## 2025-02-26 RX ORDER — AMIODARONE HYDROCHLORIDE 200 MG/1
200 TABLET ORAL 3 TIMES DAILY
Status: CANCELLED | OUTPATIENT
Start: 2025-02-26

## 2025-02-26 RX ORDER — PANTOPRAZOLE SODIUM 40 MG/1
40 TABLET, DELAYED RELEASE ORAL DAILY
OUTPATIENT
Start: 2025-02-27

## 2025-02-26 RX ORDER — MAGNESIUM HYDROXIDE/ALUMINUM HYDROXICE/SIMETHICONE 120; 1200; 1200 MG/30ML; MG/30ML; MG/30ML
30 SUSPENSION ORAL EVERY 6 HOURS PRN
OUTPATIENT
Start: 2025-02-26

## 2025-02-26 RX ORDER — OXYCODONE HYDROCHLORIDE 5 MG/1
10 TABLET ORAL EVERY 4 HOURS PRN
OUTPATIENT
Start: 2025-02-26

## 2025-02-26 RX ORDER — METOPROLOL TARTRATE 25 MG/1
12.5 TABLET, FILM COATED ORAL 2 TIMES DAILY
Qty: 60 TABLET | Refills: 3 | Status: CANCELLED | OUTPATIENT
Start: 2025-02-26

## 2025-02-26 RX ORDER — ASPIRIN 81 MG/1
81 TABLET ORAL DAILY
OUTPATIENT
Start: 2025-02-27

## 2025-02-26 RX ORDER — AMIODARONE HYDROCHLORIDE 200 MG/1
200 TABLET ORAL 2 TIMES DAILY
Qty: 30 TABLET | Refills: 0 | Status: CANCELLED | OUTPATIENT
Start: 2025-02-26

## 2025-02-26 RX ORDER — MELOXICAM 7.5 MG/1
15 TABLET ORAL DAILY
OUTPATIENT
Start: 2025-02-27

## 2025-02-26 RX ADMIN — PANTOPRAZOLE SODIUM 40 MG: 40 TABLET, DELAYED RELEASE ORAL at 08:34

## 2025-02-26 RX ADMIN — FUROSEMIDE 20 MG: 10 INJECTION, SOLUTION INTRAMUSCULAR; INTRAVENOUS at 08:35

## 2025-02-26 RX ADMIN — ASPIRIN 81 MG: 81 TABLET, COATED ORAL at 08:35

## 2025-02-26 RX ADMIN — AMIODARONE HYDROCHLORIDE 200 MG: 200 TABLET ORAL at 08:33

## 2025-02-26 RX ADMIN — METOPROLOL TARTRATE 12.5 MG: 25 TABLET, FILM COATED ORAL at 08:34

## 2025-02-26 RX ADMIN — INSULIN LISPRO 2 UNITS: 100 INJECTION, SOLUTION INTRAVENOUS; SUBCUTANEOUS at 08:36

## 2025-02-26 RX ADMIN — CHLORZOXAZONE 500 MG: 500 TABLET ORAL at 20:01

## 2025-02-26 RX ADMIN — AMIODARONE HYDROCHLORIDE 200 MG: 200 TABLET ORAL at 19:57

## 2025-02-26 RX ADMIN — HYDROCORTISONE: 10 CREAM TOPICAL at 08:39

## 2025-02-26 RX ADMIN — PREGABALIN 200 MG: 100 CAPSULE ORAL at 19:54

## 2025-02-26 RX ADMIN — INSULIN LISPRO 2 UNITS: 100 INJECTION, SOLUTION INTRAVENOUS; SUBCUTANEOUS at 11:35

## 2025-02-26 RX ADMIN — METOPROLOL TARTRATE 12.5 MG: 25 TABLET, FILM COATED ORAL at 19:59

## 2025-02-26 RX ADMIN — DIPHENHYDRAMINE HYDROCHLORIDE 25 MG: 25 TABLET ORAL at 22:17

## 2025-02-26 RX ADMIN — CLOPIDOGREL BISULFATE 75 MG: 75 TABLET ORAL at 08:34

## 2025-02-26 RX ADMIN — MELOXICAM 15 MG: 7.5 TABLET ORAL at 08:35

## 2025-02-26 RX ADMIN — PREGABALIN 200 MG: 100 CAPSULE ORAL at 08:34

## 2025-02-26 RX ADMIN — ATORVASTATIN CALCIUM 20 MG: 20 TABLET, FILM COATED ORAL at 19:59

## 2025-02-26 RX ADMIN — HYDROCORTISONE: 10 CREAM TOPICAL at 20:00

## 2025-02-26 RX ADMIN — FLUOXETINE HYDROCHLORIDE 20 MG: 20 CAPSULE ORAL at 08:34

## 2025-02-26 RX ADMIN — Medication 12.5 MG: at 08:33

## 2025-02-26 RX ADMIN — CHLORZOXAZONE 500 MG: 500 TABLET ORAL at 08:39

## 2025-02-26 RX ADMIN — INSULIN LISPRO 2 UNITS: 100 INJECTION, SOLUTION INTRAVENOUS; SUBCUTANEOUS at 18:05

## 2025-02-26 RX ADMIN — SODIUM CHLORIDE, PRESERVATIVE FREE 10 ML: 5 INJECTION INTRAVENOUS at 08:37

## 2025-02-26 RX ADMIN — OXYCODONE 5 MG: 5 TABLET ORAL at 19:57

## 2025-02-26 RX ADMIN — SODIUM CHLORIDE, PRESERVATIVE FREE 10 ML: 5 INJECTION INTRAVENOUS at 19:58

## 2025-02-26 RX ADMIN — OXYCODONE 5 MG: 5 TABLET ORAL at 00:09

## 2025-02-26 ASSESSMENT — PAIN SCALES - GENERAL
PAINLEVEL_OUTOF10: 2
PAINLEVEL_OUTOF10: 2
PAINLEVEL_OUTOF10: 6
PAINLEVEL_OUTOF10: 6
PAINLEVEL_OUTOF10: 0

## 2025-02-26 ASSESSMENT — PAIN DESCRIPTION - DESCRIPTORS
DESCRIPTORS: ACHING;DISCOMFORT
DESCRIPTORS: ACHING;DISCOMFORT

## 2025-02-26 ASSESSMENT — PAIN DESCRIPTION - LOCATION
LOCATION: CHEST
LOCATION: BACK

## 2025-02-26 NOTE — PLAN OF CARE
Problem: Discharge Planning  Goal: Discharge to home or other facility with appropriate resources  2/26/2025 1053 by Hailey Goddard RN  Outcome: Progressing  2/26/2025 0132 by Moni Salinas RN  Outcome: Progressing     Problem: Chronic Conditions and Co-morbidities  Goal: Patient's chronic conditions and co-morbidity symptoms are monitored and maintained or improved  2/26/2025 1053 by Hailey Goddard RN  Outcome: Progressing  2/26/2025 0132 by Moni Salinas RN  Outcome: Progressing     Problem: Safety - Adult  Goal: Free from fall injury  2/26/2025 1053 by Hailey Goddard RN  Outcome: Progressing  2/26/2025 0132 by Moni Salinas RN  Outcome: Progressing     Problem: Respiratory - Adult  Goal: Achieves optimal ventilation and oxygenation  2/26/2025 1053 by Hailey Goddard RN  Outcome: Progressing  2/26/2025 0132 by Moni Salinas RN  Outcome: Progressing     Problem: Pain  Goal: Verbalizes/displays adequate comfort level or baseline comfort level  2/26/2025 1053 by Hailey Goddard RN  Outcome: Progressing  Flowsheets (Taken 2/26/2025 0815)  Verbalizes/displays adequate comfort level or baseline comfort level: Encourage patient to monitor pain and request assistance  2/26/2025 0132 by Moni Salinas RN  Outcome: Progressing     Problem: ABCDS Injury Assessment  Goal: Absence of physical injury  2/26/2025 1053 by Hailey Goddard RN  Outcome: Progressing  2/26/2025 0132 by Moni Salinas RN  Outcome: Progressing

## 2025-02-26 NOTE — PROGRESS NOTES
[facility] Cardiothoracic Surgery  Progress Note    2/26/2025 10:42 AM  Surgeon: Surgeon CTS: Dr. Yaquelin CHESTER     S/P: CABG x 4 LIMA to LAD saphenous vein to first diagonal saphenous vein to OM1 saphenous vein to the PDA  Endo vein harvest  Left atrial appendage  FRAN  Platelet gel     Subjective:  Mr. Avila up in chair. No complaints wants to be discharged    No acute needs A&0x4  Objective:  /62   Pulse 80   Temp 98 °F (36.7 °C) (Oral)   Resp 18   Ht 1.905 m (6' 3\")   Wt 125 kg (275 lb 9.2 oz)   SpO2 96%   BMI 34.44 kg/m²     Labs: Labs reviewed today  CBC:   Recent Labs     02/24/25 0438 02/25/25  0540 02/26/25  0723   WBC 8.2 6.6 7.9   HGB 10.0* 10.5* 11.1*   HCT 29.7* 30.1* 32.4*   MCV 98.0 97.1 97.9   * 172 243     BMP:   Recent Labs     02/24/25 0438 02/25/25  0540 02/26/25  0723    139 139   K 4.0 3.7 3.6*    104 103   CO2 24 24 25   BUN 22 17 18   CREATININE 1.0 0.8 1.0     Magnesium: @LABCNT(MG:3)@  APTT:   No results for input(s): \"APTT\" in the last 72 hours.    PT/INR:   No results for input(s): \"PROTIME\", \"INR\" in the last 72 hours.      Imaging:   XR CHEST PORTABLE 02/22/2025    Narrative  EXAMINATION:  ONE XRAY VIEW OF THE CHEST    2/22/2025 4:47 am    COMPARISON:  02/21/2025, 1603 hours    HISTORY:  ORDERING SYSTEM PROVIDED HISTORY: Post op open heart surgery  TECHNOLOGIST PROVIDED HISTORY:  Post op open heart surgery  Reason for Exam: POST OP HEART SURGERY    69-year-old male; postop open-heart surgery    FINDINGS:  Portable upright view of the chest.    Cardiac monitor leads overlie the chest.    Prior median sternotomy.    Right shoulder arthroplasty hardware.    Stable cardiomegaly.  Atrial appendage clip.  Right IJ approach central  venous catheter distal tip overlying the upper SVC.    Stable left basilar chest tubes.  Stable mediastinal drain.    Small left apical pneumothorax with pleural to parenchymal gap measuring 5 mm.    Stable cardiomegaly.  Mild pulmonary

## 2025-02-26 NOTE — DISCHARGE INSTR - COC
days: 4       Incision 02/21/25 Knee Right;Anterior (Active)   Dressing Status Clean;Dry;Intact 02/26/25 0800   Dressing Change Due 02/23/25 02/25/25 0800   Incision Cleansed Betadine/povidone iodine 02/26/25 0800   Dressing/Treatment Open to air 02/26/25 0800   Closure Open to air 02/26/25 0800   Margins Approximated 02/26/25 0800   Incision Assessment Other (Comment) 02/25/25 0800   Drainage Amount None (dry) 02/26/25 0800   Drainage Description Thin;Serosanguinous 02/24/25 0600   Odor None 02/26/25 0800   Dionne-incision Assessment Intact 02/26/25 0800   Number of days: 4        Elimination:  Continence:   Bowel: Yes  Bladder: Yes (chronic suprapubic cath)  Urinary Catheter: None   Colostomy/Ileostomy/Ileal Conduit: No       Date of Last BM: 2/26/25    Intake/Output Summary (Last 24 hours) at 2/26/2025 1053  Last data filed at 2/26/2025 0833  Gross per 24 hour   Intake 510 ml   Output 2300 ml   Net -1790 ml     I/O last 3 completed shifts:  In: 878.6 [P.O.:760; I.V.:118.6]  Out: 4395 [Urine:4395]    Safety Concerns:     None    Impairments/Disabilities:      None (wears glasses)    Nutrition Therapy:  Current Nutrition Therapy:   - Oral Diet:  General    Routes of Feeding: Oral  Liquids: No Restrictions  Daily Fluid Restriction: no  Last Modified Barium Swallow with Video (Video Swallowing Test): not done    Treatments at the Time of Hospital Discharge:   Respiratory Treatments: Received one nebulizer treatment    No home o2  Ventilator:    - No ventilator support  - CPAP   only when sleeping    Rehab Therapies: Physical Therapy and Occupational Therapy  Weight Bearing Status/Restrictions: No weight bearing restrictions  Other Medical Equipment (for information only, NOT a DME order):  walker  Other Treatments: skilled nursing    Patient's personal belongings (please select all that are sent with patient):  Glasses    RN SIGNATURE:  Electronically signed by Jyoti Crowder RN on 2/28/25 at 2:35 PM EST    CASE  MANAGEMENT/SOCIAL WORK SECTION    Inpatient Status Date: ***    Readmission Risk Assessment Score:  [facility] RISK OF UNPLANNED READMISSION 2.0             10.4 Total Score        Discharging to Facility/ Agency   Name: Brittany    / signature: Electronically signed by Holly Moncada RN on 2/28/25 at 1:22 PM EST    PHYSICIAN SECTION    Prognosis: Good    Condition at Discharge: Stable    Rehab Potential (if transferring to Rehab): Good    Recommended Labs or Other Treatments After Discharge: Skilled nursing cardio/pulmonary assessment EVERY shift/visit with vital signs and SaO2 call abnormal results to Surgeon's office  Notify Surgeon's office for temp >101.5 F  Daily weight and record. Call for weight gain of 3 lbs in 3 days or 5-7lbs in 7 days    Monitor surgical incisions for signs of infection (redness, warmth, pain, purulent drainage, odor) and call with abnormal findings.   Leave incisions open to air unless drainage is prohibitive. Call with any signs of separation or dehiscence.     Shower daily with warm water and mild soap. Pat dry with clean towel, do not rub.   Heart Hugger and Surgical bra on during daytime  Large breasted patients need to wear surgical or soft sports bra at all times to reduce tension on sternal incision  Tong hose on during day time, rinse and dry overnight to prevent infection of leg incisions    Strict Sternal Precautions: No lifting/pushing/pulling over 5lbs x 4 weeks, may increase 5-10 lbs per week after that as tolerated.  Physical therapy BID, plus ambulation 3x per day in addition.  Up in chair for all meals  Reinforce Cardiac and Diabetic Diet, medication compliance and activity instructions    Arrange for transportation to all appointments  Patient needs to see Surgeon and Cardiologist within 2 weeks of discharge.            Physician Certification: I certify the above information and transfer of Earnest OSPINA English  is necessary for the continuing treatment

## 2025-02-26 NOTE — PROGRESS NOTES
Physical Therapy  Facility/Department: [facility] CAR 2- STEPDOWN   Physical Therapy Daily Treatment Note    Patient Name: Earnest Avila        MRN: [id number]    : 1955    Date of Service: 2025    Past Medical History:  has a past medical history of Allergic rhinitis, Anxiety, Borderline hypertension, CAD (coronary artery disease), Controlled diabetes mellitus type II without complication (HCC), Dental abscess, Depression, History of colon polyps, Intermittent self-catheterization of bladder, Kidney stones, Neuropathy, Obesity, Obstructive sleep apnea of adult, Osteoarthritis, Prostate enlargement, Restless legs syndrome, Tubular adenoma of colon, Type II or unspecified type diabetes mellitus without mention of complication, not stated as uncontrolled, UTI (urinary tract infection), and Wears glasses.  Past Surgical History:  has a past surgical history that includes Cholecystectomy (); Lithotripsy (2016); Lithotripsy; Cystocopy (2016); Prostate surgery (2016); Cystoscopy (2016); Colonoscopy (2016); Shoulder Arthroplasty (Right, 2016); Shoulder arthroscopy (Right, ); Vasectomy (); Cystoscopy (2016); Cystocopy; Cystoscopy (N/A, 2017); Cystoscopy (N/A, 2017); joint replacement; Cystourethroscopy/Urethral Dilation (11/15/2017); pr cystourethroscopy (N/A, 11/15/2017); Cystoscopy (N/A, 11/15/2017); eye surgery (Bilateral, ); Bladder surgery; Colonoscopy (N/A, 2019); Cardiac catheterization (2025); Cardiac procedure (N/A, 2025); and Coronary artery bypass graft (N/A, 2025).    Discharge Recommendations  Discharge Recommendations: Patient would benefit from continued therapy after discharge  PT Equipment Recommendations  Equipment Needed: Yes  Mobility Devices: Walker  Walker: Rolling  Other: TBD    Assessment  Body Structures, Functions, Activity Limitations Requiring Skilled Therapeutic Intervention: Decreased functional  Provided: Role of Therapy;Plan of Care;Precautions;Transfer Training  Education Method: Demonstration;Verbal  Barriers to Learning: None  Education Outcome: Verbalized understanding;Demonstrated understanding    Plan  Physical Therapy Plan  General Plan: 6-7 times per week  Current Treatment Recommendations: Strengthening, Balance training, Functional mobility training, Transfer training, Endurance training, Gait training, Stair training, Neuromuscular re-education, Home exercise program, Safety education & training, Patient/Caregiver education & training, Equipment evaluation, education, & procurement, Therapeutic activities    Goals  Short Term Goals  Time Frame for Short Term Goals: 14 visits  Short Term Goal 1: Pt will be Antonio in all bed mobility tasks  Short Term Goal 2: Pt will be Antonio in transfers  Short Term Goal 3: Pt will amb 300' Antonio w/ RW  Short Term Goal 4: Pt will negotiate 1 step Antonio w/ unilateral UE assist  Additional Goals?: No    Minutes  PT Individual Minutes  Time In: 1344  Time Out: 1410  Minutes: 26  Time Code Minutes  Timed Code Treatment Minutes: 23 Minutes    Electronically signed by MADELINE Murray on 2/26/25 at 2:21 PM EST  Treatment performed by Student PTA under the supervision of co-signing PTA who agrees with all treatment and documentation.   Tracey Lemus PTA

## 2025-02-26 NOTE — CARE COORDINATION
Spoke with Deborah with [employer]. States the insurance company has requested a peer to peer. She will call back and inform of when it is scheduled.

## 2025-02-26 NOTE — DISCHARGE INSTRUCTIONS
ACTIVITY/EXERCISE   ?    It will take 2 to 3 months to feel like you did before surgery in terms of energy and strength.  ?    Slowly increase your activity after you go home. Pace yourself, listen to your body. If it hurts, stop.    During the first 2 months, no digging, outside bike riding, or using arm movement on  a stationary bike for sternal precautions.   ?   The limit on how much you can lift, push or pull is 10 pounds.    For the first 4 weeks after surgery, you must not lift anything over 10 pounds. (You cannot lift anything heavier than a gallon of  milk).   Beginning the 5th week after surgery, you may lift, push or pull up to 20 pounds.   ?    Begin your walking program on the first day you are home and record how many times per day and number of minutes on your log. You may climb stairs; there are no limits to stair climbing.   ?    Continue to do your cough and deep breathing exercises and the incentive spirometer 6 times a day as you did in the hospital.   ?    Do not use arms to get up from a seated position. Instead, rock in your chair to give yourself momentum to sit up.   ?    You may begin light house hold chores, washing a few dishes, dusting, setting the table or folding laundry.    ?    You can have sex when it is comfortable for you.  The amount of stress on the heart during sex is about the same as climbing 2 flights of stairs.    APPETITE   ?   Your appetite might be decreased at first.  It should slowly improve.   ?   Small, frequent meals, as well as cold foods, may help.   ?   The dietitian will assist you with a low fat, low cholesterol, low salt diet.   ?   Consult your open heart binder for diet instructions.    TEMPERATURE   ?   Take your temperature at the same time each day.  Take your temperature at 8 a.m. (morning) and 8 p.m. (evening).     WEIGHT   ?   Weigh yourself when you awaken in the morning and record in your daily log.    PAIN   ?   You may have pain at the incision.

## 2025-02-26 NOTE — PLAN OF CARE
Problem: Discharge Planning  Goal: Discharge to home or other facility with appropriate resources  2/25/2025 1156 by Hailey Goddard RN  Outcome: Progressing  Flowsheets  Taken 2/25/2025 1015 by Hailey Goddard RN  Discharge to home or other facility with appropriate resources: Identify barriers to discharge with patient and caregiver  Taken 2/25/2025 0800 by Max Hernandez RN  Discharge to home or other facility with appropriate resources:   Identify barriers to discharge with patient and caregiver   Identify discharge learning needs (meds, wound care, etc)   Arrange for needed discharge resources and transportation as appropriate   Arrange for interpreters to assist at discharge as needed   Refer to discharge planning if patient needs post-hospital services based on physician order or complex needs related to functional status, cognitive ability or social support system     Problem: Chronic Conditions and Co-morbidities  Goal: Patient's chronic conditions and co-morbidity symptoms are monitored and maintained or improved  2/25/2025 1156 by Hailey Goddard RN  Outcome: Progressing  Flowsheets  Taken 2/25/2025 1015 by Hailey Goddard RN  Care Plan - Patient's Chronic Conditions and Co-Morbidity Symptoms are Monitored and Maintained or Improved: Monitor and assess patient's chronic conditions and comorbid symptoms for stability, deterioration, or improvement  Taken 2/25/2025 0800 by Max Hernandez RN  Care Plan - Patient's Chronic Conditions and Co-Morbidity Symptoms are Monitored and Maintained or Improved:   Monitor and assess patient's chronic conditions and comorbid symptoms for stability, deterioration, or improvement   Collaborate with multidisciplinary team to address chronic and comorbid conditions and prevent exacerbation or deterioration   Update acute care plan with appropriate goals if chronic or comorbid symptoms are exacerbated and prevent overall improvement and discharge     Problem: Safety -  Adult  Goal: Free from fall injury  2/25/2025 1156 by Hailey Goddard RN  Outcome: Progressing     Problem: Respiratory - Adult  Goal: Achieves optimal ventilation and oxygenation  2/25/2025 1156 by Hailey Goddard RN  Outcome: Progressing  Flowsheets (Taken 2/25/2025 1015)  Achieves optimal ventilation and oxygenation:   Assess for changes in respiratory status   Assess for changes in mentation and behavior     Problem: Pain  Goal: Verbalizes/displays adequate comfort level or baseline comfort level  2/25/2025 1156 by Hailey Goddard RN  Outcome: Progressing  Flowsheets  Taken 2/25/2025 1045  Verbalizes/displays adequate comfort level or baseline comfort level: Encourage patient to monitor pain and request assistance  Taken 2/25/2025 1030  Verbalizes/displays adequate comfort level or baseline comfort level: Encourage patient to monitor pain and request assistance     Problem: ABCDS Injury Assessment  Goal: Absence of physical injury  2/25/2025 1156 by Hailey Goddard RN  Outcome: Progressing

## 2025-02-26 NOTE — PROGRESS NOTES
Pacer wires clipped per order @1135 with no complications. Patient resting comfortably in chair, call light within reach. Plan of care on going.

## 2025-02-26 NOTE — PROGRESS NOTES
Patient transferred from [facility] earlier in the day. When patient attempted to connect home CPAP, power cord was found to be missing. Patient searched belongings with RN, not present in room with belongings. RN called [facility] to ask if it had been found, it was unable to be found. RN called respiratory therapy who then again checked for cord in old room, unable to find. Respiratory set patient up with hospital CPAP. Charge RN notified.

## 2025-02-26 NOTE — PROGRESS NOTES
Cardiology Progress Note                     Date:   2/26/2025  Patient name: Earnest Avila  Date of admission:  2/21/2025  7:12 AM  MRN:   [id number]  YOB: 1955  PCP: No primary care provider on file.    Reason for Admission:  MV CAD post CABG     Subjective:       No acute events overnight, patient remains in normal sinus rhythm with occasional ectopy/PACs, denies any chest pain, on room air, no significant volume overload noted on examination    Scheduled Meds:   FLUoxetine  20 mg Oral QAM    naloxegol  12.5 mg Oral QAM AC    hydrocortisone   Topical BID    meloxicam  15 mg Oral Daily    pregabalin  200 mg Oral BID    chlorzoxazone  500 mg Oral BID    sodium chloride flush  5-40 mL IntraVENous 2 times per day    aspirin  81 mg Oral Daily    clopidogrel  75 mg Oral Daily    amiodarone  200 mg Oral TID    polyethylene glycol  17 g Oral Daily    metoprolol tartrate  12.5 mg Oral BID    atorvastatin  20 mg Oral Nightly    pantoprazole  40 mg Oral Daily    Or    pantoprazole (PROTONIX) 40 mg in sodium chloride (PF) 0.9 % 10 mL injection  40 mg IntraVENous Daily    insulin lispro  0-12 Units SubCUTAneous TID WC       Continuous Infusions:   amiodarone Stopped (02/24/25 2154)    sodium chloride Stopped (02/22/25 0350)    sodium chloride Stopped (02/25/25 0507)    propofol Stopped (02/21/25 2104)    norepinephrine Stopped (02/22/25 0221)    EPINEPHrine Stopped (02/22/25 0103)    VASOpressin Stopped (02/21/25 1624)    insulin Stopped (02/22/25 0818)    dextrose      dexmedeTOMIDine Stopped (02/21/25 2158)       Labs:     CBC:   Recent Labs     02/24/25  0438 02/25/25  0540 02/26/25  0723   WBC 8.2 6.6 7.9   HGB 10.0* 10.5* 11.1*   * 172 243     BMP:    Recent Labs     02/24/25  0438 02/25/25  0540 02/26/25  0723    139 139   K 4.0 3.7 3.6*    104 103   CO2 24 24 25   BUN 22 17 18   CREATININE 1.0 0.8 1.0   GLUCOSE 181* 137* 162*     Hepatic: No results for input(s):  \"AST\", \"ALT\", \"BILITOT\", \"ALKPHOS\" in the last 72 hours.    Invalid input(s): \"ALB\"  Troponin: No results for input(s): \"TROPONINI\" in the last 72 hours.  BNP: No results for input(s): \"BNP\" in the last 72 hours.  Lipids: No results for input(s): \"CHOL\", \"HDL\" in the last 72 hours.    Invalid input(s): \"LDLCALCU\"  INR:   No results for input(s): \"INR\" in the last 72 hours.        Objective:     Vitals: BP (!) 91/59   Pulse 61   Temp 98.2 °F (36.8 °C) (Oral)   Resp 16   Ht 1.905 m (6' 3\")   Wt 125 kg (275 lb 9.2 oz)   SpO2 96%   BMI 34.44 kg/m²     General appearance: awake, alert, in no apparent respiratory distress on room air  HEENT: Head: Normocephalic, no lesions, without obvious abnormality  Neck: no JVD  Lungs: Mildly diminished air entry at the bases   Heart: regular rate and rhythm, S1, S2 normal, no murmur, click, rub or gallop  Midline sternal incision with dry dressing   Abdomen: soft, non-tender; bowel sounds normal  Extremities: Lower extremity edema has resolved, trace ankle edema noted neurologic: Mental status: Alert, oriented. Motor and sensory not done.       Cardiac testing:     EKG:   Sinus rhythm with Premature atrial complexes  Low voltage QRS     Echo: 2/18/25    Left Ventricle: Normal left ventricular systolic function with a visually estimated EF of 50 - 55%. EF by 2D Simpsons Biplane is 54%.    Mitral Valve: Trace regurgitation.    Pulmonic Valve: Trace regurgitation.    Image quality is adequate.     [facility]: 2/17/25    Multivessel coronary artery disease    Normal LV systolic function. Normal LVEDP.        Impression:   Dyspnea on minimal exertion, progressive   Multivessel CAD on [facility] s/p CABG x 3 on 2/21/2025 with LIMA-LAD, SVG-OM1 and SVG-PDA.   Atrial fibrillation with rapid ventricular response, postop, currently back in sinus rhythm  Preserved LVEF  Frequent PACs   HTN  HLP  History of laminectomy  Chronic back pain  Chronic indwelling Leyva's    Patient Active Problem List:

## 2025-02-26 NOTE — CARE COORDINATION
Received Telephone Call from [facility]  representative Gi. Call Ref# [id number]     Intent to deny admission to Acute Inpatient Rehabilitation Stay under Auth/CaseRef# [id number]      Rationale to deny Acute Inpatient Rehabilitation level of care: Does not meet medically complexity, care can be met at lower level.     Peer to Peer Deadline: 14 calendar days    Peer to Peer Instructions: Call [phone] opt 1 then opt 2 to schedule     Per PM&R physiatrist Dr. Jeff Cooper, Pursue peer to peer to attempt to overturn denial: Scheduled for [facility] left awaiting a return call.     Updated Vickie ELAM: Via Telephone Conversation at this time at phone/extension: [phone]    200 pm Call back from [facility]. P2P arranged for Friday, 2025 at 12 pm.  PM&R MD to call [phone] opt. 1 opt. 1.    Earnest Avila  :1955  Reference #: [id number]    Member ID:  [id number]  Update provided to Vickie ELAM on CAR 2 via M at extension [phone].

## 2025-02-27 ENCOUNTER — APPOINTMENT (OUTPATIENT)
Dept: GENERAL RADIOLOGY | Age: 70
End: 2025-02-27
Attending: THORACIC SURGERY (CARDIOTHORACIC VASCULAR SURGERY)
Payer: COMMERCIAL

## 2025-02-27 LAB
ANION GAP SERPL CALCULATED.3IONS-SCNC: 10 MMOL/L (ref 9–16)
BUN SERPL-MCNC: 16 MG/DL (ref 8–23)
CALCIUM SERPL-MCNC: 8.5 MG/DL (ref 8.6–10.4)
CHLORIDE SERPL-SCNC: 105 MMOL/L (ref 98–107)
CO2 SERPL-SCNC: 25 MMOL/L (ref 20–31)
CREAT SERPL-MCNC: 1 MG/DL (ref 0.7–1.2)
ERYTHROCYTE [DISTWIDTH] IN BLOOD BY AUTOMATED COUNT: 12.7 % (ref 11.8–14.4)
GFR, ESTIMATED: 81 ML/MIN/1.73M2
GLUCOSE BLD-MCNC: 134 MG/DL (ref 75–110)
GLUCOSE BLD-MCNC: 139 MG/DL (ref 75–110)
GLUCOSE BLD-MCNC: 162 MG/DL (ref 75–110)
GLUCOSE BLD-MCNC: 204 MG/DL (ref 75–110)
GLUCOSE SERPL-MCNC: 137 MG/DL (ref 74–99)
HCT VFR BLD AUTO: 31.1 % (ref 40.7–50.3)
HGB BLD-MCNC: 10.6 G/DL (ref 13–17)
MAGNESIUM SERPL-MCNC: 2.2 MG/DL (ref 1.6–2.4)
MCH RBC QN AUTO: 33.2 PG (ref 25.2–33.5)
MCHC RBC AUTO-ENTMCNC: 34.1 G/DL (ref 28.4–34.8)
MCV RBC AUTO: 97.5 FL (ref 82.6–102.9)
NRBC BLD-RTO: 0 PER 100 WBC
PLATELET # BLD AUTO: 244 K/UL (ref 138–453)
PMV BLD AUTO: 9.6 FL (ref 8.1–13.5)
POTASSIUM SERPL-SCNC: 3.8 MMOL/L (ref 3.7–5.3)
RBC # BLD AUTO: 3.19 M/UL (ref 4.21–5.77)
SODIUM SERPL-SCNC: 140 MMOL/L (ref 136–145)
WBC OTHER # BLD: 8.2 K/UL (ref 3.5–11.3)

## 2025-02-27 PROCEDURE — 6370000000 HC RX 637 (ALT 250 FOR IP)

## 2025-02-27 PROCEDURE — 93005 ELECTROCARDIOGRAM TRACING: CPT | Performed by: THORACIC SURGERY (CARDIOTHORACIC VASCULAR SURGERY)

## 2025-02-27 PROCEDURE — 94640 AIRWAY INHALATION TREATMENT: CPT

## 2025-02-27 PROCEDURE — 97116 GAIT TRAINING THERAPY: CPT

## 2025-02-27 PROCEDURE — 97535 SELF CARE MNGMENT TRAINING: CPT

## 2025-02-27 PROCEDURE — 71045 X-RAY EXAM CHEST 1 VIEW: CPT

## 2025-02-27 PROCEDURE — 36415 COLL VENOUS BLD VENIPUNCTURE: CPT

## 2025-02-27 PROCEDURE — 6370000000 HC RX 637 (ALT 250 FOR IP): Performed by: INTERNAL MEDICINE

## 2025-02-27 PROCEDURE — 99024 POSTOP FOLLOW-UP VISIT: CPT | Performed by: NURSE PRACTITIONER

## 2025-02-27 PROCEDURE — 82947 ASSAY GLUCOSE BLOOD QUANT: CPT

## 2025-02-27 PROCEDURE — 6360000002 HC RX W HCPCS: Performed by: NURSE PRACTITIONER

## 2025-02-27 PROCEDURE — 83735 ASSAY OF MAGNESIUM: CPT

## 2025-02-27 PROCEDURE — 97110 THERAPEUTIC EXERCISES: CPT

## 2025-02-27 PROCEDURE — 80048 BASIC METABOLIC PNL TOTAL CA: CPT

## 2025-02-27 PROCEDURE — 2140000001 HC CVICU INTERMEDIATE R&B

## 2025-02-27 PROCEDURE — 2500000003 HC RX 250 WO HCPCS

## 2025-02-27 PROCEDURE — 97530 THERAPEUTIC ACTIVITIES: CPT

## 2025-02-27 PROCEDURE — 6370000000 HC RX 637 (ALT 250 FOR IP): Performed by: NURSE PRACTITIONER

## 2025-02-27 PROCEDURE — 99232 SBSQ HOSP IP/OBS MODERATE 35: CPT | Performed by: INTERNAL MEDICINE

## 2025-02-27 PROCEDURE — 94660 CPAP INITIATION&MGMT: CPT

## 2025-02-27 PROCEDURE — 85027 COMPLETE CBC AUTOMATED: CPT

## 2025-02-27 RX ORDER — POTASSIUM CHLORIDE 1500 MG/1
20 TABLET, EXTENDED RELEASE ORAL DAILY
Qty: 5 TABLET | Refills: 0 | Status: SHIPPED | OUTPATIENT
Start: 2025-02-27 | End: 2025-03-01

## 2025-02-27 RX ORDER — METOPROLOL TARTRATE 25 MG/1
25 TABLET, FILM COATED ORAL 2 TIMES DAILY
Status: DISCONTINUED | OUTPATIENT
Start: 2025-02-27 | End: 2025-03-01 | Stop reason: HOSPADM

## 2025-02-27 RX ORDER — POLYETHYLENE GLYCOL 3350 17 G/17G
17 POWDER, FOR SOLUTION ORAL DAILY
Qty: 10 PACKET | Refills: 0 | Status: SHIPPED | OUTPATIENT
Start: 2025-02-27 | End: 2025-03-29

## 2025-02-27 RX ORDER — ONDANSETRON 4 MG/1
4 TABLET, ORALLY DISINTEGRATING ORAL EVERY 8 HOURS PRN
Qty: 30 TABLET | Refills: 0 | Status: SHIPPED | OUTPATIENT
Start: 2025-02-27

## 2025-02-27 RX ORDER — METOPROLOL TARTRATE 25 MG/1
25 TABLET, FILM COATED ORAL 2 TIMES DAILY
Qty: 60 TABLET | Refills: 3 | Status: SHIPPED | OUTPATIENT
Start: 2025-02-27

## 2025-02-27 RX ORDER — OXYCODONE HYDROCHLORIDE 5 MG/1
5 TABLET ORAL EVERY 8 HOURS PRN
Qty: 21 TABLET | Refills: 0 | Status: SHIPPED | OUTPATIENT
Start: 2025-02-27 | End: 2025-03-06

## 2025-02-27 RX ORDER — PANTOPRAZOLE SODIUM 40 MG/1
40 TABLET, DELAYED RELEASE ORAL DAILY
Qty: 30 TABLET | Refills: 3 | Status: SHIPPED | OUTPATIENT
Start: 2025-02-27

## 2025-02-27 RX ORDER — AMIODARONE HYDROCHLORIDE 200 MG/1
200 TABLET ORAL DAILY
Qty: 30 TABLET | Refills: 0 | Status: SHIPPED | OUTPATIENT
Start: 2025-02-28

## 2025-02-27 RX ORDER — AMIODARONE HYDROCHLORIDE 200 MG/1
200 TABLET ORAL DAILY
OUTPATIENT
Start: 2025-02-28

## 2025-02-27 RX ORDER — ATORVASTATIN CALCIUM 20 MG/1
20 TABLET, FILM COATED ORAL NIGHTLY
Qty: 30 TABLET | Refills: 3 | Status: SHIPPED | OUTPATIENT
Start: 2025-02-27

## 2025-02-27 RX ORDER — AMIODARONE HYDROCHLORIDE 200 MG/1
200 TABLET ORAL DAILY
Status: DISCONTINUED | OUTPATIENT
Start: 2025-02-28 | End: 2025-03-01 | Stop reason: HOSPADM

## 2025-02-27 RX ADMIN — Medication 12.5 MG: at 08:36

## 2025-02-27 RX ADMIN — FLUOXETINE HYDROCHLORIDE 20 MG: 20 CAPSULE ORAL at 08:35

## 2025-02-27 RX ADMIN — AMIODARONE HYDROCHLORIDE 200 MG: 200 TABLET ORAL at 08:34

## 2025-02-27 RX ADMIN — CHLORZOXAZONE 500 MG: 500 TABLET ORAL at 20:00

## 2025-02-27 RX ADMIN — METOPROLOL TARTRATE 25 MG: 25 TABLET, FILM COATED ORAL at 19:59

## 2025-02-27 RX ADMIN — HYDROCORTISONE: 10 CREAM TOPICAL at 20:00

## 2025-02-27 RX ADMIN — ASPIRIN 81 MG: 81 TABLET, COATED ORAL at 08:38

## 2025-02-27 RX ADMIN — CHLORZOXAZONE 500 MG: 500 TABLET ORAL at 08:34

## 2025-02-27 RX ADMIN — APIXABAN 5 MG: 5 TABLET, FILM COATED ORAL at 19:59

## 2025-02-27 RX ADMIN — MELOXICAM 15 MG: 7.5 TABLET ORAL at 09:34

## 2025-02-27 RX ADMIN — METOPROLOL TARTRATE 12.5 MG: 25 TABLET, FILM COATED ORAL at 08:36

## 2025-02-27 RX ADMIN — SODIUM CHLORIDE, PRESERVATIVE FREE 5 ML: 5 INJECTION INTRAVENOUS at 08:36

## 2025-02-27 RX ADMIN — PREGABALIN 200 MG: 100 CAPSULE ORAL at 08:36

## 2025-02-27 RX ADMIN — INSULIN LISPRO 2 UNITS: 100 INJECTION, SOLUTION INTRAVENOUS; SUBCUTANEOUS at 18:06

## 2025-02-27 RX ADMIN — PANTOPRAZOLE SODIUM 40 MG: 40 TABLET, DELAYED RELEASE ORAL at 08:36

## 2025-02-27 RX ADMIN — APIXABAN 5 MG: 5 TABLET, FILM COATED ORAL at 14:36

## 2025-02-27 RX ADMIN — PREGABALIN 200 MG: 100 CAPSULE ORAL at 20:51

## 2025-02-27 RX ADMIN — ATORVASTATIN CALCIUM 20 MG: 20 TABLET, FILM COATED ORAL at 19:59

## 2025-02-27 RX ADMIN — OXYCODONE 10 MG: 5 TABLET ORAL at 22:38

## 2025-02-27 RX ADMIN — HYDROCORTISONE: 10 CREAM TOPICAL at 08:35

## 2025-02-27 RX ADMIN — SODIUM CHLORIDE, PRESERVATIVE FREE 5 ML: 5 INJECTION INTRAVENOUS at 19:59

## 2025-02-27 RX ADMIN — ALBUTEROL SULFATE 2.5 MG: 2.5 SOLUTION RESPIRATORY (INHALATION) at 10:44

## 2025-02-27 RX ADMIN — CLOPIDOGREL BISULFATE 75 MG: 75 TABLET ORAL at 08:38

## 2025-02-27 RX ADMIN — MAGNESIUM HYDROXIDE 30 ML: 400 SUSPENSION ORAL at 20:20

## 2025-02-27 RX ADMIN — DIPHENHYDRAMINE HYDROCHLORIDE 25 MG: 25 TABLET ORAL at 20:20

## 2025-02-27 RX ADMIN — INSULIN LISPRO 4 UNITS: 100 INJECTION, SOLUTION INTRAVENOUS; SUBCUTANEOUS at 13:07

## 2025-02-27 ASSESSMENT — PAIN SCALES - GENERAL
PAINLEVEL_OUTOF10: 0
PAINLEVEL_OUTOF10: 8
PAINLEVEL_OUTOF10: 0

## 2025-02-27 ASSESSMENT — PAIN DESCRIPTION - ORIENTATION: ORIENTATION: RIGHT

## 2025-02-27 ASSESSMENT — PAIN DESCRIPTION - LOCATION: LOCATION: SHOULDER

## 2025-02-27 NOTE — PROGRESS NOTES
Occupational Therapy  Occupational Therapy Daily Treatment Note  Facility/Department: [facility] CAR 2- STEPDOWN   Patient Name: Earnest Avila        MRN: [id number]    : 1955    Date of Service: 2025    No chief complaint on file.    Past Medical History:  has a past medical history of Allergic rhinitis, Anxiety, Borderline hypertension, CAD (coronary artery disease), Controlled diabetes mellitus type II without complication ([facility]), Dental abscess, Depression, History of colon polyps, Intermittent self-catheterization of bladder, Kidney stones, Neuropathy, Obesity, Obstructive sleep apnea of adult, Osteoarthritis, Prostate enlargement, Restless legs syndrome, Tubular adenoma of colon, Type II or unspecified type diabetes mellitus without mention of complication, not stated as uncontrolled, UTI (urinary tract infection), and Wears glasses.  Past Surgical History:  has a past surgical history that includes Cholecystectomy (); Lithotripsy (2016); Lithotripsy; Cystocopy (2016); Prostate surgery (2016); Cystoscopy (2016); Colonoscopy (2016); Shoulder Arthroplasty (Right, 2016); Shoulder arthroscopy (Right, ); Vasectomy (); Cystoscopy (2016); Cystocopy; Cystoscopy (N/A, 2017); Cystoscopy (N/A, 2017); joint replacement; Cystourethroscopy/Urethral Dilation (11/15/2017); pr cystourethroscopy (N/A, 11/15/2017); Cystoscopy (N/A, 11/15/2017); eye surgery (Bilateral, ); Bladder surgery; Colonoscopy (N/A, 2019); Cardiac catheterization (2025); Cardiac procedure (N/A, 2025); and Coronary artery bypass graft (N/A, 2025).    Discharge Recommendations  Discharge Recommendations: Patient would benefit from continued therapy after discharge  OT Equipment Recommendations  Equipment Needed: No    Assessment  Performance deficits / Impairments: Decreased functional mobility ;Decreased ADL status;Decreased endurance;Decreased balance;Decreased

## 2025-02-27 NOTE — PROGRESS NOTES
Physical Therapy  Facility/Department: [facility] CAR 2- STEPDOWN   Physical Therapy Daily Treatment Note    Patient Name: Earnest Avila        MRN: [id number]    : 1955    Date of Service: 2025    Past Medical History:  has a past medical history of Allergic rhinitis, Anxiety, Borderline hypertension, CAD (coronary artery disease), Controlled diabetes mellitus type II without complication (HCC), Dental abscess, Depression, History of colon polyps, Intermittent self-catheterization of bladder, Kidney stones, Neuropathy, Obesity, Obstructive sleep apnea of adult, Osteoarthritis, Prostate enlargement, Restless legs syndrome, Tubular adenoma of colon, Type II or unspecified type diabetes mellitus without mention of complication, not stated as uncontrolled, UTI (urinary tract infection), and Wears glasses.  Past Surgical History:  has a past surgical history that includes Cholecystectomy (); Lithotripsy (2016); Lithotripsy; Cystocopy (2016); Prostate surgery (2016); Cystoscopy (2016); Colonoscopy (2016); Shoulder Arthroplasty (Right, 2016); Shoulder arthroscopy (Right, ); Vasectomy (); Cystoscopy (2016); Cystocopy; Cystoscopy (N/A, 2017); Cystoscopy (N/A, 2017); joint replacement; Cystourethroscopy/Urethral Dilation (11/15/2017); pr cystourethroscopy (N/A, 11/15/2017); Cystoscopy (N/A, 11/15/2017); eye surgery (Bilateral, ); Bladder surgery; Colonoscopy (N/A, 2019); Cardiac catheterization (2025); Cardiac procedure (N/A, 2025); and Coronary artery bypass graft (N/A, 2025).    Discharge Recommendations  Discharge Recommendations: Patient would benefit from continued therapy after discharge  PT Equipment Recommendations  Equipment Needed: Yes  Mobility Devices: Walker  Walker: Rolling  Other: TBD    Assessment  Body Structures, Functions, Activity Limitations Requiring Skilled Therapeutic Intervention: Decreased functional  Please have patient call Bonnie to review further, how much, how often, [place] clearance, etc

## 2025-02-27 NOTE — PROGRESS NOTES
Cardiology progress note        Date:  2/27/2025  Patient: Earnest Avila  Admission:  2/21/2025  7:12 AM  Admit DX: Coronary artery disease [I25.10]  CAD, multiple vessel [I25.10]  Age:  69 y.o., 1955     LOS: 6 days     Reason for evaluation:   Atherosclerotic CAD status post CABG       SUBJECTIVE:     The patient was seen and examined. Notes and labs reviewed.    Patient is awake and alert and states that he had a bowel movement and is passing gases, he denies any chest pain however states that he still feels little bit short of breath    OBJECTIVE:      EXAM:   Vitals:    VITALS:  /60   Pulse 64   Temp 98 °F (36.7 °C) (Oral)   Resp 18   Ht 1.905 m (6' 3\")   Wt 125.4 kg (276 lb 7.3 oz)   SpO2 92%   BMI 34.55 kg/m²    24HR INTAKE/OUTPUT:    Intake/Output Summary (Last 24 hours) at 2/27/2025 0942  Last data filed at 2/27/2025 0642  Gross per 24 hour   Intake 300 ml   Output 2150 ml   Net -1850 ml       General appearance: awake, alert, no apparent discomfort  HEENT: atraumatic, normocephalic, no JVD, no significant carotid bruit  Lungs: clear to auscultation bilaterally  Heart: regular rate and rhythm, S1, S2, 2/6 systolic murmur  Abdomen: soft, non-tender; bowel sounds active, suprapubic catheter in place  Extremities: no significant lower extremity edema  Neurologic: no obvious neurologic deficit    Current Inpatient Medications:   [START ON 2/28/2025] amiodarone  200 mg Oral Daily    metoprolol tartrate  25 mg Oral BID    FLUoxetine  20 mg Oral QAM    naloxegol  12.5 mg Oral QAM AC    hydrocortisone   Topical BID    meloxicam  15 mg Oral Daily    pregabalin  200 mg Oral BID    chlorzoxazone  500 mg Oral BID    sodium chloride flush  5-40 mL IntraVENous 2 times per day    aspirin  81 mg Oral Daily    clopidogrel  75 mg Oral Daily    polyethylene glycol  17 g Oral Daily    atorvastatin  20 mg Oral Nightly    pantoprazole  40 mg Oral Daily    Or    pantoprazole (PROTONIX) 40 mg in  49 05/20/2024 0705    TRIG 197 (H) 05/20/2024 0705     LIVER PROFILE:No results for input(s): \"AST\", \"ALT\", \"LABALBU\", \"ALKPHOS\", \"BILITOT\", \"BILIDIR\", \"IBILI\", \"GLOB\", \"ALBUMIN\" in the last 72 hours.    Invalid input(s): \"PROT\"  TSH:  No results for input(s): \"TSH\" in the last 720 hours.   HGA1C:  No results for input(s): \"LABA1C\" in the last 720 hours.     ASSESSMENT/PLAN:  1. Atherosclerotic CAD status post CABG  -continue aspirin 81 mg and Plavix 75 mg daily  -replace and keep potassium more than 4 and magnesium more than 2     2. Dyslipidemia; continue atorvastatin     3. Postoperative atrial fibrillation; now in sinus rhythm  -continue metoprolol and amiodarone for rate and rhythm control   -[id number] score is high, if atrial fibrillation recurs then may consider switching plavix to apixaban     4. Obesity with obstructive sleep apnea  -advised weight loss with low-fat low-carb diet and regular calorie burning exercises  -continue CPAP     5. Diabetes mellitus, Gilbert's disease, BPH, and other medical issues as per medicine      May discharge to rehab from cardiac stand point    The case is discussed with nursing staff, medicine team and family at the bedside    Adriana Pandya MD, FACC

## 2025-02-27 NOTE — PROGRESS NOTES
much more soft but still has mild distention.  No significant pain with palpation.  Will continue to monitor for bowel pattern and bowel movement  KUB yesterday showed diffuse large gas pattern loops with nothing definitive of ileus  Continue with bowel regiment and liquid diet  Please continue with PT OT around-the-clock along with aggressive I-S and Acapella  DC planning in progress- HCC vs need for rehab    2/24/25  Patient has some abdominal distention and pain with palpation and hypoactive GI tones.  Patient admits that his abdomen does feels distended.  Ordered a KUB this morning.  Sips with chips and we will evaluate advance diet slowly.  Added some bowel regiment and Mylanta and mag citrate  Please discontinue the Amio infusion convert to p.o. as patient is currently in normal sinus rhythm bradycardic.  Evaluate remove chest tubes today.  No large pleural effusion or pneumothorax on chest x-ray.  Continue with PT OT today.  DC planning in progress-plan for HHC when medically stable    2/23/25  Postop day 2 ([place])  -Patient went into A-fib RVR overnight, cardiology started amiodarone gtt. with bolus.  He then converted back to normal sinus rhythm.  Amio gtt. was DC'd due to bradycardia.  Patient remains in normal sinus rhythm now.  -Patient complains of chronic back pain.  All home meds restarted.  Patient receiving all as needed medications for pain management.  -Please encourage PT with patient.  He is postop day 2 now and has only walked to the door.  -Continue aggressive ICS.  -Keep chest tubes for today. 590/32 overnight  -Patient has suprapubic catheter.  Continue to monitor strict I's and O's will increase diuretic.  600 mL out overnight.  X-ray reviewed.  -Keep central line 1 more day      2/22/25  Postop day 1 ([place])  -Patient has complaints of sternal pain.  All PRNs given.  Added Toradol for pain control.  -EKG done this morning NSR.  Cardiology notified and reviewed.  -Patient up to chair

## 2025-02-27 NOTE — PLAN OF CARE
Problem: Respiratory - Adult  Goal: Achieves optimal ventilation and oxygenation  2/27/2025 1047 by Gina Vickers, RCDIMITRI  Outcome: Progressing

## 2025-02-28 ENCOUNTER — TELEPHONE (OUTPATIENT)
Dept: FAMILY MEDICINE CLINIC | Age: 70
End: 2025-02-28

## 2025-02-28 ENCOUNTER — APPOINTMENT (OUTPATIENT)
Dept: GENERAL RADIOLOGY | Age: 70
End: 2025-02-28
Attending: THORACIC SURGERY (CARDIOTHORACIC VASCULAR SURGERY)
Payer: COMMERCIAL

## 2025-02-28 LAB
ANION GAP SERPL CALCULATED.3IONS-SCNC: 12 MMOL/L (ref 9–16)
BUN SERPL-MCNC: 16 MG/DL (ref 8–23)
CALCIUM SERPL-MCNC: 8.3 MG/DL (ref 8.6–10.4)
CHLORIDE SERPL-SCNC: 104 MMOL/L (ref 98–107)
CO2 SERPL-SCNC: 24 MMOL/L (ref 20–31)
CREAT SERPL-MCNC: 1 MG/DL (ref 0.7–1.2)
EKG ATRIAL RATE: 69 BPM
EKG P AXIS: 27 DEGREES
EKG P-R INTERVAL: 160 MS
EKG Q-T INTERVAL: 438 MS
EKG QRS DURATION: 92 MS
EKG QTC CALCULATION (BAZETT): 469 MS
EKG R AXIS: 51 DEGREES
EKG T AXIS: 26 DEGREES
EKG VENTRICULAR RATE: 69 BPM
ERYTHROCYTE [DISTWIDTH] IN BLOOD BY AUTOMATED COUNT: 13.1 % (ref 11.8–14.4)
GFR, ESTIMATED: 81 ML/MIN/1.73M2
GLUCOSE BLD-MCNC: 141 MG/DL (ref 75–110)
GLUCOSE BLD-MCNC: 156 MG/DL (ref 75–110)
GLUCOSE BLD-MCNC: 165 MG/DL (ref 75–110)
GLUCOSE SERPL-MCNC: 149 MG/DL (ref 74–99)
HCT VFR BLD AUTO: 29.5 % (ref 40.7–50.3)
HGB BLD-MCNC: 10.3 G/DL (ref 13–17)
MAGNESIUM SERPL-MCNC: 2.1 MG/DL (ref 1.6–2.4)
MCH RBC QN AUTO: 34.1 PG (ref 25.2–33.5)
MCHC RBC AUTO-ENTMCNC: 34.9 G/DL (ref 28.4–34.8)
MCV RBC AUTO: 97.7 FL (ref 82.6–102.9)
NRBC BLD-RTO: 0 PER 100 WBC
PLATELET # BLD AUTO: 248 K/UL (ref 138–453)
PMV BLD AUTO: 9.7 FL (ref 8.1–13.5)
POTASSIUM SERPL-SCNC: 3.8 MMOL/L (ref 3.7–5.3)
RBC # BLD AUTO: 3.02 M/UL (ref 4.21–5.77)
SODIUM SERPL-SCNC: 140 MMOL/L (ref 136–145)
WBC OTHER # BLD: 8.3 K/UL (ref 3.5–11.3)

## 2025-02-28 PROCEDURE — 99231 SBSQ HOSP IP/OBS SF/LOW 25: CPT | Performed by: GENERAL PRACTICE

## 2025-02-28 PROCEDURE — 6370000000 HC RX 637 (ALT 250 FOR IP)

## 2025-02-28 PROCEDURE — 99232 SBSQ HOSP IP/OBS MODERATE 35: CPT | Performed by: INTERNAL MEDICINE

## 2025-02-28 PROCEDURE — 6370000000 HC RX 637 (ALT 250 FOR IP): Performed by: INTERNAL MEDICINE

## 2025-02-28 PROCEDURE — 99024 POSTOP FOLLOW-UP VISIT: CPT | Performed by: NURSE PRACTITIONER

## 2025-02-28 PROCEDURE — 93010 ELECTROCARDIOGRAM REPORT: CPT | Performed by: INTERNAL MEDICINE

## 2025-02-28 PROCEDURE — 80048 BASIC METABOLIC PNL TOTAL CA: CPT

## 2025-02-28 PROCEDURE — 71045 X-RAY EXAM CHEST 1 VIEW: CPT

## 2025-02-28 PROCEDURE — 2500000003 HC RX 250 WO HCPCS

## 2025-02-28 PROCEDURE — 6370000000 HC RX 637 (ALT 250 FOR IP): Performed by: NURSE PRACTITIONER

## 2025-02-28 PROCEDURE — 2140000001 HC CVICU INTERMEDIATE R&B

## 2025-02-28 PROCEDURE — 83735 ASSAY OF MAGNESIUM: CPT

## 2025-02-28 PROCEDURE — 82947 ASSAY GLUCOSE BLOOD QUANT: CPT

## 2025-02-28 PROCEDURE — 85027 COMPLETE CBC AUTOMATED: CPT

## 2025-02-28 PROCEDURE — 36415 COLL VENOUS BLD VENIPUNCTURE: CPT

## 2025-02-28 RX ORDER — HYDROXYZINE HYDROCHLORIDE 10 MG/1
5 TABLET, FILM COATED ORAL EVERY 8 HOURS PRN
Qty: 15 TABLET | Refills: 0 | Status: SHIPPED | OUTPATIENT
Start: 2025-02-28 | End: 2025-03-02

## 2025-02-28 RX ORDER — HYDROXYZINE HYDROCHLORIDE 10 MG/1
10 TABLET, FILM COATED ORAL EVERY 8 HOURS PRN
Qty: 30 TABLET | Refills: 0 | Status: SHIPPED | OUTPATIENT
Start: 2025-02-28 | End: 2025-02-28 | Stop reason: HOSPADM

## 2025-02-28 RX ORDER — HYDROXYZINE HYDROCHLORIDE 10 MG/1
5 TABLET, FILM COATED ORAL 3 TIMES DAILY PRN
Status: DISCONTINUED | OUTPATIENT
Start: 2025-02-28 | End: 2025-03-01 | Stop reason: HOSPADM

## 2025-02-28 RX ORDER — HYDROXYZINE HYDROCHLORIDE 10 MG/1
10 TABLET, FILM COATED ORAL 3 TIMES DAILY PRN
Status: DISCONTINUED | OUTPATIENT
Start: 2025-02-28 | End: 2025-02-28

## 2025-02-28 RX ADMIN — HYDROCORTISONE: 10 CREAM TOPICAL at 08:48

## 2025-02-28 RX ADMIN — Medication 12.5 MG: at 08:48

## 2025-02-28 RX ADMIN — INSULIN LISPRO 2 UNITS: 100 INJECTION, SOLUTION INTRAVENOUS; SUBCUTANEOUS at 18:29

## 2025-02-28 RX ADMIN — ASPIRIN 81 MG: 81 TABLET, COATED ORAL at 08:48

## 2025-02-28 RX ADMIN — METOPROLOL TARTRATE 25 MG: 25 TABLET, FILM COATED ORAL at 20:15

## 2025-02-28 RX ADMIN — PANTOPRAZOLE SODIUM 40 MG: 40 TABLET, DELAYED RELEASE ORAL at 08:48

## 2025-02-28 RX ADMIN — SODIUM CHLORIDE, PRESERVATIVE FREE 10 ML: 5 INJECTION INTRAVENOUS at 08:49

## 2025-02-28 RX ADMIN — FLUOXETINE HYDROCHLORIDE 20 MG: 20 CAPSULE ORAL at 08:48

## 2025-02-28 RX ADMIN — ATORVASTATIN CALCIUM 20 MG: 20 TABLET, FILM COATED ORAL at 20:15

## 2025-02-28 RX ADMIN — MELOXICAM 15 MG: 7.5 TABLET ORAL at 08:50

## 2025-02-28 RX ADMIN — AMIODARONE HYDROCHLORIDE 200 MG: 200 TABLET ORAL at 08:48

## 2025-02-28 RX ADMIN — INSULIN LISPRO 2 UNITS: 100 INJECTION, SOLUTION INTRAVENOUS; SUBCUTANEOUS at 13:40

## 2025-02-28 RX ADMIN — CHLORZOXAZONE 500 MG: 500 TABLET ORAL at 20:15

## 2025-02-28 RX ADMIN — APIXABAN 5 MG: 5 TABLET, FILM COATED ORAL at 08:48

## 2025-02-28 RX ADMIN — HYDROCORTISONE: 10 CREAM TOPICAL at 20:15

## 2025-02-28 RX ADMIN — APIXABAN 5 MG: 5 TABLET, FILM COATED ORAL at 20:15

## 2025-02-28 RX ADMIN — CHLORZOXAZONE 500 MG: 500 TABLET ORAL at 08:48

## 2025-02-28 RX ADMIN — PREGABALIN 200 MG: 100 CAPSULE ORAL at 08:48

## 2025-02-28 RX ADMIN — INSULIN LISPRO 2 UNITS: 100 INJECTION, SOLUTION INTRAVENOUS; SUBCUTANEOUS at 09:13

## 2025-02-28 RX ADMIN — METOPROLOL TARTRATE 25 MG: 25 TABLET, FILM COATED ORAL at 08:48

## 2025-02-28 RX ADMIN — DIPHENHYDRAMINE HYDROCHLORIDE 25 MG: 25 TABLET ORAL at 20:14

## 2025-02-28 RX ADMIN — PREGABALIN 200 MG: 100 CAPSULE ORAL at 20:14

## 2025-02-28 RX ADMIN — CLOPIDOGREL BISULFATE 75 MG: 75 TABLET ORAL at 08:48

## 2025-02-28 ASSESSMENT — PAIN SCALES - GENERAL
PAINLEVEL_OUTOF10: 0

## 2025-02-28 NOTE — PLAN OF CARE
Problem: Respiratory - Adult  Goal: Achieves optimal ventilation and oxygenation  2/27/2025 2525 by Riccardo Heredia, ESTEPHANIA  Outcome: Progressing   BRONCHOSPASM/BRONCHOCONSTRICTION     [x]         IMPROVE AERATION/BREATH SOUNDS  [x]   ADMINISTER BRONCHODILATOR THERAPY AS APPROPRIATE  [x]   ASSESS BREATH SOUNDS  []   IMPLEMENT AEROSOL/MDI PROTOCOL  [x]   PATIENT EDUCATION AS NEEDED  NON INVASIVE VENTILATION  PROVIDE OPTIMAL VENTILATION/ACCEPTABLE SP02  IMPLEMENT NON INVASIVE VENTILATION PROTOCOL  ASSESSMENT SKIN INTEGRITY  PATIENT EDUCATION AS NEEDED  BIPAP AS NEEDED

## 2025-02-28 NOTE — PROGRESS NOTES
Stopped (02/22/25 0818)    dextrose      dexmedeTOMIDine Stopped (02/21/25 7538)     PRN Meds:hydrOXYzine HCl, aluminum & magnesium hydroxide-simethicone, potassium chloride **OR** potassium alternative oral replacement **OR** potassium chloride, albuterol, sodium chloride, sodium chloride flush, sodium chloride, ondansetron **OR** ondansetron, oxyCODONE **OR** oxyCODONE, fentanNYL **OR** fentanNYL, hydrALAZINE, metoprolol, diphenhydrAMINE, magnesium hydroxide, bisacodyl, potassium chloride, magnesium sulfate, calcium chloride 1,000 mg in sodium chloride 0.9 % 100 mL IVPB **OR** calcium chloride 2,000 mg in sodium chloride 0.9 % 100 mL IVPB, albumin human 5%, albumin human 25%, norepinephrine, EPINEPHrine, glucose, dextrose bolus **OR** dextrose bolus, glucagon (rDNA), dextrose    Beta- Blocker: Yes  Aspirin: Yes  Lovenox: No  GI: Yes  Plavix: Yes  Coumadin: No  Statin: Yes  ACE: Yes    Daily Nursing Care:  Please keep SCDS in place as DVT prophylaxis  If not intubated, Please have patient use IS and acapella 10X/hr or during commercial breaks  Please continue BM management  Daily labs and CXR  Daily PT/OT and ambulation  Continue with Case Management for DC planning      Assessment/ Plan:    2/28/25  Start Atarax for any itching and complaint of rash to back.  Please make sure pacer wires are clipped today.  DC patient today whether he is excepted at [facility] or home with home care.    2/27/25  See if patient qualifies for [facility] today.  See if patient could go home with family today  Patient is medically ready for discharge  Please make sure pacer wires are clipped  Will be around discussed discharge planning with patient later today.  Either way discharge today.  Potassium stable.    2/26/25:  D/C to [facility] today when bed is ready.  Patient states has had a large bowel movement and continues to pass gas.  Improvement in stomach.  Clip pacer wires today  Discharge readmit completed  Continue I-S  day      2/22/25  Postop day 1 ([place])  -Patient has complaints of sternal pain.  All PRNs given.  Added Toradol for pain control.  -EKG done this morning NSR.  Cardiology notified and reviewed.  -Patient up to chair and alert and oriented x 4.  On BiPAP.  Continue as patient tolerates.  Will help with pulmonary recruitment  -Keep chest tubes.  -Keep central line  -Remove Leyva, diuresing well.  -Okay to DC arterial line if patient has remained off vasopressors for 6 hours per protocol  -Monitor and replace electrolytes per protocol  -ABLA : hemoglobin 9.6  -Continue routine postop care    TSERING MCNAIR - NP

## 2025-02-28 NOTE — PROGRESS NOTES
Cardiology progress note        Date:  2/28/2025  Patient: Earnest Avila  Admission:  2/21/2025  7:12 AM  Admit DX: Coronary artery disease [I25.10]  CAD, multiple vessel [I25.10]  Age:  69 y.o., 1955     LOS: 7 days     Reason for evaluation:   Atherosclerotic CAD status post CABG      SUBJECTIVE:     The patient was seen and examined. Notes and labs reviewed.    Patient denies any chest pain or shortness of breath    Patient has developed a maculopapular rash on his back and complains of itching, it appears to be contact rash due to the chucks on the bed, which are removed.  It is unlikely a drug rash as it spares the extremities and front side of the chest    OBJECTIVE:      EXAM:   Vitals:    VITALS:  BP (!) 106/57   Pulse 51   Temp 98.8 °F (37.1 °C) (Oral)   Resp 16   Ht 1.905 m (6' 3\")   Wt 125.4 kg (276 lb 7.3 oz)   SpO2 93%   BMI 34.55 kg/m²    24HR INTAKE/OUTPUT:    Intake/Output Summary (Last 24 hours) at 2/28/2025 0901  Last data filed at 2/28/2025 0200  Gross per 24 hour   Intake --   Output 2000 ml   Net -2000 ml       General appearance: awake, alert, no apparent discomfort  HEENT: atraumatic, normocephalic, no JVD, no significant carotid bruit  Lungs: clear to auscultation bilaterally  Heart: regular rate and rhythm, S1, S2, 2/6 systolic murmur  Abdomen: soft, non-tender; bowel sounds active, suprapubic catheter in place  Extremities: no significant lower extremity edema  Neurologic: no obvious neurologic deficit  Skin: Maculopapular rash on the back    Current Inpatient Medications:   amiodarone  200 mg Oral Daily    metoprolol tartrate  25 mg Oral BID    apixaban  5 mg Oral BID    FLUoxetine  20 mg Oral QAM    naloxegol  12.5 mg Oral QAM AC    hydrocortisone   Topical BID    meloxicam  15 mg Oral Daily    pregabalin  200 mg Oral BID    chlorzoxazone  500 mg Oral BID    sodium chloride flush  5-40 mL IntraVENous 2 times per day    aspirin  81 mg Oral Daily    clopidogrel

## 2025-02-28 NOTE — PROGRESS NOTES
Physical Medicine & Rehabilitation  Progress Note        Admitting Physician: Ronnie Jamison MD    Primary Care Provider: No primary care provider on file.     Chief Complaint: Low endurance     Brief History:    This is a follow up to the initial consult on Mr. Earnest Avila is a 69 y.o. right handed male who was admitted to [facility] on 2/21/2025 with No chief complaint on file.      69-year-old male presented to the hospital with complaints of shortness of breath and mild chest pressure.  Previous stress test have been negative.  Patient underwent cardiac catheterization and noted to have multivessel coronary artery disease.  Cardiothoracic surgery was consulted for bypass surgery.  Patient underwent CABG x4 2/21.  Patient went to [facility] cardiology started amiodarone drip, subsequently converted back to sinus rhythm and amiodarone discontinued due to bradycardia.  Patient has no complaints of abdominal distention, KUB ordered showed diffuse large gas pattern loops, improved        Subjective:  The patient is resting comfortably. The patient's mobility is improved.  Patient has made progress with therapies, currently contact-guard assist bed mobility, transfers, ambulation.  Mod to max lower extremity ADLs.      ROS:  Constitutional: negative for anorexia, chills, fevers, sweats and weight loss; +fatigue  Eyes: negative for redness and visual disturbance  Ears, nose, mouth, throat, and face: negative for earaches, epistaxis, sore throat and tinnitus  Respiratory: negative for cough and shortness of breath  Cardiovascular: negative for chest pain, dyspnea and palpitations  Gastrointestinal: negative for abdominal pain, change in bowel habits, constipation, nausea and vomiting  Genitourinary:negative for dysuria, frequency, hesitancy and urinary incontinence  Integument/breast: negative for pruritus and rash  Musculoskeletal:negative for stiff joints  Neurological: negative for dizziness, headaches

## 2025-02-28 NOTE — CARE COORDINATION
Peer to peer scheduled today at 1 pm. Patient updated. If denied, he will go home with home care. [place] of choice provided. Referral sent to [facility]. Spoke to Jyoti. They are not in network with insurance. Referral to [facility]    0925 received voicemail from Debby from [facility]. They are able to accept    1315 received voicemail from Deborah from [facility]. Peer to peer was denied. Patient updated. Notified him that [facility] accepted for home care. He denies other needs and has transportation home. Notified Khushbu from [facility] of discharge today     1545 patient's wife at bedside requesting to appeal ARU denial. Notified Deborah from [facility]. She will submit appeal. Vineet GUERRERO updated

## 2025-02-28 NOTE — CARE COORDINATION
P2P completed by Dr. Cooper, denial upheld.  VMM left for CAR 2 CM at [phone].     156 pm Notified per [place] Car 2 CM,, patient and family would like to appeal.  AOR form sent for signature.  Update provided to Dr. Cooper.     340 pm Faxed AOR and all updated clinicals to initiated an expedited appeal to [phone]. Updated provided to [place] CAR 2 CM.

## 2025-02-28 NOTE — PLAN OF CARE
Problem: Discharge Planning  Goal: Discharge to home or other facility with appropriate resources  Outcome: Progressing     Problem: Chronic Conditions and Co-morbidities  Goal: Patient's chronic conditions and co-morbidity symptoms are monitored and maintained or improved  Outcome: Progressing     Problem: Safety - Adult  Goal: Free from fall injury  Outcome: Progressing     Problem: Respiratory - Adult  Goal: Achieves optimal ventilation and oxygenation  2/27/2025 2044 by Jyoti Crowder RN  Outcome: Progressing  2/27/2025 1047 by Gina Vickers, RCP  Outcome: Progressing     Problem: Pain  Goal: Verbalizes/displays adequate comfort level or baseline comfort level  Outcome: Progressing     Problem: ABCDS Injury Assessment  Goal: Absence of physical injury  Outcome: Progressing

## 2025-02-28 NOTE — TELEPHONE ENCOUNTER
----- Message from TSERING Prather NP sent at 2/28/2025 10:08 AM EST -----  Regarding: establish care  THis patient post op cabg    He wanted a new PCP in [place] area. I have heard good things about you! So I was wondering if you could establish care with him? He did well with surgery. Very pathway. Has a slight rash on on back. Likley allergy to chux vs sheets. On atarax.     Let me know if you or one of your partners are excepting new patients.     Thanks     Vineet

## 2025-02-28 NOTE — PLAN OF CARE
Problem: Discharge Planning  Goal: Discharge to home or other facility with appropriate resources  2/28/2025 0048 by Fredrick Rice RN  Outcome: Progressing  Flowsheets (Taken 2/27/2025 2300)  Discharge to home or other facility with appropriate resources:   Identify barriers to discharge with patient and caregiver   Arrange for needed discharge resources and transportation as appropriate   Identify discharge learning needs (meds, wound care, etc)  2/27/2025 2044 by Jyoti Crowder RN  Outcome: Progressing     Problem: Chronic Conditions and Co-morbidities  Goal: Patient's chronic conditions and co-morbidity symptoms are monitored and maintained or improved  2/28/2025 0048 by Fredrick Rice RN  Outcome: Progressing  Flowsheets (Taken 2/27/2025 2300)  Care Plan - Patient's Chronic Conditions and Co-Morbidity Symptoms are Monitored and Maintained or Improved:   Monitor and assess patient's chronic conditions and comorbid symptoms for stability, deterioration, or improvement   Collaborate with multidisciplinary team to address chronic and comorbid conditions and prevent exacerbation or deterioration   Update acute care plan with appropriate goals if chronic or comorbid symptoms are exacerbated and prevent overall improvement and discharge  2/27/2025 2044 by Jyoti Crowder RN  Outcome: Progressing     Problem: Safety - Adult  Goal: Free from fall injury  2/28/2025 0048 by Fredrick Rice RN  Outcome: Progressing  2/27/2025 2044 by Jyoti Crowder RN  Outcome: Progressing     Problem: Respiratory - Adult  Goal: Achieves optimal ventilation and oxygenation  2/28/2025 0048 by Fredrick Rice RN  Outcome: Progressing  2/27/2025 2355 by Riccardo Heredia RCP  Outcome: Progressing  2/27/2025 2044 by Jyoti Crowder RN  Outcome: Progressing     Problem: Pain  Goal: Verbalizes/displays adequate comfort level or baseline comfort level  2/28/2025 0048 by Fredrick Rice RN  Outcome: Progressing  2/27/2025 2044 by  Jyoti Crowder, RN  Outcome: Progressing     Problem: ABCDS Injury Assessment  Goal: Absence of physical injury  2/28/2025 0048 by Fredrick Rice, RN  Outcome: Progressing  2/27/2025 2044 by Jyoti Crowder, RN  Outcome: Progressing

## 2025-02-28 NOTE — PLAN OF CARE
Problem: Discharge Planning  Goal: Discharge to home or other facility with appropriate resources  Outcome: Progressing     Problem: Chronic Conditions and Co-morbidities  Goal: Patient's chronic conditions and co-morbidity symptoms are monitored and maintained or improved  Outcome: Progressing     Problem: Safety - Adult  Goal: Free from fall injury  Outcome: Progressing     Problem: Respiratory - Adult  Goal: Achieves optimal ventilation and oxygenation  Outcome: Progressing     Problem: Pain  Goal: Verbalizes/displays adequate comfort level or baseline comfort level  Outcome: Progressing     Problem: ABCDS Injury Assessment  Goal: Absence of physical injury  Outcome: Progressing

## 2025-02-28 NOTE — TELEPHONE ENCOUNTER
Noted  Future Appointments   Date Time Provider Department Center   3/14/2025  8:30 AM Medardo Teague MD [facility]

## 2025-03-01 ENCOUNTER — APPOINTMENT (OUTPATIENT)
Dept: GENERAL RADIOLOGY | Age: 70
End: 2025-03-01
Attending: THORACIC SURGERY (CARDIOTHORACIC VASCULAR SURGERY)
Payer: COMMERCIAL

## 2025-03-01 VITALS
TEMPERATURE: 98 F | WEIGHT: 276.46 LBS | RESPIRATION RATE: 16 BRPM | SYSTOLIC BLOOD PRESSURE: 116 MMHG | DIASTOLIC BLOOD PRESSURE: 54 MMHG | OXYGEN SATURATION: 97 % | HEART RATE: 65 BPM | BODY MASS INDEX: 34.37 KG/M2 | HEIGHT: 75 IN

## 2025-03-01 LAB
ANION GAP SERPL CALCULATED.3IONS-SCNC: 11 MMOL/L (ref 9–16)
BUN SERPL-MCNC: 14 MG/DL (ref 8–23)
CALCIUM SERPL-MCNC: 8.8 MG/DL (ref 8.6–10.4)
CHLORIDE SERPL-SCNC: 104 MMOL/L (ref 98–107)
CO2 SERPL-SCNC: 25 MMOL/L (ref 20–31)
CREAT SERPL-MCNC: 1 MG/DL (ref 0.7–1.2)
ERYTHROCYTE [DISTWIDTH] IN BLOOD BY AUTOMATED COUNT: 13.2 % (ref 11.8–14.4)
GFR, ESTIMATED: 81 ML/MIN/1.73M2
GLUCOSE BLD-MCNC: 157 MG/DL (ref 75–110)
GLUCOSE BLD-MCNC: 165 MG/DL (ref 75–110)
GLUCOSE SERPL-MCNC: 149 MG/DL (ref 74–99)
HCT VFR BLD AUTO: 31 % (ref 40.7–50.3)
HGB BLD-MCNC: 10.7 G/DL (ref 13–17)
MAGNESIUM SERPL-MCNC: 1.9 MG/DL (ref 1.6–2.4)
MCH RBC QN AUTO: 33.8 PG (ref 25.2–33.5)
MCHC RBC AUTO-ENTMCNC: 34.5 G/DL (ref 28.4–34.8)
MCV RBC AUTO: 97.8 FL (ref 82.6–102.9)
NRBC BLD-RTO: 0 PER 100 WBC
PLATELET # BLD AUTO: 268 K/UL (ref 138–453)
PMV BLD AUTO: 9.6 FL (ref 8.1–13.5)
POTASSIUM SERPL-SCNC: 3.9 MMOL/L (ref 3.7–5.3)
RBC # BLD AUTO: 3.17 M/UL (ref 4.21–5.77)
SODIUM SERPL-SCNC: 140 MMOL/L (ref 136–145)
WBC OTHER # BLD: 8.2 K/UL (ref 3.5–11.3)

## 2025-03-01 PROCEDURE — 99024 POSTOP FOLLOW-UP VISIT: CPT | Performed by: NURSE PRACTITIONER

## 2025-03-01 PROCEDURE — 99232 SBSQ HOSP IP/OBS MODERATE 35: CPT | Performed by: INTERNAL MEDICINE

## 2025-03-01 PROCEDURE — 6370000000 HC RX 637 (ALT 250 FOR IP): Performed by: NURSE PRACTITIONER

## 2025-03-01 PROCEDURE — 6370000000 HC RX 637 (ALT 250 FOR IP): Performed by: THORACIC SURGERY (CARDIOTHORACIC VASCULAR SURGERY)

## 2025-03-01 PROCEDURE — 6370000000 HC RX 637 (ALT 250 FOR IP)

## 2025-03-01 PROCEDURE — 85027 COMPLETE CBC AUTOMATED: CPT

## 2025-03-01 PROCEDURE — 83735 ASSAY OF MAGNESIUM: CPT

## 2025-03-01 PROCEDURE — 97535 SELF CARE MNGMENT TRAINING: CPT

## 2025-03-01 PROCEDURE — 97116 GAIT TRAINING THERAPY: CPT

## 2025-03-01 PROCEDURE — 80048 BASIC METABOLIC PNL TOTAL CA: CPT

## 2025-03-01 PROCEDURE — 6370000000 HC RX 637 (ALT 250 FOR IP): Performed by: INTERNAL MEDICINE

## 2025-03-01 PROCEDURE — 71045 X-RAY EXAM CHEST 1 VIEW: CPT

## 2025-03-01 PROCEDURE — 82947 ASSAY GLUCOSE BLOOD QUANT: CPT

## 2025-03-01 PROCEDURE — 36415 COLL VENOUS BLD VENIPUNCTURE: CPT

## 2025-03-01 RX ORDER — POTASSIUM CHLORIDE 1500 MG/1
20 TABLET, EXTENDED RELEASE ORAL 2 TIMES DAILY
Qty: 14 TABLET | Refills: 0 | Status: SHIPPED | OUTPATIENT
Start: 2025-03-01

## 2025-03-01 RX ORDER — FUROSEMIDE 20 MG/1
20 TABLET ORAL 2 TIMES DAILY
Qty: 14 TABLET | Refills: 0 | Status: SHIPPED | OUTPATIENT
Start: 2025-03-01

## 2025-03-01 RX ADMIN — CLOPIDOGREL BISULFATE 75 MG: 75 TABLET ORAL at 08:09

## 2025-03-01 RX ADMIN — HYDROXYZINE HYDROCHLORIDE 5 MG: 10 TABLET ORAL at 06:10

## 2025-03-01 RX ADMIN — AMIODARONE HYDROCHLORIDE 200 MG: 200 TABLET ORAL at 08:10

## 2025-03-01 RX ADMIN — PANTOPRAZOLE SODIUM 40 MG: 40 TABLET, DELAYED RELEASE ORAL at 08:10

## 2025-03-01 RX ADMIN — ASPIRIN 81 MG: 81 TABLET, COATED ORAL at 08:09

## 2025-03-01 RX ADMIN — MELOXICAM 15 MG: 7.5 TABLET ORAL at 08:14

## 2025-03-01 RX ADMIN — Medication 12.5 MG: at 06:10

## 2025-03-01 RX ADMIN — POLYETHYLENE GLYCOL 3350 17 G: 17 POWDER, FOR SOLUTION ORAL at 08:09

## 2025-03-01 RX ADMIN — FLUOXETINE HYDROCHLORIDE 20 MG: 20 CAPSULE ORAL at 08:09

## 2025-03-01 RX ADMIN — CHLORZOXAZONE 500 MG: 500 TABLET ORAL at 08:15

## 2025-03-01 RX ADMIN — INSULIN LISPRO 2 UNITS: 100 INJECTION, SOLUTION INTRAVENOUS; SUBCUTANEOUS at 12:40

## 2025-03-01 RX ADMIN — APIXABAN 5 MG: 5 TABLET, FILM COATED ORAL at 08:09

## 2025-03-01 RX ADMIN — MAGNESIUM HYDROXIDE 30 ML: 400 SUSPENSION ORAL at 06:16

## 2025-03-01 RX ADMIN — INSULIN LISPRO 2 UNITS: 100 INJECTION, SOLUTION INTRAVENOUS; SUBCUTANEOUS at 08:23

## 2025-03-01 RX ADMIN — HYDROCORTISONE: 10 CREAM TOPICAL at 08:08

## 2025-03-01 RX ADMIN — PREGABALIN 200 MG: 100 CAPSULE ORAL at 08:10

## 2025-03-01 ASSESSMENT — PAIN SCALES - GENERAL
PAINLEVEL_OUTOF10: 0

## 2025-03-01 NOTE — PROGRESS NOTES
Occupational Therapy  Occupational Therapy Daily Treatment Note  Facility/Department: [facility] CAR 2- STEPDOWN   Patient Name: Earnest Avila        MRN: [id number]    : 1955    Date of Service: 3/1/2025    No chief complaint on file.    Past Medical History:  has a past medical history of Allergic rhinitis, Anxiety, Borderline hypertension, CAD (coronary artery disease), Controlled diabetes mellitus type II without complication (HCC), Dental abscess, Depression, History of colon polyps, Intermittent self-catheterization of bladder, Kidney stones, Neuropathy, Obesity, Obstructive sleep apnea of adult, Osteoarthritis, Prostate enlargement, Restless legs syndrome, Tubular adenoma of colon, Type II or unspecified type diabetes mellitus without mention of complication, not stated as uncontrolled, UTI (urinary tract infection), and Wears glasses.  Past Surgical History:  has a past surgical history that includes Cholecystectomy (); Lithotripsy (2016); Lithotripsy; Cystocopy (2016); Prostate surgery (2016); Cystoscopy (2016); Colonoscopy (2016); Shoulder Arthroplasty (Right, 2016); Shoulder arthroscopy (Right, ); Vasectomy (); Cystoscopy (2016); Cystocopy; Cystoscopy (N/A, 2017); Cystoscopy (N/A, 2017); joint replacement; Cystourethroscopy/Urethral Dilation (11/15/2017); pr cystourethroscopy (N/A, 11/15/2017); Cystoscopy (N/A, 11/15/2017); eye surgery (Bilateral, ); Bladder surgery; Colonoscopy (N/A, 2019); Cardiac catheterization (2025); Cardiac procedure (N/A, 2025); and Coronary artery bypass graft (N/A, 2025).    Discharge Recommendations  Discharge Recommendations: Patient would benefit from continued therapy after discharge in order to increase pt independence and precaution adherence with ADL tasks and functional transfers       Assessment  Performance deficits / Impairments: Decreased functional mobility ;Decreased ADL  status;Decreased endurance;Decreased balance;Decreased high-level IADLs  Prognosis: Good  REQUIRES OT FOLLOW-UP: Yes  Activity Tolerance  Activity Tolerance: Patient Tolerated treatment well  Safety Devices  Type of Devices: Gait belt;Left in chair;Call light within reach;Nurse notified  Restraints  Restraints Initially in Place: No    AM-PAC  AM-[facility] Daily Activity - Inpatient   How much help is needed for putting on and taking off regular lower body clothing?: A Little  How much help is needed for bathing (which includes washing, rinsing, drying)?: A Little  How much help is needed for toileting (which includes using toilet, bedpan, or urinal)?: A Little  How much help is needed for putting on and taking off regular upper body clothing?: A Little  How much help is needed for taking care of personal grooming?: A Little  How much help for eating meals?: None  AM-[facility] Inpatient Daily Activity Raw Score: 19  AM-PAC Inpatient ADL T-Scale Score : 40.22  ADL Inpatient CMS 0-100% Score: 42.8  ADL Inpatient CMS G-Code Modifier : CK    Restrictions/Precautions  Restrictions/Precautions  Restrictions/Precautions: General Precautions;Cardiac;Fall Risk;Surgical Protocols  Activity Level: Up as Tolerated;Up with Assist  Required Braces or Orthoses?: Yes  Required Braces or Orthoses  Other: Heart Hugger Brace  Position Activity Restriction  Sternal Precautions: No Pushing;No Pulling;5# Lifting Restrictions  Other Position/Activity Restrictions: Up in chair. s/p: CABG x4 on 2/21/25. Suprapubic catheter.       Subjective  General  Chart Reviewed: Yes  Patient assessed for rehabilitation services?: Yes  Family / Caregiver Present: No  General Comment  Comments: Pt and RN agreeable to therapy this day. Pt seated in chair at start of session and retired to seated in chair at session end with call light in reach and all needs met. Pt c/o 3/10 pain in RLE, pt repositioned for comfort and RLE elevated.

## 2025-03-01 NOTE — PROGRESS NOTES
Discharge order noted. AVS printed and reviewed with patient; all questions answered. Meds being picked up at preferred pharmacy. Care plan and education completed. IV removed. All belongings sent with patient. Tele box removed. Patient discharged home with home care.

## 2025-03-01 NOTE — PLAN OF CARE
Problem: Discharge Planning  Goal: Discharge to home or other facility with appropriate resources  3/1/2025 1334 by Quita Gautam RN  Outcome: Completed  3/1/2025 1019 by Quita Gautam RN  Outcome: Progressing  3/1/2025 0040 by Fredrick Rice RN  Outcome: Progressing  Flowsheets (Taken 2/28/2025 2000)  Discharge to home or other facility with appropriate resources:   Identify barriers to discharge with patient and caregiver   Arrange for needed discharge resources and transportation as appropriate   Identify discharge learning needs (meds, wound care, etc)     Problem: Chronic Conditions and Co-morbidities  Goal: Patient's chronic conditions and co-morbidity symptoms are monitored and maintained or improved  3/1/2025 1334 by Quita Gautam RN  Outcome: Completed  3/1/2025 1019 by Quita Gautam RN  Outcome: Progressing  3/1/2025 0040 by Fredrick Rice RN  Outcome: Progressing  Flowsheets (Taken 2/28/2025 2000)  Care Plan - Patient's Chronic Conditions and Co-Morbidity Symptoms are Monitored and Maintained or Improved:   Monitor and assess patient's chronic conditions and comorbid symptoms for stability, deterioration, or improvement   Collaborate with multidisciplinary team to address chronic and comorbid conditions and prevent exacerbation or deterioration   Update acute care plan with appropriate goals if chronic or comorbid symptoms are exacerbated and prevent overall improvement and discharge     Problem: Safety - Adult  Goal: Free from fall injury  3/1/2025 1334 by Quita Gautam RN  Outcome: Completed  3/1/2025 1019 by Quita Gautam RN  Outcome: Progressing  3/1/2025 0040 by Fredrick Rice RN  Outcome: Progressing     Problem: Respiratory - Adult  Goal: Achieves optimal ventilation and oxygenation  3/1/2025 1334 by Quita Gautam RN  Outcome: Completed  3/1/2025 1019 by Quita Gautam RN  Outcome: Progressing  3/1/2025 0040 by Fredrick Rice

## 2025-03-01 NOTE — CARE COORDINATION
Dx: Coronary artery disease [I25.10]  CAD, multiple vessel [I25.10]    Admit date: 2025  GMLOS :  LOS :    [facility] RISK OF UNPLANNED READMISSION 2.0             12.9 Total Score        ______________________________________      Patients goal/ Transitional Planning : Spoke with patient, stated he will now DC to home with [facility] HC orders placed,     Spoke with Debby from [facility] notified of DC    PT/OT recommendations : IPR vs HHC        Barriers to discharge : FRAN    Discharge Report    [facility]  Clinical Case Management Department  Written by: Licha Reid RN    Patient Name: Earnest Avila  Attending Provider: Ronnie Jamison MD  Admit Date: 2025  7:12 AM  MRN: [id number]  Account: [id number]                     : 1955  Discharge Date: 3/1      Disposition: home    Licha Reid RN

## 2025-03-01 NOTE — PROGRESS NOTES
Physical Therapy  Facility/Department: [facility] CAR 2- STEPDOWN   Physical Therapy Daily Treatment Note    Patient Name: Earnest Avila        MRN: [id number]    : 1955    Date of Service: 3/1/2025    No chief complaint on file.    Past Medical History:  has a past medical history of Allergic rhinitis, Anxiety, Borderline hypertension, CAD (coronary artery disease), Controlled diabetes mellitus type II without complication (HCC), Dental abscess, Depression, History of colon polyps, Intermittent self-catheterization of bladder, Kidney stones, Neuropathy, Obesity, Obstructive sleep apnea of adult, Osteoarthritis, Prostate enlargement, Restless legs syndrome, Tubular adenoma of colon, Type II or unspecified type diabetes mellitus without mention of complication, not stated as uncontrolled, UTI (urinary tract infection), and Wears glasses.  Past Surgical History:  has a past surgical history that includes Cholecystectomy (); Lithotripsy (2016); Lithotripsy; Cystocopy (2016); Prostate surgery (2016); Cystoscopy (2016); Colonoscopy (2016); Shoulder Arthroplasty (Right, 2016); Shoulder arthroscopy (Right, ); Vasectomy (); Cystoscopy (2016); Cystocopy; Cystoscopy (N/A, 2017); Cystoscopy (N/A, 2017); joint replacement; Cystourethroscopy/Urethral Dilation (11/15/2017); pr cystourethroscopy (N/A, 11/15/2017); Cystoscopy (N/A, 11/15/2017); eye surgery (Bilateral, ); Bladder surgery; Colonoscopy (N/A, 2019); Cardiac catheterization (2025); Cardiac procedure (N/A, 2025); and Coronary artery bypass graft (N/A, 2025).    Discharge Recommendations  Discharge Recommendations: Patient would benefit from continued therapy after discharge  PT Equipment Recommendations  Equipment Needed: No  Mobility Devices: Walker  Walker: Rolling  Other: has DME, walker and cane    Assessment  Body Structures, Functions, Activity Limitations Requiring Skilled

## 2025-03-01 NOTE — PROGRESS NOTES
[facility] Cardiothoracic Surgery  Progress Note    3/1/2025 10:31 AM  Surgeon: Surgeon CTS: Dr. Yaquelin CHESTER     S/P: CABG x 4 LIMA to LAD saphenous vein to first diagonal saphenous vein to OM1 saphenous vein to the PDA  Endo vein harvest  Left atrial appendage  FRAN  Platelet gel     Subjective:  Mr. Avila up in chair. No complaints wants to be discharged    No acute needs A&0x4  Objective:  /69   Pulse 68   Temp 98 °F (36.7 °C) (Oral)   Resp 16   Ht 1.905 m (6' 3\")   Wt 125.4 kg (276 lb 7.3 oz)   SpO2 97%   BMI 34.55 kg/m²     Labs: Labs reviewed today  CBC:   Recent Labs     02/27/25 0625 02/28/25 0404 03/01/25  0726   WBC 8.2 8.3 8.2   HGB 10.6* 10.3* 10.7*   HCT 31.1* 29.5* 31.0*   MCV 97.5 97.7 97.8    248 268     BMP:   Recent Labs     02/27/25 0625 02/28/25 0404 03/01/25  0726    140 140   K 3.8 3.8 3.9    104 104   CO2 25 24 25   BUN 16 16 14   CREATININE 1.0 1.0 1.0     Magnesium: @LABCNT(MG:3)@  APTT:   No results for input(s): \"APTT\" in the last 72 hours.    PT/INR:   No results for input(s): \"PROTIME\", \"INR\" in the last 72 hours.      Imaging:   XR CHEST PORTABLE 02/22/2025    Narrative  EXAMINATION:  ONE XRAY VIEW OF THE CHEST    2/22/2025 4:47 am    COMPARISON:  02/21/2025, 1603 hours    HISTORY:  ORDERING SYSTEM PROVIDED HISTORY: Post op open heart surgery  TECHNOLOGIST PROVIDED HISTORY:  Post op open heart surgery  Reason for Exam: POST OP HEART SURGERY    69-year-old male; postop open-heart surgery    FINDINGS:  Portable upright view of the chest.    Cardiac monitor leads overlie the chest.    Prior median sternotomy.    Right shoulder arthroplasty hardware.    Stable cardiomegaly.  Atrial appendage clip.  Right IJ approach central  venous catheter distal tip overlying the upper SVC.    Stable left basilar chest tubes.  Stable mediastinal drain.    Small left apical pneumothorax with pleural to parenchymal gap measuring 5 mm.    Stable cardiomegaly.  Mild pulmonary

## 2025-03-01 NOTE — PLAN OF CARE
Problem: Discharge Planning  Goal: Discharge to home or other facility with appropriate resources  3/1/2025 1019 by Quita Gautam RN  Outcome: Progressing  3/1/2025 0040 by Fredrick Rice RN  Outcome: Progressing  Flowsheets (Taken 2/28/2025 2000)  Discharge to home or other facility with appropriate resources:   Identify barriers to discharge with patient and caregiver   Arrange for needed discharge resources and transportation as appropriate   Identify discharge learning needs (meds, wound care, etc)     Problem: Chronic Conditions and Co-morbidities  Goal: Patient's chronic conditions and co-morbidity symptoms are monitored and maintained or improved  3/1/2025 1019 by Quita Gautam RN  Outcome: Progressing  3/1/2025 0040 by Fredrick Rice RN  Outcome: Progressing  Flowsheets (Taken 2/28/2025 2000)  Care Plan - Patient's Chronic Conditions and Co-Morbidity Symptoms are Monitored and Maintained or Improved:   Monitor and assess patient's chronic conditions and comorbid symptoms for stability, deterioration, or improvement   Collaborate with multidisciplinary team to address chronic and comorbid conditions and prevent exacerbation or deterioration   Update acute care plan with appropriate goals if chronic or comorbid symptoms are exacerbated and prevent overall improvement and discharge     Problem: Safety - Adult  Goal: Free from fall injury  3/1/2025 1019 by Quita Gautam RN  Outcome: Progressing  3/1/2025 0040 by Fredrick Rice RN  Outcome: Progressing     Problem: Respiratory - Adult  Goal: Achieves optimal ventilation and oxygenation  3/1/2025 1019 by Quita Gautam RN  Outcome: Progressing  3/1/2025 0040 by Fredrick Rice RN  Outcome: Progressing     Problem: Pain  Goal: Verbalizes/displays adequate comfort level or baseline comfort level  3/1/2025 1019 by Quita Gautam RN  Outcome: Progressing  3/1/2025 0040 by Fredrick Rice RN  Outcome: Progressing      Problem: ABCDS Injury Assessment  Goal: Absence of physical injury  3/1/2025 1019 by Quita Gautam, RN  Outcome: Progressing  3/1/2025 0040 by Fredrick Rice, RN  Outcome: Progressing  Flowsheets (Taken 2/28/2025 2000)  Absence of Physical Injury: Implement safety measures based on patient assessment

## 2025-03-01 NOTE — PROGRESS NOTES
Cardiology Progress Note                     Date:   3/1/2025  Patient name: Earnest Avila  Date of admission:  2/21/2025  7:12 AM  MRN:   [id number]  YOB: 1955  PCP: No primary care provider on file.    Reason for Admission:  MV CAD post CABG     Subjective:       No acute events overnight, patient remains in normal sinus rhythm with occasional ectopy/PACs, denies any chest pain, on room air, no significant volume overload noted on examination    Scheduled Meds:   amiodarone  200 mg Oral Daily    metoprolol tartrate  25 mg Oral BID    apixaban  5 mg Oral BID    FLUoxetine  20 mg Oral QAM    naloxegol  12.5 mg Oral QAM AC    hydrocortisone   Topical BID    meloxicam  15 mg Oral Daily    pregabalin  200 mg Oral BID    chlorzoxazone  500 mg Oral BID    sodium chloride flush  5-40 mL IntraVENous 2 times per day    aspirin  81 mg Oral Daily    clopidogrel  75 mg Oral Daily    polyethylene glycol  17 g Oral Daily    atorvastatin  20 mg Oral Nightly    pantoprazole  40 mg Oral Daily    Or    pantoprazole (PROTONIX) 40 mg in sodium chloride (PF) 0.9 % 10 mL injection  40 mg IntraVENous Daily    insulin lispro  0-12 Units SubCUTAneous TID WC       Continuous Infusions:   sodium chloride Stopped (02/22/25 0350)    sodium chloride Stopped (02/25/25 0507)    propofol Stopped (02/21/25 2104)    norepinephrine Stopped (02/22/25 0221)    EPINEPHrine Stopped (02/22/25 0103)    VASOpressin Stopped (02/21/25 1624)    insulin Stopped (02/22/25 0818)    dextrose      dexmedeTOMIDine Stopped (02/21/25 2158)       Labs:     CBC:   Recent Labs     02/27/25  0625 02/28/25  0404 03/01/25  0726   WBC 8.2 8.3 8.2   HGB 10.6* 10.3* 10.7*    248 268     BMP:    Recent Labs     02/27/25  0625 02/28/25  0404 03/01/25  0726    140 140   K 3.8 3.8 3.9    104 104   CO2 25 24 25   BUN 16 16 14   CREATININE 1.0 1.0 1.0   GLUCOSE 137* 149* 149*     Hepatic: No results for input(s): \"AST\", \"ALT\",  controlled, with neuropathy (HCC)     Essential hypertension     Osteoarthritis     Anxiety     BRIDGETTE (obstructive sleep apnea)     Neuropathy     Obesity, Class I, BMI 30-34.9     Chronic right shoulder pain     Tubular adenoma of colon     History of colon polyps     Hepatitis C carrier (HCC)     Weight loss counseling, encounter for     Stomach ulcer     Gilbert's disease     Class 1 obesity due to excess calories with serious comorbidity and body mass index (BMI) of 30.0 to 30.9 in adult     H/O urinary retention     History of suprapubic catheter     Diastasis recti     DDD (degenerative disc disease), cervical     Neck pain     Cervical stenosis of spine     BPH (benign prostatic hyperplasia)     Chronic constipation     Spondylolisthesis of lumbar region     Microalbuminuria     Chronic suprapubic catheter (HCC)     Irregular heart beats     Spinal stenosis of lumbar region at multiple levels     Disorder of rotator cuff     Angina pectoris     Chronic dyspnea     Dyspnea     CAD, multiple vessel     Coronary artery disease     S/P CABG (coronary artery bypass graft)        Plan:   DC Plavix   Continue aspirin, statin, metoprolol   On p.o. amiodarone 200 mg daily   Therapeutic anticoagulation with Eliquis   PT OT   Okay to discharge from cardiology standpoint   Patient to follow-up with [facility] cardiology in 3 to 4 weeks        Lemuel Spaulding MD [facility] [place]

## 2025-03-01 NOTE — PLAN OF CARE
Problem: Discharge Planning  Goal: Discharge to home or other facility with appropriate resources  3/1/2025 0040 by Fredrick Rice RN  Outcome: Progressing  Flowsheets (Taken 2/28/2025 2000)  Discharge to home or other facility with appropriate resources:   Identify barriers to discharge with patient and caregiver   Arrange for needed discharge resources and transportation as appropriate   Identify discharge learning needs (meds, wound care, etc)  2/28/2025 1745 by Jyoti Crowder RN  Outcome: Progressing     Problem: Chronic Conditions and Co-morbidities  Goal: Patient's chronic conditions and co-morbidity symptoms are monitored and maintained or improved  3/1/2025 0040 by Fredrick Rice RN  Outcome: Progressing  Flowsheets (Taken 2/28/2025 2000)  Care Plan - Patient's Chronic Conditions and Co-Morbidity Symptoms are Monitored and Maintained or Improved:   Monitor and assess patient's chronic conditions and comorbid symptoms for stability, deterioration, or improvement   Collaborate with multidisciplinary team to address chronic and comorbid conditions and prevent exacerbation or deterioration   Update acute care plan with appropriate goals if chronic or comorbid symptoms are exacerbated and prevent overall improvement and discharge  2/28/2025 1745 by Jyoti Crowder RN  Outcome: Progressing     Problem: Safety - Adult  Goal: Free from fall injury  3/1/2025 0040 by Fredrick Rice RN  Outcome: Progressing  2/28/2025 1745 by Jyoti Crowder RN  Outcome: Progressing     Problem: Respiratory - Adult  Goal: Achieves optimal ventilation and oxygenation  3/1/2025 0040 by Fredrick Rice RN  Outcome: Progressing  2/28/2025 1745 by Jyoti Crowder RN  Outcome: Progressing     Problem: Pain  Goal: Verbalizes/displays adequate comfort level or baseline comfort level  3/1/2025 0040 by Fredrick Rice RN  Outcome: Progressing  2/28/2025 1745 by Jyoti Crowder RN  Outcome: Progressing     Problem: ABCDS Injury  Assessment  Goal: Absence of physical injury  3/1/2025 0040 by Fredrick Rice, RN  Outcome: Progressing  Flowsheets (Taken 2/28/2025 2000)  Absence of Physical Injury: Implement safety measures based on patient assessment  2/28/2025 2551 by Jyoti Crowder, RN  Outcome: Progressing

## 2025-03-02 RX ORDER — HYDROXYZINE HYDROCHLORIDE 10 MG/1
5 TABLET, FILM COATED ORAL EVERY 12 HOURS
Qty: 15 TABLET | Refills: 0 | Status: SHIPPED | OUTPATIENT
Start: 2025-03-02 | End: 2025-03-12

## 2025-03-03 ENCOUNTER — CARE COORDINATION (OUTPATIENT)
Dept: CARE COORDINATION | Age: 70
End: 2025-03-03

## 2025-03-03 RX ORDER — POTASSIUM CHLORIDE 1500 MG/1
20 TABLET, EXTENDED RELEASE ORAL DAILY
Qty: 5 TABLET | OUTPATIENT
Start: 2025-03-03

## 2025-03-03 RX ORDER — FUROSEMIDE 20 MG/1
20 TABLET ORAL 2 TIMES DAILY
Qty: 14 TABLET | Refills: 0 | OUTPATIENT
Start: 2025-03-03

## 2025-03-03 NOTE — TELEPHONE ENCOUNTER
Noted  Future Appointments   Date Time Provider Department Center   3/14/2025  8:30 AM Medardo Teague MD [facility]   3/20/2025  9:45 AM Ronnie Jamison MD [facility] S[facility]

## 2025-03-03 NOTE — CARE COORDINATION
Care Transitions Note    Initial Call - Call within 2 business days of discharge: Yes    Patient Current Location:  Home: [address] [place] [address]   [facility] [address]    Care Transition Nurse contacted the patient by telephone to perform post hospital discharge assessment, verified name and  as identifiers. Provided introduction to self, and explanation of the Care Transition Nurse role.     Patient: Earnest Avila    Patient : 1955   MRN: [id number]    Reason for Admission: S/P CABG (coronary artery bypass graft)  Discharge Date: 3/1/25  RURS: Readmission Risk Score: 13.9      Last Discharge Facility       Date Complaint Diagnosis Description Type Department Provider    25  S/P CABG (coronary artery bypass graft) Admission (Discharged) [facility] Ronnie Jamison MD            Was this an external facility discharge? No    Additional needs identified to be addressed with provider   No needs identified             Method of communication with provider: none.    Patients top risk factors for readmission: functional physical ability and medication management    Interventions to address risk factors:   Education:   If you notice any sign or symptom that indicates that you may be having a stroke, activate the  emergency medical services immediately by calling -1. These symptoms include: uneven  facial expression, arm(s) drifting down, strange speech or loss of speech, vision problems, sudden  severe headache, sudden numbness or face/arms/legs, sudden confusion or difficult  understanding, sudden dizziness, sudden difficulty with walking. Continue or begin taking the  medications prescribed by your physician as listed above. There are personal risk that are  associated with Stroke. Anyone can have a stroke no matter your age, race or gender. The  chances of having a stroke increase if you have certain risk factors. The best way to protect  yourself and loved ones from stroke is to understand your personal risk

## 2025-03-04 ENCOUNTER — TELEPHONE (OUTPATIENT)
Age: 70
End: 2025-03-04

## 2025-03-04 DIAGNOSIS — E11.40 TYPE 2 DIABETES, CONTROLLED, WITH NEUROPATHY (HCC): Primary | Chronic | ICD-10-CM

## 2025-03-04 DIAGNOSIS — Z95.1 S/P CABG (CORONARY ARTERY BYPASS GRAFT): ICD-10-CM

## 2025-03-04 DIAGNOSIS — I25.10 CAD, MULTIPLE VESSEL: ICD-10-CM

## 2025-03-04 DIAGNOSIS — I10 ESSENTIAL HYPERTENSION: ICD-10-CM

## 2025-03-04 DIAGNOSIS — D12.6 TUBULAR ADENOMA OF COLON: ICD-10-CM

## 2025-03-04 DIAGNOSIS — K59.09 CHRONIC CONSTIPATION: ICD-10-CM

## 2025-03-04 NOTE — DISCHARGE SUMMARY
[facility] Cardiothoracic Surgery  Discharge Summary    Patient's Name/Date of Birth: Earnest Avila / 1955 (69 y.o.)    Admission Date: 2/21/2025  7:12 AM  Discharge Date: 3/1/2025  2:13 PM  Discharge Physician: Dr. randle  Discharge Unit: CAR1  Discharge condition: stable  Disposition: Home      Reason For Admission: No chief complaint on file.      HPI: Earnest Avila is a 69 y.o.   male who presented to cardiothoracic surgery multivessel CAD diagnosed after cardiac catheterization.    Patient had uneventful postop recovery from bypass surgery.  Acute blood loss anemia was due to surgery.  Delay in discharge was due to awaiting insurance company to approve or deny acute rehab.  Insurance company denied any placement therefore patient was discharged home with home care after explaining we did not meet qualifications for placement    Procedures completed in hospital: CABG x 4 LIMA to LAD saphenous vein to first diagonal saphenous vein to OM1 saphenous vein to the PDA  Endo vein harvest  Left atrial appendage  FRAN  Platelet gel      Physical Exam:  Vitals:    03/01/25 1121   BP:    Pulse: 65   Resp: 16   Temp: 98 °F (36.7 °C)   SpO2: 97%     Weight: Weight - Scale: 125.4 kg (276 lb 7.3 oz)    Weight - Scale: 119.7 kg (263 lb 14.3 oz)    I/O last 3 completed shifts:  In: -   Out: 850 [Urine:850]    General: alert and oriented to person, place and time, well-developed and well-nourished, in no acute distress. Up in chair, No apparent distress.  Heart:Normal S1 and S2.  Regular rhythm. No murmurs, gallops, or rubs.  Pacing Wires No   Lungs: clear to auscultation bilaterally  Abdomen: soft, non tender, non distended, BSx4  Extremities: negative  Wounds: clean and dry, healing appropriately.     Past Medical History:   Diagnosis Date    Allergic rhinitis     Anxiety     Borderline hypertension     CAD (coronary artery disease)     Controlled diabetes mellitus type II without complication (HCC)     Dental

## 2025-03-04 NOTE — TELEPHONE ENCOUNTER
Received call from Nany, RN with [facility].  She stated patient is S/P CABG and is scheduled to se new PCP next week.  She was advised to contact cardiology to manage post labs.  He will need order to check BMP/CBC x next 2 weeks.  Contact Nany when orders are placed  [phone]

## 2025-03-05 ENCOUNTER — HOSPITAL ENCOUNTER (OUTPATIENT)
Age: 70
Setting detail: SPECIMEN
Discharge: HOME | End: 2025-03-05

## 2025-03-05 LAB
ANION GAP SERPL CALCULATED.3IONS-SCNC: 11 MMOL/L (ref 9–16)
BASOPHILS # BLD: 0.13 K/UL (ref 0–0.2)
BASOPHILS NFR BLD: 2 % (ref 0–2)
BUN SERPL-MCNC: 18 MG/DL (ref 8–23)
CALCIUM SERPL-MCNC: 9.2 MG/DL (ref 8.6–10.4)
CHLORIDE SERPL-SCNC: 101 MMOL/L (ref 98–107)
CO2 SERPL-SCNC: 25 MMOL/L (ref 20–31)
CREAT SERPL-MCNC: 1.2 MG/DL (ref 0.7–1.2)
EOSINOPHIL # BLD: 0.89 K/UL (ref 0–0.44)
EOSINOPHILS RELATIVE PERCENT: 11 % (ref 1–4)
ERYTHROCYTE [DISTWIDTH] IN BLOOD BY AUTOMATED COUNT: 14.1 % (ref 11.8–14.4)
GFR, ESTIMATED: 65 ML/MIN/1.73M2
GLUCOSE SERPL-MCNC: 181 MG/DL (ref 74–99)
HCT VFR BLD AUTO: 35.1 % (ref 40.7–50.3)
HGB BLD-MCNC: 11.1 G/DL (ref 13–17)
IMM GRANULOCYTES # BLD AUTO: 0.07 K/UL (ref 0–0.3)
IMM GRANULOCYTES NFR BLD: 1 %
LYMPHOCYTES NFR BLD: 1.68 K/UL (ref 1.1–3.7)
LYMPHOCYTES RELATIVE PERCENT: 20 % (ref 24–43)
MCH RBC QN AUTO: 32.6 PG (ref 25.2–33.5)
MCHC RBC AUTO-ENTMCNC: 31.6 G/DL (ref 28.4–34.8)
MCV RBC AUTO: 103.2 FL (ref 82.6–102.9)
MONOCYTES NFR BLD: 0.68 K/UL (ref 0.1–1.2)
MONOCYTES NFR BLD: 8 % (ref 3–12)
NEUTROPHILS NFR BLD: 60 % (ref 36–65)
NEUTS SEG NFR BLD: 5.06 K/UL (ref 1.5–8.1)
NRBC BLD-RTO: 0 PER 100 WBC
PLATELET # BLD AUTO: 328 K/UL (ref 138–453)
PMV BLD AUTO: 10.3 FL (ref 8.1–13.5)
POTASSIUM SERPL-SCNC: 4.7 MMOL/L (ref 3.7–5.3)
RBC # BLD AUTO: 3.4 M/UL (ref 4.21–5.77)
RBC # BLD: ABNORMAL 10*6/UL
SODIUM SERPL-SCNC: 137 MMOL/L (ref 136–145)
WBC OTHER # BLD: 8.5 K/UL (ref 3.5–11.3)

## 2025-03-06 ENCOUNTER — TELEPHONE (OUTPATIENT)
Age: 70
End: 2025-03-06

## 2025-03-06 RX ORDER — POTASSIUM CHLORIDE 1500 MG/1
TABLET, EXTENDED RELEASE ORAL
Qty: 30 TABLET | Refills: 0 | Status: SHIPPED | OUTPATIENT
Start: 2025-03-06 | End: 2025-03-13 | Stop reason: ALTCHOICE

## 2025-03-06 NOTE — TELEPHONE ENCOUNTER
Patient returned call and stated he wants to follow with Dr Spaulding.  Patient scheduled office visit with Dr Spaulding on 3/21/25 at [place] location.  Requests script for potassium be sent to [facility]

## 2025-03-06 NOTE — TELEPHONE ENCOUNTER
Alecia from [facility] called office stating that patient needs medication refills.     Writer stated that Dr. Spaulding did heart cath, however, patient is scheduled for a visit with [facility] on 3/14.     Alecia will let patient know that if he wants to stay with [facility] for cardiac care that is fine, but if he wants to switch to our care she will tell patient to call our office to schedule an appointment.    All parties voiced understanding.

## 2025-03-07 ENCOUNTER — TELEPHONE (OUTPATIENT)
Dept: INTERNAL MEDICINE CLINIC | Age: 70
End: 2025-03-07

## 2025-03-07 ENCOUNTER — CARE COORDINATION (OUTPATIENT)
Dept: CARE COORDINATION | Age: 70
End: 2025-03-07

## 2025-03-07 RX ORDER — FUROSEMIDE 20 MG/1
20 TABLET ORAL 2 TIMES DAILY
Qty: 14 TABLET | Refills: 0 | OUTPATIENT
Start: 2025-03-07

## 2025-03-07 NOTE — TELEPHONE ENCOUNTER
Informed patient there is no availability until April. Patient and wife state they will call around to other offices.

## 2025-03-07 NOTE — TELEPHONE ENCOUNTER
----- Message from Jennifer JUNG sent at 3/7/2025 10:42 AM EST -----  Regarding: [facility] Appointment Request  E[facility] Appointment Request    Patient needs appointment for E[facility] Appointment Type: New Patient.    Patient Requested Dates(s): As soon as possible   Patient Requested Time: Early morning or late afternoon   Provider Name: Marion Cedeño MD    Reason for Appointment Request: New Patient - Requested Provider unavailable  --------------------------------------------------------------------------------------------------------------------------    Relationship to Patient: Spouse/Partner (Alyssa)     Call Back Information: OK to leave message on voicemail  Preferred Call Back Number: Phone [phone]  [phone]       Note: Patient is new, requesting to schedule an appointment with Dr. Marion Cedeño MD to establish care.

## 2025-03-07 NOTE — CARE COORDINATION
Care Transitions Note    Follow Up Call     Patient Current Location:  Home: [address] [place] [address]   [place] [address]    LPN Care Coordinator contacted the patient by telephone. Verified name and  as identifiers.    Additional needs identified to be addressed with provider   No needs identified                 Method of communication with provider: none.    Care Summary Note: Writer spoke with Earnest for a follow up care transitions call. He states he is doing good. He is not having any chest pain,palpitations or SOB. He states incision is healing well. He continues with [facility] and will be starting PT next week. He denies having any chest pain,palpitations,SOB or dizziness. He is eating and drinking without issues and denies having any b/b issues. He is scheduled to see a new cardiology on 3/14/25 and CT surgery on 3/20/25. He denies having any new needs or concerns.     Plan of care updates since last contact:  Review of patient management of conditions/medications:         Advance Care Planning:   Does patient have an Advance Directive: reviewed during previous call, see note. .    Medication Review:  No changes since last call.     Remote Patient Monitoring:  Offered patient enrollment in the Remote Patient Monitoring (RPM) program for in-home monitoring: Yes, but did not enroll at this time:   .    Assessments:  Care Transitions Subsequent and Final Call    Subsequent and Final Calls  Do you have any ongoing symptoms?: No  Have your medications changed?: No  Do you have any questions related to your medications?: No  Do you currently have any active services?: No  Do you have any needs or concerns that I can assist you with?: No  Identified Barriers: None  Care Transitions Interventions    Physical Therapy: Completed Other Services: Completed    Occupational Therapy: Completed     Other Interventions:              Follow Up Appointment:   Reviewed upcoming appointment(s).  Future Appointments         Provider

## 2025-03-10 ENCOUNTER — TELEPHONE (OUTPATIENT)
Age: 70
End: 2025-03-10

## 2025-03-13 ENCOUNTER — OFFICE VISIT (OUTPATIENT)
Age: 70
End: 2025-03-13
Payer: COMMERCIAL

## 2025-03-13 VITALS
DIASTOLIC BLOOD PRESSURE: 67 MMHG | BODY MASS INDEX: 33.6 KG/M2 | HEART RATE: 60 BPM | OXYGEN SATURATION: 96 % | SYSTOLIC BLOOD PRESSURE: 119 MMHG | WEIGHT: 268.8 LBS

## 2025-03-13 DIAGNOSIS — I10 ESSENTIAL HYPERTENSION: Primary | ICD-10-CM

## 2025-03-13 PROCEDURE — 3074F SYST BP LT 130 MM HG: CPT | Performed by: INTERNAL MEDICINE

## 2025-03-13 PROCEDURE — 1111F DSCHRG MED/CURRENT MED MERGE: CPT | Performed by: INTERNAL MEDICINE

## 2025-03-13 PROCEDURE — G8417 CALC BMI ABV UP PARAM F/U: HCPCS | Performed by: INTERNAL MEDICINE

## 2025-03-13 PROCEDURE — 3078F DIAST BP <80 MM HG: CPT | Performed by: INTERNAL MEDICINE

## 2025-03-13 PROCEDURE — 3017F COLORECTAL CA SCREEN DOC REV: CPT | Performed by: INTERNAL MEDICINE

## 2025-03-13 PROCEDURE — 1036F TOBACCO NON-USER: CPT | Performed by: INTERNAL MEDICINE

## 2025-03-13 PROCEDURE — G8427 DOCREV CUR MEDS BY ELIG CLIN: HCPCS | Performed by: INTERNAL MEDICINE

## 2025-03-13 PROCEDURE — 1123F ACP DISCUSS/DSCN MKR DOCD: CPT | Performed by: INTERNAL MEDICINE

## 2025-03-13 PROCEDURE — 93000 ELECTROCARDIOGRAM COMPLETE: CPT | Performed by: INTERNAL MEDICINE

## 2025-03-13 PROCEDURE — 99215 OFFICE O/P EST HI 40 MIN: CPT | Performed by: INTERNAL MEDICINE

## 2025-03-13 NOTE — PROGRESS NOTES
Cardiology Office Follow up      Earnest Avila  1955  [id number]    Today: 3/13/25    CC: Patient is here for routine follow up visit after hospital discharge    HPI:   This is a 9-year-old gentleman with history of diabetes mellitus, atherosclerotic CAD status post CABG, postoperative atrial fibrillation, dyslipidemia, essential hypertension, obesity with possible obstructive sleep apnea, generalized osteoarthritis, nephrolithiasis, neuropathy, anxiety/depression and GERD presents for follow-up after hospital discharge.    Patient is recovering well however complains of fatigue and tiredness and states that his blood pressure remains 100s-110s over 60s-70s with heart rate in 50s to 60s range and at times heart rate is 40 as well despite decreasing the dose of metoprolol.    We suggest to hold metoprolol for now and continue amiodarone for rhythm control.  We suggest repeating EKG and may consider a Holter monitor 4 to 6 weeks and if he remains in sinus rhythm then we will stop amiodarone and switch apixaban to Plavix.    He is going to start cardiac rehab soon.    Past Medical:  Past Medical History:   Diagnosis Date    Allergic rhinitis     Anxiety     Borderline hypertension     CAD (coronary artery disease)     Controlled diabetes mellitus type II without complication (HCC)     Dental abscess     Depression     On medas    History of colon polyps     Intermittent self-catheterization of bladder 06/17/2016    3/8/17 does not do this any more- was getting UTI's    Kidney stones     Neuropathy     Obesity     HISTORY OF OBESITY, AS OF 11/06/2017 PATIENT HAS LOST 80 LBS    Obstructive sleep apnea of adult 2005    on CPAP    Osteoarthritis     RIGHT SHOULDER, BACK    Prostate enlargement 2014    Self cath since Jan. 16 2016    Restless legs syndrome     Tubular adenoma of colon 06/2016    Type II or unspecified type diabetes mellitus without mention of complication, not stated as uncontrolled 2006

## 2025-03-14 ENCOUNTER — CARE COORDINATION (OUTPATIENT)
Dept: CARE COORDINATION | Age: 70
End: 2025-03-14

## 2025-03-14 NOTE — CARE COORDINATION
Care Transitions Note    Follow Up Call     Patient Current Location:  Home: [address] [place] [address] [place] [address]    Care Transition Nurse contacted the patient by telephone. Verified name and  as identifiers.    Additional needs identified to be addressed with provider   No needs identified                 Method of communication with provider: none.    Care Summary Note:     CTN LPN contacted the patient for Care Transition follow-up and spoke to Earnest Avila denies falls or balance issues, pain, chest pain, fever, chills, congestion, cough, diaphoresis, fatigue, weakness, swelling, shortness of breath, rash, constipation, nausea, vomiting or diarrhea, activity or sleep issues, bowel or urination issues, urinary tract infection signs and symptoms, appetite or fluid intake issues, weight gain, weight loss, or any other new symptoms.    Earnest Avila states   - Since the last CTN call, the patient had an appointment with Interventional Cardiology on 2025,  repeating EKG and may consider a Holter monitor 4 to 6 weeks ,  stop amiodarone and switch apixaban to Plavix, continue amiodarone for rhythm control however will stop metoprolol at this point , Follow-up in 4 weeks his blood pressure and heart rate log book, confirmed notes with Earnest Avila, feels better with the metoprolol stopped .   - [facility], started PT on Wednesday, went well, ambulates with a walker, denies falls or balance issues   - The patient has an upcoming appointment with Cardiothoracic Surgical on 2025, the patient denies the need for transportation at this time.   - /78  - HR 60  - denies chest pain, palpitations, SOB, fatigue, or tiredness   - incision is healing well, open to air, denies fever, drainage, odor, redness, hardening, hot to the touch, pain, or bleeding.    The patient agrees to receive a call within the next few days.     Discussed seeking emergency medical attention for new

## 2025-03-20 ENCOUNTER — HOSPITAL ENCOUNTER (OUTPATIENT)
Age: 70
Discharge: HOME | End: 2025-03-20
Payer: COMMERCIAL

## 2025-03-20 ENCOUNTER — OFFICE VISIT (OUTPATIENT)
Dept: CARDIOTHORACIC SURGERY | Age: 70
End: 2025-03-20

## 2025-03-20 VITALS
SYSTOLIC BLOOD PRESSURE: 144 MMHG | BODY MASS INDEX: 33 KG/M2 | HEART RATE: 64 BPM | TEMPERATURE: 97.7 F | DIASTOLIC BLOOD PRESSURE: 82 MMHG | WEIGHT: 264 LBS | OXYGEN SATURATION: 96 %

## 2025-03-20 DIAGNOSIS — Z95.1 S/P CABG (CORONARY ARTERY BYPASS GRAFT): ICD-10-CM

## 2025-03-20 DIAGNOSIS — Z95.1 S/P CABG (CORONARY ARTERY BYPASS GRAFT): Primary | ICD-10-CM

## 2025-03-20 LAB
ANION GAP SERPL CALCULATED.3IONS-SCNC: 12 MMOL/L (ref 9–16)
BUN SERPL-MCNC: 17 MG/DL (ref 8–23)
CALCIUM SERPL-MCNC: 9.6 MG/DL (ref 8.6–10.4)
CHLORIDE SERPL-SCNC: 104 MMOL/L (ref 98–107)
CO2 SERPL-SCNC: 22 MMOL/L (ref 20–31)
CREAT SERPL-MCNC: 1.1 MG/DL (ref 0.7–1.2)
EST. AVERAGE GLUCOSE BLD GHB EST-MCNC: 123 MG/DL
GFR, ESTIMATED: 73 ML/MIN/1.73M2
GLUCOSE SERPL-MCNC: 142 MG/DL (ref 74–99)
HBA1C MFR BLD: 5.9 % (ref 4–6)
POTASSIUM SERPL-SCNC: 4.1 MMOL/L (ref 3.7–5.3)
SODIUM SERPL-SCNC: 138 MMOL/L (ref 136–145)

## 2025-03-20 PROCEDURE — 83036 HEMOGLOBIN GLYCOSYLATED A1C: CPT

## 2025-03-20 PROCEDURE — 80048 BASIC METABOLIC PNL TOTAL CA: CPT

## 2025-03-20 PROCEDURE — 3079F DIAST BP 80-89 MM HG: CPT | Performed by: THORACIC SURGERY (CARDIOTHORACIC VASCULAR SURGERY)

## 2025-03-20 PROCEDURE — 1036F TOBACCO NON-USER: CPT | Performed by: THORACIC SURGERY (CARDIOTHORACIC VASCULAR SURGERY)

## 2025-03-20 PROCEDURE — 3077F SYST BP >= 140 MM HG: CPT | Performed by: THORACIC SURGERY (CARDIOTHORACIC VASCULAR SURGERY)

## 2025-03-20 PROCEDURE — 36415 COLL VENOUS BLD VENIPUNCTURE: CPT

## 2025-03-20 PROCEDURE — 1111F DSCHRG MED/CURRENT MED MERGE: CPT | Performed by: THORACIC SURGERY (CARDIOTHORACIC VASCULAR SURGERY)

## 2025-03-20 PROCEDURE — G8427 DOCREV CUR MEDS BY ELIG CLIN: HCPCS | Performed by: THORACIC SURGERY (CARDIOTHORACIC VASCULAR SURGERY)

## 2025-03-20 PROCEDURE — 3017F COLORECTAL CA SCREEN DOC REV: CPT | Performed by: THORACIC SURGERY (CARDIOTHORACIC VASCULAR SURGERY)

## 2025-03-20 PROCEDURE — 99024 POSTOP FOLLOW-UP VISIT: CPT | Performed by: THORACIC SURGERY (CARDIOTHORACIC VASCULAR SURGERY)

## 2025-03-20 PROCEDURE — G8417 CALC BMI ABV UP PARAM F/U: HCPCS | Performed by: THORACIC SURGERY (CARDIOTHORACIC VASCULAR SURGERY)

## 2025-03-20 PROCEDURE — 1123F ACP DISCUSS/DSCN MKR DOCD: CPT | Performed by: THORACIC SURGERY (CARDIOTHORACIC VASCULAR SURGERY)

## 2025-03-20 RX ORDER — BLOOD-GLUCOSE METER
1 KIT MISCELLANEOUS 3 TIMES DAILY
Qty: 1 KIT | Refills: 3 | Status: SHIPPED | OUTPATIENT
Start: 2025-03-20

## 2025-03-20 NOTE — PROGRESS NOTES
[facility] Cardiothoracic Surgery  Progress Note    3/20/2025 9:39 AM  Surgeon: Surgeon CTS: Dr. Yaquelin CHESTER   S/P: S/p CABGx4 on 2/21       Subjective:  Mr. Avila is here for his postop visit after undergoing CABG. was discharged from hospital to home.  He was unable to get a bed at [facility] for rehab.  He is going to start outpatient rehab in the next few weeks.  His appetite is slowly returning back to normal he is having no issues with bowel or urinary elimination.  He requires no oxygen use.  He uses a walker for long distances, but does not require it at home.  He has followed up with cardiology.  He states that they discontinued his Lopressor due to side effects.  Otherwise he is compliant with all medications.  He states that he believes his diabetes is back and would like to check his blood sugar.  Ordered home monitoring blood sugar K, BMP with hemoglobin A1c.  He has an appointment with his PCP to establish care in the next few weeks.  All incisions are clean dry and intact.  There is no suspicious drainage noted.     Objective:  BP (!) 144/82 (BP Site: Left Upper Arm, Patient Position: Sitting, BP Cuff Size: Large Adult)   Pulse 64   Temp 97.7 °F (36.5 °C)   Wt 119.7 kg (264 lb)   SpO2 96%   BMI 33.00 kg/m²   CV: no murmur noted, Normal S1, S2,   Lungs: clear to auscultation, no wheezes, rales, or rhonchi  Abd: normal bowel sounds   Lower Extremities: Trace edema  Saph Incison: no sign of drainage or infection  Sternal Incison: dressing applied-no excessive drainage or signs of infection noted    Labs: Labs reviewed today  CBC: No results for input(s): \"WBC\", \"HGB\", \"HCT\", \"MCV\", \"PLT\" in the last 72 hours.  BMP: No results for input(s): \"NA\", \"K\", \"CL\", \"CO2\", \"PHOS\", \"BUN\", \"CREATININE\" in the last 72 hours.    Invalid input(s): \"CA\"    I/O: [unfilled]  Scheduled Meds:  Continuous Infusions:  PRN Meds:      Daily Nursing Care:  Please keep SCDS in place as DVT prophylaxis  If not

## 2025-03-21 ENCOUNTER — TELEPHONE (OUTPATIENT)
Dept: CARDIOTHORACIC SURGERY | Age: 70
End: 2025-03-21

## 2025-03-21 NOTE — TELEPHONE ENCOUNTER
Pt calls in today stating that he received the glucose monitor but did not receive test strips. Contacted [facility] pharmacy to fill test strips. Left message for pt also.

## 2025-03-23 ENCOUNTER — HOSPITAL ENCOUNTER (EMERGENCY)
Age: 70
Discharge: HOME | End: 2025-03-23
Attending: EMERGENCY MEDICINE
Payer: COMMERCIAL

## 2025-03-23 VITALS
OXYGEN SATURATION: 98 % | HEART RATE: 64 BPM | DIASTOLIC BLOOD PRESSURE: 74 MMHG | HEIGHT: 75 IN | WEIGHT: 264 LBS | SYSTOLIC BLOOD PRESSURE: 141 MMHG | RESPIRATION RATE: 18 BRPM | TEMPERATURE: 97.6 F | BODY MASS INDEX: 32.83 KG/M2

## 2025-03-23 DIAGNOSIS — R73.9 HYPERGLYCEMIA: Primary | ICD-10-CM

## 2025-03-23 LAB — GLUCOSE BLD-MCNC: 198 MG/DL (ref 75–110)

## 2025-03-23 PROCEDURE — 99283 EMERGENCY DEPT VISIT LOW MDM: CPT

## 2025-03-23 PROCEDURE — 82947 ASSAY GLUCOSE BLOOD QUANT: CPT

## 2025-03-23 PROCEDURE — 99282 EMERGENCY DEPT VISIT SF MDM: CPT

## 2025-03-23 NOTE — ED PROVIDER NOTES
[facility] EMERGENCY DEPARTMENT  EMERGENCY DEPARTMENT ENCOUNTER      Pt Name: Earnest Avila  MRN: [id number]  Birthdate 1955  Date of evaluation: 3/23/25      CHIEF COMPLAINT       Chief Complaint   Patient presents with    Hyperglycemia     States blood sugar is \"out of control\" at home     HISTORY OF PRESENT ILLNESS   HPI 69 y.o. male presents with c/o for evaluation of hyperglycemia.  The patient has a history of diabetes.  Is diet controlled.  He says that today he ate 2 cookies and had some other carbohydrates.  He then checked his glucose afterwards and it was 300 mg/dL.  The high reading concerned him so he came to the emergency department for further evaluation.  He had a hemoglobin A1c checked on 20 March And his A1c was 5.9.      REVIEW OF SYSTEMS     Review of Systems   Constitutional:  Negative for fever.   Respiratory:  Positive for shortness of breath (Ever since having CABG he is participating in cardiac rehab). Negative for cough.    Cardiovascular:  Negative for chest pain.       PAST MEDICAL HISTORY     Past Medical History:   Diagnosis Date    Allergic rhinitis     Anxiety     Borderline hypertension     CAD (coronary artery disease)     Controlled diabetes mellitus type II without complication (HCC)     Dental abscess     Depression     On medas    History of colon polyps     Intermittent self-catheterization of bladder 06/17/2016    3/8/17 does not do this any more- was getting UTI's    Kidney stones     Neuropathy     Obesity     HISTORY OF OBESITY, AS OF 11/06/2017 PATIENT HAS LOST 80 LBS    Obstructive sleep apnea of adult 2005    on CPAP    Osteoarthritis     RIGHT SHOULDER, BACK    Prostate enlargement 2014    Self cath since Jan. 16 2016    Restless legs syndrome     Tubular adenoma of colon 06/2016    Type II or unspecified type diabetes mellitus without mention of complication, not stated as uncontrolled 2006    NO LONGER ON MEDS, LOST WEIGHT, DIET CONTROLLED    UTI (urinary

## 2025-03-23 NOTE — ED TRIAGE NOTES
Mode of arrival (squad #, walk in, police, etc) : walk in        Chief complaint(s): Hyperglycemia        Arrival Note (brief scenario, treatment PTA, etc).: Pt states blood sugar was over 300 at home, BS was checked in triage and was 198.         C= \"Have you ever felt that you should Cut down on your drinking?\"  No  A= \"Have people Annoyed you by criticizing your drinking?\"  No  G= \"Have you ever felt bad or Guilty about your drinking?\"  No  E= \"Have you ever had a drink as an Eye-opener first thing in the morning to steady your nerves or to help a hangover?\"  No      Deferred []      Reason for deferring: N/A    *If yes to two or more: probable alcohol abuse.*

## 2025-03-24 ENCOUNTER — CARE COORDINATION (OUTPATIENT)
Dept: CARE COORDINATION | Age: 70
End: 2025-03-24

## 2025-03-24 NOTE — CARE COORDINATION
Care Transitions Note    Follow Up Call     Patient Current Location:  Home: [address] [place] [address] [address] [place] [address]    Care Transition Nurse contacted the patient by telephone. Verified name and  as identifiers.    Additional needs identified to be addressed with provider   No needs identified                 Method of communication with provider: none.    Care Summary Note: Writer spoke to patient, he stated he is feeling pretty good today, went to ED d/t  BS being elevated, denied any c/o fever, chills, n/v/d sob or chest pain at time of call, patient stated his BS are still elevated, have been since his surgery, has f/I with pcp too discuss a treatment plan, writer sent referral to RD to discuss diet, will follow//JU    Plan of care updates since last contact:  Education: Meal planning can be a key part of managing diabetes. Planning meals and snacks with the right balance of  carbohydrate, protein, and fat can help you keep your blood sugar at the target level you set with your doctor.  You don't have to eat special foods. You can eat what your family eats, including sweets once in a while. But you do  have to pay attention to how often you eat and how much you eat of certain foods.  You may want to work with a dietitian or a diabetes educator. They can give you tips and meal ideas and can answer  your questions about meal planning. This health professional can also help you reach a healthy weight if that is one of  your goals.  What plan is right for you?  Your dietitian or diabetes educator may suggest that you start with the plate method or carbohydrate counting.  The plate method  The plate method is an easy way to plan meals. You divide your plate into sections for vegetables, proteins, and  carbohydrates. This can help you manage the amount of carbohydrate you eat. It can make it easier to keep your blood  sugar level within your target range.  To use the plate method, put non-starchy vegetables on half your

## 2025-03-25 ENCOUNTER — CARE COORDINATION (OUTPATIENT)
Dept: CARE COORDINATION | Age: 70
End: 2025-03-25

## 2025-03-25 NOTE — CARE COORDINATION
Registered Dietitian Initial Assessment for Care Coordination      Name-Earnest Avila  March 25, 2025    Initial Referral Reason: Diabetes    Patient Care Team:  Larissa Rice MD as PCP - Empaneled Provider  Rogelio Gutierrez MD as Consulting Physician (Gastroenterology)  Ekta Rosen, RN as Care Transitions Nurse  Monisha Salguero RD, LD as Dietitian    Patient Active Problem List   Diagnosis    Type 2 diabetes, controlled, with neuropathy (HCC)    Essential hypertension    Osteoarthritis    Anxiety    BRIDGETTE (obstructive sleep apnea)    Neuropathy    Obesity, Class I, BMI 30-34.9    Chronic right shoulder pain    Tubular adenoma of colon    History of colon polyps    Hepatitis C carrier (HCC)    Weight loss counseling, encounter for    Stomach ulcer    Gilbert's disease    Class 1 obesity due to excess calories with serious comorbidity and body mass index (BMI) of 30.0 to 30.9 in adult    H/O urinary retention    History of suprapubic catheter    Diastasis recti    DDD (degenerative disc disease), cervical    Neck pain    Cervical stenosis of spine    BPH (benign prostatic hyperplasia)    Chronic constipation    Spondylolisthesis of lumbar region    Microalbuminuria    Chronic suprapubic catheter (HCC)    Irregular heart beats    Spinal stenosis of lumbar region at multiple levels    Disorder of rotator cuff    Angina pectoris    Chronic dyspnea    Dyspnea    CAD, multiple vessel    Coronary artery disease    S/P CABG (coronary artery bypass graft)    Debility       Current Outpatient Medications   Medication Sig Dispense Refill    Blood Glucose Monitoring Suppl (BLAISE BLOOD GLUCOSE KIT) KIT 1 Box by Does not apply route in the morning, at noon, and at bedtime 1 kit 3    atorvastatin (LIPITOR) 20 MG tablet Take 1 tablet by mouth nightly 30 tablet 3    ondansetron (ZOFRAN-ODT) 4 MG disintegrating tablet Take 1 tablet by mouth every 8 hours as needed for Nausea or Vomiting 30 tablet 0

## 2025-03-25 NOTE — CARE COORDINATION
Referral from Ekta Rosen RN-  Diabetes education.    Will reach out to patient for consult.  JENNIFER Can

## 2025-03-26 ENCOUNTER — CARE COORDINATION (OUTPATIENT)
Dept: CARE COORDINATION | Age: 70
End: 2025-03-26

## 2025-03-26 NOTE — CARE COORDINATION
Per RD request, educational material mailed to patient.  Carbohydrate servings hi en pdf   Low carb snacks 2  Glucerna coupons x 5

## 2025-03-27 RX ORDER — AMIODARONE HYDROCHLORIDE 200 MG/1
200 TABLET ORAL DAILY
Qty: 30 TABLET | Refills: 0 | OUTPATIENT
Start: 2025-03-27

## 2025-03-31 ENCOUNTER — OFFICE VISIT (OUTPATIENT)
Dept: UROLOGY | Age: 70
End: 2025-03-31
Payer: COMMERCIAL

## 2025-03-31 VITALS
SYSTOLIC BLOOD PRESSURE: 120 MMHG | HEART RATE: 60 BPM | HEIGHT: 75 IN | DIASTOLIC BLOOD PRESSURE: 80 MMHG | OXYGEN SATURATION: 96 % | BODY MASS INDEX: 32.83 KG/M2 | TEMPERATURE: 97.4 F | WEIGHT: 264 LBS

## 2025-03-31 DIAGNOSIS — Z93.59 CHRONIC SUPRAPUBIC CATHETER (HCC): ICD-10-CM

## 2025-03-31 DIAGNOSIS — R31.0 GROSS HEMATURIA: Primary | ICD-10-CM

## 2025-03-31 DIAGNOSIS — R33.9 URINARY RETENTION: ICD-10-CM

## 2025-03-31 PROCEDURE — G8428 CUR MEDS NOT DOCUMENT: HCPCS | Performed by: UROLOGY

## 2025-03-31 PROCEDURE — 1036F TOBACCO NON-USER: CPT | Performed by: UROLOGY

## 2025-03-31 PROCEDURE — 3079F DIAST BP 80-89 MM HG: CPT | Performed by: UROLOGY

## 2025-03-31 PROCEDURE — 3017F COLORECTAL CA SCREEN DOC REV: CPT | Performed by: UROLOGY

## 2025-03-31 PROCEDURE — 1111F DSCHRG MED/CURRENT MED MERGE: CPT | Performed by: UROLOGY

## 2025-03-31 PROCEDURE — 1123F ACP DISCUSS/DSCN MKR DOCD: CPT | Performed by: UROLOGY

## 2025-03-31 PROCEDURE — 99214 OFFICE O/P EST MOD 30 MIN: CPT | Performed by: UROLOGY

## 2025-03-31 PROCEDURE — 3074F SYST BP LT 130 MM HG: CPT | Performed by: UROLOGY

## 2025-03-31 PROCEDURE — G8417 CALC BMI ABV UP PARAM F/U: HCPCS | Performed by: UROLOGY

## 2025-03-31 RX ORDER — CEPHALEXIN 500 MG/1
CAPSULE ORAL
COMMUNITY
Start: 2025-03-28 | End: 2025-04-02 | Stop reason: ALTCHOICE

## 2025-03-31 RX ORDER — HYDROXYZINE HYDROCHLORIDE 10 MG/1
TABLET, FILM COATED ORAL
COMMUNITY
End: 2025-04-02 | Stop reason: ALTCHOICE

## 2025-03-31 ASSESSMENT — ENCOUNTER SYMPTOMS
WHEEZING: 0
ABDOMINAL PAIN: 0
BACK PAIN: 0
NAUSEA: 0
COLOR CHANGE: 0
EYE REDNESS: 0
EYES NEGATIVE: 1
GASTROINTESTINAL NEGATIVE: 1
SHORTNESS OF BREATH: 0
COUGH: 0
VOMITING: 0
EYE PAIN: 0
ALLERGIC/IMMUNOLOGIC NEGATIVE: 1
RESPIRATORY NEGATIVE: 1

## 2025-03-31 NOTE — PROGRESS NOTES
Indication/Diagnosis  Urinary Retention    Suprapubic Catheter Change and Insertion Procedure:  Risks and Benefits discussed with patient prior to procedure.      Placement Date: 3/31/25    Verbal consent obtained from patient prior to procedure.  Patient arrived with old SP catheter in place, balloon deflated and was removed without complication.  New 24F catheter inserted utilizing sterile technique per organization policy placed by Oneil Mcguire MA.  10ml sterile water balloon inflated, attached to leg niranjan with return of yellow urine.   SP catheter secured.  Patient tolerated procedure well and without complications. Reviewed catheter care. Verbalized understanding.

## 2025-03-31 NOTE — PROGRESS NOTES
[facility] PHYSICIANS [facility], [facility] UROLOGY CENTER  [address]  [facility] [address]  Dept: [phone]    [facility] Urology Office Note - Established    Patient:  Earnest Avila  YOB: 1955  Date: 3/31/2025    The patient is a 69 y.o. male who presents todayfor evaluation of the following problems:   Chief Complaint   Patient presents with    Hematuria       HPI  This is a 69-year-old gentleman who has had a recent episode of gross hematuria when his suprapubic catheter was changed.  He does feel that the change was a bit traumatic and the bleeding only lasted for a few hours and then resolved spontaneously.  Otherwise he has been doing well with the suprapubic catheter.  His wife has been changing it monthly.    Summary of old records: N/A    Additional History: N/A    Procedures Today: N/A    Urinalysis today:  No results found for this visit on 03/31/25.  Last several PSA's:  Lab Results   Component Value Date    PSA 0.30 05/20/2024    PSA 0.27 10/22/2022    PSA 0.52 06/30/2015     Last total testosterone:  No results found for: \"TESTOSTERONE\"    AUA Symptom Score (3/31/2025):                               Last BUN and creatinine:  Lab Results   Component Value Date    BUN 17 03/20/2025     Lab Results   Component Value Date    CREATININE 1.1 03/20/2025       Additional Lab/Culture results: none    Imaging Reviewed during this Office Visit: none  (results were independently reviewed by physician and radiology report verified)    PAST MEDICAL, FAMILY AND SOCIAL HISTORY UPDATE:  Past Medical History:   Diagnosis Date    Allergic rhinitis     Anxiety     Borderline hypertension     CAD (coronary artery disease)     Controlled diabetes mellitus type II without complication (HCC)     Dental abscess     Depression     On medas    History of colon polyps     Intermittent self-catheterization of bladder 06/17/2016    3/8/17 does not do this any more- was

## 2025-03-31 NOTE — PROGRESS NOTES
Called patient to remind him of his cardiac rehab appointment tomorrow, no answer, left V/M with reminder to go through registration, wear exercise appropriate clothes. Also left a callback number if he has any questions.

## 2025-04-01 ENCOUNTER — HOSPITAL ENCOUNTER (OUTPATIENT)
Dept: CARDIAC REHAB | Age: 70
Setting detail: THERAPIES SERIES
Discharge: HOME | End: 2025-04-01
Attending: THORACIC SURGERY (CARDIOTHORACIC VASCULAR SURGERY)
Payer: COMMERCIAL

## 2025-04-01 VITALS — WEIGHT: 269.3 LBS | BODY MASS INDEX: 33.66 KG/M2

## 2025-04-01 PROCEDURE — 93798 PHYS/QHP OP CAR RHAB W/ECG: CPT

## 2025-04-01 PROCEDURE — 93797 PHYS/QHP OP CAR RHAB WO ECG: CPT

## 2025-04-01 ASSESSMENT — EXERCISE STRESS TEST
PEAK_HR: 77
PEAK_RPE: 12
PEAK_BP: 130/64
PEAK_METS: 2

## 2025-04-01 ASSESSMENT — PATIENT HEALTH QUESTIONNAIRE - PHQ9
SUM OF ALL RESPONSES TO PHQ QUESTIONS 1-9: 0
1. LITTLE INTEREST OR PLEASURE IN DOING THINGS: NOT AT ALL
SUM OF ALL RESPONSES TO PHQ QUESTIONS 1-9: 0
2. FEELING DOWN, DEPRESSED OR HOPELESS: NOT AT ALL

## 2025-04-01 NOTE — FLOWSHEET NOTE
04/01/25 1052   Staff Time   Start Time 0840   Stop Time 1035   Total Time (Minutes) 115 Minutes     Pt oriented to Cardiac Rehab, goal set and ITP initiated. CAD Risk Factors and Prevention video viewed. Pt educated on Rate of Perceived Education scale. Patient completed Rate Your Plate. Consents signed.   
added sugars;Overall healthy eating pattern that emphasizes vegetables, fruits, wholegrains, healthy proteins and non-tropical oils;Reduced calorie/portion controlled diet   Nutrition Goals   Patient Target Goals HbA1C less than 7 percent for those with diabetes;Triglycerides less than 150;LDL-C less than 70 and non-HDL-C <100 for those with ASCVD  (PATIENT LAST A1C 5.9, PATIENT WOULD LIKE TO MAINTAIN a1C BELOW 7.)   Patient Treatment Goal EAT LESS PROCESSED FOOD, MAKE HEALTHIER CHOICES WHEN EATING OUT.   Goal Status Initial   Education   Learning Barrier Ready to learn   Cardiac Knowledge Test Score 9   Education Schedule Given Yes   Other Core Component - Hypertension   Hypertension No   Is BP WDL Yes   Hypertension Managed as Core Component No   Medications   Resource Information Provided Yes   Med(s) Change No   BP Meds AMIODARONE 200MG,   ACE/ARB/ARNI Prescribed No   BB Prescribed No   Antiplatelet/Anticoagulant Prescribed Yes  (ELIQUIS 5MG, ASA 81MG)   Antiplatelet/Anticoagulant Adherent Yes   Statins/Anti-hyperlipidemic Prescribed Yes  (LIPITOR 20MG)   Statins/Anti-hyperlipidemic Adherent Yes   Statins/Anti-hyperlipidemic Intensity Moderate   Other Core Component - Medication Compliance   Medication Compliance Managed as Core Component No   Other Core Component - Other Risk Factor   Other Modifiable Risk Factor Managed as Core Component No

## 2025-04-02 ENCOUNTER — OFFICE VISIT (OUTPATIENT)
Dept: FAMILY MEDICINE CLINIC | Age: 70
End: 2025-04-02
Payer: COMMERCIAL

## 2025-04-02 ENCOUNTER — RESULTS FOLLOW-UP (OUTPATIENT)
Dept: FAMILY MEDICINE CLINIC | Age: 70
End: 2025-04-02

## 2025-04-02 VITALS
HEIGHT: 77 IN | DIASTOLIC BLOOD PRESSURE: 78 MMHG | SYSTOLIC BLOOD PRESSURE: 142 MMHG | WEIGHT: 276.6 LBS | BODY MASS INDEX: 32.66 KG/M2

## 2025-04-02 DIAGNOSIS — E80.4 GILBERT'S DISEASE: ICD-10-CM

## 2025-04-02 DIAGNOSIS — Z93.59 CHRONIC SUPRAPUBIC CATHETER (HCC): ICD-10-CM

## 2025-04-02 DIAGNOSIS — I48.0 PAROXYSMAL ATRIAL FIBRILLATION (HCC): ICD-10-CM

## 2025-04-02 DIAGNOSIS — I25.10 CORONARY ARTERY DISEASE INVOLVING NATIVE CORONARY ARTERY OF NATIVE HEART WITHOUT ANGINA PECTORIS: ICD-10-CM

## 2025-04-02 DIAGNOSIS — E83.19 INCREASED STORAGE IRON: ICD-10-CM

## 2025-04-02 DIAGNOSIS — I12.9 BENIGN HYPERTENSION WITH CKD (CHRONIC KIDNEY DISEASE), STAGE II: ICD-10-CM

## 2025-04-02 DIAGNOSIS — E78.2 MIXED HYPERLIPIDEMIA: ICD-10-CM

## 2025-04-02 DIAGNOSIS — D64.9 ANEMIA, UNSPECIFIED TYPE: ICD-10-CM

## 2025-04-02 DIAGNOSIS — E11.65 TYPE 2 DIABETES MELLITUS WITH HYPERGLYCEMIA, WITHOUT LONG-TERM CURRENT USE OF INSULIN (HCC): Primary | ICD-10-CM

## 2025-04-02 DIAGNOSIS — G57.93 NEUROPATHY INVOLVING BOTH LOWER EXTREMITIES: ICD-10-CM

## 2025-04-02 DIAGNOSIS — N18.2 BENIGN HYPERTENSION WITH CKD (CHRONIC KIDNEY DISEASE), STAGE II: ICD-10-CM

## 2025-04-02 DIAGNOSIS — Z23 ENCOUNTER FOR IMMUNIZATION: ICD-10-CM

## 2025-04-02 DIAGNOSIS — F33.41 RECURRENT MAJOR DEPRESSIVE DISORDER, IN PARTIAL REMISSION: ICD-10-CM

## 2025-04-02 PROBLEM — I10 ESSENTIAL HYPERTENSION: Status: RESOLVED | Noted: 2021-02-12 | Resolved: 2025-04-02

## 2025-04-02 PROBLEM — B18.2 HEPATITIS C CARRIER (HCC): Chronic | Status: RESOLVED | Noted: 2017-01-26 | Resolved: 2025-04-02

## 2025-04-02 PROBLEM — Z98.1 HISTORY OF LUMBAR FUSION: Status: ACTIVE | Noted: 2025-04-02

## 2025-04-02 PROBLEM — R06.00 DYSPNEA: Status: RESOLVED | Noted: 2025-02-16 | Resolved: 2025-04-02

## 2025-04-02 PROBLEM — Z71.3 WEIGHT LOSS COUNSELING, ENCOUNTER FOR: Status: RESOLVED | Noted: 2017-01-26 | Resolved: 2025-04-02

## 2025-04-02 PROBLEM — Z96.611 HISTORY OF TOTAL REPLACEMENT OF RIGHT SHOULDER JOINT: Status: ACTIVE | Noted: 2025-04-02

## 2025-04-02 PROBLEM — Z98.890 HISTORY OF SUPRAPUBIC CATHETER: Status: RESOLVED | Noted: 2018-07-30 | Resolved: 2025-04-02

## 2025-04-02 PROBLEM — K25.9 STOMACH ULCER: Status: RESOLVED | Noted: 2017-02-14 | Resolved: 2025-04-02

## 2025-04-02 PROBLEM — I20.9 ANGINA PECTORIS: Status: RESOLVED | Noted: 2025-02-14 | Resolved: 2025-04-02

## 2025-04-02 PROBLEM — R53.81 DEBILITY: Status: RESOLVED | Noted: 2025-02-25 | Resolved: 2025-04-02

## 2025-04-02 LAB — GLUCOSE, WHOLE BLOOD: 112

## 2025-04-02 PROCEDURE — 1123F ACP DISCUSS/DSCN MKR DOCD: CPT | Performed by: FAMILY MEDICINE

## 2025-04-02 PROCEDURE — G8417 CALC BMI ABV UP PARAM F/U: HCPCS | Performed by: FAMILY MEDICINE

## 2025-04-02 PROCEDURE — 3017F COLORECTAL CA SCREEN DOC REV: CPT | Performed by: FAMILY MEDICINE

## 2025-04-02 PROCEDURE — 2022F DILAT RTA XM EVC RTNOPTHY: CPT | Performed by: FAMILY MEDICINE

## 2025-04-02 PROCEDURE — 1036F TOBACCO NON-USER: CPT | Performed by: FAMILY MEDICINE

## 2025-04-02 PROCEDURE — 99215 OFFICE O/P EST HI 40 MIN: CPT | Performed by: FAMILY MEDICINE

## 2025-04-02 PROCEDURE — 90471 IMMUNIZATION ADMIN: CPT | Performed by: FAMILY MEDICINE

## 2025-04-02 PROCEDURE — 90677 PCV20 VACCINE IM: CPT | Performed by: FAMILY MEDICINE

## 2025-04-02 PROCEDURE — 3044F HG A1C LEVEL LT 7.0%: CPT | Performed by: FAMILY MEDICINE

## 2025-04-02 PROCEDURE — 82947 ASSAY GLUCOSE BLOOD QUANT: CPT | Performed by: FAMILY MEDICINE

## 2025-04-02 PROCEDURE — G8427 DOCREV CUR MEDS BY ELIG CLIN: HCPCS | Performed by: FAMILY MEDICINE

## 2025-04-02 RX ORDER — BLOOD SUGAR DIAGNOSTIC
1 STRIP MISCELLANEOUS 2 TIMES DAILY
COMMUNITY
Start: 2025-03-21

## 2025-04-02 RX ORDER — POLYETHYLENE GLYCOL 3350 17 G/17G
17 POWDER, FOR SOLUTION ORAL DAILY
COMMUNITY
End: 2025-04-02 | Stop reason: ALTCHOICE

## 2025-04-02 RX ORDER — POLYETHYLENE GLYCOL 3350 17 G/17G
17 POWDER, FOR SOLUTION ORAL DAILY
COMMUNITY
Start: 2025-04-02

## 2025-04-02 SDOH — ECONOMIC STABILITY: FOOD INSECURITY: WITHIN THE PAST 12 MONTHS, THE FOOD YOU BOUGHT JUST DIDN'T LAST AND YOU DIDN'T HAVE MONEY TO GET MORE.: NEVER TRUE

## 2025-04-02 SDOH — ECONOMIC STABILITY: FOOD INSECURITY: WITHIN THE PAST 12 MONTHS, YOU WORRIED THAT YOUR FOOD WOULD RUN OUT BEFORE YOU GOT MONEY TO BUY MORE.: NEVER TRUE

## 2025-04-02 ASSESSMENT — ENCOUNTER SYMPTOMS
DIARRHEA: 0
NAUSEA: 0
BACK PAIN: 1
CONSTIPATION: 1
ABDOMINAL DISTENTION: 0
COUGH: 0
VOMITING: 0
WHEEZING: 0
ABDOMINAL PAIN: 0
CHEST TIGHTNESS: 0
SHORTNESS OF BREATH: 1

## 2025-04-02 ASSESSMENT — PATIENT HEALTH QUESTIONNAIRE - PHQ9
2. FEELING DOWN, DEPRESSED OR HOPELESS: NOT AT ALL
SUM OF ALL RESPONSES TO PHQ QUESTIONS 1-9: 0
1. LITTLE INTEREST OR PLEASURE IN DOING THINGS: NOT AT ALL

## 2025-04-02 NOTE — ASSESSMENT & PLAN NOTE
Chronic hypertension, Fluctuating, not on blood pressure medication at this time but he would benefit from ACE or ARB  Chronic kidney disease stage II, will check labs  His blood pressure fluctuates a lot, will bring him back for a blood pressure recheck  BP Readings from Last 3 Encounters:   04/02/25 (!) 142/78   03/31/25 120/80   03/23/25 (!) 141/74       Orders:    Comprehensive Metabolic Panel; Future    CBC with Auto Differential; Future    TSH; Future    Uric Acid; Future    Magnesium; Future    Phosphorus; Future

## 2025-04-02 NOTE — ASSESSMENT & PLAN NOTE
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  He is not on any beta-blocker per cardiology

## 2025-04-02 NOTE — PROGRESS NOTES
patient (or guardian, if applicable) and other individuals in attendance at the appointment consented to the use of AI, including the recording.      An electronic signature was used to authenticate this note.    --Theresa Garrison MD     Electronically signed by Theresa Garrison MD on 4/7/25 at 12:06 AM EDT

## 2025-04-02 NOTE — ASSESSMENT & PLAN NOTE
Chronic, stable   Chronic suprapubic catheter, under the care of Dr. Rollins.  - Advised to monitor for any changes in urine color or smell and to contact the office or Dr. Rollins if any changes occur.

## 2025-04-02 NOTE — RESULT ENCOUNTER NOTE
Addressed during office visit today, POC glucose today 112, continue treatment recommended during the office visit.

## 2025-04-02 NOTE — ASSESSMENT & PLAN NOTE
Chronic and stable  We will continue to monitor  Continue Lyrica 200 mg twice a day per pain management

## 2025-04-02 NOTE — ASSESSMENT & PLAN NOTE
Chronic, stable, due to liver disease he would benefit from having vitamin D level    Orders:    Vitamin D 25 Hydroxy; Future

## 2025-04-02 NOTE — ASSESSMENT & PLAN NOTE
Chronic type 2 diabetes mellitus, improved      Lab Results   Component Value Date    LABA1C 5.9 03/20/2025    LABA1C 6.1 09/03/2024    LABA1C 5.8 05/20/2024     - History of diabetes with an A1c of 7.4% on 12/25/2013.  - Currently, blood glucose levels are elevated, but suspicion that the home glucometer may be faulty.  - Recent lab result on 03/20/2025 showed a blood glucose level of 140 mg/dL.  - Jardiance (empagliflozin) 10 mg daily prescribed, continue current treatment    - Advised to monitor fasting blood glucose levels once daily and bring glucometer to the next appointment for verification, if abnormally high numbers, then we will prescribe a new glucometer    POC glucose today 115, well-controlled  Check labs, low-carb diet discussed  Orders:    POC Glucose, Whole Blood    empagliflozin (JARDIANCE) 10 MG tablet; Take 1 tablet by mouth daily    Comprehensive Metabolic Panel; Future    CBC with Auto Differential; Future    TSH; Future    Uric Acid; Future    Vitamin B12 & Folate; Future    Albumin/Creatinine Ratio, Urine; Future

## 2025-04-03 ENCOUNTER — CARE COORDINATION (OUTPATIENT)
Dept: CARE COORDINATION | Age: 70
End: 2025-04-03

## 2025-04-03 ENCOUNTER — HOSPITAL ENCOUNTER (OUTPATIENT)
Age: 70
Discharge: HOME | End: 2025-04-03
Payer: COMMERCIAL

## 2025-04-03 DIAGNOSIS — N18.2 BENIGN HYPERTENSION WITH CKD (CHRONIC KIDNEY DISEASE), STAGE II: ICD-10-CM

## 2025-04-03 DIAGNOSIS — E80.4 GILBERT'S DISEASE: ICD-10-CM

## 2025-04-03 DIAGNOSIS — E11.65 TYPE 2 DIABETES MELLITUS WITH HYPERGLYCEMIA, WITHOUT LONG-TERM CURRENT USE OF INSULIN (HCC): ICD-10-CM

## 2025-04-03 DIAGNOSIS — D64.9 ANEMIA, UNSPECIFIED TYPE: ICD-10-CM

## 2025-04-03 DIAGNOSIS — E83.19 INCREASED STORAGE IRON: ICD-10-CM

## 2025-04-03 DIAGNOSIS — I12.9 BENIGN HYPERTENSION WITH CKD (CHRONIC KIDNEY DISEASE), STAGE II: ICD-10-CM

## 2025-04-03 DIAGNOSIS — E78.2 MIXED HYPERLIPIDEMIA: ICD-10-CM

## 2025-04-03 LAB
25(OH)D3 SERPL-MCNC: 30.9 NG/ML (ref 30–100)
ALBUMIN SERPL-MCNC: 3.9 G/DL (ref 3.5–5.2)
ALP SERPL-CCNC: 114 U/L (ref 40–129)
ALT SERPL-CCNC: 45 U/L (ref 10–50)
ANION GAP SERPL CALCULATED.3IONS-SCNC: 9 MMOL/L (ref 9–16)
AST SERPL-CCNC: 37 U/L (ref 10–50)
BASOPHILS # BLD: 0.1 K/UL (ref 0–0.2)
BASOPHILS NFR BLD: 1 % (ref 0–2)
BILIRUB SERPL-MCNC: 1.2 MG/DL (ref 0–1.2)
BUN SERPL-MCNC: 22 MG/DL (ref 8–23)
CALCIUM SERPL-MCNC: 9.6 MG/DL (ref 8.6–10.4)
CHLORIDE SERPL-SCNC: 105 MMOL/L (ref 98–107)
CHOLEST SERPL-MCNC: 118 MG/DL (ref 0–199)
CHOLESTEROL/HDL RATIO: 3.3
CO2 SERPL-SCNC: 26 MMOL/L (ref 20–31)
CREAT SERPL-MCNC: 1.1 MG/DL (ref 0.7–1.2)
EOSINOPHIL # BLD: 0.2 K/UL (ref 0–0.4)
EOSINOPHILS RELATIVE PERCENT: 4 % (ref 0–4)
ERYTHROCYTE [DISTWIDTH] IN BLOOD BY AUTOMATED COUNT: 13.5 % (ref 11.5–14.9)
FERRITIN SERPL-MCNC: 137 NG/ML
FOLATE SERPL-MCNC: 26.5 NG/ML (ref 4.8–24.2)
GFR, ESTIMATED: 73 ML/MIN/1.73M2
GLUCOSE SERPL-MCNC: 135 MG/DL (ref 74–99)
HCT VFR BLD AUTO: 41.6 % (ref 41–53)
HDLC SERPL-MCNC: 36 MG/DL
HGB BLD-MCNC: 14 G/DL (ref 13.5–17.5)
IMM RETICS NFR: 15.6 % (ref 2.7–18.3)
IRON SATN MFR SERPL: 26 % (ref 20–55)
IRON SERPL-MCNC: 86 UG/DL (ref 61–157)
LDLC SERPL CALC-MCNC: 48 MG/DL (ref 0–100)
LYMPHOCYTES NFR BLD: 1.2 K/UL (ref 1–4.8)
LYMPHOCYTES RELATIVE PERCENT: 25 % (ref 24–44)
MAGNESIUM SERPL-MCNC: 2 MG/DL (ref 1.6–2.4)
MCH RBC QN AUTO: 33.5 PG (ref 26–34)
MCHC RBC AUTO-ENTMCNC: 33.6 G/DL (ref 31–37)
MCV RBC AUTO: 99.9 FL (ref 80–100)
MONOCYTES NFR BLD: 0.5 K/UL (ref 0.1–1.3)
MONOCYTES NFR BLD: 10 % (ref 1–7)
NEUTROPHILS NFR BLD: 60 % (ref 36–66)
NEUTS SEG NFR BLD: 2.9 K/UL (ref 1.3–9.1)
PHOSPHATE SERPL-MCNC: 3.7 MG/DL (ref 2.5–4.5)
PLATELET # BLD AUTO: 225 K/UL (ref 150–450)
PMV BLD AUTO: 7.8 FL (ref 6–12)
POTASSIUM SERPL-SCNC: 4.2 MMOL/L (ref 3.7–5.3)
PROT SERPL-MCNC: 6.8 G/DL (ref 6.6–8.7)
RBC # BLD AUTO: 4.17 M/UL (ref 4.5–5.9)
RETIC HEMOGLOBIN: 35.9 PG (ref 28.2–35.7)
RETICS # AUTO: 0.13 M/UL (ref 0.03–0.08)
RETICS/RBC NFR AUTO: 3.1 % (ref 0.5–1.9)
SODIUM SERPL-SCNC: 140 MMOL/L (ref 136–145)
TIBC SERPL-MCNC: 333 UG/DL (ref 250–450)
TRIGL SERPL-MCNC: 168 MG/DL (ref 0–149)
TSH SERPL DL<=0.05 MIU/L-ACNC: 2.13 UIU/ML (ref 0.27–4.2)
UNSATURATED IRON BINDING CAPACITY: 247 UG/DL (ref 112–347)
URATE SERPL-MCNC: 5.1 MG/DL (ref 3.4–7)
VIT B12 SERPL-MCNC: 847 PG/ML (ref 232–1245)
WBC OTHER # BLD: 4.8 K/UL (ref 3.5–11)

## 2025-04-03 PROCEDURE — 85025 COMPLETE CBC W/AUTO DIFF WBC: CPT

## 2025-04-03 PROCEDURE — 82746 ASSAY OF FOLIC ACID SERUM: CPT

## 2025-04-03 PROCEDURE — 82607 VITAMIN B-12: CPT

## 2025-04-03 PROCEDURE — 81256 HFE GENE: CPT

## 2025-04-03 PROCEDURE — 84100 ASSAY OF PHOSPHORUS: CPT

## 2025-04-03 PROCEDURE — 80061 LIPID PANEL: CPT

## 2025-04-03 PROCEDURE — 82306 VITAMIN D 25 HYDROXY: CPT

## 2025-04-03 PROCEDURE — 36415 COLL VENOUS BLD VENIPUNCTURE: CPT

## 2025-04-03 PROCEDURE — 83540 ASSAY OF IRON: CPT

## 2025-04-03 PROCEDURE — 84443 ASSAY THYROID STIM HORMONE: CPT

## 2025-04-03 PROCEDURE — 80053 COMPREHEN METABOLIC PANEL: CPT

## 2025-04-03 PROCEDURE — 82728 ASSAY OF FERRITIN: CPT

## 2025-04-03 PROCEDURE — 83735 ASSAY OF MAGNESIUM: CPT

## 2025-04-03 PROCEDURE — 84550 ASSAY OF BLOOD/URIC ACID: CPT

## 2025-04-03 PROCEDURE — 85045 AUTOMATED RETICULOCYTE COUNT: CPT

## 2025-04-03 PROCEDURE — 83550 IRON BINDING TEST: CPT

## 2025-04-03 RX ORDER — AMIODARONE HYDROCHLORIDE 200 MG/1
200 TABLET ORAL DAILY
Qty: 30 TABLET | Refills: 0 | OUTPATIENT
Start: 2025-04-03

## 2025-04-03 RX ORDER — AMIODARONE HYDROCHLORIDE 200 MG/1
200 TABLET ORAL DAILY
Qty: 30 TABLET | Refills: 0 | Status: SHIPPED | OUTPATIENT
Start: 2025-04-03

## 2025-04-03 NOTE — TELEPHONE ENCOUNTER
Patient needs medication sent in please. Was seen at PCP office yesterday was in a-fib in there office. Patient has a follow up on 4/16 with Dr. Pandya.

## 2025-04-03 NOTE — CARE COORDINATION
RM 01 Cardiac Rehabilitation [phone]    4/9/2025 10:00 AM SCHEDULE, [facility] [phone]    4/11/2025 8:00 AM ST[facility] CARD REHAB RM 01 Cardiac Rehabilitation [phone]    4/14/2025 8:00 AM ST[facility] CARD REHAB RM 01 Cardiac Rehabilitation [phone]    4/16/2025 8:00 AM [facility] CARD REHAB RM 01 Cardiac Rehabilitation [phone]    4/16/2025 3:15 PM Adriana Pandya MD Cardiology     4/18/2025 8:00 AM [facility] CARD REHAB RM 01 Cardiac Rehabilitation [phone]    4/21/2025 8:00 AM [facility] CARD REHAB RM 01 Cardiac Rehabilitation [phone]    4/23/2025 8:00 AM [facility] CARD REHAB RM 01 Cardiac Rehabilitation [phone]    4/25/2025 8:00 AM ST[facility] CARD REHAB RM 01 Cardiac Rehabilitation [phone]    4/28/2025 8:00 AM ST[facility] CARD REHAB RM 01 Cardiac Rehabilitation [phone]    4/30/2025 8:00 AM [facility] CARD REHAB RM 01 Cardiac Rehabilitation [phone]    5/2/2025 8:00 AM [facility] CARD REHAB RM 01 Cardiac Rehabilitation [phone]    5/5/2025 8:00 AM [facility] CARD REHAB RM 01 Cardiac Rehabilitation [phone]    5/7/2025 8:00 AM [facility] CARD REHAB RM 01 Cardiac Rehabilitation [phone]    5/9/2025 8:00 AM ST[facility] CARD REHAB RM 01 Cardiac Rehabilitation [phone]    5/12/2025 8:00 AM ST[facility] CARD REHAB RM 01 Cardiac Rehabilitation [phone]    5/14/2025 8:00 AM ST[facility] CARD REHAB RM 01 Cardiac Rehabilitation [phone]    5/16/2025 8:00 AM ST[facility] CARD REHAB RM 01 Cardiac Rehabilitation [phone]    5/19/2025 8:00 AM ST[facility] CARD REHAB RM 01 Cardiac Rehabilitation [phone]    5/21/2025 8:00 AM [facility] CARD REHAB RM 01 Cardiac Rehabilitation [phone]    5/23/2025 8:00 AM [facility] CARD REHAB RM 01 Cardiac Rehabilitation [phone]    5/28/2025 8:00 AM [facility] CARD REHAB RM 01 Cardiac Rehabilitation [phone]    5/30/2025 8:00 AM [facility] CARD REHAB RM 01 Cardiac Rehabilitation [phone]    6/2/2025 8:00 AM [facility] CARD REHAB RM 01 Cardiac Rehabilitation [phone]    6/4/2025 8:00 AM STC CARD REHAB RM 01 Cardiac

## 2025-04-04 ENCOUNTER — HOSPITAL ENCOUNTER (OUTPATIENT)
Dept: CARDIAC REHAB | Age: 70
Setting detail: THERAPIES SERIES
Discharge: HOME | End: 2025-04-04
Payer: COMMERCIAL

## 2025-04-04 ENCOUNTER — HOSPITAL ENCOUNTER (OUTPATIENT)
Age: 70
Setting detail: SPECIMEN
Discharge: HOME | End: 2025-04-04
Payer: COMMERCIAL

## 2025-04-04 ENCOUNTER — RESULTS FOLLOW-UP (OUTPATIENT)
Dept: FAMILY MEDICINE CLINIC | Age: 70
End: 2025-04-04

## 2025-04-04 VITALS — BODY MASS INDEX: 31.83 KG/M2 | WEIGHT: 267 LBS

## 2025-04-04 DIAGNOSIS — E83.119 HEMOCHROMATOSIS, UNSPECIFIED HEMOCHROMATOSIS TYPE: Primary | ICD-10-CM

## 2025-04-04 DIAGNOSIS — E11.65 TYPE 2 DIABETES MELLITUS WITH HYPERGLYCEMIA, WITHOUT LONG-TERM CURRENT USE OF INSULIN (HCC): ICD-10-CM

## 2025-04-04 LAB
CREAT UR-MCNC: 174 MG/DL (ref 39–259)
MICROALBUMIN UR-MCNC: 95 MG/L (ref 0–20)
MICROALBUMIN/CREAT UR-RTO: 55 MCG/MG CREAT (ref 0–17)
PATH REV BLD -IMP: NORMAL
SURGICAL PATHOLOGY REPORT: NORMAL

## 2025-04-04 PROCEDURE — 82043 UR ALBUMIN QUANTITATIVE: CPT

## 2025-04-04 PROCEDURE — 82570 ASSAY OF URINE CREATININE: CPT

## 2025-04-04 PROCEDURE — 93798 PHYS/QHP OP CAR RHAB W/ECG: CPT

## 2025-04-04 ASSESSMENT — EXERCISE STRESS TEST
PEAK_RPE: 11
PEAK_HR: 72
PEAK_METS: 3
PEAK_BP: 110/68

## 2025-04-04 NOTE — RESULT ENCOUNTER NOTE
Please notify patient: There is spilling of the proteins in the urine due to diabetes, which is worse than before  He would benefit from starting losartan small dosage, to help with the proteins in the urine, and blood pressure , let me know if he agrees and I can send to the pharmacy    BP Readings from Last 3 Encounters:  04/02/25 : (!) 142/78  03/31/25 : 120/80  03/23/25 : (!) 141/74      Future Appointments  4/7/2025   8:00 AM    [facility] CARD REHAB RM 01       [facility] CARDI[facility]        [place]  4/9/2025   8:00 AM    ST[facility] CARD REHAB RM 01       [facility] CARDIAC        [place]  4/9/2025   10:00 AM   SCHEDULE, [facility] fp sc               [facility]  4/11/2025  8:00 AM    ST[facility] CARD REHAB RM 01       [facility] CARDIAC        [place]  4/14/2025  8:00 AM    ST[facility] CARD REHAB RM 01       [facility] CARDI[facility]        [place]  4/16/2025  8:00 AM    ST[facility] CARD REHAB RM 01       [facility] CARDI[facility]        [place]  4/16/2025  3:15 PM    Adriana Pandya MD       [facility]        [facility]  4/18/2025  8:00 AM    ST[facility] CARD REHAB RM 01       [facility] CARDIAC        [place]  4/21/2025  8:00 AM    ST[facility] CARD REHAB RM 01       [facility] CARDIAC        [place]  4/23/2025  8:00 AM    ST[facility] CARD REHAB RM 01       [facility] CARDI[facility]        [place]  4/25/2025  8:00 AM    ST[facility] CARD REHAB RM 01       [facility] CARDI[facility]        [place]  4/28/2025  8:00 AM    ST[facility] CARD REHAB RM 01       [facility] CARDI[facility]        [place]  4/30/2025  8:00 AM    STC CARD REHAB RM 01       [facility] CARDI[facility]        [place]  5/2/2025   8:00 AM    STC CARD REHAB RM 01       [facility] CARDI[facility]        [place]  5/5/2025   8:00 AM    STC CARD REHAB RM 01       [facility] CARDI[facility]        [place]  5/7/2025   8:00 AM    STC CARD REHAB RM 01       [facility] CARD[facility]        [place]  5/9/2025   8:00 AM    STC CARD REHAB RM 01       [facility] CARD[facility]        [place]  5/12/2025  8:00 AM    STC CARD REHAB RM 01       [facility] CARDI[facility]        [place]  5/14/2025  8:00 AM    STC CARD REHAB RM 01       [facility]

## 2025-04-04 NOTE — RESULT ENCOUNTER NOTE
Please notify patient: Mild increase triglycerides, low-carb diet low-fat diet advised  Blood glucose 135 well-controlled diabetes  Mild anemia but improved  Ferritin is improved  Iron levels improved  Vitamin D borderline low to make sure he takes vitamin D 2000 units daily with food  Otherwise labs within normal limits  continue current treatment    Future Appointments  4/7/2025   8:00 AM    ST[facility] CARD REHAB RM 01       [facility] CARDIAC        [place]  4/9/2025   8:00 AM    STC CARD REHAB RM 01       [facility] CARDIAC        [place]  4/9/2025   10:00 AM   SCHEDULE, [facility] fp sc               [facility]  4/11/2025  8:00 AM    STC CARD REHAB RM 01       [facility] CARDIAC        [place]  4/14/2025  8:00 AM    ST[facility] CARD REHAB RM 01       [facility] CARDI[facility]        [place]  4/16/2025  8:00 AM    ST[facility] CARD REHAB RM 01       [facility] CARDI[facility]        [place]  4/16/2025  3:15 PM    Adriana Pandya MD       [facility] C[facility]        [facility]  4/18/2025  8:00 AM    STC CARD REHAB RM 01       [facility] CARDIAC        [place]  4/21/2025  8:00 AM    STC CARD REHAB RM 01       [facility] CARDI[facility]        [place]  4/23/2025  8:00 AM    STC CARD REHAB RM 01       [facility] CARDI[facility]        [place]  4/25/2025  8:00 AM    STC CARD REHAB RM 01       [facility] CARDIAC        [place]  4/28/2025  8:00 AM    STC CARD REHAB RM 01       [facility] CARDI[facility]        [place]  4/30/2025  8:00 AM    STC CARD REHAB RM 01       [facility] CARDIAC        [place]  5/2/2025   8:00 AM    STC CARD REHAB RM 01       [facility] CARDIAC        [place]  5/5/2025   8:00 AM    STC CARD REHAB RM 01       [facility] CARDIAC        [place]  5/7/2025   8:00 AM    STC CARD REHAB RM 01       [facility] CARDIAC        [place]  5/9/2025   8:00 AM    STC CARD REHAB RM 01       [facility] CARDI[facility]        [place]  5/12/2025  8:00 AM    STC CARD REHAB RM 01       [facility] CARDI[facility]        [place]  5/14/2025  8:00 AM    STC CARD REHAB RM 01       [facility] CARDIAC        [place]  5/16/2025  8:00 AM    STC CARD REHAB

## 2025-04-06 PROBLEM — E78.2 MIXED HYPERLIPIDEMIA: Status: ACTIVE | Noted: 2021-02-22

## 2025-04-06 PROBLEM — I48.0 PAROXYSMAL ATRIAL FIBRILLATION (HCC): Status: ACTIVE | Noted: 2025-04-06

## 2025-04-06 PROBLEM — E83.19 INCREASED STORAGE IRON: Status: ACTIVE | Noted: 2025-04-06

## 2025-04-06 PROBLEM — I49.9 IRREGULAR HEART BEATS: Status: RESOLVED | Noted: 2024-05-08 | Resolved: 2025-04-06

## 2025-04-06 PROBLEM — D64.9 ANEMIA: Status: ACTIVE | Noted: 2025-04-06

## 2025-04-06 PROBLEM — F33.41 RECURRENT MAJOR DEPRESSIVE DISORDER, IN PARTIAL REMISSION: Status: ACTIVE | Noted: 2025-04-06

## 2025-04-07 ENCOUNTER — HOSPITAL ENCOUNTER (OUTPATIENT)
Dept: CARDIAC REHAB | Age: 70
Setting detail: THERAPIES SERIES
Discharge: HOME | End: 2025-04-07
Payer: COMMERCIAL

## 2025-04-07 VITALS — BODY MASS INDEX: 31.59 KG/M2 | WEIGHT: 265 LBS

## 2025-04-07 PROCEDURE — 93798 PHYS/QHP OP CAR RHAB W/ECG: CPT

## 2025-04-07 ASSESSMENT — EXERCISE STRESS TEST
PEAK_RPE: 11
PEAK_HR: 86
PEAK_BP: 130/62
PEAK_METS: 4

## 2025-04-07 NOTE — ASSESSMENT & PLAN NOTE
Chronic, improving  Continue Lipitor 20 Mg daily, check lipid panel  Orders:    Lipid Panel; Future

## 2025-04-07 NOTE — ASSESSMENT & PLAN NOTE
Chronic, stable  - Occasional atrial fibrillation, currently on amiodarone as prescribed by the cardiologist.  - Advised to continue taking amiodarone as directed.  For chronic anticoagulation on Eliquis 5 Mg twice a day

## 2025-04-07 NOTE — ASSESSMENT & PLAN NOTE
Apparently chronic, unsure if worsening or not, check labs, refer to hematologist oncologist, rule out hemochromatosis    Orders:    Chu Barton MD, Hematology/Oncology, [facility]. HEMOCHROMATOSIS, DNA; Future    CBC with Auto Differential; Future    Iron and TIBC; Future    Ferritin; Future

## 2025-04-07 NOTE — ASSESSMENT & PLAN NOTE
Chronic , unsure if worsening or improved     Anemia despite high iron levels.  - Referral to a hematologist will be made for further evaluation.  - Advised not to take any iron supplements until further notice from the hematologist.  Orders:    Chu Barton MD, Hematology/Oncology, [place]    Path Review, Smear; Future    CBC with Auto Differential; Future

## 2025-04-09 ENCOUNTER — RESULTS FOLLOW-UP (OUTPATIENT)
Dept: FAMILY MEDICINE CLINIC | Age: 70
End: 2025-04-09

## 2025-04-09 ENCOUNTER — CLINICAL SUPPORT (OUTPATIENT)
Dept: FAMILY MEDICINE CLINIC | Age: 70
End: 2025-04-09
Payer: COMMERCIAL

## 2025-04-09 ENCOUNTER — HOSPITAL ENCOUNTER (OUTPATIENT)
Dept: CARDIAC REHAB | Age: 70
Setting detail: THERAPIES SERIES
Discharge: HOME | End: 2025-04-09
Payer: COMMERCIAL

## 2025-04-09 ENCOUNTER — CARE COORDINATION (OUTPATIENT)
Dept: CARE COORDINATION | Age: 70
End: 2025-04-09

## 2025-04-09 VITALS — DIASTOLIC BLOOD PRESSURE: 84 MMHG | OXYGEN SATURATION: 99 % | SYSTOLIC BLOOD PRESSURE: 124 MMHG | HEART RATE: 76 BPM

## 2025-04-09 VITALS — WEIGHT: 263 LBS | BODY MASS INDEX: 31.35 KG/M2

## 2025-04-09 DIAGNOSIS — I12.9 BENIGN HYPERTENSION WITH CKD (CHRONIC KIDNEY DISEASE), STAGE II: Primary | ICD-10-CM

## 2025-04-09 DIAGNOSIS — N18.2 BENIGN HYPERTENSION WITH CKD (CHRONIC KIDNEY DISEASE), STAGE II: Primary | ICD-10-CM

## 2025-04-09 DIAGNOSIS — E11.65 TYPE 2 DIABETES MELLITUS WITH HYPERGLYCEMIA, WITHOUT LONG-TERM CURRENT USE OF INSULIN (HCC): ICD-10-CM

## 2025-04-09 LAB
CHP ED QC CHECK: NORMAL
GLUCOSE BLD-MCNC: 247 MG/DL

## 2025-04-09 PROCEDURE — 3044F HG A1C LEVEL LT 7.0%: CPT | Performed by: FAMILY MEDICINE

## 2025-04-09 PROCEDURE — 93797 PHYS/QHP OP CAR RHAB WO ECG: CPT

## 2025-04-09 PROCEDURE — 82962 GLUCOSE BLOOD TEST: CPT | Performed by: FAMILY MEDICINE

## 2025-04-09 PROCEDURE — 99211 OFF/OP EST MAY X REQ PHY/QHP: CPT | Performed by: FAMILY MEDICINE

## 2025-04-09 PROCEDURE — 93798 PHYS/QHP OP CAR RHAB W/ECG: CPT

## 2025-04-09 ASSESSMENT — EXERCISE STRESS TEST
PEAK_METS: 4
PEAK_HR: 108
PEAK_BP: 124/64
PEAK_RPE: 12

## 2025-04-09 NOTE — CARE COORDINATION
Encouraged to work on eating 3 meals/d spaced every 4-5 hours.   2. Reviewed what foods contain carbohydrate to limit and how to measure out portions. Encouraged patient to limit carbs 60gms/meal 15-30gms/snack. Discussed carb counting in detail- what a serving size is/ how to measure out servings. Reviewed low carb snacking. Discussed having a snack with 15gms of carb + a protein source, gave patient several examples. Will send a list of low carb snacks to patient.   3. Reviewed plate method, encouraged patient to increase intake of non-starchy vegetables and lean protein sources.  Goal is 1/2 of plate to be non-starchy vegetables, 1/4 lean protein, 1/4 carbs.  Discussed what a serving size is and how to measure out servings at meals/snacks.  4. Reviewed avoiding/limiting sweets and sweetened drinks. Patient drinks water, denies sugary drinks. Encouraged water and SF beverages. He states does not eats a lot of sweets. Reviewed some alternatives to sweets- fresh fruit- but limit serving, sugar free pudding or SF jello, yogurt, ect.Encouraged to keep sweets out of the house if possible and limit intake.  Spoke with patient who relays he did get nutrition information and read through it. He states working on eating more vegetables and has cut portions down. Reviewed goals. Will follow up in 3-4 weeks to review and answer questions.  JENNIFER Navarro

## 2025-04-09 NOTE — FLOWSHEET NOTE
04/09/25 0959   Staff Time   Start Time 0817   Stop Time 0959   Total Time (Minutes) 102 Minutes      Patient attended healthy minds class today as part of cardiac rehab.

## 2025-04-09 NOTE — ASSESSMENT & PLAN NOTE
Chronic, at goal (stable), continue current treatment plan      Lab Results   Component Value Date    LABA1C 5.9 03/20/2025    LABA1C 6.1 09/03/2024    LABA1C 5.8 05/20/2024       Orders:    POCT Glucose   high, 247, to improve diet  Results for orders placed or performed in visit on 04/09/25   POCT Glucose   Result Value Ref Range    POC Glucose 247     QC OK?

## 2025-04-09 NOTE — RESULT ENCOUNTER NOTE
Addressed during office visit today, glucose done today 247, to check his glucometer, continue treatment recommended during the office visit.

## 2025-04-09 NOTE — ASSESSMENT & PLAN NOTE
Chronic, at goal (stable), continue current treatment plan    BP Readings from Last 3 Encounters:   04/09/25 124/84   04/02/25 (!) 142/78   03/31/25 120/80       Pulse Readings from Last 3 Encounters:   04/09/25 76   03/31/25 60   03/23/25 64

## 2025-04-09 NOTE — PROGRESS NOTES
[facility]ARDI[facility] [place]   5/12/2025  8:00 AM STC CARD REHAB RM 01 [facility] [place]   5/14/2025  8:00 AM STC CARD REHAB RM 01 [facility]   5/16/2025  8:00 AM STC CARD REHAB RM 01 [facility]   5/19/2025  8:00 AM STC CARD REHAB RM 01 [facility]   5/21/2025  8:00 AM STC CARD REHAB RM 01 [facility]   5/23/2025  8:00 AM STC CARD REHAB RM 01 [facility]   5/28/2025  8:00 AM STC CARD REHAB RM 01 [facility]   5/30/2025  8:00 AM STC CARD REHAB RM 01 [facility]   6/2/2025  8:00 AM STC CARD REHAB RM 01 [facility]   6/4/2025  8:00 AM STC CARD REHAB RM 01 [facility]   6/6/2025  8:00 AM STC CARD REHAB RM 01 [facility]   6/9/2025  8:00 AM STC CARD REHAB RM 01 [facility]   6/11/2025  8:00 AM STC CARD REHAB RM 01 [facility]   6/13/2025  8:00 AM STC CARD REHAB RM 01 [facility]   6/16/2025  8:00 AM STC CARD REHAB RM 01 [facility]   6/18/2025  8:00 AM STC CARD REHAB RM 01 [facility]   6/20/2025  8:00 AM STC CARD REHAB RM 01 [facility]   7/17/2025  8:00 AM Theresa Garrison MD fp [facility] ECC DEP       Electronically signed by Theresa Garrison MD on 4/9/25 at 4:11 PM EDT      
AM STC CARD REHAB RM 01 [facility] [place]   5/9/2025  8:00 AM STC CARD REHAB RM 01 [facility]   5/12/2025  8:00 AM STC CARD REHAB RM 01 [facility]   5/14/2025  8:00 AM STC CARD REHAB RM 01 [facility]   5/16/2025  8:00 AM STC CARD REHAB RM 01 [facility]   5/19/2025  8:00 AM STC CARD REHAB RM 01 [facility]   5/21/2025  8:00 AM STC CARD REHAB RM 01 [facility]   5/23/2025  8:00 AM STC CARD REHAB RM 01 [facility]   5/28/2025  8:00 AM STC CARD REHAB RM 01 [facility]   5/30/2025  8:00 AM STC CARD REHAB RM 01 [facility]   6/2/2025  8:00 AM STC CARD REHAB RM 01 [facility]   6/4/2025  8:00 AM STC CARD REHAB RM 01 [facility]   6/6/2025  8:00 AM STC CARD REHAB RM 01 [facility]   6/9/2025  8:00 AM STC CARD REHAB RM 01 [facility]   6/11/2025  8:00 AM STC CARD REHAB RM 01 [facility]   6/13/2025  8:00 AM STC CARD REHAB RM 01 [facility]   6/16/2025  8:00 AM STC CARD REHAB RM 01 [facility]   6/18/2025  8:00 AM STC CARD REHAB RM 01 [facility]   6/20/2025  8:00 AM STC CARD REHAB RM 01 [facility]   7/17/2025  8:00 AM Theresa Garrison MD fp [facility] ECC DEP

## 2025-04-10 LAB
C282Y HEMOCHROMATOSIS MUT: NEGATIVE
H63D HEMOCHROMATOSIS MUT: NORMAL
HEMOCHROMATOSIS MUTATION INT: NORMAL
HEMOCHROMATOSIS SPECIMEN: NORMAL
S65C HEMOCHROMATOSIS MUT: NEGATIVE

## 2025-04-11 ENCOUNTER — HOSPITAL ENCOUNTER (OUTPATIENT)
Dept: CARDIAC REHAB | Age: 70
Setting detail: THERAPIES SERIES
Discharge: HOME | End: 2025-04-11
Payer: COMMERCIAL

## 2025-04-11 VITALS — WEIGHT: 260.2 LBS | BODY MASS INDEX: 31.02 KG/M2

## 2025-04-11 PROBLEM — E83.119 HEMOCHROMATOSIS: Status: ACTIVE | Noted: 2025-04-06

## 2025-04-11 PROCEDURE — 93798 PHYS/QHP OP CAR RHAB W/ECG: CPT

## 2025-04-11 ASSESSMENT — EXERCISE STRESS TEST
PEAK_BP: 124/68
PEAK_RPE: 13
PEAK_HR: 89
PEAK_METS: 4.5

## 2025-04-11 NOTE — RESULT ENCOUNTER NOTE
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, which can lead to deposits of iron, I would like to refer him to hematologist oncologist, please give him contact information     not to take iron supplements or daily vitamin C supplement  Future Appointments  4/11/2025  8:00 AM    STC CARD REHAB RM 01       [facility]        [facility] [place]  4/14/2025  8:00 AM    STC CARD REHAB RM 01       [facility]        [facility] [place]  4/16/2025  8:00 AM    STC CARD REHAB RM 01       [facility]        [facility] [place]  4/18/2025  8:00 AM    STC CARD REHAB RM 01       [facility]        [facility] [place]  4/21/2025  8:00 AM    STC CARD REHAB RM 01       [facility] C[facility]        [facility] [place]  4/23/2025  8:00 AM    STC CARD REHAB RM 01       [facility] CARDIAC        [facility] [place]  4/25/2025  8:00 AM    STC CARD REHAB RM 01       [facility] C[facility]        [facility]  4/28/2025  8:00 AM    STC CARD REHAB RM 01       [facility] CARD[facility]        [facility]  4/30/2025  8:00 AM    STC CARD REHAB RM 01       [facility] C[facility]        [facility]  5/2/2025   8:00 AM    STC CARD REHAB RM 01       [facility] CARD[facility]        [facility] [place]  5/5/2025   8:00 AM    STC CARD REHAB RM 01       [facility] CARDIAC        [facility] [place]  5/6/2025   3:30 PM    Chu Alvarado MD        [facility]        [facility]  5/7/2025   8:00 AM    STC CARD REHAB RM 01       [facility] CARDI[facility]        [facility] [place]  5/9/2025   8:00 AM    STC CARD REHAB RM 01       [facility] CARD[facility]        [facility] [place]  5/12/2025  8:00 AM    STC CARD REHAB RM 01       [facility] CARD[facility]        [facility] [place]  5/14/2025  8:00 AM    STC CARD REHAB RM 01       [facility] CARDIAC        [facility] [place]  5/16/2025  8:00 AM    STC CARD REHAB RM 01       [facility] CARDI[facility]        [facility] [place]  5/19/2025  8:00 AM    STC CARD REHAB RM 01       [facility] C[facility]        [facility] [place]  5/21/2025  8:00 AM    STC CARD REHAB RM 01       [facility] CARDIAC        [facility] [place]  5/23/2025  8:00 AM    STC CARD REHAB RM 01       [facility] C[facility]IAC        [facility] [place]  5/28/2025  8:00 AM    [facility] CARD

## 2025-04-14 ENCOUNTER — HOSPITAL ENCOUNTER (OUTPATIENT)
Dept: CARDIAC REHAB | Age: 70
Setting detail: THERAPIES SERIES
Discharge: HOME | End: 2025-04-14
Payer: COMMERCIAL

## 2025-04-14 VITALS — WEIGHT: 256 LBS | BODY MASS INDEX: 30.52 KG/M2

## 2025-04-14 PROCEDURE — 93798 PHYS/QHP OP CAR RHAB W/ECG: CPT

## 2025-04-14 ASSESSMENT — EXERCISE STRESS TEST
PEAK_BP: 118/64
PEAK_METS: 4.3
PEAK_HR: 90
PEAK_RPE: 12

## 2025-04-16 ENCOUNTER — HOSPITAL ENCOUNTER (OUTPATIENT)
Dept: CARDIAC REHAB | Age: 70
Setting detail: THERAPIES SERIES
Discharge: HOME | End: 2025-04-16
Payer: COMMERCIAL

## 2025-04-16 VITALS — BODY MASS INDEX: 29.92 KG/M2 | WEIGHT: 251 LBS

## 2025-04-16 PROCEDURE — 93798 PHYS/QHP OP CAR RHAB W/ECG: CPT

## 2025-04-16 PROCEDURE — 93797 PHYS/QHP OP CAR RHAB WO ECG: CPT

## 2025-04-16 ASSESSMENT — EXERCISE STRESS TEST
PEAK_METS: 4.5
PEAK_BP: 140/60
PEAK_RPE: 12
PEAK_HR: 80

## 2025-04-16 NOTE — FLOWSHEET NOTE
04/16/25 1001   Staff Time   Start Time 0805   Stop Time 1000   Total Time (Minutes) 115 Minutes     PT ATTENDED PHARMACY CLASS

## 2025-04-18 ENCOUNTER — HOSPITAL ENCOUNTER (OUTPATIENT)
Dept: CARDIAC REHAB | Age: 70
Setting detail: THERAPIES SERIES
Discharge: HOME | End: 2025-04-18
Payer: COMMERCIAL

## 2025-04-18 VITALS — BODY MASS INDEX: 30.04 KG/M2 | WEIGHT: 252 LBS

## 2025-04-18 PROCEDURE — 93798 PHYS/QHP OP CAR RHAB W/ECG: CPT

## 2025-04-18 ASSESSMENT — EXERCISE STRESS TEST
PEAK_BP: 116/64
PEAK_RPE: 11
PEAK_METS: 4.5
PEAK_HR: 94

## 2025-04-21 ENCOUNTER — HOSPITAL ENCOUNTER (OUTPATIENT)
Dept: CARDIAC REHAB | Age: 70
Setting detail: THERAPIES SERIES
Discharge: HOME | End: 2025-04-21
Payer: COMMERCIAL

## 2025-04-21 VITALS — BODY MASS INDEX: 29.68 KG/M2 | WEIGHT: 249 LBS

## 2025-04-21 PROCEDURE — 93798 PHYS/QHP OP CAR RHAB W/ECG: CPT

## 2025-04-21 ASSESSMENT — EXERCISE STRESS TEST
PEAK_RPE: 12
PEAK_BP: 118/64
PEAK_HR: 82
PEAK_METS: 6

## 2025-04-23 ENCOUNTER — TELEPHONE (OUTPATIENT)
Age: 70
End: 2025-04-23

## 2025-04-23 ENCOUNTER — HOSPITAL ENCOUNTER (OUTPATIENT)
Dept: CARDIAC REHAB | Age: 70
Setting detail: THERAPIES SERIES
Discharge: HOME | End: 2025-04-23
Payer: COMMERCIAL

## 2025-04-23 VITALS — BODY MASS INDEX: 29.92 KG/M2 | WEIGHT: 251 LBS

## 2025-04-23 PROCEDURE — 93797 PHYS/QHP OP CAR RHAB WO ECG: CPT

## 2025-04-23 PROCEDURE — 93798 PHYS/QHP OP CAR RHAB W/ECG: CPT

## 2025-04-23 ASSESSMENT — EXERCISE STRESS TEST
PEAK_HR: 85
PEAK_METS: 7
PEAK_RPE: 12
PEAK_BP: 130/78

## 2025-04-23 NOTE — FLOWSHEET NOTE
04/23/25 0958   Staff Time   Start Time 0800   Stop Time 0958   Total Time (Minutes) 118 Minutes     Patient attended nutrition class as part of cardiac rehab.

## 2025-04-23 NOTE — TELEPHONE ENCOUNTER
PT called in to check on the status of the Cardiac Clearance that was send by primary care on 4/14/2025.    He needs the clearance before he is about to get shots.

## 2025-04-25 ENCOUNTER — HOSPITAL ENCOUNTER (OUTPATIENT)
Dept: CARDIAC REHAB | Age: 70
Setting detail: THERAPIES SERIES
Discharge: HOME | End: 2025-04-25
Payer: COMMERCIAL

## 2025-04-25 VITALS — WEIGHT: 254 LBS | BODY MASS INDEX: 30.28 KG/M2

## 2025-04-25 PROCEDURE — 93798 PHYS/QHP OP CAR RHAB W/ECG: CPT

## 2025-04-25 ASSESSMENT — EXERCISE STRESS TEST
PEAK_RPE: 12
PEAK_HR: 81
PEAK_BP: 120/72
PEAK_METS: 4

## 2025-04-28 ENCOUNTER — HOSPITAL ENCOUNTER (OUTPATIENT)
Dept: CARDIAC REHAB | Age: 70
Setting detail: THERAPIES SERIES
Discharge: HOME | End: 2025-04-28
Payer: COMMERCIAL

## 2025-04-28 VITALS — BODY MASS INDEX: 29.56 KG/M2 | WEIGHT: 248 LBS

## 2025-04-28 PROCEDURE — 93798 PHYS/QHP OP CAR RHAB W/ECG: CPT

## 2025-04-28 ASSESSMENT — EXERCISE STRESS TEST
PEAK_METS: 3.5
PEAK_BP: 120/68
PEAK_HR: 81
PEAK_RPE: 11

## 2025-04-28 NOTE — FLOWSHEET NOTE
04/28/25 0808   Treatment Diagnosis   Treatment Diagnosis 1 CABG   CABG Date 02/21/25   Referral Date 02/25/25   Significant Cardiovascular History   (A-FIB, HTN)   Co-morbidities Diabetes  (ANXIETY, DEPRESSION, BRIDGETTE, NEUROPATHY, ARTHRITIS)   Individual Treatment Plan   ITP Visit Type Re-assessment   Visit #/Total Visits 12/36   EF% 50 %  (50-55% ECHO 02/17/25)   Risk Stratification Moderate   Supplemental Oxygen   Oxygen Use No   Exercise Intervention/Prescription   Mode Treadmill;Bike;Stepper;Ergometer;Resistance training   Frequency per week 3   Duration Per Session 31 - 49 MIN   Intensity METS       3.5   Progression PT HAS INCREASED EXERCISE DURATION AND INCREASED METS   Symptoms with Exercise Denies   Target Heart Rate 106 - 128   Resistance Training Yes   RPE 11   Exercise Activity at Home   Type LEG STRETCHES  (PT IS PERFORMING LEG STRETCHES AND HAS STARTED RIDING HIS BIKE)   Frequency DAILY   Duration 10 MIN LEG STRETCHES DAILY RIDE BIKE 30 MINUTES 2 TIMES WEEKELY   Resistance Training Yes   Exercise Education   Education Attended class;Deep breathing techniques;Dyspnea management;Equipment orientation;Exercise safety;Physically active;Pulse oximetry;Signs/symptoms to report   Exercise Goals   Patient Target Goals Individual exercise RX;Maintain exercise and activity at a moderate intensity;Improve endurance   Patient Treatment Goal PT WOULD LIKE TO RIDE HIS BIKE TWICE WEEKLY   Goal Status Initial   Progress Towards Goal PT STARTED RIDING HIS BIKE FOR 30 MINUTES   Psychosocial Assessment   Stages of Change Maintenance   Psychosocial Intervention   Interventions Currently under treatment for depression   Stress Management Techniques Maintains physical exercise and healthy nutrition;Connects with others   Currently Taking Psychotropic Meds Yes   Medication Changes No   Psychosocial Education   Education Attended class;Cardiac meds;Coping techniques;Environmental triggers;Impact self care behaviors on

## 2025-04-30 ENCOUNTER — CARE COORDINATION (OUTPATIENT)
Dept: CARE COORDINATION | Age: 70
End: 2025-04-30

## 2025-04-30 ENCOUNTER — HOSPITAL ENCOUNTER (OUTPATIENT)
Dept: CARDIAC REHAB | Age: 70
Setting detail: THERAPIES SERIES
Discharge: HOME | End: 2025-04-30
Payer: COMMERCIAL

## 2025-04-30 VITALS — BODY MASS INDEX: 29.68 KG/M2 | WEIGHT: 249 LBS

## 2025-04-30 PROCEDURE — 93798 PHYS/QHP OP CAR RHAB W/ECG: CPT

## 2025-04-30 RX ORDER — AMIODARONE HYDROCHLORIDE 200 MG/1
200 TABLET ORAL DAILY
Qty: 90 TABLET | Refills: 3 | Status: SHIPPED | OUTPATIENT
Start: 2025-04-30

## 2025-04-30 ASSESSMENT — EXERCISE STRESS TEST
PEAK_HR: 86
PEAK_METS: 4.4
PEAK_RPE: 12
PEAK_BP: 122/70

## 2025-04-30 NOTE — CARE COORDINATION
to work on eating 3 meals/d spaced every 4-5 hours.   2. Reviewed what foods contain carbohydrate to limit and how to measure out portions. Encouraged patient to limit carbs 60gms/meal 15-30gms/snack. Discussed carb counting in detail- what a serving size is/ how to measure out servings. Reviewed low carb snacking. Discussed having a snack with 15gms of carb + a protein source, gave patient several examples. Will send a list of low carb snacks to patient.   3. Reviewed plate method, encouraged patient to increase intake of non-starchy vegetables and lean protein sources.  Goal is 1/2 of plate to be non-starchy vegetables, 1/4 lean protein, 1/4 carbs.  Discussed what a serving size is and how to measure out servings at meals/snacks.  4. Reviewed avoiding/limiting sweets and sweetened drinks. Patient drinks water, denies sugary drinks. Encouraged water and SF beverages. He states does not eats a lot of sweets. Reviewed some alternatives to sweets- fresh fruit- but limit serving, sugar free pudding or SF jello, yogurt, ect.Encouraged to keep sweets out of the house if possible and limit intake.  Spoke with patient who relays he is doing well. He states continues to work on eating more vegetables and cut portions down. Reviewed low carb snacking- encouraged to use low carb snack lists provided to him.Will follow up in 3-4 weeks to review and answer questions.    JENNIFER Navarro

## 2025-05-02 ENCOUNTER — HOSPITAL ENCOUNTER (OUTPATIENT)
Dept: CARDIAC REHAB | Age: 70
Setting detail: THERAPIES SERIES
Discharge: HOME | End: 2025-05-02
Payer: COMMERCIAL

## 2025-05-02 VITALS — WEIGHT: 250 LBS | BODY MASS INDEX: 29.8 KG/M2

## 2025-05-02 PROCEDURE — 93798 PHYS/QHP OP CAR RHAB W/ECG: CPT

## 2025-05-02 ASSESSMENT — EXERCISE STRESS TEST
PEAK_METS: 6
PEAK_BP: 134/68
PEAK_RPE: 12
PEAK_HR: 80

## 2025-05-04 ENCOUNTER — HOSPITAL ENCOUNTER (EMERGENCY)
Age: 70
Discharge: HOME | End: 2025-05-04
Attending: STUDENT IN AN ORGANIZED HEALTH CARE EDUCATION/TRAINING PROGRAM
Payer: COMMERCIAL

## 2025-05-04 VITALS
HEART RATE: 62 BPM | TEMPERATURE: 98 F | HEIGHT: 77 IN | DIASTOLIC BLOOD PRESSURE: 95 MMHG | BODY MASS INDEX: 29.52 KG/M2 | SYSTOLIC BLOOD PRESSURE: 168 MMHG | OXYGEN SATURATION: 95 % | RESPIRATION RATE: 14 BRPM | WEIGHT: 250 LBS

## 2025-05-04 DIAGNOSIS — S01.502A TONGUE WOUND, INITIAL ENCOUNTER: Primary | ICD-10-CM

## 2025-05-04 LAB
ALBUMIN SERPL-MCNC: 3.8 G/DL (ref 3.5–5.2)
ALP SERPL-CCNC: 98 U/L (ref 40–129)
ALT SERPL-CCNC: 36 U/L (ref 10–50)
ANION GAP SERPL CALCULATED.3IONS-SCNC: 11 MMOL/L (ref 9–16)
AST SERPL-CCNC: 32 U/L (ref 10–50)
BILIRUB SERPL-MCNC: 0.9 MG/DL (ref 0–1.2)
BUN SERPL-MCNC: 16 MG/DL (ref 8–23)
CALCIUM SERPL-MCNC: 9.1 MG/DL (ref 8.6–10.4)
CHLORIDE SERPL-SCNC: 105 MMOL/L (ref 98–107)
CO2 SERPL-SCNC: 23 MMOL/L (ref 20–31)
CREAT SERPL-MCNC: 1.2 MG/DL (ref 0.7–1.2)
ERYTHROCYTE [DISTWIDTH] IN BLOOD BY AUTOMATED COUNT: 13.3 % (ref 11.5–14.9)
GFR, ESTIMATED: 65 ML/MIN/1.73M2
GLUCOSE SERPL-MCNC: 155 MG/DL (ref 74–99)
HCT VFR BLD AUTO: 43.2 % (ref 41–53)
HGB BLD-MCNC: 14.8 G/DL (ref 13.5–17.5)
INR PPP: 1.3
MCH RBC QN AUTO: 32.7 PG (ref 26–34)
MCHC RBC AUTO-ENTMCNC: 34.3 G/DL (ref 31–37)
MCV RBC AUTO: 95.3 FL (ref 80–100)
PLATELET # BLD AUTO: 207 K/UL (ref 150–450)
PMV BLD AUTO: 8.1 FL (ref 6–12)
POTASSIUM SERPL-SCNC: 4 MMOL/L (ref 3.7–5.3)
PROT SERPL-MCNC: 6.7 G/DL (ref 6.6–8.7)
PROTHROMBIN TIME: 16.8 SEC (ref 11.8–14.6)
RBC # BLD AUTO: 4.53 M/UL (ref 4.5–5.9)
SODIUM SERPL-SCNC: 139 MMOL/L (ref 136–145)
WBC OTHER # BLD: 5.4 K/UL (ref 3.5–11)

## 2025-05-04 PROCEDURE — 85610 PROTHROMBIN TIME: CPT

## 2025-05-04 PROCEDURE — 80053 COMPREHEN METABOLIC PANEL: CPT

## 2025-05-04 PROCEDURE — 85027 COMPLETE CBC AUTOMATED: CPT

## 2025-05-04 PROCEDURE — 36415 COLL VENOUS BLD VENIPUNCTURE: CPT

## 2025-05-04 PROCEDURE — 6360000002 HC RX W HCPCS: Performed by: STUDENT IN AN ORGANIZED HEALTH CARE EDUCATION/TRAINING PROGRAM

## 2025-05-04 PROCEDURE — 99283 EMERGENCY DEPT VISIT LOW MDM: CPT

## 2025-05-04 RX ORDER — LIDOCAINE HYDROCHLORIDE AND EPINEPHRINE 10; 10 MG/ML; UG/ML
20 INJECTION, SOLUTION INFILTRATION; PERINEURAL ONCE
Status: COMPLETED | OUTPATIENT
Start: 2025-05-04 | End: 2025-05-04

## 2025-05-04 RX ADMIN — LIDOCAINE HYDROCHLORIDE,EPINEPHRINE BITARTRATE 20 ML: 10; .01 INJECTION, SOLUTION INFILTRATION; PERINEURAL at 19:53

## 2025-05-04 ASSESSMENT — PAIN - FUNCTIONAL ASSESSMENT: PAIN_FUNCTIONAL_ASSESSMENT: NONE - DENIES PAIN

## 2025-05-04 ASSESSMENT — LIFESTYLE VARIABLES
HOW MANY STANDARD DRINKS CONTAINING ALCOHOL DO YOU HAVE ON A TYPICAL DAY: 3 OR 4
HOW OFTEN DO YOU HAVE A DRINK CONTAINING ALCOHOL: 2-4 TIMES A MONTH

## 2025-05-04 NOTE — ED TRIAGE NOTES
Mode of arrival (squad #, walk in, police, etc) : walk in        Chief complaint(s): mouth lesion, bleeding        Arrival Note (brief scenario, treatment PTA, etc).: Pt reports he has had a mouth lesion for a little while and today it burst and now uncontrolled bleeding in his mouth. He is on eliquis. Pt is A&OX4.        C= \"Have you ever felt that you should Cut down on your drinking?\"  No  A= \"Have people Annoyed you by criticizing your drinking?\"  No  G= \"Have you ever felt bad or Guilty about your drinking?\"  No  E= \"Have you ever had a drink as an Eye-opener first thing in the morning to steady your nerves or to help a hangover?\"  No      Deferred []      Reason for deferring: N/A    *If yes to two or more: probable alcohol abuse.*

## 2025-05-05 ENCOUNTER — RESULTS FOLLOW-UP (OUTPATIENT)
Dept: FAMILY MEDICINE CLINIC | Age: 70
End: 2025-05-05

## 2025-05-05 ENCOUNTER — HOSPITAL ENCOUNTER (OUTPATIENT)
Dept: CARDIAC REHAB | Age: 70
Setting detail: THERAPIES SERIES
End: 2025-05-05
Payer: COMMERCIAL

## 2025-05-05 ENCOUNTER — TELEPHONE (OUTPATIENT)
Dept: FAMILY MEDICINE CLINIC | Age: 70
End: 2025-05-05

## 2025-05-05 ASSESSMENT — ENCOUNTER SYMPTOMS
SHORTNESS OF BREATH: 0
ABDOMINAL PAIN: 0

## 2025-05-05 NOTE — ED PROVIDER NOTES
[facility]  Emergency Department Encounter  Emergency Medicine Physician     Pt Name: Earnest Avila  MRN: [id number]  Birthdate 1955  Date of evaluation: 5/4/25  PCP:  Theresa Garrison MD    CHIEF COMPLAINT       Chief Complaint   Patient presents with    Bleeding/Bruising       HISTORY OF PRESENT ILLNESS  (Location/Symptom, Timing/Onset, Context/Setting, Quality, Duration, Modifying Factors, Severity.)    Earnest Avila is a 69 y.o. male who presents with bleeding lesion on his tongue.  Patient states that he had this happen spontaneously today.  States that he was leaning over and he noticed some blood coming from his mouth.  States he did not bite his tongue.  States that he noticed the lesion on his tongue a few days ago which he describes as \"a large vein\".  States he has not had any dysphagia.  No pain within his mouth or within his tongue.  States it has not bled before.  He had not noticed it before.  Denies any fevers or chills recently.  Denies any nausea or vomiting.  States that he is on Eliquis and Plavix status post CABG.        PAST MEDICAL / SURGICAL / SOCIAL / FAMILY HISTORY    has a past medical history of Allergic rhinitis, Angina pectoris, Anxiety, Borderline hypertension, CAD (coronary artery disease), Controlled diabetes mellitus type II without complication ([facility]), Debility, Dental abscess, Depression, Essential hypertension, History of colon polyps, History of suprapubic catheter, Intermittent self-catheterization of bladder, Kidney stones, Neuropathy, Obesity, Obstructive sleep apnea of adult, Osteoarthritis, Prostate enlargement, Restless legs syndrome, Stomach ulcer, Tubular adenoma of colon, Type II or unspecified type diabetes mellitus without mention of complication, not stated as uncontrolled, UTI (urinary tract infection), Wears glasses, and Weight loss counseling, encounter for.     has a past surgical history that includes Cholecystectomy (1992);

## 2025-05-05 NOTE — TELEPHONE ENCOUNTER
[facility] ED Follow up Call    Reason for ED visit:  TONGUE BLEEDING         Hi Earnest , this is BEULAH from Theresa Gibson's office, just calling to see how you are doing after your recent ED visit.    Did you receive discharge instructions?  Yes  Do you understand the discharge instructions? Yes  Did the ED give you any new prescriptions? No: NONE GIVEN  Were you able to fill your prescriptions? No: NONE GIVEN      Do you have one of our red, yellow and green  Zone sheets that help you to determine when you should go to the ED?No      Do you need or want to make a follow up appt with your PCP?No/DECLINE ANY FOLLOW UP    Do you have any further needs in the home i.e. Equipment?  No        FU appts/Provider:    Future Appointments   Date Time Provider Department Center   5/6/2025  3:30 PM Chu Alvarado MD URG CANCER [facility]   5/7/2025  8:00 AM STC CARD REHAB RM 01 [facility] [place]   5/9/2025  8:00 AM STC CARD REHAB RM 01 [facility]   5/12/2025  8:00 AM STC CARD REHAB RM 01 [facility]   5/14/2025  8:00 AM STC CARD REHAB RM 01 [facility]   5/16/2025  8:00 AM STC CARD REHAB RM 01 [facility]   5/19/2025  8:00 AM STC CARD REHAB RM 01 [facility]   5/21/2025  8:00 AM STC CARD REHAB RM 01 [facility]   5/23/2025  8:00 AM STC CARD REHAB RM 01 [facility]   5/28/2025  8:00 AM STC CARD REHAB RM 01 [facility]   5/28/2025  2:30 PM Adriana Pandya MD [facility]IO [facility]   5/30/2025  8:00 AM STC CARD REHAB RM 01 [facility]   6/2/2025  8:00 AM STC CARD REHAB RM 01 [facility]   6/4/2025  8:00 AM STC CARD REHAB RM 01 [facility]   6/6/2025  8:00 AM STC CARD REHAB RM 01 [facility]   6/9/2025  8:00 AM STC CARD REHAB RM 01 [facility]   6/11/2025  8:00 AM STC CARD REHAB RM 01 [facility]   6/13/2025  8:00 AM STC CARD REHAB RM 01 [facility]

## 2025-05-05 NOTE — DISCHARGE INSTRUCTIONS
The stitch that was placed will dissolve on its own in the next several days  If the bleeding returns, hold pressure for 5 to 10 minutes.  If that does not work, return to the emergency department

## 2025-05-06 ENCOUNTER — INITIAL CONSULT (OUTPATIENT)
Age: 70
End: 2025-05-06
Payer: COMMERCIAL

## 2025-05-06 ENCOUNTER — TELEPHONE (OUTPATIENT)
Dept: FAMILY MEDICINE CLINIC | Age: 70
End: 2025-05-06

## 2025-05-06 VITALS
HEIGHT: 77 IN | SYSTOLIC BLOOD PRESSURE: 133 MMHG | OXYGEN SATURATION: 97 % | DIASTOLIC BLOOD PRESSURE: 79 MMHG | HEART RATE: 65 BPM | BODY MASS INDEX: 30.46 KG/M2 | WEIGHT: 258 LBS

## 2025-05-06 DIAGNOSIS — D64.9 ANEMIA, UNSPECIFIED TYPE: Primary | ICD-10-CM

## 2025-05-06 PROCEDURE — 99244 OFF/OP CNSLTJ NEW/EST MOD 40: CPT | Performed by: INTERNAL MEDICINE

## 2025-05-06 PROCEDURE — G8427 DOCREV CUR MEDS BY ELIG CLIN: HCPCS | Performed by: INTERNAL MEDICINE

## 2025-05-06 PROCEDURE — G8417 CALC BMI ABV UP PARAM F/U: HCPCS | Performed by: INTERNAL MEDICINE

## 2025-05-06 NOTE — PROGRESS NOTES
Patient ID: Earnest Avila, 1955, [id number], 69 y.o.  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 male with recent CABG on 2/21/2025 was seen during initial consultation visit for mild anemia noted post CABG and also positive hemochromatosis gene mutation.    Patient had a normal hemoglobin prior to his CABG surgery.  Post CABG in February his hemoglobin dropped to 9.4 and around 10.  Now his most recent hemoglobin is improved to 14.    His lab work in February did show iron of 253, iron saturation 81% and ferritin of 625.  He had a repeat lab work on 4/3/2025 which showed ferritin of 137, iron 86, iron saturation 26%, however he was noted to have positive H63D hemochromatosis gene mutation.    He denied any family history of hemochromatosis.  He has a history of Gilbert's syndrome.  Past Medical History:   Diagnosis Date    Allergic rhinitis     Angina pectoris 02/14/2025    Anxiety     Borderline hypertension     CAD (coronary artery disease)     Controlled diabetes mellitus type II without complication (HCC)     Debility 02/25/2025    Dental abscess     Depression     On medas    Essential hypertension 02/12/2021    History of colon polyps     History of suprapubic catheter 07/30/2018    Intermittent self-catheterization of bladder 06/17/2016    3/8/17 does not do this any more- was getting UTI's    Kidney stones     Neuropathy     Obesity     HISTORY OF OBESITY, AS OF 11/06/2017 PATIENT HAS LOST 80 LBS    Obstructive sleep apnea of adult 2005    on CPAP    Osteoarthritis     RIGHT SHOULDER, BACK    Prostate enlargement 2014    Self cath since Jan. 16 2016    Restless legs syndrome     Stomach ulcer 02/14/2017    Tubular adenoma of colon 06/2016    Type II or unspecified type diabetes mellitus without mention of complication,

## 2025-05-06 NOTE — TELEPHONE ENCOUNTER
[facility] ED Follow up Call    Reason for ED visit:  TONGUE BLEEDING            Hi Earnest , this is BEULAH from Theresa Gibson's office, just calling to see how you are doing after your recent ED visit.     Did you receive discharge instructions?  Yes  Do you understand the discharge instructions? Yes  Did the ED give you any new prescriptions? No: NONE GIVEN  Were you able to fill your prescriptions? No: NONE GIVEN        Do you have one of our red, yellow and green  Zone sheets that help you to determine when you should go to the ED?No        Do you need or want to make a follow up appt with your PCP?No/DECLINE ANY FOLLOW UP     Do you have any further needs in the home i.e. Equipment?  No           FU appts/Provider:           Future Appointments   Date Time Provider Department Center   5/6/2025  3:30 PM Chu Alvarado MD [facility]RG CAN[facility]ER [facility]   5/7/2025  8:00 AM STC CARD REHAB RM 01 [facility] [place]   5/9/2025  8:00 AM STC CARD REHAB RM 01 ST[facility] CARDI[facility]   5/12/2025  8:00 AM STC CARD REHAB RM 01 [facility]   5/14/2025  8:00 AM STC CARD REHAB RM 01 [facility]   5/16/2025  8:00 AM STC CARD REHAB RM 01 [facility]   5/19/2025  8:00 AM STC CARD REHAB RM 01 [facility]   5/21/2025  8:00 AM STC CARD REHAB RM 01 [facility]   5/23/2025  8:00 AM STC CARD REHAB RM 01 [facility]   5/28/2025  8:00 AM STC CARD REHAB RM 01 [facility]   5/28/2025  2:30 PM Adriana Pandya MD [facility]   5/30/2025  8:00 AM STC CARD REHAB RM 01 [facility]   6/2/2025  8:00 AM STC CARD REHAB RM 01 [facility]   6/4/2025  8:00 AM STC CARD REHAB RM 01 [facility]   6/6/2025  8:00 AM STC CARD REHAB RM 01 [facility]   6/9/2025  8:00 AM STC CARD REHAB RM 01 [facility] CARDIAC [facility]   6/11/2025  8:00 AM STC CARD REHAB RM 01 [facility] CARD[facility]   6/13/2025  8:00 AM STC CARD

## 2025-05-06 NOTE — RESULT ENCOUNTER NOTE
Addressed in ED     Future Appointments  5/6/2025   3:30 PM    Chu Alvarado MD        [facility]        [facility]  5/7/2025   8:00 AM    STC CARD REHAB RM 01       [facility] C[facility]        [facility] [place]  5/9/2025   8:00 AM    STC CARD REHAB RM 01       [facility] CARDI[facility]        [facility]  5/12/2025  8:00 AM    STC CARD REHAB RM 01       [facility] CARDIAC        [facility] [place]  5/14/2025  8:00 AM    STC CARD REHAB RM 01       [facility] CARDIAC        [facility]  5/16/2025  8:00 AM    STC CARD REHAB RM 01       [facility] CARDIAC        [facility]  5/19/2025  8:00 AM    STC CARD REHAB RM 01       [facility] CARDIAC        [facility]  5/21/2025  8:00 AM    STC CARD REHAB RM 01       [facility] CARDI[facility]        [facility]  5/23/2025  8:00 AM    STC CARD REHAB RM 01       [facility] CARDIAC        [facility]  5/28/2025  8:00 AM    STC CARD REHAB RM 01       [facility] CARDIAC        [facility]  5/28/2025  2:30 PM    Adriana Pandya MD       [facility]        [facility]  5/30/2025  8:00 AM    STC CARD REHAB RM 01       [facility] CARDI[facility]        [facility]  6/2/2025   8:00 AM    STC CARD REHAB RM 01       [facility] CARDIAC        [facility]  6/4/2025   8:00 AM    STC CARD REHAB RM 01       [facility] CARDIAC        [facility]  6/6/2025   8:00 AM    STC CARD REHAB RM 01       [facility] CARDIAC        [facility]  6/9/2025   8:00 AM    STC CARD REHAB RM 01       [facility] CARDIAC        [facility]  6/11/2025  8:00 AM    STC CARD REHAB RM 01       [facility] CARDIAC        [facility]  6/13/2025  8:00 AM    STC CARD REHAB RM 01       [facility] CARDIAC        [facility]  6/16/2025  8:00 AM    STC CARD REHAB RM 01       [facility] CARDIAC        [facility]  6/18/2025  8:00 AM    STC CARD REHAB RM 01       [facility] CARDIAC        [facility]  6/20/2025  8:00 AM    STC CARD REHAB RM 01       [facility] CARDIAC        [facility]  7/17/2025  8:00 AM    Theresa Garrison MD    [facility] ECC DEP

## 2025-05-07 ENCOUNTER — TELEPHONE (OUTPATIENT)
Dept: FAMILY MEDICINE CLINIC | Age: 70
End: 2025-05-07

## 2025-05-07 ENCOUNTER — HOSPITAL ENCOUNTER (OUTPATIENT)
Dept: CARDIAC REHAB | Age: 70
Setting detail: THERAPIES SERIES
Discharge: HOME | End: 2025-05-07
Payer: COMMERCIAL

## 2025-05-07 ENCOUNTER — HOSPITAL ENCOUNTER (OUTPATIENT)
Age: 70
Discharge: HOME | End: 2025-05-07
Payer: COMMERCIAL

## 2025-05-07 ENCOUNTER — RESULTS FOLLOW-UP (OUTPATIENT)
Dept: FAMILY MEDICINE CLINIC | Age: 70
End: 2025-05-07

## 2025-05-07 VITALS — WEIGHT: 250 LBS | BODY MASS INDEX: 29.65 KG/M2

## 2025-05-07 DIAGNOSIS — D64.9 ANEMIA, UNSPECIFIED TYPE: ICD-10-CM

## 2025-05-07 LAB
FERRITIN SERPL-MCNC: 56 NG/ML
IRON SATN MFR SERPL: 22 % (ref 20–55)
IRON SERPL-MCNC: 74 UG/DL (ref 61–157)
TIBC SERPL-MCNC: 334 UG/DL (ref 250–450)
UNSATURATED IRON BINDING CAPACITY: 260 UG/DL (ref 112–347)

## 2025-05-07 PROCEDURE — 82728 ASSAY OF FERRITIN: CPT

## 2025-05-07 PROCEDURE — 83540 ASSAY OF IRON: CPT

## 2025-05-07 PROCEDURE — 83550 IRON BINDING TEST: CPT

## 2025-05-07 PROCEDURE — 93798 PHYS/QHP OP CAR RHAB W/ECG: CPT

## 2025-05-07 PROCEDURE — 36415 COLL VENOUS BLD VENIPUNCTURE: CPT

## 2025-05-07 ASSESSMENT — EXERCISE STRESS TEST
PEAK_HR: 88
PEAK_BP: 136/74
PEAK_RPE: 13
PEAK_METS: 8

## 2025-05-07 NOTE — TELEPHONE ENCOUNTER
[facility] ED Follow up Call    Reason for ED visit:    [facility] ED Follow up Call    Reason for ED visit:  TONGUE BLEEDING            Hi Earnest , this is BEULAH from Theresa Gibson's office, just calling to see how you are doing after your recent ED visit.     Did you receive discharge instructions?  Yes  Do you understand the discharge instructions? Yes  Did the ED give you any new prescriptions? No: NONE GIVEN  Were you able to fill your prescriptions? No: NONE GIVEN        Do you have one of our red, yellow and green  Zone sheets that help you to determine when you should go to the ED?No        Do you need or want to make a follow up appt with your PCP?No/DECLINE ANY FOLLOW UP     Do you have any further needs in the home i.e. Equipment?  No           FU appts/Provider:                Future Appointments   Date Time Provider Department Center   5/6/2025  3:30 PM Chu Alvarado MD URG [facility]ER [facility]   5/7/2025  8:00 AM STC CARD REHAB RM 01 [facility]   5/9/2025  8:00 AM STC CARD REHAB RM 01 [facility]   5/12/2025  8:00 AM STC CARD REHAB RM 01 [facility]   5/14/2025  8:00 AM STC CARD REHAB RM 01 [facility]   5/16/2025  8:00 AM STC CARD REHAB RM 01 [facility]   5/19/2025  8:00 AM STC CARD REHAB RM 01 [facility]   5/21/2025  8:00 AM STC CARD REHAB RM 01 [facility]   5/23/2025  8:00 AM STC CARD REHAB RM 01 [facility]   5/28/2025  8:00 AM STC CARD REHAB RM 01 [facility]   5/28/2025  2:30 PM Adriana Pandya MD [facility]   5/30/2025  8:00 AM STC CARD REHAB RM 01 [facility]   6/2/2025  8:00 AM STC CARD REHAB RM 01 [facility]   6/4/2025  8:00 AM STC CARD REHAB RM 01 [facility]   6/6/2025  8:00 AM STC CARD REHAB RM 01 [facility]   6/9/2025  8:00 AM STC CARD REHAB RM 01 [facility]   6/11/2025  8:00 AM STC CARD

## 2025-05-07 NOTE — RESULT ENCOUNTER NOTE
Management per hematologist oncologist, normal iron, already addressed    Future Appointments  5/9/2025   8:00 AM    STC CARD REHAB RM 01       [facility] CARDIAC        [facility]  5/12/2025  8:00 AM    STC CARD REHAB RM 01       [facility] CARDIAC        [facility]  5/14/2025  8:00 AM    STC CARD REHAB RM 01       [facility] CARDIAC        [facility]  5/16/2025  8:00 AM    STC CARD REHAB RM 01       [facility] CARDIAC        [facility]  5/19/2025  8:00 AM    STC CARD REHAB RM 01       [facility] CARDIAC        [facility]  5/21/2025  8:00 AM    STC CARD REHAB RM 01       [facility] CARDIAC        [facility]  5/23/2025  8:00 AM    STC CARD REHAB RM 01       [facility] CARDIAC        [facility]  5/28/2025  8:00 AM    STC CARD REHAB RM 01       [facility] CARDIAC        [facility]  5/28/2025  2:30 PM    Ardiana Pandya MD       [facility]        [facility]  5/30/2025  8:00 AM    STC CARD REHAB RM 01       [facility] CARDIAC        [facility]  6/2/2025   8:00 AM    STC CARD REHAB RM 01       [facility] CARDIAC        [facility]  6/4/2025   8:00 AM    STC CARD REHAB RM 01       [facility] CARDIAC        [facility]  6/6/2025   8:00 AM    STC CARD REHAB RM 01       [facility] CARDIAC        [facility]  6/9/2025   8:00 AM    STC CARD REHAB RM 01       [facility] CARDIAC        [facility]  6/11/2025  8:00 AM    STC CARD REHAB RM 01       [facility] CARDIAC        [facility]  6/13/2025  8:00 AM    STC CARD REHAB RM 01       [facility] CARDIAC        [facility]  6/16/2025  8:00 AM    STC CARD REHAB RM 01       [facility] CARDIAC        [facility]  6/18/2025  8:00 AM    STC CARD REHAB RM 01       [facility] CARDIAC        [facility]  6/20/2025  8:00 AM    STC CARD REHAB RM 01       [facility] CARDIAC        [facility]  7/17/2025  8:00 AM    Theresa Garrison MD    [facility]               BS ECC DEP  8/5/2025   11:00 AM   Chu Alvarado MD        [facility]        [facility]

## 2025-05-09 ENCOUNTER — HOSPITAL ENCOUNTER (OUTPATIENT)
Dept: CARDIAC REHAB | Age: 70
Setting detail: THERAPIES SERIES
Discharge: HOME | End: 2025-05-09
Payer: COMMERCIAL

## 2025-05-09 VITALS — BODY MASS INDEX: 29.65 KG/M2 | WEIGHT: 250 LBS

## 2025-05-09 PROCEDURE — 93798 PHYS/QHP OP CAR RHAB W/ECG: CPT

## 2025-05-09 ASSESSMENT — EXERCISE STRESS TEST
PEAK_HR: 72
PEAK_RPE: 15
PEAK_BP: 114/70
PEAK_METS: 10

## 2025-05-12 ENCOUNTER — HOSPITAL ENCOUNTER (OUTPATIENT)
Dept: CARDIAC REHAB | Age: 70
Setting detail: THERAPIES SERIES
Discharge: HOME | End: 2025-05-12
Payer: COMMERCIAL

## 2025-05-12 VITALS — WEIGHT: 250.2 LBS | BODY MASS INDEX: 29.67 KG/M2

## 2025-05-12 PROCEDURE — 93798 PHYS/QHP OP CAR RHAB W/ECG: CPT

## 2025-05-12 ASSESSMENT — EXERCISE STRESS TEST
PEAK_RPE: 15
PEAK_METS: 7.5
PEAK_HR: 90
PEAK_BP: 128/66

## 2025-05-14 ENCOUNTER — HOSPITAL ENCOUNTER (OUTPATIENT)
Dept: CARDIAC REHAB | Age: 70
Setting detail: THERAPIES SERIES
Discharge: HOME | End: 2025-05-14
Payer: COMMERCIAL

## 2025-05-14 VITALS — WEIGHT: 249 LBS | BODY MASS INDEX: 29.53 KG/M2

## 2025-05-14 PROCEDURE — 93798 PHYS/QHP OP CAR RHAB W/ECG: CPT

## 2025-05-14 ASSESSMENT — EXERCISE STRESS TEST
PEAK_RPE: 12
PEAK_METS: 7
PEAK_BP: 120/64
PEAK_HR: 85

## 2025-05-16 ENCOUNTER — HOSPITAL ENCOUNTER (OUTPATIENT)
Dept: CARDIAC REHAB | Age: 70
Setting detail: THERAPIES SERIES
Discharge: HOME | End: 2025-05-16
Payer: COMMERCIAL

## 2025-05-16 VITALS — WEIGHT: 248.6 LBS | BODY MASS INDEX: 29.48 KG/M2

## 2025-05-16 PROCEDURE — 93798 PHYS/QHP OP CAR RHAB W/ECG: CPT

## 2025-05-16 ASSESSMENT — EXERCISE STRESS TEST
PEAK_BP: 124/80
PEAK_HR: 80
PEAK_RPE: 11
PEAK_METS: 3.6

## 2025-05-19 ENCOUNTER — HOSPITAL ENCOUNTER (OUTPATIENT)
Dept: CARDIAC REHAB | Age: 70
Setting detail: THERAPIES SERIES
Discharge: HOME | End: 2025-05-19
Payer: COMMERCIAL

## 2025-05-19 VITALS — BODY MASS INDEX: 29.5 KG/M2 | WEIGHT: 248.8 LBS

## 2025-05-19 DIAGNOSIS — E80.4 GILBERT'S DISEASE: Primary | ICD-10-CM

## 2025-05-19 PROCEDURE — 93798 PHYS/QHP OP CAR RHAB W/ECG: CPT

## 2025-05-19 RX ORDER — ONDANSETRON 4 MG/1
4 TABLET, ORALLY DISINTEGRATING ORAL EVERY 8 HOURS PRN
Qty: 30 TABLET | Refills: 0 | Status: SHIPPED | OUTPATIENT
Start: 2025-05-19

## 2025-05-19 ASSESSMENT — EXERCISE STRESS TEST
PEAK_HR: 94
PEAK_RPE: 13
PEAK_BP: 108/66
PEAK_METS: 10

## 2025-05-19 NOTE — TELEPHONE ENCOUNTER
Please Approve or Refuse.  Send to Pharmacy per Pt's Request:      Next Visit Date:  7/17/2025   Last Visit Date: 4/9/2025    Hemoglobin A1C (%)   Date Value   03/20/2025 5.9   09/03/2024 6.1   05/20/2024 5.8             ( goal A1C is < 7)   BP Readings from Last 3 Encounters:   05/06/25 133/79   05/04/25 (!) 168/95   04/09/25 124/84          (goal 120/80)  BUN   Date Value Ref Range Status   05/04/2025 16 8 - 23 mg/dL Final     Creatinine   Date Value Ref Range Status   05/04/2025 1.2 0.7 - 1.2 mg/dL Final     Potassium   Date Value Ref Range Status   05/04/2025 4.0 3.7 - 5.3 mmol/L Final

## 2025-05-20 ENCOUNTER — CARE COORDINATION (OUTPATIENT)
Dept: CARE COORDINATION | Age: 70
End: 2025-05-20

## 2025-05-20 NOTE — CARE COORDINATION
Nutrition Care Coordinator Follow-Up visit:    Food Recall:eating 2-3 meals/d    Activity Level:  Sedentary:X  Lightly Active:  Moderately Active:  Very Active:    Adult BMI:  Underweight (below 18.5)  Normal Weight (18.5-24.9)  Overweight (25-29.9)X  Obese (30-39.9)  Morbidly Obese (>40)    Plan:  Plan was established with patient:  Increase dietary fiber by consuming whole grains, fruits and vegetables:X  Limit dietary cholesterol to >200mg/day:  Increase water intake:  Avoid added sugar:X  Avoid sweetened beverages:X  Choose lean meats:      Monitoring:  Will monitor weight:  Will monitor adherence to meal plan:X  Will monitor adherence to exercise plan:  Will monitor HGA1c:    Handouts Provided :  Low Carb snacking:  Carb counting /individual meal plan:  Portion Control:  Food Labels:  Physical Activity:  Low Fat/Cholesterol:  Hypo/Hyperglycemia:  Calorie Controlled Meal Plan:    Goals:  Increase water consumption to 8oz. 6-8 times daily:  Manage blood sugars by consuming 3 meals spaced every 4-5 hours with 2-3 snacks daily:reviewed  Increase fiber and decrease fat intake by consuming 1-2 fruit servings and 2-3 vegetable servings per day.  Increase physical activity by:  Consume less than 2,000mg of sodium/day  Avoid consumption of sweetened beverages and added sugar by reading food labels:reviewed  Monitor blood sugars by using meter to check blood glucose before morning meal and 2 hours after a meal daily:patient states sugars around 150  Decrease risk of coronary heart disease by consuming fish that contains omega-3 fatty acids at least twice a week, avoiding partially hydrogenated oil/trans fats and limiting saturated fat intake by reading food labels:    Patient goals set:  1.Patient states eating 2-3 meal/day, skips breakfast occasionally. Encouraged set meal times daily and consistency of meal times and explained how this can effect sugars. Discussed healthy breakfast options- having some carb and

## 2025-05-21 ENCOUNTER — HOSPITAL ENCOUNTER (OUTPATIENT)
Dept: CARDIAC REHAB | Age: 70
Setting detail: THERAPIES SERIES
Discharge: HOME | End: 2025-05-21
Payer: COMMERCIAL

## 2025-05-21 VITALS — WEIGHT: 248.4 LBS | BODY MASS INDEX: 29.46 KG/M2

## 2025-05-21 PROCEDURE — 93798 PHYS/QHP OP CAR RHAB W/ECG: CPT

## 2025-05-21 ASSESSMENT — EXERCISE STRESS TEST
PEAK_METS: 10
PEAK_RPE: 14
PEAK_HR: 114
PEAK_BP: 136/68

## 2025-05-23 ENCOUNTER — HOSPITAL ENCOUNTER (OUTPATIENT)
Dept: CARDIAC REHAB | Age: 70
Setting detail: THERAPIES SERIES
Discharge: HOME | End: 2025-05-23
Payer: COMMERCIAL

## 2025-05-23 VITALS — WEIGHT: 244.8 LBS | BODY MASS INDEX: 29.03 KG/M2

## 2025-05-23 PROCEDURE — 93798 PHYS/QHP OP CAR RHAB W/ECG: CPT

## 2025-05-23 ASSESSMENT — EXERCISE STRESS TEST
PEAK_METS: 10
PEAK_HR: 96
PEAK_RPE: 15
PEAK_BP: 122/68

## 2025-05-28 ENCOUNTER — TELEPHONE (OUTPATIENT)
Dept: UROLOGY | Age: 70
End: 2025-05-28

## 2025-05-28 ENCOUNTER — OFFICE VISIT (OUTPATIENT)
Age: 70
End: 2025-05-28
Payer: COMMERCIAL

## 2025-05-28 ENCOUNTER — RESULTS FOLLOW-UP (OUTPATIENT)
Dept: FAMILY MEDICINE CLINIC | Age: 70
End: 2025-05-28

## 2025-05-28 ENCOUNTER — HOSPITAL ENCOUNTER (OUTPATIENT)
Dept: CARDIAC REHAB | Age: 70
Setting detail: THERAPIES SERIES
Discharge: HOME | End: 2025-05-28
Payer: COMMERCIAL

## 2025-05-28 VITALS
OXYGEN SATURATION: 97 % | HEART RATE: 67 BPM | HEIGHT: 76 IN | WEIGHT: 256 LBS | BODY MASS INDEX: 31.17 KG/M2 | SYSTOLIC BLOOD PRESSURE: 142 MMHG | DIASTOLIC BLOOD PRESSURE: 84 MMHG

## 2025-05-28 VITALS — WEIGHT: 249 LBS | BODY MASS INDEX: 29.53 KG/M2

## 2025-05-28 DIAGNOSIS — E11.40 TYPE 2 DIABETES, CONTROLLED, WITH NEUROPATHY (HCC): ICD-10-CM

## 2025-05-28 DIAGNOSIS — I25.10 CAD, MULTIPLE VESSEL: ICD-10-CM

## 2025-05-28 DIAGNOSIS — Z95.1 S/P CABG (CORONARY ARTERY BYPASS GRAFT): ICD-10-CM

## 2025-05-28 DIAGNOSIS — I10 ESSENTIAL HYPERTENSION: Primary | ICD-10-CM

## 2025-05-28 PROCEDURE — 3017F COLORECTAL CA SCREEN DOC REV: CPT | Performed by: INTERNAL MEDICINE

## 2025-05-28 PROCEDURE — G8417 CALC BMI ABV UP PARAM F/U: HCPCS | Performed by: INTERNAL MEDICINE

## 2025-05-28 PROCEDURE — 3044F HG A1C LEVEL LT 7.0%: CPT | Performed by: INTERNAL MEDICINE

## 2025-05-28 PROCEDURE — 93798 PHYS/QHP OP CAR RHAB W/ECG: CPT

## 2025-05-28 PROCEDURE — 2022F DILAT RTA XM EVC RTNOPTHY: CPT | Performed by: INTERNAL MEDICINE

## 2025-05-28 PROCEDURE — 99215 OFFICE O/P EST HI 40 MIN: CPT | Performed by: INTERNAL MEDICINE

## 2025-05-28 PROCEDURE — 3077F SYST BP >= 140 MM HG: CPT | Performed by: INTERNAL MEDICINE

## 2025-05-28 PROCEDURE — 93000 ELECTROCARDIOGRAM COMPLETE: CPT | Performed by: INTERNAL MEDICINE

## 2025-05-28 PROCEDURE — G8427 DOCREV CUR MEDS BY ELIG CLIN: HCPCS | Performed by: INTERNAL MEDICINE

## 2025-05-28 PROCEDURE — 1123F ACP DISCUSS/DSCN MKR DOCD: CPT | Performed by: INTERNAL MEDICINE

## 2025-05-28 PROCEDURE — 3079F DIAST BP 80-89 MM HG: CPT | Performed by: INTERNAL MEDICINE

## 2025-05-28 PROCEDURE — 1036F TOBACCO NON-USER: CPT | Performed by: INTERNAL MEDICINE

## 2025-05-28 RX ORDER — CLOPIDOGREL BISULFATE 75 MG/1
75 TABLET ORAL DAILY
Qty: 90 TABLET | Refills: 3 | Status: SHIPPED | OUTPATIENT
Start: 2025-05-28

## 2025-05-28 RX ORDER — CARVEDILOL 6.25 MG/1
6.25 TABLET ORAL 2 TIMES DAILY
Qty: 180 TABLET | Refills: 3 | Status: SHIPPED | OUTPATIENT
Start: 2025-05-28

## 2025-05-28 ASSESSMENT — EXERCISE STRESS TEST
PEAK_RPE: 14
PEAK_BP: 122/70
PEAK_METS: 8
PEAK_HR: 86

## 2025-05-28 NOTE — TELEPHONE ENCOUNTER
Patient call and stated that he will need doctor to place a new order to change quantity of his cath changes due to frequency. Patient will also need some 22f cath to last him till mid July please. Patient needs order sent to [facility] cath supply.

## 2025-05-28 NOTE — RESULT ENCOUNTER NOTE
Management per cardiology, seen by cardiology today    Future Appointments  5/30/2025  8:00 AM    [facility]ARD REHAB RM 01       [facility]        [place]  6/2/2025   8:00 AM    [facility] CARD REHAB RM 01       [facility]        [place]  6/4/2025   8:00 AM    ST[facility] CARD REHAB RM 01       [facility]ARDI[facility]        [place]  6/6/2025   8:00 AM    [facility] CARD REHAB RM 01       [facility]        [place]  6/9/2025   8:00 AM    [facility] CARD REHAB RM 01       [facility]        [place]  6/11/2025  8:00 AM    [facility] CARD REHAB RM 01       [facility]        [place]  6/13/2025  8:00 AM    [facility] CARD REHAB RM 01       [facility]        [place]  6/16/2025  8:00 AM    ST[facility] CARD REHAB RM 01       [facility]        [place]  6/18/2025  8:00 AM    ST[facility] CARD REHAB RM 01       [facility]        [place]  6/20/2025  8:00 AM    ST[facility] CARD REHAB RM 01       [facility] CARD[facility]        [place]  7/17/2025  8:00 AM    Theresa Garrison MD    [facility]               [facility] DEP  8/5/2025   11:00 AM   Chu Alvarado MD        [facility]        [facility]  8/26/2025  3:50 PM    Adriana Pandya MD       [facility]        [facility]

## 2025-05-28 NOTE — PROGRESS NOTES
joint complaints.  Integumentary: No rash or pruritis.  Neurological: No headache, diplopia, change in muscle strength, numbness or tingling  Psychiatric: No new anxiety or depression.  Endocrine: No temperature intolerance. No excessive thirst, fluid intake, or urination. No tremor.  Hematologic/Lymphatic: No abnormal bruising or bleeding, blood clots or swollen lymph nodes.  Allergic/Immunologic: No nasal congestion or hives.    Medications:  Current Outpatient Medications   Medication Sig Dispense Refill    carvedilol (COREG) 6.25 MG tablet Take 1 tablet by mouth 2 times daily 180 tablet 3    clopidogrel (PLAVIX) 75 MG tablet Take 1 tablet by mouth daily 90 tablet 3    ondansetron (ZOFRAN-ODT) 4 MG disintegrating tablet Take 1 tablet by mouth every 8 hours as needed for Nausea or Vomiting 30 tablet 0    ONETOUCH ULTRA strip 1 each 2 times daily      Multiple Vitamins-Minerals (CENTRUM SILVER PO) Take by mouth  mv-min/folic/K1/lycopen/lutein (CENTRUM SILVER MEN ORAL) Take 1 tablet by mouth in the morning. Active      empagliflozin (JARDIANCE) 10 MG tablet Take 1 tablet by mouth daily 30 tablet 5    polyethylene glycol (MIRALAX) 17 GM/SCOOP powder Take 17 g by mouth daily      Blood Glucose Monitoring Suppl (Education [facility] BLOOD [facility]SE KIT) KIT 1 Box by Does not apply route in the morning, at noon, and at bedtime 1 kit 3    atorvastatin (LIPITOR) 20 MG tablet Take 1 tablet by mouth nightly 30 tablet 3    pantoprazole (PROTONIX) 40 MG tablet Take 1 tablet by mouth daily 30 tablet 3    oxymetazoline (AFRIN) 0.05 % nasal spray 2 sprays by Nasal route 2 times daily as needed for Congestion (max 7 days at a time)      FLUoxetine (PROZAC) 20 MG capsule TAKE ONE CAPSULE BY MOUTH EVERY MORNING 90 capsule 3    chlorzoxazone (PARAFON FORTE) 500 MG tablet Take 1 tablet by mouth 2 times daily      aspirin (ASPIRIN LOW DOSE) 81 MG chewable tablet CHEW 1 TABLET BY MOUTH DAILY 90 tablet 3    Misc. Devices MISC Apply 1 sterile drape

## 2025-05-28 NOTE — TELEPHONE ENCOUNTER
Patient gets cath changes every 3 weeks with 22F. LVM to clarify needing catheters until July.   Writer called Michelle and requested catheter increase.

## 2025-05-28 NOTE — PROGRESS NOTES
05/28/25 0737   Treatment Diagnosis   Treatment Diagnosis 1 CABG   CABG Date 02/21/25   Referral Date 02/25/25   Significant Cardiovascular History   (AFIB, HTN)   Co-morbidities Diabetes  (BRIDGETTE, DM TYPE II, NEUROPATHY, DDD, ANXIETY, DEPRESSION, OSTEOARTHRITIS)   Individual Treatment Plan   ITP Visit Type Re-assessment   Visit #/Total Visits 23/36   EF% 50 %  (50-55% ECHO 2/17/2025)   Risk Stratification Low   Supplemental Oxygen   Oxygen Use No  (96% ON ROOM AIR)   Exercise Intervention/Prescription   Mode Treadmill;Bike;Stepper;Ergometer;Resistance training   Frequency per week 3   Duration Per Session 31-49 MIN.   Intensity METS       8.0   Target RPE 11-15   Progression PT AT MAXIMUM DURATION OF 49 MINUTES, INTENSITY AS RPE ALLOWS   Symptoms with Exercise Denies   Target Heart Rate 106 - 128   Resistance Training Yes   RPE 14   Exercise Activity at Home   Type LEG EXERCISES ONLY  (PT STATES WITH HIS LEGS, IT'S DIFFICULT TO EXERCISE(WALKING). PT HAS RIDEN HIS OUTDOOR BIKE ONCE SINCE BEGINNING OF MONTH)   Frequency DAILY   Duration 10 MINUTES   Resistance Training Yes   Exercise Education   Education Attended class;Deep breathing techniques;Dyspnea management;Equipment orientation;Exercise safety;Physically active;Pulse oximetry;Signs/symptoms to report   Exercise Goals   Patient Target Goals Individual exercise RX;Maintain exercise and activity at a moderate intensity;Improve endurance   Patient Treatment Goal PT WOULD LIKE TO RIDE HIS BIKE TWICE WEEKLY   Goal Status In progress   Progress Towards Goal PT STARTED RIDING HIS OUTDOOR BIKE WHEN WEATHER PERMITS. PT ADMITTED HE HAS RIDDEN IT ONLY ONCE IN THE PAST MONTH. IMPORTANCE OF HAVING REGULAR HOME EXERCISE PROGRAM ESTABLISHED, PT VERBALIZED UNDERSTANDING.   Psychosocial Assessment   Stages of Change Maintenance   Psychosocial Intervention   Interventions Currently under treatment for depression   Stress Management Techniques Connects with others;Manages social

## 2025-05-30 ENCOUNTER — HOSPITAL ENCOUNTER (OUTPATIENT)
Dept: CARDIAC REHAB | Age: 70
Setting detail: THERAPIES SERIES
Discharge: HOME | End: 2025-05-30
Payer: COMMERCIAL

## 2025-05-30 VITALS — BODY MASS INDEX: 30.07 KG/M2 | WEIGHT: 247 LBS

## 2025-05-30 PROCEDURE — 93798 PHYS/QHP OP CAR RHAB W/ECG: CPT

## 2025-05-30 ASSESSMENT — EXERCISE STRESS TEST
PEAK_METS: 8
PEAK_BP: 136/60
PEAK_HR: 82
PEAK_RPE: 13

## 2025-06-01 ENCOUNTER — PATIENT MESSAGE (OUTPATIENT)
Age: 70
End: 2025-06-01

## 2025-06-02 ENCOUNTER — HOSPITAL ENCOUNTER (OUTPATIENT)
Dept: CARDIAC REHAB | Age: 70
Setting detail: THERAPIES SERIES
Discharge: HOME | End: 2025-06-02
Payer: COMMERCIAL

## 2025-06-02 VITALS — BODY MASS INDEX: 30.55 KG/M2 | WEIGHT: 251 LBS

## 2025-06-02 PROCEDURE — 93798 PHYS/QHP OP CAR RHAB W/ECG: CPT

## 2025-06-02 ASSESSMENT — EXERCISE STRESS TEST
PEAK_RPE: 12
PEAK_METS: 8
PEAK_HR: 82
PEAK_BP: 124/72

## 2025-06-02 NOTE — TELEPHONE ENCOUNTER
Patient state, he stops taking Coreg 6.25mg. due to side effects patient report low heart rate, fatigue & dizziness. Patient report same side effect when he has on Metoprolol XL 25 Mg.   Patient is asking if is any other medication that he can take. Please advise.

## 2025-06-04 ENCOUNTER — HOSPITAL ENCOUNTER (OUTPATIENT)
Dept: CARDIAC REHAB | Age: 70
Setting detail: THERAPIES SERIES
Discharge: HOME | End: 2025-06-04
Payer: COMMERCIAL

## 2025-06-04 VITALS — BODY MASS INDEX: 30.19 KG/M2 | WEIGHT: 248 LBS

## 2025-06-04 PROCEDURE — 93798 PHYS/QHP OP CAR RHAB W/ECG: CPT

## 2025-06-04 ASSESSMENT — EXERCISE STRESS TEST
PEAK_RPE: 11
PEAK_METS: 7
PEAK_BP: 126/72
PEAK_HR: 83

## 2025-06-06 ENCOUNTER — HOSPITAL ENCOUNTER (OUTPATIENT)
Dept: CARDIAC REHAB | Age: 70
Setting detail: THERAPIES SERIES
Discharge: HOME | End: 2025-06-06
Payer: COMMERCIAL

## 2025-06-06 VITALS — BODY MASS INDEX: 29.97 KG/M2 | WEIGHT: 246.2 LBS

## 2025-06-06 PROCEDURE — 93798 PHYS/QHP OP CAR RHAB W/ECG: CPT

## 2025-06-06 ASSESSMENT — EXERCISE STRESS TEST
PEAK_METS: 8
PEAK_RPE: 12
PEAK_HR: 85
PEAK_BP: 104/68

## 2025-06-09 ENCOUNTER — HOSPITAL ENCOUNTER (OUTPATIENT)
Dept: CARDIAC REHAB | Age: 70
Setting detail: THERAPIES SERIES
Discharge: HOME | End: 2025-06-09
Payer: COMMERCIAL

## 2025-06-09 VITALS — WEIGHT: 248 LBS | BODY MASS INDEX: 30.19 KG/M2

## 2025-06-09 PROCEDURE — 93798 PHYS/QHP OP CAR RHAB W/ECG: CPT

## 2025-06-09 ASSESSMENT — EXERCISE STRESS TEST
PEAK_BP: 132/80
PEAK_HR: 88
PEAK_RPE: 12
PEAK_METS: 7

## 2025-06-10 ENCOUNTER — CARE COORDINATION (OUTPATIENT)
Dept: CARE COORDINATION | Age: 70
End: 2025-06-10

## 2025-06-10 NOTE — CARE COORDINATION
Patient goals reviewed-  1.Patient states eating 2-3 meal/day, skips breakfast occasionally. Encouraged set meal times daily and consistency of meal times and explained how this can effect sugars. Discussed healthy breakfast options- having some carb and protein at meals. Encouraged to work on eating 3 meals/d spaced every 4-5 hours.   2. Reviewed what foods contain carbohydrate to limit and how to measure out portions. Encouraged patient to limit carbs 60gms/meal 15-30gms/snack. Discussed carb counting in detail- what a serving size is/ how to measure out servings. Reviewed low carb snacking. Discussed having a snack with 15gms of carb + a protein source, gave patient several examples. Will send a list of low carb snacks to patient.   3. Reviewed plate method, encouraged patient to increase intake of non-starchy vegetables and lean protein sources.  Goal is 1/2 of plate to be non-starchy vegetables, 1/4 lean protein, 1/4 carbs.  Discussed what a serving size is and how to measure out servings at meals/snacks.  4. Reviewed avoiding/limiting sweets and sweetened drinks. Patient drinks water, denies sugary drinks. Encouraged water and SF beverages. He states does not eats a lot of sweets. Reviewed some alternatives to sweets- fresh fruit- but limit serving, sugar free pudding or SF jello, yogurt, ect.Encouraged to keep sweets out of the house if possible and limit intake.  Spoke with patient who relays he is doing well. He states sugars are stable mostly 120-150. He states has cut portions down and is working on  Eating more vegetables. He has the nutrition information I sent edson- encouraged to read over frequently. He states he has no further nutrition  Questions. Provided patient with contact information to call with questions. Removed from panel.  JENNIFER Can

## 2025-06-11 ENCOUNTER — HOSPITAL ENCOUNTER (OUTPATIENT)
Dept: CARDIAC REHAB | Age: 70
Setting detail: THERAPIES SERIES
Discharge: HOME | End: 2025-06-11
Payer: COMMERCIAL

## 2025-06-11 VITALS — WEIGHT: 248.8 LBS | BODY MASS INDEX: 30.28 KG/M2

## 2025-06-11 PROCEDURE — 93798 PHYS/QHP OP CAR RHAB W/ECG: CPT

## 2025-06-11 ASSESSMENT — EXERCISE STRESS TEST
PEAK_BP: 120/62
PEAK_RPE: 11
PEAK_METS: 5
PEAK_HR: 78

## 2025-06-13 ENCOUNTER — HOSPITAL ENCOUNTER (OUTPATIENT)
Dept: CARDIAC REHAB | Age: 70
Setting detail: THERAPIES SERIES
Discharge: HOME | End: 2025-06-13
Payer: COMMERCIAL

## 2025-06-13 VITALS — BODY MASS INDEX: 29.94 KG/M2 | WEIGHT: 246 LBS

## 2025-06-13 PROCEDURE — 93798 PHYS/QHP OP CAR RHAB W/ECG: CPT

## 2025-06-13 ASSESSMENT — EXERCISE STRESS TEST
PEAK_METS: 8
PEAK_RPE: 13
PEAK_BP: 118/70
PEAK_HR: 83

## 2025-06-15 ENCOUNTER — APPOINTMENT (OUTPATIENT)
Dept: CT IMAGING | Age: 70
DRG: 389 | End: 2025-06-15
Payer: COMMERCIAL

## 2025-06-15 ENCOUNTER — HOSPITAL ENCOUNTER (INPATIENT)
Age: 70
LOS: 5 days | Discharge: HOME | DRG: 389 | End: 2025-06-20
Attending: STUDENT IN AN ORGANIZED HEALTH CARE EDUCATION/TRAINING PROGRAM | Admitting: INTERNAL MEDICINE
Payer: COMMERCIAL

## 2025-06-15 ENCOUNTER — APPOINTMENT (OUTPATIENT)
Dept: GENERAL RADIOLOGY | Age: 70
DRG: 389 | End: 2025-06-15
Payer: COMMERCIAL

## 2025-06-15 DIAGNOSIS — R00.1 BRADYCARDIA: ICD-10-CM

## 2025-06-15 DIAGNOSIS — K56.609 SBO (SMALL BOWEL OBSTRUCTION) (HCC): Primary | ICD-10-CM

## 2025-06-15 DIAGNOSIS — R93.5 ABNORMAL FINDINGS ON DIAGNOSTIC IMAGING OF ABDOMEN: ICD-10-CM

## 2025-06-15 DIAGNOSIS — R55 PRE-SYNCOPE: ICD-10-CM

## 2025-06-15 DIAGNOSIS — R10.9 ABDOMINAL PAIN, UNSPECIFIED ABDOMINAL LOCATION: ICD-10-CM

## 2025-06-15 LAB
ALBUMIN SERPL-MCNC: 3.9 G/DL (ref 3.5–5.2)
ALP SERPL-CCNC: 98 U/L (ref 40–129)
ALT SERPL-CCNC: 40 U/L (ref 10–50)
ANION GAP SERPL CALCULATED.3IONS-SCNC: 16 MMOL/L (ref 9–16)
AST SERPL-CCNC: 35 U/L (ref 10–50)
BASOPHILS # BLD: 0 K/UL (ref 0–0.2)
BASOPHILS NFR BLD: 0 % (ref 0–2)
BILIRUB SERPL-MCNC: 1.9 MG/DL (ref 0–1.2)
BUN SERPL-MCNC: 20 MG/DL (ref 8–23)
CALCIUM SERPL-MCNC: 9.3 MG/DL (ref 8.6–10.4)
CHLORIDE SERPL-SCNC: 103 MMOL/L (ref 98–107)
CO2 SERPL-SCNC: 19 MMOL/L (ref 20–31)
CREAT SERPL-MCNC: 1.1 MG/DL (ref 0.7–1.2)
EOSINOPHIL # BLD: 0 K/UL (ref 0–0.4)
EOSINOPHILS RELATIVE PERCENT: 0 % (ref 0–4)
ERYTHROCYTE [DISTWIDTH] IN BLOOD BY AUTOMATED COUNT: 13.4 % (ref 11.5–14.9)
GFR, ESTIMATED: 73 ML/MIN/1.73M2
GLUCOSE SERPL-MCNC: 160 MG/DL (ref 74–99)
HCT VFR BLD AUTO: 46.3 % (ref 41–53)
HGB BLD-MCNC: 16.5 G/DL (ref 13.5–17.5)
IMM GRANULOCYTES # BLD AUTO: 0 K/UL (ref 0–0.3)
IMM GRANULOCYTES NFR BLD: 0 %
LACTATE BLDV-SCNC: 1.6 MMOL/L (ref 0.5–2.2)
LYMPHOCYTES NFR BLD: 0.42 K/UL (ref 1–4.8)
LYMPHOCYTES RELATIVE PERCENT: 5 % (ref 24–44)
MCH RBC QN AUTO: 32.9 PG (ref 26–34)
MCHC RBC AUTO-ENTMCNC: 35.6 G/DL (ref 31–37)
MCV RBC AUTO: 92.2 FL (ref 80–100)
MONOCYTES NFR BLD: 0.75 K/UL (ref 0.1–1.3)
MONOCYTES NFR BLD: 9 % (ref 1–7)
MORPHOLOGY: NORMAL
NEUTROPHILS NFR BLD: 86 % (ref 36–66)
NEUTS SEG NFR BLD: 7.13 K/UL (ref 1.3–9.1)
NRBC BLD-RTO: 0 PER 100 WBC
PLATELET # BLD AUTO: 185 K/UL (ref 150–450)
PMV BLD AUTO: 9.8 FL (ref 8–13.5)
POTASSIUM SERPL-SCNC: 4.1 MMOL/L (ref 3.7–5.3)
PROT SERPL-MCNC: 7 G/DL (ref 6.6–8.7)
RBC # BLD AUTO: 5.02 M/UL (ref 4.21–5.77)
SODIUM SERPL-SCNC: 138 MMOL/L (ref 136–145)
WBC OTHER # BLD: 8.3 K/UL (ref 3.5–11)

## 2025-06-15 PROCEDURE — 6360000002 HC RX W HCPCS: Performed by: INTERNAL MEDICINE

## 2025-06-15 PROCEDURE — 0D9670Z DRAINAGE OF STOMACH WITH DRAINAGE DEVICE, VIA NATURAL OR ARTIFICIAL OPENING: ICD-10-PCS | Performed by: INTERNAL MEDICINE

## 2025-06-15 PROCEDURE — 99223 1ST HOSP IP/OBS HIGH 75: CPT | Performed by: INTERNAL MEDICINE

## 2025-06-15 PROCEDURE — 36415 COLL VENOUS BLD VENIPUNCTURE: CPT

## 2025-06-15 PROCEDURE — 99285 EMERGENCY DEPT VISIT HI MDM: CPT

## 2025-06-15 PROCEDURE — 85025 COMPLETE CBC W/AUTO DIFF WBC: CPT

## 2025-06-15 PROCEDURE — 96375 TX/PRO/DX INJ NEW DRUG ADDON: CPT

## 2025-06-15 PROCEDURE — 2500000003 HC RX 250 WO HCPCS: Performed by: INTERNAL MEDICINE

## 2025-06-15 PROCEDURE — 74018 RADEX ABDOMEN 1 VIEW: CPT

## 2025-06-15 PROCEDURE — 6360000002 HC RX W HCPCS

## 2025-06-15 PROCEDURE — 80053 COMPREHEN METABOLIC PANEL: CPT

## 2025-06-15 PROCEDURE — 2580000003 HC RX 258: Performed by: STUDENT IN AN ORGANIZED HEALTH CARE EDUCATION/TRAINING PROGRAM

## 2025-06-15 PROCEDURE — 74176 CT ABD & PELVIS W/O CONTRAST: CPT

## 2025-06-15 PROCEDURE — 6360000002 HC RX W HCPCS: Performed by: STUDENT IN AN ORGANIZED HEALTH CARE EDUCATION/TRAINING PROGRAM

## 2025-06-15 PROCEDURE — 0D9670Z DRAINAGE OF STOMACH WITH DRAINAGE DEVICE, VIA NATURAL OR ARTIFICIAL OPENING: ICD-10-PCS

## 2025-06-15 PROCEDURE — 2580000003 HC RX 258: Performed by: INTERNAL MEDICINE

## 2025-06-15 PROCEDURE — 83605 ASSAY OF LACTIC ACID: CPT

## 2025-06-15 PROCEDURE — 96374 THER/PROPH/DIAG INJ IV PUSH: CPT

## 2025-06-15 PROCEDURE — 96361 HYDRATE IV INFUSION ADD-ON: CPT

## 2025-06-15 PROCEDURE — 2060000000 HC ICU INTERMEDIATE R&B

## 2025-06-15 RX ORDER — ONDANSETRON 2 MG/ML
4 INJECTION INTRAMUSCULAR; INTRAVENOUS EVERY 6 HOURS PRN
Status: DISCONTINUED | OUTPATIENT
Start: 2025-06-15 | End: 2025-06-20 | Stop reason: HOSPADM

## 2025-06-15 RX ORDER — SODIUM CHLORIDE 0.9 % (FLUSH) 0.9 %
10 SYRINGE (ML) INJECTION PRN
Status: DISCONTINUED | OUTPATIENT
Start: 2025-06-15 | End: 2025-06-20 | Stop reason: HOSPADM

## 2025-06-15 RX ORDER — POTASSIUM CHLORIDE 7.45 MG/ML
10 INJECTION INTRAVENOUS PRN
Status: DISCONTINUED | OUTPATIENT
Start: 2025-06-15 | End: 2025-06-20 | Stop reason: HOSPADM

## 2025-06-15 RX ORDER — MORPHINE SULFATE 2 MG/ML
1 INJECTION, SOLUTION INTRAMUSCULAR; INTRAVENOUS EVERY 4 HOURS PRN
Status: DISCONTINUED | OUTPATIENT
Start: 2025-06-15 | End: 2025-06-20 | Stop reason: HOSPADM

## 2025-06-15 RX ORDER — SODIUM CHLORIDE 0.9 % (FLUSH) 0.9 %
5-40 SYRINGE (ML) INJECTION EVERY 12 HOURS SCHEDULED
Status: DISCONTINUED | OUTPATIENT
Start: 2025-06-15 | End: 2025-06-20 | Stop reason: HOSPADM

## 2025-06-15 RX ORDER — SODIUM CHLORIDE 9 MG/ML
INJECTION, SOLUTION INTRAVENOUS CONTINUOUS
Status: ACTIVE | OUTPATIENT
Start: 2025-06-15 | End: 2025-06-16

## 2025-06-15 RX ORDER — SODIUM CHLORIDE 9 MG/ML
INJECTION, SOLUTION INTRAVENOUS PRN
Status: DISCONTINUED | OUTPATIENT
Start: 2025-06-15 | End: 2025-06-20 | Stop reason: HOSPADM

## 2025-06-15 RX ORDER — OXYCODONE HYDROCHLORIDE 5 MG/1
5 TABLET ORAL 2 TIMES DAILY
Status: ON HOLD | COMMUNITY
End: 2025-06-20 | Stop reason: HOSPADM

## 2025-06-15 RX ORDER — 0.9 % SODIUM CHLORIDE 0.9 %
1000 INTRAVENOUS SOLUTION INTRAVENOUS ONCE
Status: COMPLETED | OUTPATIENT
Start: 2025-06-15 | End: 2025-06-15

## 2025-06-15 RX ORDER — ENOXAPARIN SODIUM 100 MG/ML
30 INJECTION SUBCUTANEOUS 2 TIMES DAILY
Status: DISCONTINUED | OUTPATIENT
Start: 2025-06-15 | End: 2025-06-20 | Stop reason: HOSPADM

## 2025-06-15 RX ORDER — MAGNESIUM SULFATE 1 G/100ML
1000 INJECTION INTRAVENOUS PRN
Status: DISCONTINUED | OUTPATIENT
Start: 2025-06-15 | End: 2025-06-20 | Stop reason: HOSPADM

## 2025-06-15 RX ORDER — POTASSIUM CHLORIDE 1500 MG/1
40 TABLET, EXTENDED RELEASE ORAL PRN
Status: DISCONTINUED | OUTPATIENT
Start: 2025-06-15 | End: 2025-06-20 | Stop reason: HOSPADM

## 2025-06-15 RX ORDER — ACETAMINOPHEN 325 MG/1
650 TABLET ORAL EVERY 6 HOURS PRN
Status: DISCONTINUED | OUTPATIENT
Start: 2025-06-15 | End: 2025-06-20 | Stop reason: HOSPADM

## 2025-06-15 RX ORDER — MORPHINE SULFATE 2 MG/ML
2 INJECTION, SOLUTION INTRAMUSCULAR; INTRAVENOUS EVERY 4 HOURS PRN
Status: DISCONTINUED | OUTPATIENT
Start: 2025-06-15 | End: 2025-06-20 | Stop reason: HOSPADM

## 2025-06-15 RX ORDER — POLYETHYLENE GLYCOL 3350 17 G/17G
17 POWDER, FOR SOLUTION ORAL DAILY PRN
Status: DISCONTINUED | OUTPATIENT
Start: 2025-06-15 | End: 2025-06-20 | Stop reason: HOSPADM

## 2025-06-15 RX ORDER — ACETAMINOPHEN 650 MG/1
650 SUPPOSITORY RECTAL EVERY 6 HOURS PRN
Status: DISCONTINUED | OUTPATIENT
Start: 2025-06-15 | End: 2025-06-20 | Stop reason: HOSPADM

## 2025-06-15 RX ORDER — ONDANSETRON 4 MG/1
4 TABLET, ORALLY DISINTEGRATING ORAL EVERY 8 HOURS PRN
Status: DISCONTINUED | OUTPATIENT
Start: 2025-06-15 | End: 2025-06-20 | Stop reason: HOSPADM

## 2025-06-15 RX ORDER — ONDANSETRON 2 MG/ML
4 INJECTION INTRAMUSCULAR; INTRAVENOUS ONCE
Status: COMPLETED | OUTPATIENT
Start: 2025-06-15 | End: 2025-06-15

## 2025-06-15 RX ORDER — MORPHINE SULFATE 4 MG/ML
4 INJECTION, SOLUTION INTRAMUSCULAR; INTRAVENOUS ONCE
Refills: 0 | Status: COMPLETED | OUTPATIENT
Start: 2025-06-15 | End: 2025-06-15

## 2025-06-15 RX ADMIN — SODIUM CHLORIDE: 0.9 INJECTION, SOLUTION INTRAVENOUS at 22:31

## 2025-06-15 RX ADMIN — ENOXAPARIN SODIUM 30 MG: 100 INJECTION SUBCUTANEOUS at 22:36

## 2025-06-15 RX ADMIN — SODIUM CHLORIDE 1000 ML: 0.9 INJECTION, SOLUTION INTRAVENOUS at 17:00

## 2025-06-15 RX ADMIN — ONDANSETRON 4 MG: 2 INJECTION, SOLUTION INTRAMUSCULAR; INTRAVENOUS at 17:02

## 2025-06-15 RX ADMIN — MORPHINE SULFATE 4 MG: 4 INJECTION, SOLUTION INTRAMUSCULAR; INTRAVENOUS at 17:05

## 2025-06-15 RX ADMIN — SODIUM CHLORIDE, PRESERVATIVE FREE 10 ML: 5 INJECTION INTRAVENOUS at 22:29

## 2025-06-15 RX ADMIN — MORPHINE SULFATE 1 MG: 2 INJECTION, SOLUTION INTRAMUSCULAR; INTRAVENOUS at 19:59

## 2025-06-15 ASSESSMENT — PAIN - FUNCTIONAL ASSESSMENT: PAIN_FUNCTIONAL_ASSESSMENT: 0-10

## 2025-06-15 ASSESSMENT — PAIN SCALES - GENERAL: PAINLEVEL_OUTOF10: 7

## 2025-06-15 ASSESSMENT — PAIN DESCRIPTION - LOCATION
LOCATION_2: BACK
LOCATION: ABDOMEN

## 2025-06-15 ASSESSMENT — PAIN DESCRIPTION - ORIENTATION: ORIENTATION: LOWER

## 2025-06-15 ASSESSMENT — PAIN DESCRIPTION - PAIN TYPE: TYPE: ACUTE PAIN

## 2025-06-15 ASSESSMENT — PAIN DESCRIPTION - INTENSITY: RATING_2: 7

## 2025-06-15 NOTE — ED PROVIDER NOTES
[facility]  Emergency Department Encounter  Emergency Medicine Physician     Pt Name: Earnest Avila  MRN: [id number]  Birthdate 1955  Date of evaluation: 6/15/25  PCP:  Theresa Garrison MD    CHIEF COMPLAINT       Chief Complaint   Patient presents with    Abdominal Pain    Nausea & Vomiting     Started this morning        HISTORY OF PRESENT ILLNESS  (Location/Symptom, Timing/Onset, Context/Setting, Quality, Duration, Modifying Factors, Severity.)    Earnest Avila is a 69 y.o. male who presents with abdominal pain, distention, nausea.  Episode of vomiting earlier this morning.  States that he had a try to use an enema earlier this morning which did not give him much output.  He states that he normally does not have significant bowel movements and often does have to use an enema.  States he did not have any blood in his vomit, and has not had any blood in his stool.  States he feels if his abdomen is more distended.  States he has a previous surgical history including cholecystectomy, and had a bowel obstruction about 20 years ago.    Denies any chest pain.  States is difficult for him to take a deep breath.  But does not feel short of breath when resting.        PAST MEDICAL / SURGICAL / SOCIAL / FAMILY HISTORY    has a past medical history of Allergic rhinitis, Angina pectoris, Anxiety, Borderline hypertension, CAD (coronary artery disease), Controlled diabetes mellitus type II without complication (HCC), Debility, Dental abscess, Depression, Essential hypertension, History of colon polyps, History of suprapubic catheter, Intermittent self-catheterization of bladder, Kidney stones, Neuropathy, Obesity, Obstructive sleep apnea of adult, Osteoarthritis, Prostate enlargement, Restless legs syndrome, Stomach ulcer, Tubular adenoma of colon, Type II or unspecified type diabetes mellitus without mention of complication, not stated as uncontrolled, UTI (urinary tract infection), Wears    Misc. Devices MISC 22Fr wolf catheter to be changed out every 14 days. 5/28/24   Gaby Amezcua, APRN - CNP   pregabalin (LYRICA) 200 MG capsule Take 1 capsule by mouth 2 times daily.    Provider, MD Nati   Cholecalciferol 50 MCG (2000 UT) TABS Take 1 tablet by mouth daily    Provider, Nati, MD       REVIEW OF SYSTEMS    (2-9 systems for level 4, 10 or more for level 5)    Review of Systems   Constitutional:  Negative for chills and fever.   Respiratory:  Negative for cough and shortness of breath.    Cardiovascular:  Negative for chest pain.   Gastrointestinal:  Positive for abdominal distention, abdominal pain, nausea and vomiting. Negative for blood in stool.   All other systems reviewed and are negative.      PHYSICAL EXAM   (up to 7 for level 4, 8 or more for level 5)    INITIAL VITALS:   ED Triage Vitals [06/15/25 1537]   BP Systolic BP Percentile Diastolic BP Percentile Temp Temp Source Pulse Respirations SpO2   (!) 132/106 -- -- 99.6 °F (37.6 °C) Axillary 91 15 94 %      Height Weight - Scale         1.93 m (6' 4\") 111.6 kg (246 lb)             Physical Exam  Vitals and nursing note reviewed.   Constitutional:       General: He is not in acute distress.     Appearance: Normal appearance.   Cardiovascular:      Rate and Rhythm: Normal rate and regular rhythm.      Pulses: Normal pulses.           Radial pulses are 2+ on the right side and 2+ on the left side.   Pulmonary:      Effort: Pulmonary effort is normal. No respiratory distress.      Breath sounds: Normal breath sounds. No decreased breath sounds.   Abdominal:      General: There is distension.      Palpations: Abdomen is soft.      Tenderness: There is generalized abdominal tenderness. There is no guarding or rebound.   Skin:     General: Skin is warm and dry.      Capillary Refill: Capillary refill takes less than 2 seconds.   Neurological:      General: No focal deficit present.      Mental Status: He is alert and oriented to

## 2025-06-15 NOTE — H&P
[facility] Internal Medicine   Trevor Arciniega MD; MD Marion Milan MD, MD , Jayant Schaefer MD    [facility] Internal Medicine   IN-PATIENT SERVICE   [facility]    HISTORY AND PHYSICAL EXAMINATION            Date:   6/15/2025  Patient name:  Earnest Avila  Date of admission:  6/15/2025  3:39 PM  MRN:   [id number]  Account:  [id number]  YOB: 1955  PCP:    Theresa Garrison MD  Room:   10/10  Code Status:    Prior    Chief Complaint:     Chief Complaint   Patient presents with    Abdominal Pain    Nausea & Vomiting     Started this morning        History Obtained From:     Patient medical record nursing staff    History of Present Illness:     Earnest Avila is a 69 y.o. Non- / non  male who presents with Abdominal Pain and Nausea & Vomiting (Started this morning )   and is admitted to the hospital for the management of SBO (small bowel obstruction) (HCC).    69-year-old  gentleman with a recent history of a four-vessel coronary artery bypass graft done at [facility] this February  History of cholecystectomy 20 years ago history of diabetes mellitus hypertension hyperlipidemia history of colon polyps  Admitted with abdominal pain started last night gradually gotten worse over the night this morning he was having nausea vomiting no bowel movements patient claims he was passing small amount of gas  Associated with abdominal distention  Pain 10 out of 10 sharp stabbing colicky kind of pain with radiation to back diffuse all over the abdomen  Patient had a CT scan done which showed small bowel obstruction NG tube was placed  Patient is n.p.o.    Past Medical History:     Past Medical History:   Diagnosis Date    Allergic rhinitis     Angina pectoris 02/14/2025    Anxiety     Borderline hypertension     CAD (coronary artery disease)     Controlled diabetes mellitus type II without  Lymphocytes % 5 (L) 24 - 44 %    Monocytes % 9 (H) 1 - 7 %    Eosinophils % 0 0 - 4 %    Basophils % 0 0 - 2 %    Immature Granulocytes % 0 0 %    Neutrophils Absolute 7.13 1.3 - 9.1 k/uL    Lymphocytes Absolute 0.42 (L) 1.0 - 4.8 k/uL    Monocytes Absolute 0.75 0.1 - 1.3 k/uL    Eosinophils Absolute 0.00 0.0 - 0.4 k/uL    Basophils Absolute 0.00 0.0 - 0.2 k/uL    Immature Granulocytes Absolute 0.00 0.00 - 0.30 k/uL    Morphology Normal    Comprehensive Metabolic Panel    Collection Time: 06/15/25  4:30 PM   Result Value Ref Range    Sodium 138 136 - 145 mmol/L    Potassium 4.1 3.7 - 5.3 mmol/L    Chloride 103 98 - 107 mmol/L    CO2 19 (L) 20 - 31 mmol/L    Anion Gap 16 9 - 16 mmol/L    Glucose 160 (H) 74 - 99 mg/dL    BUN 20 8 - 23 mg/dL    Creatinine 1.1 0.7 - 1.2 mg/dL    Est, Glom Filt Rate 73 >60 mL/min/1.73m2    Calcium 9.3 8.6 - 10.4 mg/dL    Total Protein 7.0 6.6 - 8.7 g/dL    Albumin 3.9 3.5 - 5.2 g/dL    Total Bilirubin 1.9 (H) 0.0 - 1.2 mg/dL    Alkaline Phosphatase 98 40 - 129 U/L    ALT 40 10 - 50 U/L    AST 35 10 - 50 U/L   Lactic Acid    Collection Time: 06/15/25  4:30 PM   Result Value Ref Range    Lactic Acid 1.6 0.5 - 2.2 mmol/L       Imaging/Diagnostics:  CT ABDOMEN PELVIS WO CONTRAST Additional Contrast? None  Result Date: 6/15/2025  Concern for small bowel obstruction with dilated small bowel loops and transition point in the right lower quadrant where there are distal decompressed small bowel loops.  No free air. Segments of incomplete distension versus mucosal thickening in the colon as described.  No localized inflammatory changes.  The       Assessment :      Hospital Problems           Last Modified POA    * (Principal) SBO (small bowel obstruction) (Formerly [facility]) 6/15/2025 Yes       Plan:     Patient status inpatient in the Progressive Unit/Step down    69-year-old  gentleman with a recent history of a four-vessel coronary artery bypass graft done at [facility] this

## 2025-06-15 NOTE — PROGRESS NOTES
Writer placed bridle on to NG to help maintain appropriate placement. Patient minimally tolerated placement of securement device. NG tube in appropriate placement. Bedside RNs present for placement.    Electronically signed by TSERING Tong CNP on 6/15/2025 at 7:50 PM

## 2025-06-16 ENCOUNTER — RESULTS FOLLOW-UP (OUTPATIENT)
Dept: FAMILY MEDICINE CLINIC | Age: 70
End: 2025-06-16

## 2025-06-16 ENCOUNTER — HOSPITAL ENCOUNTER (OUTPATIENT)
Dept: CARDIAC REHAB | Age: 70
Setting detail: THERAPIES SERIES
Discharge: HOME | End: 2025-06-16
Payer: COMMERCIAL

## 2025-06-16 DIAGNOSIS — E78.2 MIXED HYPERLIPIDEMIA: Primary | ICD-10-CM

## 2025-06-16 DIAGNOSIS — K21.9 GASTROESOPHAGEAL REFLUX DISEASE WITHOUT ESOPHAGITIS: ICD-10-CM

## 2025-06-16 LAB
GLUCOSE BLD-MCNC: 111 MG/DL (ref 75–110)
GLUCOSE BLD-MCNC: 92 MG/DL (ref 75–110)
INR PPP: 1.4
PROTHROMBIN TIME: 18.6 SEC (ref 11.8–14.6)

## 2025-06-16 PROCEDURE — 2060000000 HC ICU INTERMEDIATE R&B

## 2025-06-16 PROCEDURE — 97166 OT EVAL MOD COMPLEX 45 MIN: CPT

## 2025-06-16 PROCEDURE — 87086 URINE CULTURE/COLONY COUNT: CPT

## 2025-06-16 PROCEDURE — 87040 BLOOD CULTURE FOR BACTERIA: CPT

## 2025-06-16 PROCEDURE — 6370000000 HC RX 637 (ALT 250 FOR IP): Performed by: INTERNAL MEDICINE

## 2025-06-16 PROCEDURE — 99222 1ST HOSP IP/OBS MODERATE 55: CPT | Performed by: NURSE PRACTITIONER

## 2025-06-16 PROCEDURE — 51705 CHANGE OF BLADDER TUBE: CPT

## 2025-06-16 PROCEDURE — 6360000002 HC RX W HCPCS: Performed by: INTERNAL MEDICINE

## 2025-06-16 PROCEDURE — 99254 IP/OBS CNSLTJ NEW/EST MOD 60: CPT | Performed by: NURSE PRACTITIONER

## 2025-06-16 PROCEDURE — 97162 PT EVAL MOD COMPLEX 30 MIN: CPT

## 2025-06-16 PROCEDURE — 36415 COLL VENOUS BLD VENIPUNCTURE: CPT

## 2025-06-16 PROCEDURE — 6370000000 HC RX 637 (ALT 250 FOR IP)

## 2025-06-16 PROCEDURE — 86403 PARTICLE AGGLUT ANTBDY SCRN: CPT

## 2025-06-16 PROCEDURE — 85610 PROTHROMBIN TIME: CPT

## 2025-06-16 PROCEDURE — 6360000002 HC RX W HCPCS

## 2025-06-16 PROCEDURE — 2500000003 HC RX 250 WO HCPCS: Performed by: INTERNAL MEDICINE

## 2025-06-16 PROCEDURE — 2500000003 HC RX 250 WO HCPCS

## 2025-06-16 PROCEDURE — 82947 ASSAY GLUCOSE BLOOD QUANT: CPT

## 2025-06-16 PROCEDURE — 97530 THERAPEUTIC ACTIVITIES: CPT

## 2025-06-16 PROCEDURE — 2580000003 HC RX 258

## 2025-06-16 PROCEDURE — 99233 SBSQ HOSP IP/OBS HIGH 50: CPT | Performed by: INTERNAL MEDICINE

## 2025-06-16 PROCEDURE — 87186 SC STD MICRODIL/AGAR DIL: CPT

## 2025-06-16 RX ORDER — SODIUM CHLORIDE 9 MG/ML
INJECTION, SOLUTION INTRAVENOUS CONTINUOUS
Status: DISCONTINUED | OUTPATIENT
Start: 2025-06-16 | End: 2025-06-20 | Stop reason: HOSPADM

## 2025-06-16 RX ORDER — ONDANSETRON 2 MG/ML
4 INJECTION INTRAMUSCULAR; INTRAVENOUS EVERY 6 HOURS PRN
Status: DISCONTINUED | OUTPATIENT
Start: 2025-06-16 | End: 2025-06-20 | Stop reason: HOSPADM

## 2025-06-16 RX ORDER — LORAZEPAM 2 MG/ML
1 INJECTION INTRAMUSCULAR EVERY 4 HOURS PRN
Status: DISCONTINUED | OUTPATIENT
Start: 2025-06-16 | End: 2025-06-20 | Stop reason: HOSPADM

## 2025-06-16 RX ORDER — DEXTROSE MONOHYDRATE 100 MG/ML
INJECTION, SOLUTION INTRAVENOUS CONTINUOUS PRN
Status: DISCONTINUED | OUTPATIENT
Start: 2025-06-16 | End: 2025-06-20 | Stop reason: HOSPADM

## 2025-06-16 RX ORDER — TIZANIDINE 2 MG/1
2 TABLET ORAL 3 TIMES DAILY
Status: DISCONTINUED | OUTPATIENT
Start: 2025-06-16 | End: 2025-06-20 | Stop reason: HOSPADM

## 2025-06-16 RX ORDER — INSULIN LISPRO 100 [IU]/ML
0-4 INJECTION, SOLUTION INTRAVENOUS; SUBCUTANEOUS
Status: DISCONTINUED | OUTPATIENT
Start: 2025-06-16 | End: 2025-06-20 | Stop reason: HOSPADM

## 2025-06-16 RX ADMIN — ENOXAPARIN SODIUM 30 MG: 100 INJECTION SUBCUTANEOUS at 20:51

## 2025-06-16 RX ADMIN — SODIUM CHLORIDE 40 MG: 9 INJECTION INTRAMUSCULAR; INTRAVENOUS; SUBCUTANEOUS at 08:59

## 2025-06-16 RX ADMIN — MORPHINE SULFATE 2 MG: 2 INJECTION, SOLUTION INTRAMUSCULAR; INTRAVENOUS at 20:08

## 2025-06-16 RX ADMIN — ACETAMINOPHEN 650 MG: 325 TABLET ORAL at 00:45

## 2025-06-16 RX ADMIN — ENOXAPARIN SODIUM 30 MG: 100 INJECTION SUBCUTANEOUS at 08:59

## 2025-06-16 RX ADMIN — SODIUM CHLORIDE: 0.9 INJECTION, SOLUTION INTRAVENOUS at 18:24

## 2025-06-16 RX ADMIN — WATER 1000 MG: 1 INJECTION INTRAMUSCULAR; INTRAVENOUS; SUBCUTANEOUS at 17:41

## 2025-06-16 RX ADMIN — SODIUM CHLORIDE: 0.9 INJECTION, SOLUTION INTRAVENOUS at 09:09

## 2025-06-16 RX ADMIN — MORPHINE SULFATE 1 MG: 2 INJECTION, SOLUTION INTRAMUSCULAR; INTRAVENOUS at 02:10

## 2025-06-16 RX ADMIN — LORAZEPAM 1 MG: 2 INJECTION INTRAMUSCULAR; INTRAVENOUS at 15:48

## 2025-06-16 RX ADMIN — MORPHINE SULFATE 1 MG: 2 INJECTION, SOLUTION INTRAMUSCULAR; INTRAVENOUS at 09:08

## 2025-06-16 RX ADMIN — MORPHINE SULFATE 1 MG: 2 INJECTION, SOLUTION INTRAMUSCULAR; INTRAVENOUS at 13:09

## 2025-06-16 RX ADMIN — ONDANSETRON 4 MG: 2 INJECTION, SOLUTION INTRAMUSCULAR; INTRAVENOUS at 20:15

## 2025-06-16 RX ADMIN — TIZANIDINE 2 MG: 2 TABLET ORAL at 20:51

## 2025-06-16 ASSESSMENT — PAIN DESCRIPTION - LOCATION
LOCATION: GENERALIZED
LOCATION: GENERALIZED
LOCATION: GENERALIZED;ABDOMEN;BACK
LOCATION: GENERALIZED
LOCATION: ABDOMEN
LOCATION: LEG;BACK
LOCATION: GENERALIZED

## 2025-06-16 ASSESSMENT — PAIN DESCRIPTION - PAIN TYPE
TYPE: ACUTE PAIN

## 2025-06-16 ASSESSMENT — PAIN SCALES - WONG BAKER
WONGBAKER_NUMERICALRESPONSE: NO HURT

## 2025-06-16 ASSESSMENT — PAIN SCALES - GENERAL
PAINLEVEL_OUTOF10: 7
PAINLEVEL_OUTOF10: 6
PAINLEVEL_OUTOF10: 4
PAINLEVEL_OUTOF10: 6
PAINLEVEL_OUTOF10: 6
PAINLEVEL_OUTOF10: 5
PAINLEVEL_OUTOF10: 3
PAINLEVEL_OUTOF10: 7

## 2025-06-16 ASSESSMENT — PAIN DESCRIPTION - ORIENTATION
ORIENTATION: LOWER
ORIENTATION: MID
ORIENTATION: RIGHT;LEFT

## 2025-06-16 ASSESSMENT — PAIN DESCRIPTION - DESCRIPTORS
DESCRIPTORS: DISCOMFORT
DESCRIPTORS: CRAMPING;BURNING
DESCRIPTORS: ACHING
DESCRIPTORS: DISCOMFORT
DESCRIPTORS: DISCOMFORT
DESCRIPTORS: NUMBNESS
DESCRIPTORS: DISCOMFORT;NAGGING

## 2025-06-16 ASSESSMENT — ENCOUNTER SYMPTOMS
RHINORRHEA: 0
SORE THROAT: 0
SHORTNESS OF BREATH: 0
COUGH: 0

## 2025-06-16 ASSESSMENT — PAIN - FUNCTIONAL ASSESSMENT
PAIN_FUNCTIONAL_ASSESSMENT: ACTIVITIES ARE NOT PREVENTED

## 2025-06-16 NOTE — ED NOTES
Situation  Name: Earnest Avila  Admitting: Dx SBO (small bowel obstruction) (Formerly [facility]) [K56.609]  Isolation Precautions No active isolations  Code Status: Full Code  Alerts: N/A  Where is the patient from? Home  HPI: Pt. Here with N/V and abdominal pain, which started this AM.  Pt. States that he is unable to keep anything down.      Background  PMH:   Past Medical History:   Diagnosis Date    Allergic rhinitis     Angina pectoris 02/14/2025    Anxiety     Borderline hypertension     CAD (coronary artery disease)     Controlled diabetes mellitus type II without complication ([facility])     Debility 02/25/2025    Dental abscess     Depression     On medas    Essential hypertension 02/12/2021    History of colon polyps     History of suprapubic catheter 07/30/2018    Intermittent self-catheterization of bladder 06/17/2016    3/8/17 does not do this any more- was getting UTI's    Kidney stones     Neuropathy     Obesity     HISTORY OF OBESITY, AS OF 11/06/2017 PATIENT HAS LOST 80 LBS    Obstructive sleep apnea of adult 2005    on CPAP    Osteoarthritis     RIGHT SHOULDER, BACK    Prostate enlargement 2014    Self cath since Jan. 16 2016    Restless legs syndrome     Stomach ulcer 02/14/2017    Tubular adenoma of colon 06/2016    Type II or unspecified type diabetes mellitus without mention of complication, not stated as uncontrolled 2006    NO LONGER ON MEDS, LOST WEIGHT, DIET CONTROLLED    UTI (urinary tract infection)     Wears glasses     Weight loss counseling, encounter for 01/26/2017     Allergies:   Allergies   Allergen Reactions    Iodine Hives    Other Rash     Patient developed rashes on the skin area where the medline bed pads touched his skin specifically back and backlegs.    Senna Hives    Colace [Dss] Hives    Docusate Other (See Comments)    Environmental/Seasonal Other (See Comments)     RUNNY NOSE, SNEEZING    Metformin And Related Diarrhea and Nausea And Vomiting    Metoprolol      Bradycardia, lightheaded

## 2025-06-16 NOTE — PROGRESS NOTES
[facility]   Occupational Therapy Evaluation  Date: 25  Patient Name: Earnest Avila       Room:   MRN: [id number]  Account: [id number]   : 1955  (69 y.o.) Gender: male     Discharge Recommendations:  The patient may need non-skilled ADL assistance after discharge.     OT Equipment Recommendations  Other: CTA    Referring Practitioner: Trevor Arciniega MD  Diagnosis: SBO        Treatment Diagnosis: impaired self care status    Past Medical History:  has a past medical history of Allergic rhinitis, Angina pectoris, Anxiety, Borderline hypertension, CAD (coronary artery disease), Controlled diabetes mellitus type II without complication (HCC), Debility, Dental abscess, Depression, Essential hypertension, History of colon polyps, History of suprapubic catheter, Intermittent self-catheterization of bladder, Kidney stones, Neuropathy, Obesity, Obstructive sleep apnea of adult, Osteoarthritis, Prostate enlargement, Restless legs syndrome, Stomach ulcer, Tubular adenoma of colon, Type II or unspecified type diabetes mellitus without mention of complication, not stated as uncontrolled, UTI (urinary tract infection), Wears glasses, and Weight loss counseling, encounter for.    Past Surgical History:   has a past surgical history that includes Cholecystectomy (); Lithotripsy (2016); Lithotripsy; Cystocopy (2016); Prostate surgery (2016); Cystoscopy (2016); Colonoscopy (2016); Shoulder Arthroplasty (Right, 2016); Shoulder arthroscopy (Right, ); Vasectomy (); Cystoscopy (2016); Cystocopy; Cystoscopy (N/A, 2017); Cystoscopy (N/A, 2017); joint replacement; Cystourethroscopy/Urethral Dilation (11/15/2017); pr cystourethroscopy (N/A, 11/15/2017); Cystoscopy (N/A, 11/15/2017); eye surgery (Bilateral, ); Bladder surgery; Colonoscopy (N/A, 2019); Cardiac catheterization (2025); Cardiac procedure (N/A,  02/17/2025); Coronary artery bypass graft (N/A, 02/21/2025); Spine surgery (2/2/2022); and shoulder surgery (2018).    Restrictions  Restrictions/Precautions  Restrictions/Precautions: NPO, Fall Risk, Bed Alarm  Activity Level: Up with Assist, Up as Tolerated  Required Braces or Orthoses?: No  Implants Present? : Metal implants (rods in back, TSA with R shoulder.)      Vitals  Vitals  Pulse: 60  SpO2: 100 %  O2 Device: Nasal cannula  BP: 113/71  MAP (Calculated): 85  Comment: 1L O2 via NC in pt room currently     Subjective  Subjective: \"This tube in my nose hurts my face when I am sitting up\". Pt agreeable to partcipate and was in supine position upon OT's arrival into room this am.  Comments: NAVARRO thornton'romain pt to be seen this am by OT  Pain  Pre-Pain: 6  Post-Pain: 6  Pain Location:  (Pt  reports 6/10 back pain lower, 4/10 Right shoulder pain, and 2/10 left shoulder pain level)  Pain Descriptor: Aching, Other (Comment) (spasms and ache with back)  Pain Interventions: Nurse notified, Repositioning, Rest, Ice    Social/Functional History  Social/Functional History  Lives With: Spouse  Type of Home: House  Home Layout: One level  Home Access: Stairs to enter without rails  Entrance Stairs - Number of Steps: 1 (1 CEFERINO thru garage to the house, [facility] living room has 2 steps  with B HR's going down to the living room, [facility] family room 1 step to go down to the family room with HR on the left going down.)  Bathroom Shower/Tub: Tub/Shower unit, Curtain, Shower chair with back  Bathroom Toilet: Handicap height  Bathroom Equipment: Grab bars in shower, Safety frame, Hand-held shower, Shower chair  Bathroom Accessibility: Walker accessible (walker accessible , however bathroom is small and ususally leaves RW in the hallway before entering the bathroom depending upon whether he has the back pain or not.)  Home Equipment: Walker - Rolling, Rollator, Cane, Grab bars, Lift chair, Reacher  Has the patient had two or more falls  his nose and fatigue and wanted to sit back down onto his bed.          Assessment  Assessment  Performance deficits / Impairments: Decreased functional mobility , Decreased ADL status, Decreased strength, Decreased endurance, Decreased balance, Decreased posture  Treatment Diagnosis: impaired self care status  Prognosis: Good  Decision Making: Medium Complexity  Discharge Recommendations: Patient would benefit from continued therapy after discharge    Activity Tolerance  Activity Tolerance: Patient limited by fatigue, Patient limited by pain    Safety Devices  Type of Devices: All fall risk precautions in place, Bed alarm in place, Call light within reach, Gait belt, Patient at risk for falls, Left in bed, Nurse notified    Patient Education  Patient Education  Education Given To: Patient, Family  Education Provided: Role of Therapy, Plan of Care, ADL Adaptive Strategies, Transfer Training, Energy Conservation, Fall Prevention Strategies, Mobility Training  Education Method: Demonstration, Verbal  Barriers to Learning: None  Education Outcome: Verbalized understanding, Demonstrated understanding, Continued education needed    Functional Outcome Measures  AM-PAC Daily Activity - Inpatient   How much help is needed for putting on and taking off regular lower body clothing?: A Little  How much help is needed for bathing (which includes washing, rinsing, drying)?: A Little  How much help is needed for toileting (which includes using toilet, bedpan, or urinal)?: A Little  How much help is needed for putting on and taking off regular upper body clothing?: A Little  How much help is needed for taking care of personal grooming?: A Little  How much help for eating meals?: None  [facility]: 19  AM-PAC Inpatient ADL T-Scale Score : 40.22  ADL Inpatient CMS 0-100% Score: 42.8  ADL Inpatient CMS G-Code Modifier : CK       Goals     Short Term Goals  Time Frame for Short Term Goals: By

## 2025-06-16 NOTE — CARDIO/PULMONARY
pt left voicemail, and will not be in 6/16/2025, and perhaps 6/18/2025 due to being hospitalized. 6/16/2025 session rescheduled.

## 2025-06-16 NOTE — PROGRESS NOTES
Pt requesting muscle relaxer (pt does take at home) but requesting IV due to NG tube/ NPO status. Dr. Arciniega made aware.     1300: see new orders IV PRN ativan

## 2025-06-16 NOTE — RESULT ENCOUNTER NOTE
Addressed in ED, currently admitted due to small bowel obstruction    Future Appointments  6/18/2025  8:00 AM    [facility] REHAB RM 01       [facility]        [place]  6/20/2025  8:00 AM    [facility] CARD REHAB RM 01       [facility] CARDI[facility]        [place]  6/23/2025  8:00 AM    [facility] CARD REHAB RM 01       [facility] CARDIAC        [place]  7/17/2025  8:00 AM    Theresa Garrison MD    [facility]               BS ECC DEP  8/5/2025   11:00 AM   Chu Alvarado MD        [facility]        [facility]  8/26/2025  3:50 PM    Adriana Pandya MD       [facility]        [facility]

## 2025-06-16 NOTE — PROGRESS NOTES
Spiritual Health History and Assessment/Progress Note  [facility]    (P) Spiritual/Emotional Needs, Initial Encounter (Spiritual Care Consult List),  ,  ,      Name: Earnest Avila MRN: [id number]    Age: 69 y.o.     Sex: male   Language: English   Lutheran: Baptist   SBO (small bowel obstruction) ([facility])     Date: 6/16/2025            Total Time Calculated: (P) 10 min              Spiritual Assessment began in [facility] PROGRESSIVE CARE        Referral/Consult From: (P) Other (comment) (Spiritual care Consult List)   Encounter Overview/Reason: (P) Spiritual/Emotional Needs, Initial Encounter (Spiritual Care Consult List)  Service Provided For: (P) Patient, Patient and family together (Spouse Present)    Dinora, Belief, Meaning:   Patient identifies as spiritual, is connected with a dinora tradition or spiritual practice, and has beliefs or practices that help with coping during difficult times  Family/Friends identify as spiritual, are connected with a dinora tradition or spiritual practice, and have beliefs or practices that help with coping during difficult times      Importance and Influence:  Patient has spiritual/personal beliefs that influence decisions regarding their health  Family/Friends have spiritual/personal beliefs that influence decisions regarding the patient's health    Community:  Patient is connected with a spiritual community and feels well-supported. Support system includes: Spouse/Partner, Children, and Extended family  Family/Friends are connected with a spiritual community: and feel well-supported. Support system includes: Spouse/Partner, Children, and Extended family    Assessment and Plan of Care:     Patient Interventions include: Facilitated expression of thoughts and feelings and Provided sacramental/Confucianism ritual  Family/Friends Interventions include: Provided sacramental/Confucianism ritual    Patient Plan of Care: Spiritual Care available upon further

## 2025-06-16 NOTE — PLAN OF CARE
Problem: Safety - Adult  Goal: Free from fall injury  6/16/2025 1611 by Yadira Anna RN  Outcome: Progressing     Problem: Nutrition Deficit:  Goal: Optimize nutritional status  Outcome: Progressing     Problem: ABCDS Injury Assessment  Goal: Absence of physical injury  6/16/2025 1611 by Yadira Anna RN  Outcome: Progressing  Flowsheets (Taken 6/16/2025 1611)  Absence of Physical Injury: Implement safety measures based on patient assessment     Problem: Pain  Goal: Verbalizes/displays adequate comfort level or baseline comfort level  6/16/2025 1611 by Yadira Anna RN  Outcome: Progressing  Flowsheets (Taken 6/16/2025 1611)  Verbalizes/displays adequate comfort level or baseline comfort level:   Assess pain using appropriate pain scale   Encourage patient to monitor pain and request assistance     Problem: Discharge Planning  Goal: Discharge to home or other facility with appropriate resources  Outcome: Progressing  Flowsheets (Taken 6/16/2025 1611)  Discharge to home or other facility with appropriate resources: Identify barriers to discharge with patient and caregiver     Problem: Chronic Conditions and Co-morbidities  Goal: Patient's chronic conditions and co-morbidity symptoms are monitored and maintained or improved  Outcome: Progressing  Flowsheets (Taken 6/16/2025 1611)  Care Plan - Patient's Chronic Conditions and Co-Morbidity Symptoms are Monitored and Maintained or Improved: Monitor and assess patient's chronic conditions and comorbid symptoms for stability, deterioration, or improvement

## 2025-06-16 NOTE — PROGRESS NOTES
Pt needs a UA and culture. Suprapubic in place. Need urology consult to change. Dr. Arciniega made aware.

## 2025-06-16 NOTE — RESULT ENCOUNTER NOTE
Addressed in ED , Currently admitted due to small bowel obstructions    Future Appointments  6/18/2025  8:00 AM    [facility] C[facility] REHAB RM 01       [facility]        [place]  6/20/2025  8:00 AM    [facility]ARD REHAB RM 01       [facility] CARDI[facility]        [place]  6/23/2025  8:00 AM    [facility] CARD REHAB RM 01       [facility] CARDI[facility]        [place]  7/17/2025  8:00 AM    Theresa Garrison MD    [facility]               BS ECC DEP  8/5/2025   11:00 AM   Chu Alvarado MD        [facility]        [facility]  8/26/2025  3:50 PM    Adriana Pandya MD       [facility]        [facility]

## 2025-06-16 NOTE — CONSULTS
Department of Urology  Urology Consult Note    Patient:  Earnest Avila  MRN: [id number]  YOB: 1955    Reason for Consult:  change SP Tube  Requesting Physician:  Dr. Arciniega    CHIEF COMPLAINT:    Chief Complaint   Patient presents with    Abdominal Pain    Nausea & Vomiting     Started this morning        History Obtained From:   patient, electronic medical record    HISTORY OF PRESENT ILLNESS:    The patient is a 69 y.o. male who presents with nausea and vomiting. He is admitted for management of small bowel obstruction. Urology was consulted for SP tube change. He has chronic retention. He performs SP tube changes at home every 4 weeks. His wife has been educated on how to perform cath changes and does this using sterile technique and denies any difficulty. No recent  infections. A urine needs to be collected d/t fevers.     Past Medical History:        Diagnosis Date    Allergic rhinitis     Angina pectoris 02/14/2025    Anxiety     Borderline hypertension     CAD (coronary artery disease)     Controlled diabetes mellitus type II without complication (HCC)     Debility 02/25/2025    Dental abscess     Depression     On medas    Essential hypertension 02/12/2021    History of colon polyps     History of suprapubic catheter 07/30/2018    Intermittent self-catheterization of bladder 06/17/2016    3/8/17 does not do this any more- was getting UTI's    Kidney stones     Neuropathy     Obesity     HISTORY OF OBESITY, AS OF 11/06/2017 PATIENT HAS LOST 80 LBS    Obstructive sleep apnea of adult 2005    on CPAP    Osteoarthritis     RIGHT SHOULDER, BACK    Prostate enlargement 2014    Self cath since Jan. 16 2016    Restless legs syndrome     Stomach ulcer 02/14/2017    Tubular adenoma of colon 06/2016    Type II or unspecified type diabetes mellitus without mention of complication, not stated as uncontrolled 2006    NO LONGER ON MEDS, LOST WEIGHT, DIET CONTROLLED    UTI (urinary tract infection)

## 2025-06-16 NOTE — CONSULTS
[facility] General Surgery   Juan Flores MD, FACS  Senia MATAMOROSHay Parker, APRN-CNP  [address], [address]  [place] [address]  P: [phone], F: [phone]    General and Robotic Surgery  Consult Note         PATIENT NAME: Earnest Avila   :  1955   MRN: [id number]   PCP:  Theresa Garrison MD   Referring Physician: Dr. Mills  Reason for Consult: Bowel obstruction, abdominal pain    TODAY'S DATE: 2025    HISTORY OF PRESENT ILLNESS: 69 y.o. male presents to the emergency department yesterday with abdominal pain, distention, nausea.  Per emergency department records, patient did have an episode of vomiting earlier in the morning.  Patient per emergency department record as well as nursing staff states that he is dependent on enemas.  Patient with prior abdominal/pelvic surgeries which include cholecystectomy, prostate and bladder surgery.  Chronic suprapubic catheter.  Good output noted.  Patient with remote history of bowel obstruction.  CT abdomen pelvis completed in the emergency department reveals concern for small bowel obstruction with dilated small bowel loops and transition point in the right lower quadrant where there are distal decompressed small bowel loops.  No free air.  Segments of incomplete distention versus mucosal thickening in the colon.  NG is in place.  Patient with significant medical history which includes hypertension, coronary artery disease, diabetes.  Patient was on aspirin and Plavix prior to admission.  Normal colonoscopy per GI in 2019.  History of colon polyps.    PAST MEDICAL HISTORY     Past Medical History:   Diagnosis Date    Allergic rhinitis     Angina pectoris 2025    Anxiety     Borderline hypertension     CAD (coronary artery disease)     Controlled diabetes mellitus type II without complication (HCC)     Debility 2025    Dental abscess     Depression     On medas    Essential hypertension 2021    History of colon  polyps     History of suprapubic catheter 07/30/2018    Intermittent self-catheterization of bladder 06/17/2016    3/8/17 does not do this any more- was getting UTI's    Kidney stones     Neuropathy     Obesity     HISTORY OF OBESITY, AS OF 11/06/2017 PATIENT HAS LOST 80 LBS    Obstructive sleep apnea of adult 2005    on CPAP    Osteoarthritis     RIGHT SHOULDER, BACK    Prostate enlargement 2014    Self cath since Jan. 16 2016    Restless legs syndrome     Stomach ulcer 02/14/2017    Tubular adenoma of colon 06/2016    Type II or unspecified type diabetes mellitus without mention of complication, not stated as uncontrolled 2006    NO LONGER ON MEDS, LOST WEIGHT, DIET CONTROLLED    UTI (urinary tract infection)     Wears glasses     Weight loss counseling, encounter for 01/26/2017       PROBLEM LIST     Patient Active Problem List   Diagnosis    Type 2 diabetes, controlled, with neuropathy (HCC)    Benign hypertension with CKD (chronic kidney disease), stage II    Anxiety    BRIDGETTE (obstructive sleep apnea)    Obesity, Class I, BMI 30-34.9    Chronic right shoulder pain    History of colon polyps    Gilbert's disease    Class 1 obesity due to excess calories with serious comorbidity and body mass index (BMI) of 30.0 to 30.9 in adult    H/O urinary retention    Diastasis recti    DDD (degenerative disc disease), cervical    Neck pain    Cervical stenosis of spine    BPH (benign prostatic hyperplasia)    Chronic constipation    Mixed hyperlipidemia    Spondylolisthesis of lumbar region    Microalbuminuria    Chronic suprapubic catheter (HCC)    Spinal stenosis of lumbar region at multiple levels    Disorder of rotator cuff    Chronic dyspnea    CAD, multiple vessel    Coronary artery disease    S/P CABG (coronary artery bypass graft)    History of lumbar fusion    History of total replacement of right shoulder joint    Type 2 diabetes mellitus with hyperglycemia, without long-term current use of insulin (HCC)     series 601.  Decompressed distal small bowel loops in the right lower quadrant.  Segments  of incomplete distension in the sigmoid colon, descending colon, and near the  splenic flexure.  No localized inflammatory changes.    Appendix: Normal appearance of the appendix.    Pelvis: Suprapubic catheter in the urinary bladder.  Small fat containing  left greater than right inguinal hernias.  No free fluid in the pelvis.  Prostate calcifications.    Peritoneum/Retroperitoneum: Atherosclerosis of the abdominal aorta.  No  retroperitoneal lymphadenopathy by CT criteria.    Bones/Soft Tissues: No acute findings within the subcutaneous soft tissues.  Spondylosis of the visualized spine.  Hardware in the lumbar spine.    Impression  Concern for small bowel obstruction with dilated small bowel loops and  transition point in the right lower quadrant where there are distal  decompressed small bowel loops.  No free air.    Segments of incomplete distension versus mucosal thickening in the colon as  described.  No localized inflammatory changes.      Hospital Problems           Last Modified POA    * (Principal) SBO (small bowel obstruction) ([facility]) 6/15/2025 Yes       ASSESSMENT   69 y.o. male admitted with small bowel obstruction  History of remote bowel obstruction  Patient is feeling better from admission, decreased abdominal pain, no nausea or vomiting, no bowel movement yet but is passing gas  CT abdomen pelvis completed in the emergency department reveals concern for small bowel obstruction with dilated small bowel loops interstitium point in the right lower quadrant where there are distal decompressed small bowel loops.  No free air.  Segments of incomplete distention versus mucosal thickening in the colon as described.  No localized inflammatory changes.  Lab work s reviewed.  Preliminary blood cultures negative.  Lactic acid yesterday normal at 1.6.  Sodium 138.  Potassium 4.1.  Creatinine 1.1.  Total bilirubin 1.9.

## 2025-06-16 NOTE — PROGRESS NOTES
Pt requesting IV muscle relaxer stating nothing is helping his muscle spasms.Dr. Arciniega made aware.

## 2025-06-16 NOTE — PROGRESS NOTES
Patient arrived to unit. Patient able to pivot to bed. Bed in lowest position, wheels locked and call light within reach. NG to suction.

## 2025-06-16 NOTE — CARE COORDINATION
Case Management Assessment  Initial Evaluation    Date/Time of Evaluation: 6/16/2025 2:39 PM  Assessment Completed by: Olga Luu RN    If patient is discharged prior to next notation, then this note serves as note for discharge by case management.    Patient Name: Earnest Avila                   YOB: 1955  Diagnosis: SBO (small bowel obstruction) ([facility]) [K56.609]                   Date / Time: 6/15/2025  3:39 PM    Patient Admission Status: Inpatient   Readmission Risk (Low < 19, Mod (19-27), High > 27): Readmission Risk Score: 17.6    Current PCP: Theresa Garrison MD  PCP verified by CM? Yes    Chart Reviewed: Yes      History Provided by: Patient  Patient Orientation: Alert and Oriented    Patient Cognition: Alert    Hospitalization in the last 30 days (Readmission):  No    If yes, Readmission Assessment in CM Navigator will be completed.    Advance Directives:      Code Status: Full Code   Patient's Primary Decision Maker is: Legal Next of Kin    Primary Decision Maker: Alyssa Avila - Spouse - [phone]    Discharge Planning:    Patient lives with: Spouse/Significant Other Type of Home: House  Primary Care Giver: Self  Patient Support Systems include: Spouse/Significant Other, Family Members   Current Financial resources: Medicare  Current community resources: Other (Comment) (Follows at [facility])  Current services prior to admission: C-pap, Durable Medical Equipment            Current DME: Walker, Shower Chair, Other (Comment) (SP cath supplies)            Type of Home Care services:  None    ADLS  Prior functional level: Independent in ADLs/IADLs  Current functional level: Independent in ADLs/IADLs    PT AM-PAC: 18 /24  OT AM-PAC: 19 /24    Family can provide assistance at DC: Yes  Would you like Case Management to discuss the discharge plan with any other family members/significant others, and if so, who? No  Plans to Return to Present Housing: Yes  Other  Identified Issues/Barriers to RETURNING to current housing: no  Potential Assistance needed at discharge: N/A            Potential DME:    Patient expects to discharge to: House  Plan for transportation at discharge: Self    Financial    Payor: [employer] / Plan: [employer] [address] / Product Type: *No Product type* /     Does insurance require precert for SNF: Yes    Potential assistance Purchasing Medications: No  Meds-to-Beds request:        [facility] [id number] - [place], [place] - [address] - P [phone] - F [phone]  [address]  [facility] [address]  Phone: [phone] Fax: [phone]      Notes:    Factors facilitating achievement of predicted outcomes: Family support, Motivated, Cooperative, and Pleasant    Barriers to discharge: n/a    Additional Case Management Notes: From home with spouse, independent and drives. DME: walker, SC, cpap, SP cath supplies. VNS: none/declined. Denies needs. Follows at [facility]. NPO with NG ita CONTRERAS     The Plan for Transition of Care is related to the following treatment goals of SBO (small bowel obstruction) ([facility]) [K56.609]    IF APPLICABLE: The Patient and/or patient representative Earnest and his family were provided with a choice of provider and agrees with the discharge plan. Freedom of choice list with basic dialogue that supports the patient's individualized plan of care/goals and shares the quality data associated with the providers was provided to: Patient   Patient Representative Name:       The Patient and/or Patient Representative Agree with the Discharge Plan? Yes    Olga Luu RN  Case Management Department  Ph: [phone] Fax: [phone]

## 2025-06-16 NOTE — PROGRESS NOTES
Comprehensive Nutrition Assessment    Type and Reason for Visit:  Initial, Positive nutrition screen (Poor appetite, intake, and wt loss)    Nutrition Recommendations/Plan:   NPO ordered.  Will monitor and recommend appropriate course of action when nutrition advances.     Malnutrition Assessment:  Malnutrition Status:  Insufficient data (06/16/25 1328)    Context:  Chronic Illness     Findings of the 6 clinical characteristics of malnutrition:  Energy Intake:  Mild decrease in energy intake  Weight Loss:   (8.9% wt loss in 5 months; may in part be d/t fluid shifts)     Body Fat Loss:  Unable to assess     Muscle Mass Loss:  Unable to assess    Fluid Accumulation:  Unable to assess     Strength:  Not Performed    Nutrition Assessment:    Pt in PCU, admitted for SBO, with PMH of DM2, CAD s/t four-vessel CABG on 2/21, chronic constipation, colon polyps, osteoarthitis. Pt is NPO, with NG to LIWS.    Nutrition Related Findings:    Nausea. Gluc 160 (6/15). No edema data available. Rocephin. Wound Type: None       Current Nutrition Intake & Therapies:    Average Meal Intake: NPO  Average Supplements Intake: NPO  Diet NPO Exceptions are: Sips of Water with Meds, Sips of Clear Liquids, Ice Chips    Anthropometric Measures:  Height: 193 cm (6' 3.98\")  Ideal Body Weight (IBW): 202 lbs (92 kg)    Admission Body Weight: 111.6 kg (246 lb 0.5 oz)  Current Body Weight: 111.6 kg (246 lb 0.5 oz), 121.8 % IBW. Weight Source: Stated  Current BMI (kg/m2): 30  Usual Body Weight: 122.5 kg (270 lb 1 oz) (1/29/2025 (~5mo.) Stated)     % Weight Change (Calculated): -8.9  Weight Adjustment For: No Adjustment                 BMI Categories: Obese Class 1 (BMI 30.0-34.9)    Estimated Daily Nutrient Needs:  Energy Requirements Based On: Formula  Weight Used for Energy Requirements: Admission  Energy (kcal/day): 8967-8222 kcal/day based on [place]. Jeor 1 factor  Weight Used for Protein Requirements: Ideal  Protein (g/day):  g/day  based on 1-1.2 g/kg/ideal  Method Used for Fluid Requirements: 1 ml/kcal  Fluid (ml/day): or per physician    Nutrition Diagnosis:   Inadequate protein-energy intake related to altered GI function as evidenced by NPO or clear liquid status due to medical condition    Nutrition Interventions:   Food and/or Nutrient Delivery: Continue NPO  Nutrition Education/Counseling: No recommendation at this time  Coordination of Nutrition Care: Continue to monitor while inpatient       Goals:  Goals: Initiation of nutrition  Type of Goal: New goal  Previous Goal Met: New Goal    Nutrition Monitoring and Evaluation:   Behavioral-Environmental Outcomes: None Identified  Food/Nutrient Intake Outcomes: Progression of Nutrition  Physical Signs/Symptoms Outcomes: Biochemical Data, GI Status, Fluid Status or Edema, Skin, Weight    Discharge Planning:    Too soon to determine     Gemma Wilburn, MS, LD, RD   Contact: [phone]

## 2025-06-16 NOTE — PROGRESS NOTES
[facility] Internal Medicine   Trevor Arciniega MD; MD [place] Milan MD, MD , Jayant Schaefer MD    [facility] Internal Medicine   IN-PATIENT SERVICE   [facility]    Progress note            Date:   6/16/2025  Patient name:  Earnest Avila  Date of admission:  6/15/2025  3:39 PM  MRN:   [id number]  Account:  [id number]  YOB: 1955  PCP:    Theresa Garrison MD  Room:   2093/2093-01  Code Status:    Full Code    Chief Complaint:     Chief Complaint   Patient presents with    Abdominal Pain    Nausea & Vomiting     Started this morning        History Obtained From:     Patient medical record nursing staff    History of Present Illness:     Earnest Avila is a 69 y.o. Non- / non  male who presents with Abdominal Pain and Nausea & Vomiting (Started this morning )   and is admitted to the hospital for the management of SBO (small bowel obstruction) (HCC).    69-year-old  gentleman with a recent history of a four-vessel coronary artery bypass graft done at [facility] this February  History of cholecystectomy 20 years ago history of diabetes mellitus hypertension hyperlipidemia history of colon polyps  Admitted with abdominal pain started last night gradually gotten worse over the night this morning he was having nausea vomiting no bowel movements patient claims he was passing small amount of gas  Associated with abdominal distention  Pain 10 out of 10 sharp stabbing colicky kind of pain with radiation to back diffuse all over the abdomen  Patient had a CT scan done which showed small bowel obstruction NG tube was placed  Patient is n.p.o.    Past Medical History:     Past Medical History:   Diagnosis Date    Allergic rhinitis     Angina pectoris 02/14/2025    Anxiety     Borderline hypertension     CAD (coronary artery disease)     Controlled diabetes mellitus type II without complication  wolf catheter to be changed out every 14 days. 5/28/24   Gaby Amezcua, APRN - CNP        Allergies:     Iodine, Other, Senna, Colace [dss], Docusate, Environmental/seasonal, Metformin and related, Metoprolol, Sitagliptin, and Sitagliptin phos-metformin hcl    Social History:     Tobacco:    reports that he quit smoking about 43 years ago. His smoking use included cigarettes. He started smoking about 50 years ago. He has a 3.5 pack-year smoking history. He has been exposed to tobacco smoke. He has never used smokeless tobacco.  Alcohol:      reports that he does not currently use alcohol after a past usage of about 5.0 standard drinks of alcohol per week.  Drug Use:  reports that he does not currently use drugs after having used the following drugs: Marijuana (Weed).    Family History:     Family History   Problem Relation Age of Onset    Diabetes Mother     Heart Disease Mother         A-Fib    High Blood Pressure Mother     Atrial Fibrillation Mother     Stroke Mother     Heart Disease Father     Heart Surgery Father         Stent in    Cancer Father         Foundation cell    Arthritis Sister     Diabetes Maternal Grandfather     Colon Cancer Paternal Grandfather     Cancer Paternal Grandfather         Colon       Review of Systems:     Positive and Negative as described in HPI.    CONSTITUTIONAL:  negative for fevers, chills, sweats, fatigue, weight loss  HEENT:  negative for vision, hearing changes, runny nose, throat pain  RESPIRATORY:  negative for shortness of breath, cough, congestion, wheezing  CARDIOVASCULAR:  negative for chest pain, palpitations  GASTROINTESTINAL: Nausea vomiting abdominal distention GENITOURINARY:  negative for difficulty of urination, burning with urination, frequency   INTEGUMENT:  negative for rash, skin lesions, easy bruising   HEMATOLOGIC/LYMPHATIC:  negative for swelling/edema   ALLERGIC/IMMUNOLOGIC:  negative for urticaria , itching  ENDOCRINE:  negative increase in

## 2025-06-16 NOTE — PROGRESS NOTES
Physical Therapy  [facility]   Physical Therapy Evaluation  Date: 25  Patient Name: Earnest Avila       Room:   MRN: [id number]  Account: [id number]   : 1955  (69 y.o.) Gender: male     Discharge Recommendations:  Discharge Recommendations: Continue to assess pending progress, Patient would benefit from continued therapy after discharge, Therapy recommended at discharge     PT D/C Equipment  Other: TBD  PT Equipment Recommendations  Other: TBD     Past Medical History:  has a past medical history of Allergic rhinitis, Angina pectoris, Anxiety, Borderline hypertension, CAD (coronary artery disease), Controlled diabetes mellitus type II without complication (HCC), Debility, Dental abscess, Depression, Essential hypertension, History of colon polyps, History of suprapubic catheter, Intermittent self-catheterization of bladder, Kidney stones, Neuropathy, Obesity, Obstructive sleep apnea of adult, Osteoarthritis, Prostate enlargement, Restless legs syndrome, Stomach ulcer, Tubular adenoma of colon, Type II or unspecified type diabetes mellitus without mention of complication, not stated as uncontrolled, UTI (urinary tract infection), Wears glasses, and Weight loss counseling, encounter for.  Past Surgical History:   has a past surgical history that includes Cholecystectomy (); Lithotripsy (2016); Lithotripsy; Cystocopy (2016); Prostate surgery (2016); Cystoscopy (2016); Colonoscopy (2016); Shoulder Arthroplasty (Right, 2016); Shoulder arthroscopy (Right, ); Vasectomy (); Cystoscopy (2016); Cystocopy; Cystoscopy (N/A, 2017); Cystoscopy (N/A, 2017); joint replacement; Cystourethroscopy/Urethral Dilation (11/15/2017); pr cystourethroscopy (N/A, 11/15/2017); Cystoscopy (N/A, 11/15/2017); eye surgery (Bilateral, ); Bladder surgery; Colonoscopy (N/A, 2019); Cardiac catheterization (2025); Cardiac  of Therapy, Transfer Training, Mobility Training  Education Method: Demonstration, Verbal  Barriers to Learning: None  Education Outcome: Verbalized understanding, Continued education needed     Functional Outcome Measures  AM-PAC Basic Mobility - Inpatient   How much help is needed turning from your back to your side while in a flat bed without using bedrails?: A Little  How much help is needed moving from lying on your back to sitting on the side of a flat bed without using bedrails?: A Little  How much help is needed moving to and from a bed to a chair?: A Little  How much help is needed standing up from a chair using your arms?: A Little  How much help is needed walking in hospital room?: A Little  How much help is needed climbing 3-5 steps with a railing?: A Little  [facility] Mobility Raw Score : 18  AM-PAC Inpatient T-Scale Score : 43.63  Mobility Inpatient CMS 0-100% Score: 46.58  Mobility Inpatient CMS G-Code Modifier : CK     Goals  Patient Goals   Patient Goals : return home  Short Term Goals  Time Frame for Short Term Goals: 5-7 visits/week  Short Term Goal 1: pt to demo SBA supine to sit bed mobility to facilitate functional independence in home  Short Term Goal 2: pt to demo SBA sit <> stand w/ rolling walker to facilitate safe transfers  Short Term Goal 3: pt to demo ambulating at least 30-50 ft SBA w/ rolling walker to faciliate in home ambulation distances  Short Term Goal 4: pt to demo navigating at least 2 steps with hand rail and SBA to faciliate safe mobility in the home upon discharge  Short Term Goal 5: pt to tolerate at least 20 minutes of therapeutic exercise to improve tolerance to daily activity      Plan  Physical Therapy Plan  General Plan: 5-7 times per week  Specific Instructions for Next Treatment: functional LE strengthening, static and dynamic balance training, stair negotiation, ambulating further distances  Current Treatment Recommendations: Strengthening, Balance training,

## 2025-06-16 NOTE — RESULT ENCOUNTER NOTE
Addressed in ED, currently admitted due to small bowel obstruction    Future Appointments  6/18/2025  8:00 AM    [facility] CARD REHAB RM 01       [facility] CARDIAC        [place]  6/20/2025  8:00 AM    [facility] CARD REHAB RM 01       [facility] CARDIAC        [place]  6/23/2025  8:00 AM    [facility] CARD REHAB RM 01       [facility] CARDIAC        [place]  7/17/2025  8:00 AM    Theresa Garrison MD    [facility]               BS ECC DEP  8/5/2025   11:00 AM   Chu Alvarado MD        [facility]        [facility]  8/26/2025  3:50 PM    Adriana Pandya MD       [facility]        [facility]

## 2025-06-17 ENCOUNTER — APPOINTMENT (OUTPATIENT)
Dept: GENERAL RADIOLOGY | Age: 70
DRG: 389 | End: 2025-06-17
Payer: COMMERCIAL

## 2025-06-17 LAB
ANION GAP SERPL CALCULATED.3IONS-SCNC: 10 MMOL/L (ref 9–16)
BASOPHILS # BLD: 0.03 K/UL (ref 0–0.2)
BASOPHILS NFR BLD: 1 % (ref 0–2)
BUN SERPL-MCNC: 23 MG/DL (ref 8–23)
CALCIUM SERPL-MCNC: 8.8 MG/DL (ref 8.6–10.4)
CHLORIDE SERPL-SCNC: 110 MMOL/L (ref 98–107)
CO2 SERPL-SCNC: 22 MMOL/L (ref 20–31)
CREAT SERPL-MCNC: 0.9 MG/DL (ref 0.7–1.2)
EKG ATRIAL RATE: 52 BPM
EKG P AXIS: 15 DEGREES
EKG P-R INTERVAL: 166 MS
EKG Q-T INTERVAL: 422 MS
EKG QRS DURATION: 94 MS
EKG QTC CALCULATION (BAZETT): 392 MS
EKG R AXIS: 35 DEGREES
EKG T AXIS: 86 DEGREES
EKG VENTRICULAR RATE: 52 BPM
EOSINOPHIL # BLD: <0.03 K/UL (ref 0–0.44)
EOSINOPHILS RELATIVE PERCENT: 0 % (ref 0–4)
ERYTHROCYTE [DISTWIDTH] IN BLOOD BY AUTOMATED COUNT: 13.2 % (ref 11.5–14.9)
GFR, ESTIMATED: >90 ML/MIN/1.73M2
GLUCOSE BLD-MCNC: 112 MG/DL (ref 75–110)
GLUCOSE BLD-MCNC: 120 MG/DL (ref 75–110)
GLUCOSE BLD-MCNC: 94 MG/DL (ref 75–110)
GLUCOSE BLD-MCNC: 99 MG/DL (ref 75–110)
GLUCOSE SERPL-MCNC: 104 MG/DL (ref 74–99)
HCT VFR BLD AUTO: 40.3 % (ref 41–53)
HGB BLD-MCNC: 13.7 G/DL (ref 13.5–17.5)
IMM GRANULOCYTES # BLD AUTO: 0.03 K/UL (ref 0–0.3)
IMM GRANULOCYTES NFR BLD: 1 %
LYMPHOCYTES NFR BLD: 1.06 K/UL (ref 1.1–3.7)
LYMPHOCYTES RELATIVE PERCENT: 20 % (ref 24–44)
MCH RBC QN AUTO: 32.5 PG (ref 26–34)
MCHC RBC AUTO-ENTMCNC: 34 G/DL (ref 31–37)
MCV RBC AUTO: 95.5 FL (ref 80–100)
MONOCYTES NFR BLD: 0.62 K/UL (ref 0.1–1.2)
MONOCYTES NFR BLD: 11 % (ref 3–12)
NEUTROPHILS NFR BLD: 67 % (ref 36–66)
NEUTS SEG NFR BLD: 3.69 K/UL (ref 1.5–8.1)
NRBC BLD-RTO: 0 PER 100 WBC
PLATELET # BLD AUTO: 130 K/UL (ref 150–450)
PMV BLD AUTO: 9.6 FL (ref 8–13.5)
POTASSIUM SERPL-SCNC: 4 MMOL/L (ref 3.7–5.3)
RBC # BLD AUTO: 4.22 M/UL (ref 4.21–5.77)
SODIUM SERPL-SCNC: 142 MMOL/L (ref 136–145)
WBC OTHER # BLD: 5.4 K/UL (ref 3.5–11)

## 2025-06-17 PROCEDURE — 85025 COMPLETE CBC W/AUTO DIFF WBC: CPT

## 2025-06-17 PROCEDURE — 82947 ASSAY GLUCOSE BLOOD QUANT: CPT

## 2025-06-17 PROCEDURE — 36415 COLL VENOUS BLD VENIPUNCTURE: CPT

## 2025-06-17 PROCEDURE — 93005 ELECTROCARDIOGRAM TRACING: CPT | Performed by: INTERNAL MEDICINE

## 2025-06-17 PROCEDURE — 74250 X-RAY XM SM INT 1CNTRST STD: CPT

## 2025-06-17 PROCEDURE — 93010 ELECTROCARDIOGRAM REPORT: CPT | Performed by: INTERNAL MEDICINE

## 2025-06-17 PROCEDURE — 99232 SBSQ HOSP IP/OBS MODERATE 35: CPT | Performed by: NURSE PRACTITIONER

## 2025-06-17 PROCEDURE — 2500000003 HC RX 250 WO HCPCS: Performed by: INTERNAL MEDICINE

## 2025-06-17 PROCEDURE — 6370000000 HC RX 637 (ALT 250 FOR IP)

## 2025-06-17 PROCEDURE — 2060000000 HC ICU INTERMEDIATE R&B

## 2025-06-17 PROCEDURE — 99233 SBSQ HOSP IP/OBS HIGH 50: CPT | Performed by: INTERNAL MEDICINE

## 2025-06-17 PROCEDURE — 80048 BASIC METABOLIC PNL TOTAL CA: CPT

## 2025-06-17 PROCEDURE — 6360000002 HC RX W HCPCS: Performed by: INTERNAL MEDICINE

## 2025-06-17 PROCEDURE — 2580000003 HC RX 258

## 2025-06-17 PROCEDURE — 6360000004 HC RX CONTRAST MEDICATION: Performed by: SURGERY

## 2025-06-17 PROCEDURE — 6360000002 HC RX W HCPCS

## 2025-06-17 PROCEDURE — 99222 1ST HOSP IP/OBS MODERATE 55: CPT | Performed by: INTERNAL MEDICINE

## 2025-06-17 PROCEDURE — 2500000003 HC RX 250 WO HCPCS

## 2025-06-17 RX ORDER — DIATRIZOATE MEGLUMINE AND DIATRIZOATE SODIUM 660; 100 MG/ML; MG/ML
360 SOLUTION ORAL; RECTAL
Status: DISCONTINUED | OUTPATIENT
Start: 2025-06-17 | End: 2025-06-20 | Stop reason: HOSPADM

## 2025-06-17 RX ADMIN — TIZANIDINE 2 MG: 2 TABLET ORAL at 20:17

## 2025-06-17 RX ADMIN — ONDANSETRON 4 MG: 2 INJECTION, SOLUTION INTRAMUSCULAR; INTRAVENOUS at 18:06

## 2025-06-17 RX ADMIN — SODIUM CHLORIDE: 900 INJECTION, SOLUTION INTRAVENOUS at 19:18

## 2025-06-17 RX ADMIN — TIZANIDINE 2 MG: 2 TABLET ORAL at 15:06

## 2025-06-17 RX ADMIN — MORPHINE SULFATE 1 MG: 2 INJECTION, SOLUTION INTRAMUSCULAR; INTRAVENOUS at 18:04

## 2025-06-17 RX ADMIN — SODIUM CHLORIDE 40 MG: 9 INJECTION INTRAMUSCULAR; INTRAVENOUS; SUBCUTANEOUS at 07:59

## 2025-06-17 RX ADMIN — ENOXAPARIN SODIUM 30 MG: 100 INJECTION SUBCUTANEOUS at 20:17

## 2025-06-17 RX ADMIN — ONDANSETRON 4 MG: 2 INJECTION, SOLUTION INTRAMUSCULAR; INTRAVENOUS at 08:36

## 2025-06-17 RX ADMIN — LORAZEPAM 1 MG: 2 INJECTION INTRAMUSCULAR; INTRAVENOUS at 20:17

## 2025-06-17 RX ADMIN — WATER 1000 MG: 1 INJECTION INTRAMUSCULAR; INTRAVENOUS; SUBCUTANEOUS at 17:06

## 2025-06-17 RX ADMIN — ENOXAPARIN SODIUM 30 MG: 100 INJECTION SUBCUTANEOUS at 07:59

## 2025-06-17 RX ADMIN — MORPHINE SULFATE 1 MG: 2 INJECTION, SOLUTION INTRAMUSCULAR; INTRAVENOUS at 10:07

## 2025-06-17 RX ADMIN — SODIUM CHLORIDE: 900 INJECTION, SOLUTION INTRAVENOUS at 05:51

## 2025-06-17 RX ADMIN — DIATRIZOATE MEGLUMINE AND DIATRIZOATE SODIUM 360 ML: 660; 100 LIQUID ORAL; RECTAL at 10:45

## 2025-06-17 ASSESSMENT — PAIN DESCRIPTION - DESCRIPTORS
DESCRIPTORS: DISCOMFORT
DESCRIPTORS: DISCOMFORT
DESCRIPTORS: STABBING
DESCRIPTORS: SORE

## 2025-06-17 ASSESSMENT — PAIN - FUNCTIONAL ASSESSMENT
PAIN_FUNCTIONAL_ASSESSMENT: ACTIVITIES ARE NOT PREVENTED

## 2025-06-17 ASSESSMENT — PAIN SCALES - GENERAL
PAINLEVEL_OUTOF10: 3
PAINLEVEL_OUTOF10: 5
PAINLEVEL_OUTOF10: 4

## 2025-06-17 ASSESSMENT — ENCOUNTER SYMPTOMS
RHINORRHEA: 0
SHORTNESS OF BREATH: 0
SORE THROAT: 0
COUGH: 0

## 2025-06-17 ASSESSMENT — PAIN DESCRIPTION - PAIN TYPE
TYPE: ACUTE PAIN
TYPE: ACUTE PAIN

## 2025-06-17 ASSESSMENT — PAIN DESCRIPTION - LOCATION
LOCATION: GENERALIZED
LOCATION: HEAD
LOCATION: GENERALIZED
LOCATION: BACK

## 2025-06-17 ASSESSMENT — PAIN DESCRIPTION - ORIENTATION: ORIENTATION: MID

## 2025-06-17 NOTE — PROGRESS NOTES
HR dropped 38-40. Asymptomatic. Dr. Pandya made aware.     Dr. Pandya responded, Patient has known asymptomatic sinus bradycardia with normal blood pressure, no further cardiac work up or pacemaker is indicated Keep treating the underlying issues of abdominal discomfort with small bowel obstruction Thx

## 2025-06-17 NOTE — PROGRESS NOTES
HR 40-50 this morning. Asymptomatic. Dr. Arciniega made aware.     0840:New consult for Cardiology.

## 2025-06-17 NOTE — PROGRESS NOTES
[facility] General Surgery   Juan Flores MD, FACS  Senia Hahnry, APRN-CNP  [address], [address]  [place] [address]  P: [phone], F: [phone]    General and Robotic Surgery  Progress Note         PATIENT NAME: Earnest Avila   :  1955   MRN: [id number]   PCP:  Theresa Garrison MD     TODAY'S DATE: 2025    HISTORY OF PRESENT ILLNESS: 69 y.o. male seen and examined earlier this morning and this afternoon.  Patient feeling fairly well overall.  No significant abdominal pain no nausea or vomiting.  Prior to small bowel follow-through today he was passing gas.  No nausea or vomiting.  No fever or chills.  Did not feel bloated.  After small bowel follow-through patient has started to have bowel movements.    PAST MEDICAL HISTORY     Past Medical History:   Diagnosis Date    Allergic rhinitis     Angina pectoris 2025    Anxiety     Borderline hypertension     CAD (coronary artery disease)     Controlled diabetes mellitus type II without complication (HCC)     Debility 2025    Dental abscess     Depression     On medas    Essential hypertension 2021    History of colon polyps     History of suprapubic catheter 2018    Intermittent self-catheterization of bladder 2016    3/8/17 does not do this any more- was getting UTI's    Kidney stones     Neuropathy     Obesity     HISTORY OF OBESITY, AS OF 2017 PATIENT HAS LOST 80 LBS    Obstructive sleep apnea of adult 2005    on CPAP    Osteoarthritis     RIGHT SHOULDER, BACK    Prostate enlargement 2014    Self cath since 2016    Restless legs syndrome     Stomach ulcer 2017    Tubular adenoma of colon 2016    Type II or unspecified type diabetes mellitus without mention of complication, not stated as uncontrolled     NO LONGER ON MEDS, LOST WEIGHT, DIET CONTROLLED    UTI (urinary tract infection)     Wears glasses     Weight loss counseling, encounter for 2017

## 2025-06-17 NOTE — PLAN OF CARE
Problem: Chronic Conditions and Co-morbidities  Goal: Patient's chronic conditions and co-morbidity symptoms are monitored and maintained or improved  6/17/2025 0426 by Jillian Ford RN  Outcome: Progressing     Problem: Discharge Planning  Goal: Discharge to home or other facility with appropriate resources  6/17/2025 0426 by Jillian Ford RN  Outcome: Progressing     Problem: Pain  Goal: Verbalizes/displays adequate comfort level or baseline comfort level  6/17/2025 0426 by Jillian Ford RN  Outcome: Progressing     Problem: ABCDS Injury Assessment  Goal: Absence of physical injury  6/17/2025 0426 by Jillian Ford RN  Outcome: Progressing     Problem: Safety - Adult  Goal: Free from fall injury  6/17/2025 0426 by Jillian Ford RN  Outcome: Progressing     Problem: Nutrition Deficit:  Goal: Optimize nutritional status  6/17/2025 0426 by Jillian Ford RN  Outcome: Progressing

## 2025-06-17 NOTE — CARE COORDINATION
Case Management   Daily Progress Note       Patient Name: Earnest Avila                   YOB: 1955  Diagnosis: SBO (small bowel obstruction) ([facility]ly [facility]) [K56.609]                       GMLOS: 2.3 days  Length of Stay: 2  days    Readmission Risk (Low < 19, Mod (19-27), High > 27): Readmission Risk Score: 18      Patient is alert and oriented.    Spoke with patient, and Current Transitional Plan is:    [x] Home Independently    [] Home with HC    [] Skilled Nursing Facility    [] Acute Rehabilitation    [] Long Term Acute Care (LTAC)    [] Other:     Medical Management: Urology/Cardiac/Surgery Consults. Small bowel follow through pending. NG-LIWS. IV Rocephin. PT/OT on.     Additional Notes: n/a    Electronically signed by Olga Montgomery RN on 6/17/2025 at 2:18 PM

## 2025-06-17 NOTE — PROGRESS NOTES
[facility]   OCCUPATIONAL THERAPY MISSED TREATMENT NOTE   INPATIENT   Date: 25  Patient Name: Earnest Avila       Room:   MRN: [id number]   Account #: [id number]    : 1955  (69 y.o.)  Gender: male   Referring Practitioner: Trevor Arciniega MD  Diagnosis: SBO             REASON FOR MISSED TREATMENT:  Patient declined   -    Upon arrival, patient supine in bed. OT educating patient on importance/benefit on engagement in therapy with patient declining and reporting \"I was handy hoping I was gonna go home\", but patient still NPO and having NG tube placed. Patient reporting having multiple tests earlier in the day and out of bed for utilize toilet but continues to decline engagement in therapy this date despite encouragement. Educated on attempting tomorrow with verbalizing understanding. OT will continue to follow. 1316            Electronically signed by NIEVES Higgins on 25 at 1:27 PM EDT

## 2025-06-17 NOTE — PROGRESS NOTES
During patient small bowel follow through, Writer was called for NG tube needing advancement based off of imaging. NG advanced to 69cm.

## 2025-06-17 NOTE — PROGRESS NOTES
[facility] Internal Medicine   Trevor Arciniega MD; MD [place] Milan MD, MD , Jayant Schaefer MD    [facility] Internal Medicine   IN-PATIENT SERVICE   [facility]    Progress note            Date:   6/17/2025  Patient name:  Earnest Avila  Date of admission:  6/15/2025  3:39 PM  MRN:   [id number]  Account:  [id number]  YOB: 1955  PCP:    Theresa Garrison MD  Room:   2093/2093-01  Code Status:    Full Code    Chief Complaint:     Chief Complaint   Patient presents with    Abdominal Pain    Nausea & Vomiting     Started this morning        History Obtained From:     Patient medical record nursing staff    History of Present Illness:     Earnest Avila is a 69 y.o. Non- / non  male who presents with Abdominal Pain and Nausea & Vomiting (Started this morning )   and is admitted to the hospital for the management of SBO (small bowel obstruction) (HCC).    69-year-old  gentleman with a recent history of a four-vessel coronary artery bypass graft done at [facility] this February  History of cholecystectomy 20 years ago history of diabetes mellitus hypertension hyperlipidemia history of colon polyps  Admitted with abdominal pain started last night gradually gotten worse over the night this morning he was having nausea vomiting no bowel movements patient claims he was passing small amount of gas  Associated with abdominal distention  Pain 10 out of 10 sharp stabbing colicky kind of pain with radiation to back diffuse all over the abdomen  Patient had a CT scan done which showed small bowel obstruction NG tube was placed  Patient is n.p.o.    Past Medical History:     Past Medical History:   Diagnosis Date    Allergic rhinitis     Angina pectoris 02/14/2025    Anxiety     Borderline hypertension     CAD (coronary artery disease)     Controlled diabetes mellitus type II without complication  (HCC)     Debility 02/25/2025    Dental abscess     Depression     On medas    Essential hypertension 02/12/2021    History of colon polyps     History of suprapubic catheter 07/30/2018    Intermittent self-catheterization of bladder 06/17/2016    3/8/17 does not do this any more- was getting UTI's    Kidney stones     Neuropathy     Obesity     HISTORY OF OBESITY, AS OF 11/06/2017 PATIENT HAS LOST 80 LBS    Obstructive sleep apnea of adult 2005    on CPAP    Osteoarthritis     RIGHT SHOULDER, BACK    Prostate enlargement 2014    Self cath since Jan. 16 2016    Restless legs syndrome     Stomach ulcer 02/14/2017    Tubular adenoma of colon 06/2016    Type II or unspecified type diabetes mellitus without mention of complication, not stated as uncontrolled 2006    NO LONGER ON MEDS, LOST WEIGHT, DIET CONTROLLED    UTI (urinary tract infection)     Wears glasses     Weight loss counseling, encounter for 01/26/2017        Past Surgical History:     Past Surgical History:   Procedure Laterality Date    BLADDER SURGERY      permanent supra pubic cath    CARDIAC CATHETERIZATION  02/17/2025    DR BARRERA  /  MVD  /  CT CONSULT    CARDIAC PROCEDURE N/A 02/17/2025    mumtaz / [facility] heart [facility] CARDIAC CATH LAB    CHOLECYSTECTOMY  1992    COLONOSCOPY  06/30/2016    portions of tubular adenoma and tubulovillous adenoma    COLONOSCOPY N/A 12/20/2019    COLONOSCOPY DIAGNOSTIC performed by Rogelio Gutierrez MD at [facility] ENDO    CORONARY ARTERY BYPASS GRAFT N/A 02/21/2025    CORONARY ARTERY BYPASS GRAFT X3, MANDO CLIP 40MM, ON PUMP, FRAN performed by Ronnie Jamison MD at [facility] CVOR    CYSTOSCOPY  03/04/2016    with greenlight laser of prostate    CYSTOSCOPY  06/18/2016    Cystoscopy, difficult Leyva catheter placement.    CYSTOSCOPY  07/29/2016    with bladder neck contrature laser ablation and placement of suprapubic catheter    CYSTOSCOPY      CYSTOSCOPY N/A  02/13/2017    CYSTOSCOPY performed by Layo Ratliff MD at [facility] OR    CYSTOSCOPY N/A 03/13/2017    CYSTOSCOPY, TRANSURETHERAL INCISION BLADDER NECK  WITH HOLMIUM LASER performed by Layo Ratliff MD at [facility] OR    CYSTOSCOPY N/A 11/15/2017    SUPRAPUBIC TUBE PLACEMENT, C-ARM performed by Layo Ratliff MD at [facility] OR    CYSTOURETHROSCOPY/URETHRAL DILATION  11/15/2017    HOLMIUM - CYSTOSCOPY, TRANSURETHRAL BLADDER NECK RESECTION WITH URETHRAL DILATION    EYE SURGERY Bilateral 1992    R.K. surgery    JOINT REPLACEMENT      right shoulder -June 2016    LITHOTRIPSY  01/25/2016    LITHOTRIPSY      2014    NM CYSTOURETHROSCOPY N/A 11/15/2017    CYSTOSCOPY performed by Layo Ratliff MD at [facility] OR    PROSTATE SURGERY  01/2016    reduction    SHOULDER ARTHROPLASTY Right 06/16/2016    shoulder    SHOULDER ARTHROSCOPY Right 2002    SHOULDER SURGERY  2018    Total replacement    SPINE SURGERY  2/2/2022    Laminectomy    VASECTOMY  1995        Medications Prior to Admission:     Prior to Admission medications    Medication Sig Start Date End Date Taking? Authorizing Provider   oxyCODONE (ROXICODONE) 5 MG immediate release tablet Take 1 tablet by mouth 2 times daily. Take morning and before bedtime for 14 days Max Daily Amount: 10 mg   Yes Nati Albrecht MD   clopidogrel (PLAVIX) 75 MG tablet Take 1 tablet by mouth daily 5/28/25  Yes Adriana Pandya MD   ondansetron (ZOFRAN-ODT) 4 MG disintegrating tablet Take 1 tablet by mouth every 8 hours as needed for Nausea or Vomiting 5/19/25  Yes Theresa Garrison MD   Multiple Vitamins-Minerals (CENTRUM SILVER PO) Take by mouth  mv-min/folic/K1/lycopen/lutein (CENTRUM SILVER MEN ORAL) Take 1 tablet by mouth in the morning. Active   Yes Nati Albrecht MD   empagliflozin (JARDIANCE) 10 MG tablet Take 1 tablet by mouth daily 4/2/25  Yes Theresa Garrison MD   polyethylene glycol (MIRALAX) 17 GM/SCOOP powder Take 17 g by mouth daily 4/2/25  Yes Theresa Garrison MD

## 2025-06-17 NOTE — PLAN OF CARE
Problem: Discharge Planning  Goal: Discharge to home or other facility with appropriate resources  6/17/2025 1514 by Yadira Anna RN  Outcome: Progressing  Flowsheets (Taken 6/17/2025 1514)  Discharge to home or other facility with appropriate resources: Identify barriers to discharge with patient and caregiver     Problem: Pain  Goal: Verbalizes/displays adequate comfort level or baseline comfort level  6/17/2025 1514 by Yadira Anna RN  Outcome: Progressing  Flowsheets (Taken 6/17/2025 1514)  Verbalizes/displays adequate comfort level or baseline comfort level:   Encourage patient to monitor pain and request assistance   Assess pain using appropriate pain scale   Administer analgesics based on type and severity of pain and evaluate response     Problem: Safety - Adult  Goal: Free from fall injury  6/17/2025 1514 by Yadira Anna RN  Outcome: Progressing  Flowsheets (Taken 6/17/2025 1514)  Free From Fall Injury: Instruct family/caregiver on patient safety

## 2025-06-17 NOTE — PROGRESS NOTES
Physical Therapy  DATE: 2025    NAME: Earnest Avila  MRN: [id number]   : 1955    Patient not seen this date for Physical Therapy due to:      [] Cancel by RN or physician due to:    [] Hemodialysis    [] Critical Lab Value Level     [] Blood transfusion in progress    [] Acute or unstable cardiovascular status   _MAP < 55 or more than >115  _HR < 40 or > 130    [] Acute or unstable pulmonary status   -FiO2 > 60%   _RR < 5 or >40    _O2 sats < 85%    [] Strict Bedrest    [] Off Unit for surgery or procedure    [x] Off Unit for testing; checked on pt @ 1025. Pt OOR. Will continue to follow.       [] Pending imaging to R/O fracture    [] Refusal by Patient      [] Other      [] PT being discontinued at this time. Patient independent. No further needs.     [] PT being discontinued at this time as the patient has been transferred to hospice care. No further needs.      Electronically signed by Kalyn Bhatia PTA on 25 at 10:56 AM EDT

## 2025-06-17 NOTE — CONSULTS
Cardiology Consultation   Luma Spaulding MD [facility]  Adriana Pandya MD [facility]  Celia [place], CNP      Reason for Consult: Sinus bradycardia  Requesting Physician: Trevor Arciniega MD    CHIEF COMPLAINT: Abdominal pain with nausea and vomiting      HISTORY OF PRESENT ILLNESS:    This is a 69-year-old gentleman with history of diabetes mellitus, atherosclerotic CAD status post CABG in 2/2025, dyslipidemia, essential hypertension, obstructive sleep apnea on CPAP, Gilbert's disease, depression/anxiety, nephrolithiasis, generalized osteoarthritis, BPH s/p suprapubic catheter, colon polyps and GERD was admitted with complaints of significant abdominal pain associated with nausea, vomiting and fever. CT scan showed small bowel obstruction and NG tube was placed on suction.  He is noted to have sinus bradycardia with heart rate in the range of 50s in the background of sharp stabbing and colicky abdominal pain. He remains n.p.o. with NG on suction.    Patient states that he just passed gases and feels like having a bowel movement.    Patient underwent coronary artery bypass surgery in February 2025 and postoperatively developed brief atrial fibrillation.  He has been on dual antiplatelet therapy with aspirin and Plavix, atorvastatin for dyslipidemia and carvedilol for hypertension. He has known history of asymptomatic sinus bradycardia as well, noted during previous hospitalization after bypass surgery.      Past Medical History:    Past Medical History:   Diagnosis Date    Allergic rhinitis     Angina pectoris 02/14/2025    Anxiety     Borderline hypertension     CAD (coronary artery disease)     Controlled diabetes mellitus type II without complication (HCC)     Debility 02/25/2025    Dental abscess     Depression     On medas    Essential hypertension 02/12/2021    History of colon polyps     History of suprapubic catheter 07/30/2018    Intermittent self-catheterization of bladder 06/17/2016     (GASTROGRAFIN) 66-10 % solution 360 mL  360 mL Oral ONCE PRN Juan Flores MD   360 mL at 06/17/25 1045    pantoprazole (PROTONIX) 40 mg in sodium chloride (PF) 0.9 % 10 mL injection  40 mg IntraVENous Daily Yuki Yap APRN - CNP   40 mg at 06/17/25 0759    cefTRIAXone (ROCEPHIN) 1,000 mg in sterile water 10 mL IV syringe  1,000 mg IntraVENous Q24H Trevor Arciniega MD   1,000 mg at 06/16/25 1741    LORazepam (ATIVAN) injection 1 mg  1 mg IntraVENous Q4H PRN Trevor Arciniega MD   1 mg at 06/16/25 1548    ondansetron (ZOFRAN) injection 4 mg  4 mg IntraVENous Q6H PRN Trevor Arciniega MD   4 mg at 06/17/25 0836    tiZANidine (ZANAFLEX) tablet 2 mg  2 mg Oral TID Yuliya Umana APRN - NP   2 mg at 06/16/25 2051    0.9 % sodium chloride infusion   IntraVENous Continuous Yuliya Umana APRN - NP 75 mL/hr at 06/17/25 0551 New Bag at 06/17/25 0551    insulin lispro (HUMALOG,ADMELOG) injection vial 0-4 Units  0-4 Units SubCUTAneous 4x Daily AC & HS Yuliya Umana APRN - NP        glucose chewable tablet 16 g  4 tablet Oral PRN Yuliya Umana APRN - NP        dextrose bolus 10% 125 mL  125 mL IntraVENous PRN Yuliya Umana APRN - NP        Or    dextrose bolus 10% 250 mL  250 mL IntraVENous PRN Yuliya Umana APRN - NP        glucagon injection 1 mg  1 mg SubCUTAneous PRN Yuliya Umana APRN - NP        dextrose 10 % infusion   IntraVENous Continuous PRN Yuliya Umana APRN - NP        sodium chloride flush 0.9 % injection 5-40 mL  5-40 mL IntraVENous 2 times per day Trevor Arciniega MD   10 mL at 06/15/25 2229    sodium chloride flush 0.9 % injection 10 mL  10 mL IntraVENous PRN Trevor Arciniega MD        0.9 % sodium chloride infusion   IntraVENous PRN Trevor Arciniega MD        potassium chloride (KLOR-CON M) extended release tablet 40 mEq  40 mEq Oral PRN Trevor Arciniega MD        Or    potassium bicarb-citric acid (EFFER-K) effervescent tablet  dry  Neurological:  Awake, alert, no obvious neurologic deficit    DATA:   EKG 5/28/2025  Sinus rhythm 65 bpm, nonspecific ST wave changes     Echocardiogram 2/18/2025    Low normal left ventricular systolic function. EF by 2D Simpsons Biplane is 54%.    Normal right ventricle systolic function.    No pericardial effusion.     Cardiac catheterization 2/17/2025    Multivessel coronary artery disease    Normal LV systolic function. Normal LVEDP     Coronary artery bypass surgery 2/21/2025  LIMA to LAD, SVG to diagonal, SVG to OM1, SVG to R-PDA      Cardiology Labs:No results for input(s): \"MYOGLOBIN\", \"CKTOTAL\", \"CKMB\", \"CKMBINDEX\", \"TROPHS\", \"TROPONINT\" in the last 72 hours.  Warfarin PT/INR:  Lab Results   Component Value Date/Time    PROTIME 18.6 06/16/2025 05:41 AM    INR 1.4 06/16/2025 05:41 AM     CBC:  Lab Results   Component Value Date/Time    WBC 5.4 06/17/2025 05:45 AM    RBC 4.22 06/17/2025 05:45 AM    HGB 13.7 06/17/2025 05:45 AM    HCT 40.3 06/17/2025 05:45 AM    MCV 95.5 06/17/2025 05:45 AM    MCH 32.5 06/17/2025 05:45 AM    MCHC 34.0 06/17/2025 05:45 AM    RDW 13.2 06/17/2025 05:45 AM     06/17/2025 05:45 AM    MPV 9.6 06/17/2025 05:45 AM     CMP:  Lab Results   Component Value Date/Time     06/17/2025 05:45 AM    K 4.0 06/17/2025 05:45 AM     06/17/2025 05:45 AM    CO2 22 06/17/2025 05:45 AM    BUN 23 06/17/2025 05:45 AM    CREATININE 0.9 06/17/2025 05:45 AM    GFRAA >60 05/24/2022 06:54 AM    LABGLOM >90 06/17/2025 05:45 AM    GLUCOSE 104 06/17/2025 05:45 AM    CALCIUM 8.8 06/17/2025 05:45 AM     Magnesium:    Lab Results   Component Value Date/Time    MG 2.0 04/03/2025 07:31 AM     PTT:    Lab Results   Component Value Date/Time    APTT 28.7 02/21/2025 03:48 PM     TSH:    Lab Results   Component Value Date/Time    TSH 2.13 04/03/2025 07:31 AM     BNP: No results for input(s): \"BNP\", \"PROBNP\" in the last 72 hours.  BMP:  Lab Results   Component Value Date/Time     06/17/2025

## 2025-06-17 NOTE — PROGRESS NOTES
Pt requested home medications be restarted since he is no longer NPO. Yuliya GUERRERO made aware.

## 2025-06-18 ENCOUNTER — HOSPITAL ENCOUNTER (OUTPATIENT)
Dept: CARDIAC REHAB | Age: 70
Setting detail: THERAPIES SERIES
Discharge: HOME | End: 2025-06-18
Payer: COMMERCIAL

## 2025-06-18 PROBLEM — K59.00 CONSTIPATION: Status: ACTIVE | Noted: 2022-05-04

## 2025-06-18 PROBLEM — R93.5 ABNORMAL FINDINGS ON DIAGNOSTIC IMAGING OF ABDOMEN: Status: ACTIVE | Noted: 2025-06-18

## 2025-06-18 PROBLEM — K31.89 DUODENAL MASS: Status: ACTIVE | Noted: 2025-06-18

## 2025-06-18 PROBLEM — I45.5 SINUS PAUSE: Status: ACTIVE | Noted: 2025-06-18

## 2025-06-18 PROBLEM — R00.1 BRADYCARDIA: Status: ACTIVE | Noted: 2025-06-18

## 2025-06-18 LAB
ANION GAP SERPL CALCULATED.3IONS-SCNC: 12 MMOL/L (ref 9–16)
BASOPHILS # BLD: 0.03 K/UL (ref 0–0.2)
BASOPHILS NFR BLD: 1 % (ref 0–2)
BUN SERPL-MCNC: 21 MG/DL (ref 8–23)
CALCIUM SERPL-MCNC: 8.7 MG/DL (ref 8.6–10.4)
CHLORIDE SERPL-SCNC: 108 MMOL/L (ref 98–107)
CO2 SERPL-SCNC: 21 MMOL/L (ref 20–31)
CREAT SERPL-MCNC: 0.9 MG/DL (ref 0.7–1.2)
EOSINOPHIL # BLD: 0.07 K/UL (ref 0–0.44)
EOSINOPHILS RELATIVE PERCENT: 1 % (ref 0–4)
ERYTHROCYTE [DISTWIDTH] IN BLOOD BY AUTOMATED COUNT: 13.2 % (ref 11.5–14.9)
GFR, ESTIMATED: >90 ML/MIN/1.73M2
GLUCOSE BLD-MCNC: 105 MG/DL (ref 75–110)
GLUCOSE BLD-MCNC: 123 MG/DL (ref 75–110)
GLUCOSE BLD-MCNC: 98 MG/DL (ref 75–110)
GLUCOSE SERPL-MCNC: 109 MG/DL (ref 74–99)
HCT VFR BLD AUTO: 41.5 % (ref 41–53)
HGB BLD-MCNC: 13.7 G/DL (ref 13.5–17.5)
IMM GRANULOCYTES # BLD AUTO: 0.03 K/UL (ref 0–0.3)
IMM GRANULOCYTES NFR BLD: 1 %
LYMPHOCYTES NFR BLD: 1.05 K/UL (ref 1.1–3.7)
LYMPHOCYTES RELATIVE PERCENT: 22 % (ref 24–44)
MCH RBC QN AUTO: 32 PG (ref 26–34)
MCHC RBC AUTO-ENTMCNC: 33 G/DL (ref 31–37)
MCV RBC AUTO: 97 FL (ref 80–100)
MICROORGANISM SPEC CULT: ABNORMAL
MONOCYTES NFR BLD: 0.5 K/UL (ref 0.1–1.2)
MONOCYTES NFR BLD: 10 % (ref 3–12)
NEUTROPHILS NFR BLD: 65 % (ref 36–66)
NEUTS SEG NFR BLD: 3.16 K/UL (ref 1.5–8.1)
NRBC BLD-RTO: 0 PER 100 WBC
PLATELET # BLD AUTO: ABNORMAL K/UL (ref 150–450)
PLATELET, FLUORESCENCE: 139 K/UL (ref 150–450)
PLATELETS.RETICULATED NFR BLD AUTO: 2 % (ref 1.1–10.3)
PMV BLD AUTO: 9.9 FL (ref 8–13.5)
POTASSIUM SERPL-SCNC: 3.8 MMOL/L (ref 3.7–5.3)
RBC # BLD AUTO: 4.28 M/UL (ref 4.21–5.77)
SERVICE CMNT-IMP: ABNORMAL
SODIUM SERPL-SCNC: 141 MMOL/L (ref 136–145)
SPECIMEN DESCRIPTION: ABNORMAL
WBC OTHER # BLD: 4.8 K/UL (ref 3.5–11)

## 2025-06-18 PROCEDURE — 97110 THERAPEUTIC EXERCISES: CPT

## 2025-06-18 PROCEDURE — 97116 GAIT TRAINING THERAPY: CPT

## 2025-06-18 PROCEDURE — 2580000003 HC RX 258

## 2025-06-18 PROCEDURE — 6360000002 HC RX W HCPCS

## 2025-06-18 PROCEDURE — 99232 SBSQ HOSP IP/OBS MODERATE 35: CPT | Performed by: NURSE PRACTITIONER

## 2025-06-18 PROCEDURE — 82947 ASSAY GLUCOSE BLOOD QUANT: CPT

## 2025-06-18 PROCEDURE — 36415 COLL VENOUS BLD VENIPUNCTURE: CPT

## 2025-06-18 PROCEDURE — 99233 SBSQ HOSP IP/OBS HIGH 50: CPT | Performed by: INTERNAL MEDICINE

## 2025-06-18 PROCEDURE — 6360000002 HC RX W HCPCS: Performed by: INTERNAL MEDICINE

## 2025-06-18 PROCEDURE — 99254 IP/OBS CNSLTJ NEW/EST MOD 60: CPT | Performed by: INTERNAL MEDICINE

## 2025-06-18 PROCEDURE — APPNB30 APP NON BILLABLE TIME 0-30 MINS: Performed by: NURSE PRACTITIONER

## 2025-06-18 PROCEDURE — 99232 SBSQ HOSP IP/OBS MODERATE 35: CPT | Performed by: INTERNAL MEDICINE

## 2025-06-18 PROCEDURE — 99255 IP/OBS CONSLTJ NEW/EST HI 80: CPT | Performed by: INTERNAL MEDICINE

## 2025-06-18 PROCEDURE — 6370000000 HC RX 637 (ALT 250 FOR IP)

## 2025-06-18 PROCEDURE — 80048 BASIC METABOLIC PNL TOTAL CA: CPT

## 2025-06-18 PROCEDURE — 2580000003 HC RX 258: Performed by: INTERNAL MEDICINE

## 2025-06-18 PROCEDURE — 2060000000 HC ICU INTERMEDIATE R&B

## 2025-06-18 PROCEDURE — 85025 COMPLETE CBC W/AUTO DIFF WBC: CPT

## 2025-06-18 PROCEDURE — 6370000000 HC RX 637 (ALT 250 FOR IP): Performed by: INTERNAL MEDICINE

## 2025-06-18 PROCEDURE — 2500000003 HC RX 250 WO HCPCS

## 2025-06-18 RX ORDER — PANTOPRAZOLE SODIUM 40 MG/1
40 TABLET, DELAYED RELEASE ORAL DAILY
Status: DISCONTINUED | OUTPATIENT
Start: 2025-06-18 | End: 2025-06-20 | Stop reason: HOSPADM

## 2025-06-18 RX ORDER — PREGABALIN 100 MG/1
100 CAPSULE ORAL ONCE
Status: COMPLETED | OUTPATIENT
Start: 2025-06-18 | End: 2025-06-18

## 2025-06-18 RX ORDER — PREGABALIN 75 MG/1
75 CAPSULE ORAL 2 TIMES DAILY
Status: DISCONTINUED | OUTPATIENT
Start: 2025-06-18 | End: 2025-06-18

## 2025-06-18 RX ORDER — ATORVASTATIN CALCIUM 20 MG/1
20 TABLET, FILM COATED ORAL NIGHTLY
Status: DISCONTINUED | OUTPATIENT
Start: 2025-06-18 | End: 2025-06-20 | Stop reason: HOSPADM

## 2025-06-18 RX ORDER — PREGABALIN 100 MG/1
200 CAPSULE ORAL 2 TIMES DAILY
Status: DISCONTINUED | OUTPATIENT
Start: 2025-06-18 | End: 2025-06-20 | Stop reason: HOSPADM

## 2025-06-18 RX ADMIN — VANCOMYCIN HYDROCHLORIDE 2500 MG: 1 INJECTION, POWDER, LYOPHILIZED, FOR SOLUTION INTRAVENOUS at 08:37

## 2025-06-18 RX ADMIN — PREGABALIN 200 MG: 100 CAPSULE ORAL at 21:02

## 2025-06-18 RX ADMIN — SODIUM CHLORIDE 1250 MG: 0.9 INJECTION, SOLUTION INTRAVENOUS at 20:57

## 2025-06-18 RX ADMIN — TIZANIDINE 2 MG: 2 TABLET ORAL at 08:20

## 2025-06-18 RX ADMIN — MORPHINE SULFATE 1 MG: 2 INJECTION, SOLUTION INTRAMUSCULAR; INTRAVENOUS at 04:37

## 2025-06-18 RX ADMIN — ENOXAPARIN SODIUM 30 MG: 100 INJECTION SUBCUTANEOUS at 08:21

## 2025-06-18 RX ADMIN — SODIUM CHLORIDE: 900 INJECTION, SOLUTION INTRAVENOUS at 22:42

## 2025-06-18 RX ADMIN — PREGABALIN 100 MG: 100 CAPSULE ORAL at 14:50

## 2025-06-18 RX ADMIN — ONDANSETRON 4 MG: 2 INJECTION, SOLUTION INTRAMUSCULAR; INTRAVENOUS at 04:37

## 2025-06-18 RX ADMIN — SODIUM CHLORIDE: 900 INJECTION, SOLUTION INTRAVENOUS at 04:41

## 2025-06-18 RX ADMIN — TIZANIDINE 2 MG: 2 TABLET ORAL at 21:02

## 2025-06-18 RX ADMIN — PREGABALIN 75 MG: 75 CAPSULE ORAL at 14:27

## 2025-06-18 RX ADMIN — TIZANIDINE 2 MG: 2 TABLET ORAL at 13:57

## 2025-06-18 RX ADMIN — ATORVASTATIN CALCIUM 20 MG: 20 TABLET, FILM COATED ORAL at 21:02

## 2025-06-18 RX ADMIN — SODIUM CHLORIDE 40 MG: 9 INJECTION INTRAMUSCULAR; INTRAVENOUS; SUBCUTANEOUS at 08:21

## 2025-06-18 ASSESSMENT — ENCOUNTER SYMPTOMS
RHINORRHEA: 0
SORE THROAT: 0
COUGH: 0
SHORTNESS OF BREATH: 0

## 2025-06-18 ASSESSMENT — PAIN SCALES - GENERAL
PAINLEVEL_OUTOF10: 6
PAINLEVEL_OUTOF10: 5
PAINLEVEL_OUTOF10: 0
PAINLEVEL_OUTOF10: 4
PAINLEVEL_OUTOF10: 6

## 2025-06-18 ASSESSMENT — PAIN - FUNCTIONAL ASSESSMENT: PAIN_FUNCTIONAL_ASSESSMENT: PREVENTS OR INTERFERES SOME ACTIVE ACTIVITIES AND ADLS

## 2025-06-18 ASSESSMENT — PAIN DESCRIPTION - ORIENTATION
ORIENTATION: RIGHT;LEFT
ORIENTATION: MID;LEFT;RIGHT
ORIENTATION: RIGHT;LEFT
ORIENTATION: POSTERIOR

## 2025-06-18 ASSESSMENT — PAIN DESCRIPTION - LOCATION
LOCATION: LEG
LOCATION: BACK;LEG
LOCATION: BACK
LOCATION: LEG;BACK

## 2025-06-18 ASSESSMENT — PAIN SCALES - WONG BAKER
WONGBAKER_NUMERICALRESPONSE: NO HURT
WONGBAKER_NUMERICALRESPONSE: NO HURT

## 2025-06-18 ASSESSMENT — PAIN DESCRIPTION - DESCRIPTORS
DESCRIPTORS: BURNING
DESCRIPTORS: SPASM
DESCRIPTORS: ACHING;STABBING
DESCRIPTORS: ACHING;NUMBNESS;TINGLING

## 2025-06-18 NOTE — CONSULTS
[facility]    GASTROENTEROLOGY CONSULT    Patient:   Earnest Avila   :    1955   Facility:   [facility]  Date:    2025  Admission Dx:  SBO (small bowel obstruction) (HCC) [K56.609]  Requesting physician: Trevor Arciniega MD  Reason for consult:  Abnormal sbft      SUBJECTIVE:  History of Present Illness:  This is a 69 y.o.   male who was admitted 6/15/2025 with SBO (small bowel obstruction) (HCC) [K56.609]. We have been asked to see the patient in consultation by Trevor Arciniega MD for  abnormal sbft. This is a 69 year old male with pmh of cad on plavix htn depression DMT2 kidney stones neuropathy matt who presented to the ED for abdominal pain , he was found to have a uti and  SBO and underwent decompression. He underwent SBFT  yesterday that showed two cauliflower like filling defects on the antimesenteric side of the duodenum in the second and third to fourth portions of the duodenum   . The pt reports a hx of constipation that he treats at home with enemas and oral pills. No melena hematochezia or weight loss. No current c/o abdominal pain. He did have a bm this am but did not look at the color.     Family hx no liver pancreatic stomach or colon cancer no uc/crohns    Endoscopy   No egd  19 colonoscopy     The prep was fair.       Findings:  Terminal ileum: normal     Cecum/Ascending colon: normal, also examined in the retroflexed view     Transverse colon: normal     Descending/Sigmoid colon: normal     Rectum/Anus: examined in normal and retroflexed positions and was normal.  Previous polypectomy sites carefully examined no residual polyp or recurrent polyp seen.      OBJECTIVE:    PAST MEDICAL/SURGICAL HISTORY  Past Medical History:   Diagnosis Date    Allergic rhinitis     Angina pectoris 2025    Anxiety     Borderline hypertension     CAD (coronary artery disease)     Controlled diabetes mellitus type II without complication (HCC)

## 2025-06-18 NOTE — PLAN OF CARE
Problem: Pain  Goal: Verbalizes/displays adequate comfort level or baseline comfort level  6/18/2025 0116 by Tracey Phillip, RN  Outcome: Progressing     Problem: ABCDS Injury Assessment  Goal: Absence of physical injury  6/18/2025 0116 by Tracey Phillip, RN  Outcome: Progressing     Problem: Safety - Adult  Goal: Free from fall injury  6/18/2025 0116 by Tracey Phillip, RN  Outcome: Progressing

## 2025-06-18 NOTE — PROGRESS NOTES
Pt requesting home medication Lyrica be resumed. Writer spoke with Dr. Arciniega on unit, MD states will resume.

## 2025-06-18 NOTE — PROGRESS NOTES
[facility]   INPATIENT OCCUPATIONAL THERAPY  PROGRESS NOTE  Date: 2025  Patient Name: Earnest Avila       Room:   MRN: [id number]    : 1955  (69 y.o.)  Gender: male   Referring Practitioner: Trevor Arciniega MD  Diagnosis: SBO      Discharge Recommendations:  Further Occupational Therapy is recommended upon facility discharge.    OT Equipment Recommendations  Other: CTA    Restrictions/Precautions  Restrictions/Precautions  Restrictions/Precautions: Fall Risk  Activity Level: Up as Tolerated;Up with Assist  Required Braces or Orthoses?: No  Implants Present? : Metal implants (rods in back , R TSA)    Pulse: 60  SpO2: 100 %  O2 Device: None (Room air)  BP: 113/71  MAP (Calculated): 85  Comment: 1L O2 via NC in pt room currently    Subjective  Subjective  Subjective: Pt seated in recliner next to his bed upon OT arrival into room. pt agreeable to participate this date.  Pain  Pre-Pain: 4  Post-Pain: 4  Pain Location: Back  Pain Descriptor: Aching  Pain Interventions: Repositioning;Rest  Comments: NAVARRO brambila pt to be seen by therapy today.    Objective  Orientation  Overall Orientation Status: Within Normal Limits    Activities of Daily Living  Additional Comments: Pt declined to need to complete self care during OT tx session this date.    Balance       Transfers/Mobility  Bed mobility  Bed Mobility Comments: pt in recliner next to bed for OT exercises this date.         OT Exercises  Exercise Treatment: Pt completed B UE AROM and SROM for R UE shoulder while seated in recliner. pt completed exercises in order to improve his functional endurance and activity tolerance to aid in the improvement of self care and mobility to reach his PLOF. pt completed B UE AROM ex with all joints in all planes at 2 sets of 20 reps with two rest breaks. completed with VC's to correct technique. pt completed AAROM and SROM for R UE shoulder flex/ext with 2 sets of 20 reps this

## 2025-06-18 NOTE — PROGRESS NOTES
[facility]   Pharmacy Pharmacokinetic Monitoring Service - Vancomycin     Earnest Avila is a 69 y.o. male starting on vancomycin therapy for Staph aureus in urine. Pharmacy consulted by Demian for monitoring and adjustment.    Target Concentration: Goal trough of 10-15 mg/L and AUC/TYRELL <500 mg*hr/L    Additional Antimicrobials: none    Pertinent Laboratory Values:   Wt Readings from Last 1 Encounters:   06/15/25 111.6 kg (246 lb)     Temp Readings from Last 1 Encounters:   06/18/25 97.5 °F (36.4 °C) (Oral)     Estimated Creatinine Clearance: 106 mL/min (based on SCr of 0.9 mg/dL).  Recent Labs     06/17/25  0545 06/18/25  0605   CREATININE 0.9 0.9   BUN 23 21   WBC 5.4 4.8     Procalcitonin: none    Pertinent Cultures:  Culture Date Source Results   06/16/25 Urine Staph aureus   MRSA Nasal Swab: N/A. Non-respiratory infection.    Plan:  Dosing recommendations based on Bayesian software:  Loading dose: 2500 mg at ~ 08:30 06/18/2025.  Regimen: 1250 mg IV every 12 hours.  Start time: 2100 on 06/18/2025  Exposure target: AUC24 (range) 400-600 mg/L.hr   LPX11-65: 450 mg/L.hr  AUC24,ss: 494 mg/L.hr  Probability of AUC24 > 400: 77 %  Ctrough,ss: 14.5 mg/L  Probability of Ctrough,ss > 20: 19 %    Anticipated AUC of 494 and trough concentration of 14.5 at steady state  Renal labs as indicated   Vancomycin concentration ordered for 06/19 @ 0600.   Pharmacy will continue to monitor patient and adjust therapy as indicated    Thank you for the consult,  iván meza [facility]  6/18/2025 7:53 AM

## 2025-06-18 NOTE — PROGRESS NOTES
Cardiology Progress Note                       Date:   6/18/2025  Patient name: Earnest Avila  Date of admission:  6/15/2025  3:39 PM  MRN:   [id number]  YOB: 1955  PCP: Theresa Garrison MD    Reason for Admission:  small bowel obstruction     Subjective:       Overnight patient was bradycardic with HR in low 40's, lowest 38, did have 2.5 seconds pause, he did use CPAP,  Bp is normal. Denies any chest pain, dyspnea, dizziness or light headedness.     Scheduled Meds:   pantoprazole (PROTONIX) 40 mg in sodium chloride (PF) 0.9 % 10 mL injection  40 mg IntraVENous Daily    cefTRIAXone (ROCEPHIN) IV  1,000 mg IntraVENous Q24H    tiZANidine  2 mg Oral TID    insulin lispro  0-4 Units SubCUTAneous 4x Daily AC & HS    sodium chloride flush  5-40 mL IntraVENous 2 times per day    enoxaparin  30 mg SubCUTAneous BID       Continuous Infusions:   sodium chloride 75 mL/hr at 06/18/25 0441    dextrose      sodium chloride         Labs:     CBC:   Recent Labs     06/15/25  1630 06/17/25  0545 06/18/25  0605   WBC 8.3 5.4 4.8   HGB 16.5 13.7 13.7    130* See Reflexed IPF Result     BMP:    Recent Labs     06/15/25  1630 06/17/25  0545 06/18/25  0605    142 141   K 4.1 4.0 3.8    110* 108*   CO2 19* 22 21   BUN 20 23 21   CREATININE 1.1 0.9 0.9   GLUCOSE 160* 104* 109*     Hepatic:   Recent Labs     06/15/25  1630   AST 35   ALT 40   BILITOT 1.9*   ALKPHOS 98     Troponin: No results for input(s): \"TROPONINI\" in the last 72 hours.  BNP: No results for input(s): \"BNP\" in the last 72 hours.  Lipids: No results for input(s): \"CHOL\", \"HDL\" in the last 72 hours.    Invalid input(s): \"LDLCALCU\"  INR:   Recent Labs     06/16/25  0541   INR 1.4         Objective:     Vitals: /76   Pulse (!) 38   Temp 97.7 °F (36.5 °C) (Oral)   Resp 16   Ht 1.93 m (6' 3.98\")   Wt 111.6 kg (246 lb)   SpO2 98%   BMI 29.96 kg/m²     General appearance: awake, alert, in no apparent respiratory

## 2025-06-18 NOTE — PROGRESS NOTES
Pt requesting home med of Lyrica be ordered due to increasing nerve pain. Secure message sent to Dr. Arciniega and 65 mg ordered BID. Pt took with 75mg Lyrica because his nerve pain is becoming unbearable, but pt takes 200mg BID at home. Pt is requesting the order be changed. Awaiting response.

## 2025-06-18 NOTE — PROGRESS NOTES
notified of pts 2.5 second pause and HR resting in the 30's. Message read at [phone]:  states: \"Let’s consult EP with Dr Justo Severino Thank you\"

## 2025-06-18 NOTE — PROGRESS NOTES
Physical Therapy  [facility]   Physical Therapy Treatment  Date: 25  Patient Name: Earnest Avila       Room:   MRN: [id number]  Account: [id number]   : 1955  (69 y.o.) Gender: male     Discharge Recommendations:  Discharge Recommendations: Continue to assess pending progress, Patient would benefit from continued therapy after discharge, Therapy recommended at discharge     PT D/C Equipment  Other: TBD  PT Equipment Recommendations  Other: TBD    General  Chart Reviewed: Yes  Additional Pertinent Hx: per H&P: Earnest Avila is a 69 y.o. Non- / non  male who presents with Abdominal Pain and Nausea & Vomiting (Started this morning )   and is admitted to the hospital for the management of SBO (small bowel obstruction) (HCC).     69-year-old  gentleman with a recent history of a four-vessel coronary artery bypass graft done at [facility] this February  History of cholecystectomy 20 years ago history of diabetes mellitus hypertension hyperlipidemia history of colon polyps  Admitted with abdominal pain started last night gradually gotten worse over the night this morning he was having nausea vomiting no bowel movements patient claims he was passing small amount of gas  Associated with abdominal distention  Pain 10 out of 10 sharp stabbing colicky kind of pain with radiation to back diffuse all over the abdomen  Patient had a CT scan done which showed small bowel obstruction NG tube was placed  Patient is n.p.o.  Response To Previous Treatment: Patient with no complaints from previous session.  Family/Caregiver Present: No  Referring Practitioner: Dr. Arciniega  Diagnosis: small bowel obstruction    Past Medical History:  has a past medical history of Allergic rhinitis, Angina pectoris, Anxiety, Borderline hypertension, CAD (coronary artery disease), Controlled diabetes mellitus type II without complication (HCC), Debility, Dental abscess,  Term Goals  Time Frame for Short Term Goals: 5-7 visits/week  Short Term Goal 1: pt to demo SBA supine to sit bed mobility to facilitate functional independence in home  Short Term Goal 2: pt to demo SBA sit <> stand w/ rolling walker to facilitate safe transfers  Short Term Goal 3: pt to demo ambulating at least 30-50 ft SBA w/ rolling walker to faciliate in home ambulation distances  Short Term Goal 4: pt to demo navigating at least 2 steps with hand rail and SBA to faciliate safe mobility in the home upon discharge  Short Term Goal 5: pt to tolerate at least 20 minutes of therapeutic exercise to improve tolerance to daily activity      Plan  Physical Therapy Plan  General Plan: 5-7 times per week  Specific Instructions for Next Treatment: functional LE strengthening, static and dynamic balance training, stair negotiation, ambulating further distances  Current Treatment Recommendations: Strengthening, Balance training, Functional mobility training, Transfer training, Gait training, Stair training, Neuromuscular re-education, Endurance training, Therapeutic activities   Safety Devices  Type of Devices: All fall risk precautions in place, Call light within reach, Gait belt, Patient at risk for falls, Nurse notified, Chair alarm in place, Left in chair  Restraints  Restraints Initially in Place: No          06/18/25 1000   PT Individual Minutes   Time In 0948   Time Out 1012   Minutes 24           Electronically signed by Kalyn Bhatia PTA on 6/18/25 at 10:52 AM EDT

## 2025-06-18 NOTE — CARE COORDINATION
Case Management   Daily Progress Note       Patient Name: Earnest Avila                   YOB: 1955  Diagnosis: SBO (small bowel obstruction) (HCC) [K56.609]                       GMLOS: 2.3 days  Length of Stay: 3  days    Readmission Risk (Low < 19, Mod (19-27), High > 27): Readmission Risk Score: 17.4      Patient is alert and oriented.    Spoke with Patient , and Current Transitional Plan is:    [x] Home Independently    [] Home with HC    [] Skilled Nursing Facility    [] Acute Rehabilitation    [] Long Term Acute Care (LTAC)    [] Other:     Medical Management: GI consult, NG removed on clear liquid diet, EP Consult,  IV ATB, GI Consult, hemoc/onc consult,     Testing Ordered:     Additional Notes:     Electronically signed by Jen Mckeon on 6/18/2025 at 2:14 PM

## 2025-06-18 NOTE — CONSULTS
Today's Date: 6/18/2025  Patient Name: Earnest Avila  Date of admission: 6/15/2025  3:39 PM  Patient's age: 69 y.o., 1955  Admission Dx: SBO (small bowel obstruction) (HCC) [K56.609]    Reason for Consult: sbo with duodenal lesion, rule out maligancy   Requesting Physician: Trevor Arciniega MD    CHIEF COMPLAINT:    Chief Complaint   Patient presents with    Abdominal Pain    Nausea & Vomiting     Started this morning        History Obtained From:  patient and chart    HISTORY OF PRESENT ILLNESS:      Earnest Avila is a 69 y.o. male who is admitted to the hospital on 6/15/2025  for abd pain NV  Patient has past medical history of CAD status post CABG, diabetes, hyperlipidemia.  Patient had a CT scan of the abdomen pelvis which showed small bowel obstruction and admitted for further management.    Patient had a small bowel follow-through which showed 2 cauliflower-like filling defects on the antimesenteric side of the duodenum into the second and 3rd-4th portion of duodenum suspicious for neoplasia therefore oncology was consulted.    Patient reports that he has a history of duodenal ulcers many many years ago but never had endoscopy.  He denied any unintentional weight loss night sweats fever chills.  He is seen by general surgery today and planning to have EGD and colonoscopy tomorrow  Past Medical History:   has a past medical history of Allergic rhinitis, Angina pectoris, Anxiety, Borderline hypertension, CAD (coronary artery disease), Controlled diabetes mellitus type II without complication (HCC), Debility, Dental abscess, Depression, Essential hypertension, History of colon polyps, History of suprapubic catheter, Intermittent self-catheterization of bladder, Kidney stones, Neuropathy, Obesity, Obstructive sleep apnea of adult, Osteoarthritis, Prostate enlargement, Restless legs syndrome, Stomach ulcer, Tubular adenoma of colon, Type II or unspecified type diabetes mellitus without mention of    atorvastatin (LIPITOR) 20 MG tablet Take 1 tablet by mouth nightly 2/27/25  Yes Fredrick Do APRN - NP   pantoprazole (PROTONIX) 40 MG tablet Take 1 tablet by mouth daily 2/27/25  Yes Fredrick Do APRN - NP   oxymetazoline (AFRIN) 0.05 % nasal spray 2 sprays by Nasal route 2 times daily as needed for Congestion (max 7 days at a time)   Yes Nati Albrecht MD   FLUoxetine (PROZAC) 20 MG capsule TAKE ONE CAPSULE BY MOUTH EVERY MORNING 11/18/24  Yes Larissa Rice MD   chlorzoxazone (PARAFON FORTE) 500 MG tablet Take 1 tablet by mouth 2 times daily 9/3/24  Yes Nati Albrecht MD   aspirin (ASPIRIN LOW DOSE) 81 MG chewable tablet CHEW 1 TABLET BY MOUTH DAILY 9/3/24  Yes Larissa Rice MD   pregabalin (LYRICA) 200 MG capsule Take 1 capsule by mouth 2 times daily.   Yes Nati Albrecht MD   Cholecalciferol 50 MCG (2000 UT) TABS Take 1 tablet by mouth daily   Yes Nati Albrecht MD   carvedilol (COREG) 6.25 MG tablet Take 1 tablet by mouth 2 times daily  Patient not taking: Reported on 6/15/2025 5/28/25   Adriana Pandya MD   ONETOUCH ULTRA strip 1 each 2 times daily 3/21/25   ProviderNati MD   Blood Glucose Monitoring Suppl ([employer] BLOOD GLUCOSE KIT) KIT 1 Box by Does not apply route in the morning, at noon, and at bedtime 3/20/25   Torrie Mckinley APRN - CNP Misc. Devices MISC Apply 1 sterile drape (18in x 26in) for suprapubic cath change. 5/28/24   Gaby Amezcua APRN - CNP   Misc. Devices MISC Apply 1 pair of large sterile gloves for suprapubic cath changes every 2 weeks. 5/28/24   Gaby Amezcua APRN - CNP Misc. Devices [facility] (or equivalent) wolf insertion tray with pre-filled 30cc syringe, use every 2 weeks for SP tube changes. 5/28/24   Durivage, Gaby, APRN - CNP   Misc. Devices MISC Apply large drainage bag (2000cc) every 2 weeks with SP tube changes. 5/28/24   Gaby Amezcua APRN - CNP   Misc. Devices MISC 22Fr  a 3.5 pack-year smoking history. He has been exposed to tobacco smoke. He has never used smokeless tobacco. He reports that he does not currently use alcohol after a past usage of about 5.0 standard drinks of alcohol per week. He reports that he does not currently use drugs after having used the following drugs: Marijuana (Weed).     Family History: family history includes Arthritis in his sister; Atrial Fibrillation in his mother; Cancer in his father and paternal grandfather; Colon Cancer in his paternal grandfather; Diabetes in his maternal grandfather and mother; Heart Disease in his father and mother; Heart Surgery in his father; High Blood Pressure in his mother; Stroke in his mother.    REVIEW OF SYSTEMS:    Constitutional: No fever or chills. No night sweats, no weight loss   Eyes: No eye discharge, double vision, or eye pain   HEENT: negative for sore mouth, sore throat, hoarseness and voice change   Respiratory: negative for cough , sputum, dyspnea, wheezing, hemoptysis, chest pain   Cardiovascular: negative for chest pain, dyspnea, palpitations, orthopnea, PND   Gastrointestinal: +nausea, vomiting, diarrhea, constipation, ++abdominal pain, Dysphagia, hematemesis and hematochezia   Genitourinary: negative for frequency, dysuria, nocturia, urinary incontinence, and hematuria   Integument: negative for rash, skin lesions, bruises.   Hematologic/Lymphatic: negative for easy bruising, bleeding, lymphadenopathy, or petechiae   Endocrine: negative for heat or cold intolerance,weight changes, change in bowel habits and hair loss   Musculoskeletal: negative for myalgias, arthralgias, pain, joint swelling,and bone pain   Neurological: negative for headaches, dizziness, seizures, weakness, numbness    PHYSICAL EXAM:      BP (!) 130/59   Pulse (!) 43   Temp 97.5 °F (36.4 °C) (Oral)   Resp 16   Ht 1.93 m (6' 3.98\")   Wt 111.6 kg (246 lb)   SpO2 98%   BMI 29.96 kg/m²    Temp (24hrs), Av.7 °F (36.5 °C),

## 2025-06-18 NOTE — PROGRESS NOTES
[facility] Internal Medicine   Trevor Arciniega MD; MD Marion Milan MD, MD , Jayant Schaefer MD    [facility] Internal Medicine   IN-PATIENT SERVICE   [facility]    Progress note            Date:   6/18/2025  Patient name:  Earnest Avila  Date of admission:  6/15/2025  3:39 PM  MRN:   [id number]  Account:  [id number]  YOB: 1955  PCP:    Theresa Garrison MD  Room:   2093/2093-01  Code Status:    Full Code    Chief Complaint:     Chief Complaint   Patient presents with    Abdominal Pain    Nausea & Vomiting     Started this morning        History Obtained From:     Patient medical record nursing staff    History of Present Illness:     Earnest Avila is a 69 y.o. Non- / non  male who presents with Abdominal Pain and Nausea & Vomiting (Started this morning )   and is admitted to the hospital for the management of SBO (small bowel obstruction) (HCC).    69-year-old  gentleman with a recent history of a four-vessel coronary artery bypass graft done at [facility] this February  History of cholecystectomy 20 years ago history of diabetes mellitus hypertension hyperlipidemia history of colon polyps  Admitted with abdominal pain started last night gradually gotten worse over the night this morning he was having nausea vomiting no bowel movements patient claims he was passing small amount of gas  Associated with abdominal distention  Pain 10 out of 10 sharp stabbing colicky kind of pain with radiation to back diffuse all over the abdomen  Patient had a CT scan done which showed small bowel obstruction NG tube was placed  Patient is n.p.o.    Past Medical History:     Past Medical History:   Diagnosis Date    Allergic rhinitis     Angina pectoris 02/14/2025    Anxiety     Borderline hypertension     CAD (coronary artery disease)     Controlled diabetes mellitus type II without complication

## 2025-06-18 NOTE — PROGRESS NOTES
[facility] General Surgery   Juan Flores MD, FACS  Senia MHay Parker, APRN-CNP  [address], [address]  [place] [address]  P: [phone], F: [phone]    General and Robotic Surgery  Progress Note         PATIENT NAME: Earnest Avila   :  1955   MRN: [id number]   PCP:  Theresa Garrison MD     TODAY'S DATE: 2025    HISTORY OF PRESENT ILLNESS: 69 y.o. male seen and examined earlier today.  Patient feeling well overall.  No significant abdominal pain.  Continues to have diarrhea.  No bloody black tarry stools.  No nausea or vomiting.  Tolerating diet.    PAST MEDICAL HISTORY     Past Medical History:   Diagnosis Date    Allergic rhinitis     Angina pectoris 2025    Anxiety     Borderline hypertension     CAD (coronary artery disease)     Controlled diabetes mellitus type II without complication (HCC)     Debility 2025    Dental abscess     Depression     On medas    Essential hypertension 2021    History of colon polyps     History of suprapubic catheter 2018    Intermittent self-catheterization of bladder 2016    3/8/17 does not do this any more- was getting UTI's    Kidney stones     Neuropathy     Obesity     HISTORY OF OBESITY, AS OF 2017 PATIENT HAS LOST 80 LBS    Obstructive sleep apnea of adult 2005    on CPAP    Osteoarthritis     RIGHT SHOULDER, BACK    Prostate enlargement 2014    Self cath since 2016    Restless legs syndrome     Stomach ulcer 2017    Tubular adenoma of colon 2016    Type II or unspecified type diabetes mellitus without mention of complication, not stated as uncontrolled     NO LONGER ON MEDS, LOST WEIGHT, DIET CONTROLLED    UTI (urinary tract infection)     Wears glasses     Weight loss counseling, encounter for 2017       PROBLEM LIST     Patient Active Problem List   Diagnosis    Type 2 diabetes, controlled, with neuropathy (HCC)    Benign hypertension with CKD (chronic kidney  at [facility]    CYSTOSCOPY  03/04/2016    with greenlight laser of prostate    CYSTOSCOPY  06/18/2016    Cystoscopy, difficult Leyva catheter placement.    CYSTOSCOPY  07/29/2016    with bladder neck contrature laser ablation and placement of suprapubic catheter    CYSTOSCOPY      CYSTOSCOPY N/A 02/13/2017    CYSTOSCOPY performed by Layo Ratliff MD at [facility] OR    CYSTOSCOPY N/A 03/13/2017    CYSTOSCOPY, TRANSURETHERAL INCISION BLADDER NECK  WITH HOLMIUM LASER performed by Layo Ratliff MD at [facility] OR    CYSTOSCOPY N/A 11/15/2017    SUPRAPUBIC TUBE PLACEMENT, C-ARM performed by Layo Ratliff MD at [facility] OR    CYSTOURETHROSCOPY/URETHRAL DILATION  11/15/2017    HOLMIUM - CYSTOSCOPY, TRANSURETHRAL BLADDER NECK RESECTION WITH URETHRAL DILATION    EYE SURGERY Bilateral 1992    R.K. surgery    JOINT REPLACEMENT      right shoulder -June 2016    LITHOTRIPSY  01/25/2016    LITHOTRIPSY      2014    [place] CYSTOURETHROSCOPY N/A 11/15/2017    CYSTOSCOPY performed by Layo Ratliff MD at [facility] OR    PROSTATE SURGERY  01/2016    reduction    SHOULDER ARTHROPLASTY Right 06/16/2016    shoulder    SHOULDER ARTHROSCOPY Right 2002    SHOULDER SURGERY  2018    Total replacement    SPINE SURGERY  2/2/2022    Laminectomy    VASECTOMY  1995       ALLERGIES     Allergies   Allergen Reactions    Iodine Hives    Other Rash     Patient developed rashes on the skin area where the medline bed pads touched his skin specifically back and backlegs.    Senna Hives    Colace [Dss] Hives    Docusate Other (See Comments)    Environmental/Seasonal Other (See Comments)     RUNNY NOSE, SNEEZING    Metformin And Related Diarrhea and Nausea And Vomiting    Metoprolol      Bradycardia, lightheaded    Sitagliptin Other (See Comments)    Sitagliptin Phos-Metformin Hcl Diarrhea and Nausea And Vomiting       FAMILY HISTORY   family history includes Arthritis in his sister; Atrial Fibrillation in his mother; Cancer in his father and paternal grandfather; Colon  replacement.    FINDINGS:  Lower Chest: Mild scarring or atelectasis at the lung bases.  Prior median  sternotomy partially seen.    Organs:    Liver: Normal appearance of the liver.    Gallbladder: The gallbladder is surgically absent.    Spleen: Unremarkable spleen.    Pancreas: No peripancreatic inflammatory changes.    Adrenal Glands: No focal adrenal abnormalities identified.    Kidneys: No hydronephrosis.Punctate stone in the right kidney.  Fat  containing lesion at the lateral right kidney, unchanged and possible sequela  of fat necrosis or angiomyolipoma.  Similar appearing lesion at the inferior  pole, unchanged.  Exophytic lesion at the posterior left kidney demonstrates  90 Hounsfield units, most compatible with a hemorrhagic cyst.  This is not  significantly changed from prior study on 10/25/2024.    GI/Bowel:    Stomach: Fluid-filled.    Small bowel/colon: Fluid-filled small bowel loops in the mid abdomen measure  up to 4.6 cm.  Concern for possible transition point in the right lower  quadrant, for example, image 114, of series 2, and 23 of series 601.  Decompressed distal small bowel loops in the right lower quadrant.  Segments  of incomplete distension in the sigmoid colon, descending colon, and near the  splenic flexure.  No localized inflammatory changes.    Appendix: Normal appearance of the appendix.    Pelvis: Suprapubic catheter in the urinary bladder.  Small fat containing  left greater than right inguinal hernias.  No free fluid in the pelvis.  Prostate calcifications.    Peritoneum/Retroperitoneum: Atherosclerosis of the abdominal aorta.  No  retroperitoneal lymphadenopathy by CT criteria.    Bones/Soft Tissues: No acute findings within the subcutaneous soft tissues.  Spondylosis of the visualized spine.  Hardware in the lumbar spine.    Impression  Concern for small bowel obstruction with dilated small bowel loops and  transition point in the right lower quadrant where there are

## 2025-06-19 ENCOUNTER — ANESTHESIA EVENT (OUTPATIENT)
Dept: OPERATING ROOM | Age: 70
End: 2025-06-19
Payer: COMMERCIAL

## 2025-06-19 ENCOUNTER — ANESTHESIA (OUTPATIENT)
Dept: OPERATING ROOM | Age: 70
End: 2025-06-19
Payer: COMMERCIAL

## 2025-06-19 LAB
ANION GAP SERPL CALCULATED.3IONS-SCNC: 13 MMOL/L (ref 9–16)
BASOPHILS # BLD: 0.03 K/UL (ref 0–0.2)
BASOPHILS NFR BLD: 1 % (ref 0–2)
BUN SERPL-MCNC: 13 MG/DL (ref 8–23)
CALCIUM SERPL-MCNC: 8.9 MG/DL (ref 8.6–10.4)
CHLORIDE SERPL-SCNC: 107 MMOL/L (ref 98–107)
CO2 SERPL-SCNC: 20 MMOL/L (ref 20–31)
CREAT SERPL-MCNC: 0.9 MG/DL (ref 0.7–1.2)
DATE LAST DOSE: NORMAL
EOSINOPHIL # BLD: 0.06 K/UL (ref 0–0.44)
EOSINOPHILS RELATIVE PERCENT: 1 % (ref 0–4)
ERYTHROCYTE [DISTWIDTH] IN BLOOD BY AUTOMATED COUNT: 12.9 % (ref 11.5–14.9)
GFR, ESTIMATED: >90 ML/MIN/1.73M2
GLUCOSE BLD-MCNC: 102 MG/DL (ref 75–110)
GLUCOSE BLD-MCNC: 106 MG/DL (ref 75–110)
GLUCOSE BLD-MCNC: 111 MG/DL (ref 75–110)
GLUCOSE BLD-MCNC: 113 MG/DL (ref 75–110)
GLUCOSE BLD-MCNC: 119 MG/DL (ref 75–110)
GLUCOSE BLD-MCNC: 151 MG/DL (ref 75–110)
GLUCOSE SERPL-MCNC: 105 MG/DL (ref 74–99)
HCT VFR BLD AUTO: 45.7 % (ref 41–53)
HGB BLD-MCNC: 15.4 G/DL (ref 13.5–17.5)
IMM GRANULOCYTES # BLD AUTO: 0.03 K/UL (ref 0–0.3)
IMM GRANULOCYTES NFR BLD: 1 %
LYMPHOCYTES NFR BLD: 1.33 K/UL (ref 1.1–3.7)
LYMPHOCYTES RELATIVE PERCENT: 31 % (ref 24–44)
MCH RBC QN AUTO: 31.4 PG (ref 26–34)
MCHC RBC AUTO-ENTMCNC: 33.7 G/DL (ref 31–37)
MCV RBC AUTO: 93.1 FL (ref 80–100)
MONOCYTES NFR BLD: 0.41 K/UL (ref 0.1–1.2)
MONOCYTES NFR BLD: 10 % (ref 3–12)
NEUTROPHILS NFR BLD: 56 % (ref 36–66)
NEUTS SEG NFR BLD: 2.37 K/UL (ref 1.5–8.1)
NRBC BLD-RTO: 0 PER 100 WBC
PLATELET # BLD AUTO: 146 K/UL (ref 150–450)
PMV BLD AUTO: 9.8 FL (ref 8–13.5)
POTASSIUM SERPL-SCNC: 3.6 MMOL/L (ref 3.7–5.3)
RBC # BLD AUTO: 4.91 M/UL (ref 4.21–5.77)
SODIUM SERPL-SCNC: 140 MMOL/L (ref 136–145)
TME LAST DOSE: 2057 H
VANCOMYCIN DOSE: 1250 MG
VANCOMYCIN SERPL-MCNC: 12.7 UG/ML (ref 5–40)
WBC OTHER # BLD: 4.2 K/UL (ref 3.5–11)

## 2025-06-19 PROCEDURE — 99232 SBSQ HOSP IP/OBS MODERATE 35: CPT | Performed by: INTERNAL MEDICINE

## 2025-06-19 PROCEDURE — 2580000003 HC RX 258: Performed by: NURSE ANESTHETIST, CERTIFIED REGISTERED

## 2025-06-19 PROCEDURE — 3700000001 HC ADD 15 MINUTES (ANESTHESIA): Performed by: INTERNAL MEDICINE

## 2025-06-19 PROCEDURE — 44361 SMALL BOWEL ENDOSCOPY/BIOPSY: CPT | Performed by: INTERNAL MEDICINE

## 2025-06-19 PROCEDURE — 2580000003 HC RX 258: Performed by: INTERNAL MEDICINE

## 2025-06-19 PROCEDURE — 0DB98ZX EXCISION OF DUODENUM, VIA NATURAL OR ARTIFICIAL OPENING ENDOSCOPIC, DIAGNOSTIC: ICD-10-PCS | Performed by: INTERNAL MEDICINE

## 2025-06-19 PROCEDURE — 6370000000 HC RX 637 (ALT 250 FOR IP): Performed by: INTERNAL MEDICINE

## 2025-06-19 PROCEDURE — 82947 ASSAY GLUCOSE BLOOD QUANT: CPT

## 2025-06-19 PROCEDURE — 6370000000 HC RX 637 (ALT 250 FOR IP)

## 2025-06-19 PROCEDURE — 97530 THERAPEUTIC ACTIVITIES: CPT

## 2025-06-19 PROCEDURE — 80048 BASIC METABOLIC PNL TOTAL CA: CPT

## 2025-06-19 PROCEDURE — 3700000000 HC ANESTHESIA ATTENDED CARE: Performed by: INTERNAL MEDICINE

## 2025-06-19 PROCEDURE — 43236 UPPR GI SCOPE W/SUBMUC INJ: CPT | Performed by: INTERNAL MEDICINE

## 2025-06-19 PROCEDURE — 2709999900 HC NON-CHARGEABLE SUPPLY: Performed by: INTERNAL MEDICINE

## 2025-06-19 PROCEDURE — 99232 SBSQ HOSP IP/OBS MODERATE 35: CPT | Performed by: SURGERY

## 2025-06-19 PROCEDURE — 85025 COMPLETE CBC W/AUTO DIFF WBC: CPT

## 2025-06-19 PROCEDURE — 97110 THERAPEUTIC EXERCISES: CPT

## 2025-06-19 PROCEDURE — 97116 GAIT TRAINING THERAPY: CPT

## 2025-06-19 PROCEDURE — 36415 COLL VENOUS BLD VENIPUNCTURE: CPT

## 2025-06-19 PROCEDURE — 80202 ASSAY OF VANCOMYCIN: CPT

## 2025-06-19 PROCEDURE — 88305 TISSUE EXAM BY PATHOLOGIST: CPT

## 2025-06-19 PROCEDURE — 88342 IMHCHEM/IMCYTCHM 1ST ANTB: CPT

## 2025-06-19 PROCEDURE — 6360000002 HC RX W HCPCS: Performed by: INTERNAL MEDICINE

## 2025-06-19 PROCEDURE — 7100000001 HC PACU RECOVERY - ADDTL 15 MIN: Performed by: INTERNAL MEDICINE

## 2025-06-19 PROCEDURE — 44799 UNLISTED PX SMALL INTESTINE: CPT | Performed by: INTERNAL MEDICINE

## 2025-06-19 PROCEDURE — 2060000000 HC ICU INTERMEDIATE R&B

## 2025-06-19 PROCEDURE — 7100000000 HC PACU RECOVERY - FIRST 15 MIN: Performed by: INTERNAL MEDICINE

## 2025-06-19 PROCEDURE — 99233 SBSQ HOSP IP/OBS HIGH 50: CPT | Performed by: INTERNAL MEDICINE

## 2025-06-19 PROCEDURE — 6360000002 HC RX W HCPCS: Performed by: NURSE ANESTHETIST, CERTIFIED REGISTERED

## 2025-06-19 PROCEDURE — 0DB68ZX EXCISION OF STOMACH, VIA NATURAL OR ARTIFICIAL OPENING ENDOSCOPIC, DIAGNOSTIC: ICD-10-PCS | Performed by: INTERNAL MEDICINE

## 2025-06-19 PROCEDURE — 3609012400 HC EGD TRANSORAL BIOPSY SINGLE/MULTIPLE: Performed by: INTERNAL MEDICINE

## 2025-06-19 RX ORDER — SODIUM CHLORIDE 9 MG/ML
INJECTION, SOLUTION INTRAVENOUS
Status: DISCONTINUED | OUTPATIENT
Start: 2025-06-19 | End: 2025-06-19 | Stop reason: SDUPTHER

## 2025-06-19 RX ORDER — GLYCOPYRROLATE 0.2 MG/ML
INJECTION INTRAMUSCULAR; INTRAVENOUS
Status: DISCONTINUED | OUTPATIENT
Start: 2025-06-19 | End: 2025-06-19 | Stop reason: SDUPTHER

## 2025-06-19 RX ORDER — PROPOFOL 10 MG/ML
INJECTION, EMULSION INTRAVENOUS
Status: DISCONTINUED | OUTPATIENT
Start: 2025-06-19 | End: 2025-06-19 | Stop reason: SDUPTHER

## 2025-06-19 RX ORDER — LIDOCAINE HYDROCHLORIDE 20 MG/ML
INJECTION, SOLUTION EPIDURAL; INFILTRATION; INTRACAUDAL; PERINEURAL
Status: DISCONTINUED | OUTPATIENT
Start: 2025-06-19 | End: 2025-06-19 | Stop reason: SDUPTHER

## 2025-06-19 RX ADMIN — ATORVASTATIN CALCIUM 20 MG: 20 TABLET, FILM COATED ORAL at 20:42

## 2025-06-19 RX ADMIN — SODIUM CHLORIDE: 900 INJECTION, SOLUTION INTRAVENOUS at 11:15

## 2025-06-19 RX ADMIN — SODIUM CHLORIDE 1250 MG: 0.9 INJECTION, SOLUTION INTRAVENOUS at 14:09

## 2025-06-19 RX ADMIN — PROPOFOL 10 MG: 10 INJECTION, EMULSION INTRAVENOUS at 11:26

## 2025-06-19 RX ADMIN — TIZANIDINE 2 MG: 2 TABLET ORAL at 20:42

## 2025-06-19 RX ADMIN — TIZANIDINE 2 MG: 2 TABLET ORAL at 13:52

## 2025-06-19 RX ADMIN — LIDOCAINE HYDROCHLORIDE 100 MG: 20 INJECTION, SOLUTION EPIDURAL; INFILTRATION; INTRACAUDAL; PERINEURAL at 11:20

## 2025-06-19 RX ADMIN — PREGABALIN 200 MG: 100 CAPSULE ORAL at 20:42

## 2025-06-19 RX ADMIN — TIZANIDINE 2 MG: 2 TABLET ORAL at 07:37

## 2025-06-19 RX ADMIN — SODIUM CHLORIDE: 900 INJECTION, SOLUTION INTRAVENOUS at 19:34

## 2025-06-19 RX ADMIN — PROPOFOL 30 MG: 10 INJECTION, EMULSION INTRAVENOUS at 11:38

## 2025-06-19 RX ADMIN — PROPOFOL 30 MG: 10 INJECTION, EMULSION INTRAVENOUS at 11:34

## 2025-06-19 RX ADMIN — PROPOFOL 30 MG: 10 INJECTION, EMULSION INTRAVENOUS at 11:31

## 2025-06-19 RX ADMIN — FLUOXETINE HYDROCHLORIDE 20 MG: 20 CAPSULE ORAL at 07:37

## 2025-06-19 RX ADMIN — PREGABALIN 200 MG: 100 CAPSULE ORAL at 07:37

## 2025-06-19 RX ADMIN — PROPOFOL 30 MG: 10 INJECTION, EMULSION INTRAVENOUS at 11:24

## 2025-06-19 RX ADMIN — PANTOPRAZOLE SODIUM 40 MG: 40 TABLET, DELAYED RELEASE ORAL at 07:37

## 2025-06-19 RX ADMIN — POLYETHYLENE GLYCOL 3350 17 G: 17 POWDER, FOR SOLUTION ORAL at 20:42

## 2025-06-19 RX ADMIN — GLYCOPYRROLATE 0.2 MG: 0.2 INJECTION INTRAMUSCULAR; INTRAVENOUS at 11:18

## 2025-06-19 RX ADMIN — PROPOFOL 20 MG: 10 INJECTION, EMULSION INTRAVENOUS at 11:28

## 2025-06-19 RX ADMIN — PROPOFOL 140 MG: 10 INJECTION, EMULSION INTRAVENOUS at 11:20

## 2025-06-19 ASSESSMENT — ENCOUNTER SYMPTOMS
SHORTNESS OF BREATH: 1
RHINORRHEA: 0
SHORTNESS OF BREATH: 0
COUGH: 0
SORE THROAT: 0

## 2025-06-19 ASSESSMENT — PAIN - FUNCTIONAL ASSESSMENT
PAIN_FUNCTIONAL_ASSESSMENT: ACTIVITIES ARE NOT PREVENTED
PAIN_FUNCTIONAL_ASSESSMENT: 0-10

## 2025-06-19 ASSESSMENT — PAIN DESCRIPTION - LOCATION
LOCATION: BACK;LEG
LOCATION: BACK;SHOULDER

## 2025-06-19 ASSESSMENT — PAIN SCALES - GENERAL
PAINLEVEL_OUTOF10: 4
PAINLEVEL_OUTOF10: 3
PAINLEVEL_OUTOF10: 0

## 2025-06-19 ASSESSMENT — PAIN DESCRIPTION - DESCRIPTORS
DESCRIPTORS: ACHING
DESCRIPTORS: BURNING

## 2025-06-19 ASSESSMENT — PAIN SCALES - WONG BAKER: WONGBAKER_NUMERICALRESPONSE: NO HURT

## 2025-06-19 ASSESSMENT — PAIN DESCRIPTION - ORIENTATION
ORIENTATION: RIGHT;LEFT
ORIENTATION: RIGHT

## 2025-06-19 NOTE — CONSULTS
[facility] PULMONARY, CRITICAL CARE & SLEEP   Baljeet Myers MD/ Ronnie Iyer MD/ Luigi CAGLE AGACNP-BC NP-C  Jessica CAGLE NP-C  Lalo CAGLE NP-C   CONSULT NOTE      Date of Admission: 6/15/2025  3:39 PM    Chief complaint: abdominal pain    Referring Physician: * No referring provider recorded for this case *  PCP: Theresa Garrison MD     History of Present Illness:     Earnest is a 69-year-old male with history of CABGx4 February 2025 at [facility] and follows [facility] cardiology, type 2 diabetes, hypertension, obstructive sleep apnea follows Dr. Wilson, cholecystectomy, chronic indwelling suprapubic catheter  Primary sleep specialist Dr. Wilson  BRIDGETTE is maintained on BiPAP 16/11    Patient was recently admitted 6/15 with complaints of abdominal pain with nausea/vomiting.  He had a CT abdomen which showed small bowel obstruction was treated with an NG tube.  Surgery has been consulted, small bowel follow-through showed no obstruction 6/18 but did show 2 cauliflower-like filling defects in duodenum and recommended endoscopy  GI is consulted and performed an EGD today with biopsies.  Oncology was consulted for the suspicious lesions found in duodenum, pending biopsy results from EGD  Cardiologist consulted because patient was having concerns with bradycardia nocturnally.    Pulmonary consulted for BRIDGETTE.  Recommended evaluation of the BRIDGETTE given concerns of nocturnal bradycardia, concerns for BRIDGETTE exacerbating bradycardia.  Patient was getting morphine and Ativan at night.  He has not been given Ativan in about 2 days.  Last morphine dose was yesterday morning at 4:30 AM    He did not start using his home BiPAP until 6/17 through 6/18 evening.  Prior to that was using nasal cannula.  Bradycardia was noted even with his BiPAP therapy.    I reviewed the download on the machine and his overall AHI in the last 30 days is 1.8, he is compliant.  His BRIDGETTE is adequately

## 2025-06-19 NOTE — PROGRESS NOTES
Comprehensive Nutrition Assessment    Type and Reason for Visit:  Reassess    Nutrition Recommendations/Plan:   Continue current Regular diet.     Malnutrition Assessment:  Malnutrition Status:  Insufficient data (06/16/25 1328)    Context:  Chronic Illness     Findings of the 6 clinical characteristics of malnutrition:  Energy Intake:  Mild decrease in energy intake  Weight Loss:   (8.9% wt loss in 5 months; may in part be d/t fluid shifts)     Body Fat Loss:  Unable to assess     Muscle Mass Loss:  Unable to assess    Fluid Accumulation:  Unable to assess     Strength:  Not Performed    Nutrition Assessment:    Pt in PCU, was NPO for EGD today, with diet resuming now to Regular consistency. Pt is s/p Small Bowel Follow Through (SBFT) on 6/17.    Nutrition Related Findings:    Labs: 3.6, Gluc 105. No edema (6/18). Vancomycin. Wound Type: None       Current Nutrition Intake & Therapies:    Average Meal Intake: Unable to assess  Average Supplements Intake: None Ordered  ADULT DIET; Regular    Anthropometric Measures:  Height: 193 cm (6' 3.98\")  Ideal Body Weight (IBW): 202 lbs (92 kg)    Admission Body Weight: 111.6 kg (246 lb 0.5 oz)  Current Body Weight: 111.6 kg (246 lb 0.5 oz), 121.8 % IBW. Weight Source: Stated  Current BMI (kg/m2): 30  Usual Body Weight: 122.5 kg (270 lb 1 oz) (1/29/2025 (~5mo.) Stated)     % Weight Change (Calculated): -8.9  Weight Adjustment For: No Adjustment                 BMI Categories: Obese Class 1 (BMI 30.0-34.9)    Estimated Daily Nutrient Needs:  Energy Requirements Based On: Formula  Weight Used for Energy Requirements: Admission  Energy (kcal/day): 0252-2952 kcal/day based on [place]. Jeor 1 factor  Weight Used for Protein Requirements: Ideal  Protein (g/day):  g/day based on 1-1.2 g/kg/ideal  Method Used for Fluid Requirements: 1 ml/kcal  Fluid (ml/day): or per physician    Nutrition Diagnosis:   Inadequate protein-energy intake related to altered GI function as

## 2025-06-19 NOTE — ANESTHESIA POSTPROCEDURE EVALUATION
Department of Anesthesiology  Postprocedure Note    Patient: Earnest Avila  MRN: [id number]  YOB: 1955  Date of evaluation: 6/19/2025    Procedure Summary       Date: 06/19/25 Room / Location: [address]    Anesthesia Start: 1115 Anesthesia Stop: 1146    Procedure: ESOPHAGOGASTRODUODENOSCOPY BIOPSY W/PUSH ENTEROSCOPY TATTOOING AT DUODENUM (Esophagus) Diagnosis:       Abnormal findings on diagnostic imaging of abdomen      Abdominal pain, unspecified abdominal location      (Abnormal findings on diagnostic imaging of abdomen [R93.5])      (Abdominal pain, unspecified abdominal location [R10.9])    Surgeons: Vinny Garrison MD Responsible Provider: Gianni Castellanos MD    Anesthesia Type: general, TIVA ASA Status: 3            Anesthesia Type: No value filed.    Iram Phase I: Iram Score: 10    Iram Phase II:      Anesthesia Post Evaluation    Patient location during evaluation: PACU  Patient participation: complete - patient participated  Level of consciousness: awake and alert  Airway patency: patent  Nausea & Vomiting: no vomiting  Cardiovascular status: hemodynamically stable  Respiratory status: acceptable  Hydration status: euvolemic  Comments: POST- ANESTHESIA EVALUATION       Pt Name: Earnest Avila  MRN: [id number]  YOB: 1955  Date of evaluation: 6/19/2025  Time:  1:49 PM      BP (!) 143/75   Pulse (!) 49   Temp 97.3 °F (36.3 °C) (Oral)   Resp 16   Ht 1.93 m (6' 3.98\")   Wt 111.6 kg (246 lb)   SpO2 95%   BMI 29.96 kg/m²      Consciousness Level  Awake  Cardiopulmonary Status  Stable  Pain Adequately Treated YES  Nausea / Vomiting  NO  Adequate Hydration  YES  Anesthesia Related Complications NONE      Electronically signed by Gianni Castellanos MD on 6/19/2025 at 1:49 PM         Pain management: satisfactory to patient    No notable events documented.

## 2025-06-19 NOTE — FLOWSHEET NOTE
06/18/25 2036   Treatment Team Notification   Reason for Communication Review case   Name of Team Member Notified Yuliya Umana   Treatment Team Role Advanced Practice Nurse   Method of Communication Secure Message   Response Waiting for response   Notification Time 2037       HI patients heart rate has been running 30's o 40's all day and while he has been here. He was admitted for SBO. tonight he has lyrica and zanaflex ordered. I justs wanted to make sure it is okay to give these due to the HR being so low, he is not symptomatic other than dizziness when getting out of bed.  Thanks.    NP messaged with message above.     2054: per NP okay to give, wont cause bradycardia and BP is stable.

## 2025-06-19 NOTE — CONSULTS
Full note is to follow.    Nocturnal bradycardia in a patient with significant obstructive sleep apnea even when he is using his CPAP.    Recommend pulmonary evaluation and if obstructive sleep apnea could not be resolved by their opinion then the patient could be a candidate for a permanent pacemaker.    We will discuss the case with the team.

## 2025-06-19 NOTE — ANESTHESIA PRE PROCEDURE
Answered  Tobacco comments: back in college      Vital Signs (Current):   Vitals:    06/19/25 0331 06/19/25 0715 06/19/25 0944 06/19/25 0950   BP: 121/64 (!) 146/74  (!) 143/66   Pulse: (!) 37 (!) 41 (!) 38    Resp: 18 18 12    Temp: 97.8 °F (36.6 °C) 97.9 °F (36.6 °C) 97.2 °F (36.2 °C)    TempSrc: Axillary Oral Tympanic    SpO2: 97% 97% 96%    Weight:       Height:                                                  BP Readings from Last 3 Encounters:   06/19/25 (!) 143/66   05/28/25 (!) 142/84   05/06/25 133/79       NPO Status: Time of last liquid consumption: 2359                        Time of last solid consumption:  (pt has been on clear liquid diet)                        Date of last liquid consumption: 06/18/25                        Date of last solid food consumption:  (pt has been on clear liquid diet)    BMI:   Wt Readings from Last 3 Encounters:   06/15/25 111.6 kg (246 lb)   06/13/25 111.6 kg (246 lb)   06/11/25 112.9 kg (248 lb 12.8 oz)     Body mass index is 29.96 kg/m².    CBC:   Lab Results   Component Value Date/Time    WBC 4.2 06/19/2025 07:22 AM    RBC 4.91 06/19/2025 07:22 AM    HGB 15.4 06/19/2025 07:22 AM    HCT 45.7 06/19/2025 07:22 AM    MCV 93.1 06/19/2025 07:22 AM    RDW 12.9 06/19/2025 07:22 AM     06/19/2025 07:22 AM       CMP:   Lab Results   Component Value Date/Time     06/19/2025 07:22 AM    K 3.6 06/19/2025 07:22 AM     06/19/2025 07:22 AM    CO2 20 06/19/2025 07:22 AM    BUN 13 06/19/2025 07:22 AM    CREATININE 0.9 06/19/2025 07:22 AM    GFRAA >60 05/24/2022 06:54 AM    LABGLOM >90 06/19/2025 07:22 AM    GLUCOSE 105 06/19/2025 07:22 AM    CALCIUM 8.9 06/19/2025 07:22 AM    BILITOT 1.9 06/15/2025 04:30 PM    ALKPHOS 98 06/15/2025 04:30 PM    AST 35 06/15/2025 04:30 PM    ALT 40 06/15/2025 04:30 PM       POC Tests:   Recent Labs     06/19/25  0612   POCGLU 106       Coags:   Lab Results   Component Value Date/Time    PROTIME 18.6 06/16/2025 05:41 AM    INR 1.4

## 2025-06-19 NOTE — OP NOTE
EGD with push enteroscopy report    Esophagogastroduodenoscopy (EGD) with push enteroscopy Procedure Note    Procedure:  EGD with Push enteroscopy, biopsies and submucosal tattoo injection    Procedure Date: 6/19/2025    Indications:  Abnormal CT abdomen    Sedation:  MAC    Attending Physician:  Dr. Vinny Garrison MD    Assistant:  None    Procedure Details:    Informed consent was obtained for the procedure, including sedation. Risks of infection, perforation, hemorrhage, adverse drug reaction, and aspiration were discussed. The patient was placed in the left lateral decubitus position. The patient was monitored continuously with ECG tracing, pulse oximetry, blood pressure monitoring, and direct observation.      The gastroscope was inserted into the mouth and advanced under direct vision to second portion of the duodenum.  A careful inspection was made as the gastroscope was withdrawn, including a retroflexed view of the proximal stomach; findings and interventions are described below. Appropriate photodocumentation was obtained.    Findings:  Retropharyngeal area was grossly normal appearing     Esophagus: normal                          Esophagogastric markings: Diaphragmatic hiatus- 45 cm; GE junction- 45 cm     Stomach:    Fundus: Few scattered erosions, biopsies obtained to rule out H. pylori    Body: A subepithelial lesion noted just below the incisura measuring roughly 1.5 cm.  This will likely need EUS later    Antrum: normal     Duodenum:   An abnormal fold /lesion noted on the anterior wall in the second part of the duodenum.  Unclear if this is the abnormality seen on CT or if this is the papilla.  Superficial biopsies obtained.  A tattoo was placed opposite to this.    No other lesions identified in the entire duodenum and proximal jejunum.  Biopsies from both sites obtained.      Complications:  None           Estimated blood loss:  Minimal    Disposition:  [facility]           Condition:

## 2025-06-19 NOTE — CARE COORDINATION
Case Management   Daily Progress Note       Patient Name: Earnest Avila                   YOB: 1955  Diagnosis: SBO (small bowel obstruction) (HCC) [K56.609]                       GMLOS: 3 days  Length of Stay: 4  days    Readmission Risk (Low < 19, Mod (19-27), High > 27): Readmission Risk Score: 17.4      Patient is alert and oriented.    Spoke with Patient & Patient's Wife at the bedside, and Current Transitional Plan is:    [x] Home Independently w/ Wife.     [] Home with HC    [] Skilled Nursing Facility    [] Acute Rehabilitation    [] Long Term Acute Care (LTAC)    [] Other:     Medical Management: Remains on IV Vanco. Pt. Had EGD w/ Push Enteroscopy BX w/ GI. Reg diet.     Testing Ordered: Labs    Additional Notes: Denies VNS, PT/OT on board. Will follow for any rec/needs.     Electronically signed by Christina Barahona RN on 6/19/2025 at 2:46 PM

## 2025-06-19 NOTE — PLAN OF CARE
Problem: Chronic Conditions and Co-morbidities  Goal: Patient's chronic conditions and co-morbidity symptoms are monitored and maintained or improved  6/19/2025 0332 by Lyubov Cruz, RN  Outcome: Progressing  Flowsheets (Taken 6/19/2025 0331)  Care Plan - Patient's Chronic Conditions and Co-Morbidity Symptoms are Monitored and Maintained or Improved:   Monitor and assess patient's chronic conditions and comorbid symptoms for stability, deterioration, or improvement   Collaborate with multidisciplinary team to address chronic and comorbid conditions and prevent exacerbation or deterioration  Note: Patient is planning to go for EGD today      Problem: Discharge Planning  Goal: Discharge to home or other facility with appropriate resources  6/19/2025 0332 by Lyubov Cruz, RN  Outcome: Progressing  Flowsheets (Taken 6/19/2025 0331)  Discharge to home or other facility with appropriate resources:   Identify barriers to discharge with patient and caregiver   Arrange for needed discharge resources and transportation as appropriate   Refer to discharge planning if patient needs post-hospital services based on physician order or complex needs related to functional status, cognitive ability or social support system   Identify discharge learning needs (meds, wound care, etc)     Problem: Pain  Goal: Verbalizes/displays adequate comfort level or baseline comfort level  6/19/2025 0332 by Lyubov Cruz, RN  Outcome: Progressing  Flowsheets (Taken 6/19/2025 0331)  Verbalizes/displays adequate comfort level or baseline comfort level:   Encourage patient to monitor pain and request assistance   Administer analgesics based on type and severity of pain and evaluate response   Consider cultural and social influences on pain and pain management   Assess pain using appropriate pain scale   Implement non-pharmacological measures as appropriate and evaluate response  Note: Patient received lyrica and zanafelx for pain.

## 2025-06-19 NOTE — PLAN OF CARE
Problem: Chronic Conditions and Co-morbidities  Goal: Patient's chronic conditions and co-morbidity symptoms are monitored and maintained or improved  6/19/2025 1400 by Angelita Mixon RN  Outcome: Progressing     Problem: Discharge Planning  Goal: Discharge to home or other facility with appropriate resources  6/19/2025 1400 by Angelita Mixon RN  Outcome: Progressing     Problem: Pain  Goal: Verbalizes/displays adequate comfort level or baseline comfort level  6/19/2025 1400 by Angelita Mixon RN  Outcome: Progressing     Problem: ABCDS Injury Assessment  Goal: Absence of physical injury  6/19/2025 1400 by Angelita Mixon RN  Outcome: Progressing     Problem: Safety - Adult  Goal: Free from fall injury  6/19/2025 1400 by Angelita Mixon RN  Outcome: Progressing

## 2025-06-19 NOTE — PROGRESS NOTES
[facility]   Pharmacy Pharmacokinetic Monitoring Service - Vancomycin    Consulting Provider: Demian   Indication: MRSA in urine  Target Concentration: Goal trough of 10-15 mg/L and AUC/TYRELL <500 mg*hr/L  Day of Therapy: 2  Additional Antimicrobials: none    Pertinent Laboratory Values:   Wt Readings from Last 1 Encounters:   06/15/25 111.6 kg (246 lb)     Temp Readings from Last 1 Encounters:   06/19/25 97.3 °F (36.3 °C) (Oral)     Estimated Creatinine Clearance: 106 mL/min (based on SCr of 0.9 mg/dL).  Recent Labs     06/18/25  0605 06/19/25  0722   CREATININE 0.9 0.9   BUN 21 13   WBC 4.8 4.2     Procalcitonin: none    Pertinent Cultures:  Culture Date Source Results   6/16/25 Blood x 2    Urine No growth x 4 days    MRSA   MRSA Nasal Swab: N/A. Non-respiratory infection.    Recent vancomycin administrations                     vancomycin (VANCOCIN) 1,250 mg in sodium chloride 0.9 % 250 mL IVPB ([id number]) (mg) 1,250 mg New Bag 06/18/25 2057    vancomycin (VANCOCIN) 2,500 mg in sodium chloride 0.9 % 500 mL IVPB (mg) 2,500 mg New Bag 06/18/25 0837                    Assessment:  Date/Time Current Dose Concentration Timing of Concentration (h) AUC   06/19/25 1250 mg every 12 hours 12.7 0722 495   Note: Serum concentrations collected for AUC dosing may appear elevated if collected in close proximity to the dose administered, this is not necessarily an indication of toxicity    Plan:  Current dosing regimen is therapeutic:  Loading dose: N/A  Regimen: 1250 mg IV every 12 hours.  Start time: 22473 on 06/19/2025  Exposure target: AUC24 (range) 400-600 mg/L.hr   [id number]: 440 mg/L.hr  AUC24,ss: 481 mg/L.hr  Probability of AUC24 > 400: 89 %  Ctrough,ss: 14 mg/L  Probability of Ctrough,ss > 20: 4 %    Continue current dose  Repeat vancomycin concentration ordered for TBD   Pharmacy will continue to monitor patient and adjust therapy as indicated    Thank you for the consult,  iván meza [facility]  6/19/2025

## 2025-06-19 NOTE — PROGRESS NOTES
[facility] Internal Medicine   Trevor Arciniega MD; MD Marion Milan MD, MD , Jayant Schaefer MD    [facility] Internal Medicine   IN-PATIENT SERVICE   [facility]    Progress note            Date:   6/19/2025  Patient name:  Earnest Avila  Date of admission:  6/15/2025  3:39 PM  MRN:   [id number]  Account:  [id number]  YOB: 1955  PCP:    Theresa Garrison MD  Room:   [facility] OR [place]  Code Status:    Full Code    Chief Complaint:     Chief Complaint   Patient presents with    Abdominal Pain    Nausea & Vomiting     Started this morning        History Obtained From:     Patient medical record nursing staff    History of Present Illness:     Earnest Avila is a 69 y.o. Non- / non  male who presents with Abdominal Pain and Nausea & Vomiting (Started this morning )   and is admitted to the hospital for the management of SBO (small bowel obstruction) (HCC).    69-year-old  gentleman with a recent history of a four-vessel coronary artery bypass graft done at [facility] this February  History of cholecystectomy 20 years ago history of diabetes mellitus hypertension hyperlipidemia history of colon polyps  Admitted with abdominal pain started last night gradually gotten worse over the night this morning he was having nausea vomiting no bowel movements patient claims he was passing small amount of gas  Associated with abdominal distention  Pain 10 out of 10 sharp stabbing colicky kind of pain with radiation to back diffuse all over the abdomen  Patient had a CT scan done which showed small bowel obstruction NG tube was placed  Patient is n.p.o.    Past Medical History:     Past Medical History:   Diagnosis Date    Allergic rhinitis     Angina pectoris 02/14/2025    Anxiety     Borderline hypertension     CAD (coronary artery disease)     Controlled diabetes mellitus type II without  Devices MISC 22Fr wolf catheter to be changed out every 14 days. 5/28/24   Gaby Amezcua, APRN - CNP        Allergies:     Iodine, Other, Senna, Colace [dss], Docusate, Environmental/seasonal, Metformin and related, Metoprolol, Sitagliptin, and Sitagliptin phos-metformin hcl    Social History:     Tobacco:    reports that he quit smoking about 43 years ago. His smoking use included cigarettes. He started smoking about 50 years ago. He has a 3.5 pack-year smoking history. He has been exposed to tobacco smoke. He has never used smokeless tobacco.  Alcohol:      reports that he does not currently use alcohol after a past usage of about 5.0 standard drinks of alcohol per week.  Drug Use:  reports that he does not currently use drugs after having used the following drugs: Marijuana (Weed).    Family History:     Family History   Problem Relation Age of Onset    Diabetes Mother     Heart Disease Mother         A-Fib    High Blood Pressure Mother     Atrial Fibrillation Mother     Stroke Mother     Heart Disease Father     Heart Surgery Father         Stent in    Cancer Father         Foundation cell    Arthritis Sister     Diabetes Maternal Grandfather     Colon Cancer Paternal Grandfather     Cancer Paternal Grandfather         Colon       Review of Systems:     Positive and Negative as described in HPI.    CONSTITUTIONAL:  negative for fevers, chills, sweats, fatigue, weight loss  HEENT:  negative for vision, hearing changes, runny nose, throat pain  RESPIRATORY:  negative for shortness of breath, cough, congestion, wheezing  CARDIOVASCULAR:  negative for chest pain, palpitations  GASTROINTESTINAL: Nausea vomiting abdominal distention GENITOURINARY:  negative for difficulty of urination, burning with urination, frequency   INTEGUMENT:  negative for rash, skin lesions, easy bruising   HEMATOLOGIC/LYMPHATIC:  negative for swelling/edema   ALLERGIC/IMMUNOLOGIC:  negative for urticaria , itching  ENDOCRINE:  negative  Vancomycin Random Date last dose 061825     Vancomycin Random Time last dose 2057        Imaging/Diagnostics:  CT ABDOMEN PELVIS WO CONTRAST Additional Contrast? None  Result Date: 6/15/2025  Concern for small bowel obstruction with dilated small bowel loops and transition point in the right lower quadrant where there are distal decompressed small bowel loops.  No free air. Segments of incomplete distension versus mucosal thickening in the colon as described.  No localized inflammatory changes.  The       Assessment :      Hospital Problems           Last Modified POA    * (Principal) SBO (small bowel obstruction) (HCC) 6/15/2025 Yes    Bradycardia 6/18/2025 Yes    Sinus pause 6/18/2025 Yes    Constipation 6/18/2025 Yes    Abnormal findings on diagnostic imaging of abdomen 6/18/2025 Yes    Duodenal mass 6/18/2025 Yes       Plan:     Patient status inpatient in the Progressive Unit/Step down    69-year-old  gentleman with a recent history of a four-vessel coronary artery bypass graft done at [facility] this February  History of cholecystectomy 20 years ago history of diabetes mellitus hypertension hyperlipidemia history of colon polyps  Admitted with abdominal pain started last night gradually gotten worse over the night this morning he was having nausea vomiting no bowel movements patient claims he was passing small amount of gas  Associated with abdominal distention  Pain 10 out of 10 sharp stabbing colicky kind of pain with radiation to back diffuse all over the abdomen  Patient had a CT scan done which showed small bowel obstruction NG tube was placed  Chronic indwelling suprapubic catheter Staph aureus growing UTI will do IV vancomycin final sensitivities pending  Status post small bowel follow-through lesion noted near duodenum rule out malignancy GI consult for endoscopy and hematology oncology consult  Bradycardia rule out sick sinus syndrome EP cardiology consulted  NG tube is out patient  on clear liquid  For push endoscopy today  Pulm consult for suspected matt  Dc plan in am if tolerating diet and out pt with Hem onc and GI    Consultations:   IP CONSULT TO GENERAL SURGERY  IP CONSULT TO GENERAL SURGERY  IP CONSULT TO SPIRITUAL SERVICES  IP CONSULT TO RESPIRATORY CARE  IP CONSULT TO UROLOGY  IP CONSULT TO CARDIOLOGY  IP CONSULT TO GI  IP CONSULT TO CARDIOLOGY  PHARMACY TO DOSE VANCOMYCIN  IP CONSULT TO HEM/ONC  IP CONSULT TO PULMONOLOGY     Patient is admitted as inpatient status because of co-morbidities listed above, severity of signs and symptoms as outlined, requirement for current medical therapies and most importantly because of direct risk to patient if care not provided in a hospital setting.  Expected length of stay > 48 hours.    Trevor Arciniega MD  6/19/2025  10:43 AM    Copy sent to Theresa Gibson MD    Please note that this chart was generated using voice recognition Dragon dictation software.  Although every effort was made to ensure the accuracy of this automated transcription, some errors in transcription may have occurred.

## 2025-06-19 NOTE — PROGRESS NOTES
[facility]   Physical Therapy Treatment  Date: 25  Patient Name: Earnest Avila       Room:   MRN: [id number]  Account: [id number]   : 1955  (69 y.o.) Gender: male     Discharge Recommendations:  Discharge Recommendations: Continue to assess pending progress, Patient would benefit from continued therapy after discharge, Therapy recommended at discharge     PT D/C Equipment  Other: TBD  PT Equipment Recommendations  Other: TBD    General  Response To Previous Treatment: Patient with no complaints from previous session.  Family/Caregiver Present: No    Past Medical History:  has a past medical history of Allergic rhinitis, Angina pectoris, Anxiety, Borderline hypertension, CAD (coronary artery disease), Controlled diabetes mellitus type II without complication (HCC), Debility, Dental abscess, Depression, Essential hypertension, History of colon polyps, History of suprapubic catheter, Intermittent self-catheterization of bladder, Kidney stones, Neuropathy, Obesity, Obstructive sleep apnea of adult, Osteoarthritis, Prostate enlargement, Restless legs syndrome, Stomach ulcer, Tubular adenoma of colon, Type II or unspecified type diabetes mellitus without mention of complication, not stated as uncontrolled, UTI (urinary tract infection), Wears glasses, and Weight loss counseling, encounter for.  Past Surgical History:   has a past surgical history that includes Cholecystectomy (); Lithotripsy (2016); Lithotripsy; Cystocopy (2016); Prostate surgery (2016); Cystoscopy (2016); Colonoscopy (2016); Shoulder Arthroplasty (Right, 2016); Shoulder arthroscopy (Right, ); Vasectomy (); Cystoscopy (2016); Cystocopy; Cystoscopy (N/A, 2017); Cystoscopy (N/A, 2017); joint replacement; Cystourethroscopy/Urethral Dilation (11/15/2017); pr cystourethroscopy (N/A, 11/15/2017); Cystoscopy (N/A, 11/15/2017); eye surgery (Bilateral,  Recommendations: Strengthening, Balance training, Functional mobility training, Transfer training, Gait training, Stair training, Neuromuscular re-education, Endurance training, Therapeutic activities   Safety Devices  Type of Devices: All fall risk precautions in place, Call light within reach, Gait belt, Patient at risk for falls, Nurse notified, Chair alarm in place, Left in chair      06/19/25 0900 06/19/25 0901   PT Individual Minutes   Time In 0915 1510   Time Out 0926 1546   Minutes 11 36     Electronically signed by Darlin Olivo PTA on 6/19/25 at 4:12 PM EDT

## 2025-06-19 NOTE — PROGRESS NOTES
Cardiology progress note        Date:  6/19/2025  Patient: Earnest Avila  Admission:  6/15/2025  3:39 PM  Admit DX: SBO (small bowel obstruction) ([facility]) [K56.609]  Age:  69 y.o., 1955     LOS: 4 days     Reason for evaluation:   Small bowel obstruction and sinus bradycardia      SUBJECTIVE:     The patient was seen and examined. Notes and labs reviewed.    Patient's heart rate remains in 30s-40s range and we consulted EP who suggest optimized obstructive sleep apnea and if needed then permanent pacemaker will be considered    Patient denies any chest pain, palpitations or dizziness and blood pressure remains stable    Patient is scheduled for EGD today    OBJECTIVE:      EXAM:   Vitals:    VITALS:  BP (!) 143/66   Pulse (!) 38   Temp 97.2 °F (36.2 °C) (Tympanic)   Resp 12   Ht 1.93 m (6' 3.98\")   Wt 111.6 kg (246 lb)   SpO2 96%   BMI 29.96 kg/m²    24HR INTAKE/OUTPUT:    Intake/Output Summary (Last 24 hours) at 6/19/2025 1122  Last data filed at 6/19/2025 0833  Gross per 24 hour   Intake 250 ml   Output 2775 ml   Net -2525 ml       General appearance: awake, alert, no apparent discomfort  HEENT: atraumatic, normocephalic, no JVD, no significant carotid bruit  Lungs: clear to auscultation bilaterally  Heart: regular rate and rhythm, S1, S2, 2/6 systolic murmur  Abdomen: soft, non-tender; bowel sounds active  Extremities: no significant lower extremity edema  Neurologic: no obvious neurologic deficit    Current Inpatient Medications:   [Transfer Hold] vancomycin (VANCOCIN) intermittent dosing (placeholder)   Other RX Placeholder    [Transfer Hold] vancomycin  1,250 mg IntraVENous Q12H    [Transfer Hold] pregabalin  200 mg Oral BID    [Transfer Hold] atorvastatin  20 mg Oral Nightly    [Transfer Hold] FLUoxetine  20 mg Oral QAM    [Transfer Hold] pantoprazole  40 mg Oral Daily    [Transfer Hold] tiZANidine  2 mg Oral TID    [Transfer Hold] insulin lispro  0-4 Units SubCUTAneous 4x Daily AC

## 2025-06-19 NOTE — PROGRESS NOTES
Today's Date: 6/19/2025  Patient Name: Earnest Avila  Date of admission: 6/15/2025  3:39 PM  Patient's age: 69 y.o., 1955  Admission Dx: SBO (small bowel obstruction) (HCC) [K56.609]    Reason for Consult: sbo with duodenal lesion, rule out maligancy   Requesting Physician: Trevor Arciniega MD    CHIEF COMPLAINT:    Chief Complaint   Patient presents with    Abdominal Pain    Nausea & Vomiting     Started this morning        History Obtained From:  patient and chart    INTERVAL HISTORY:    Patient seen and examined  Denied any abdominal pain nausea vomiting    EGD SHOWED  Erosive gastropathy, biopsies obtained to rule out H. pylori.  Subepithelial 1.5 cm stomach lesion below the incisura  An abnormal fold /lesion noted on the anterior wall in the second part of the duodenum. Biopsied. Papilla vs lesion as noted on CT    HISTORY OF PRESENT ILLNESS:    Earnest Avila is a 69 y.o. male who is admitted to the hospital on 6/15/2025  for abd pain NV  Patient has past medical history of CAD status post CABG, diabetes, hyperlipidemia.  Patient had a CT scan of the abdomen pelvis which showed small bowel obstruction and admitted for further management.    Patient had a small bowel follow-through which showed 2 cauliflower-like filling defects on the antimesenteric side of the duodenum into the second and 3rd-4th portion of duodenum suspicious for neoplasia therefore oncology was consulted.    Patient reports that he has a history of duodenal ulcers many many years ago but never had endoscopy.  He denied any unintentional weight loss night sweats fever chills.  He is seen by general surgery today and planning to have EGD and colonoscopy tomorrow  Past Medical History:   has a past medical history of Allergic rhinitis, Angina pectoris, Anxiety, Borderline hypertension, CAD (coronary artery disease), Controlled diabetes mellitus type II without complication (HCC), Debility, Dental abscess, Depression, Essential    fourth portions of the duodenum with endoscopy advised.      Recommendation: Upper endoscopy attention to the duodenal sweep         XR ABDOMEN FOR NG/OG/NE TUBE PLACEMENT   Final Result   Enteric tube tip and side port are likely within the proximal stomach.         CT ABDOMEN PELVIS WO CONTRAST Additional Contrast? None   Final Result   Concern for small bowel obstruction with dilated small bowel loops and   transition point in the right lower quadrant where there are distal   decompressed small bowel loops.  No free air.      Segments of incomplete distension versus mucosal thickening in the colon as   described.  No localized inflammatory changes.             Primary Problem  SBO (small bowel obstruction) ([facility])    Active Hospital Problems    Diagnosis Date Noted    Bradycardia [R00.1] 06/18/2025     Priority: High    Sinus pause [I45.5] 06/18/2025     Priority: High    Constipation [K59.00] 05/04/2022     Priority: Medium    Abnormal findings on diagnostic imaging of abdomen [R93.5] 06/18/2025    Duodenal mass [K31.89] 06/18/2025    SBO (small bowel obstruction) ([facility]) [K56.609] 06/15/2025       IMPRESSION:   Small bowel obstruction  Suspicious lesion in the duodenum    RECOMMENDATIONS:  I reviewed the laboratory, imaging studies, prior records, outside records, discussed possible diagnosis and other treatment recommendations   Continue management for small bowel obstruction as per primary team  Patient had EGD which showed subepithelial 1.5 cm stomach lesion, and also noted abnormal fold/lesion in the anterior wall of the second part of duodenum and biopsies were taken  GI recommending endoscopic ultrasound to evaluate this further  Further recommendations will be based on the tissue diagnosis  Discharge planning as per primary  Follow with oncology after discharge  Continue other management as per primary    Discussed with patient and Nurse.    Thank you for asking us to see this patient.  Chu Alvarado,

## 2025-06-19 NOTE — PROGRESS NOTES
[facility]   OCCUPATIONAL THERAPY MISSED TREATMENT NOTE   INPATIENT   Date: 25  Patient Name: Earnest Avila       Room: [facility] OR Pool/NONE  MRN: [id number]   Account #: [id number]    : 1955  (69 y.o.)  Gender: male   Referring Practitioner: Trevor Arciniega MD  Diagnosis: SBO             REASON FOR MISSED TREATMENT:  Pt not seen today for OT tx session due to   -   pt JUSTIN for surgical procedure at this time.  OT will reattempt at a later date/time as schedule permits when medically appropriate.     0933 Attempt        Electronically signed by ASA Badillo/L  on 25 at 10:15 AM EDT

## 2025-06-20 ENCOUNTER — HOSPITAL ENCOUNTER (OUTPATIENT)
Dept: CARDIAC REHAB | Age: 70
Setting detail: THERAPIES SERIES
Discharge: HOME | End: 2025-06-20
Payer: COMMERCIAL

## 2025-06-20 ENCOUNTER — APPOINTMENT (OUTPATIENT)
Dept: CARDIAC REHAB | Age: 70
End: 2025-06-20
Payer: COMMERCIAL

## 2025-06-20 ENCOUNTER — APPOINTMENT (OUTPATIENT)
Age: 70
DRG: 389 | End: 2025-06-20
Attending: INTERNAL MEDICINE
Payer: COMMERCIAL

## 2025-06-20 VITALS
RESPIRATION RATE: 18 BRPM | BODY MASS INDEX: 29.96 KG/M2 | WEIGHT: 246 LBS | SYSTOLIC BLOOD PRESSURE: 128 MMHG | DIASTOLIC BLOOD PRESSURE: 102 MMHG | HEIGHT: 76 IN | HEART RATE: 52 BPM | TEMPERATURE: 98.2 F | OXYGEN SATURATION: 94 %

## 2025-06-20 LAB
ECHO BSA: 2.45 M2
GLUCOSE BLD-MCNC: 117 MG/DL (ref 75–110)
GLUCOSE BLD-MCNC: 145 MG/DL (ref 75–110)

## 2025-06-20 PROCEDURE — 97110 THERAPEUTIC EXERCISES: CPT

## 2025-06-20 PROCEDURE — 6370000000 HC RX 637 (ALT 250 FOR IP): Performed by: INTERNAL MEDICINE

## 2025-06-20 PROCEDURE — 99232 SBSQ HOSP IP/OBS MODERATE 35: CPT | Performed by: INTERNAL MEDICINE

## 2025-06-20 PROCEDURE — 93242 EXT ECG>48HR<7D RECORDING: CPT

## 2025-06-20 PROCEDURE — 97530 THERAPEUTIC ACTIVITIES: CPT

## 2025-06-20 PROCEDURE — 97116 GAIT TRAINING THERAPY: CPT

## 2025-06-20 PROCEDURE — 93244 EXT ECG>48HR<7D REV&INTERPJ: CPT | Performed by: INTERNAL MEDICINE

## 2025-06-20 PROCEDURE — 6360000002 HC RX W HCPCS: Performed by: INTERNAL MEDICINE

## 2025-06-20 PROCEDURE — 99239 HOSP IP/OBS DSCHRG MGMT >30: CPT | Performed by: INTERNAL MEDICINE

## 2025-06-20 PROCEDURE — 2580000003 HC RX 258: Performed by: INTERNAL MEDICINE

## 2025-06-20 PROCEDURE — 97535 SELF CARE MNGMENT TRAINING: CPT

## 2025-06-20 PROCEDURE — 82947 ASSAY GLUCOSE BLOOD QUANT: CPT

## 2025-06-20 RX ORDER — SULFAMETHOXAZOLE AND TRIMETHOPRIM 800; 160 MG/1; MG/1
1 TABLET ORAL 2 TIMES DAILY
Qty: 8 TABLET | Refills: 0 | Status: SHIPPED | OUTPATIENT
Start: 2025-06-20 | End: 2025-06-24

## 2025-06-20 RX ORDER — ASPIRIN 81 MG/1
81 TABLET, CHEWABLE ORAL DAILY
Status: DISCONTINUED | OUTPATIENT
Start: 2025-06-20 | End: 2025-06-20 | Stop reason: HOSPADM

## 2025-06-20 RX ORDER — CLOPIDOGREL BISULFATE 75 MG/1
75 TABLET ORAL DAILY
Status: DISCONTINUED | OUTPATIENT
Start: 2025-06-20 | End: 2025-06-20 | Stop reason: HOSPADM

## 2025-06-20 RX ADMIN — CLOPIDOGREL BISULFATE 75 MG: 75 TABLET, FILM COATED ORAL at 09:48

## 2025-06-20 RX ADMIN — FLUOXETINE HYDROCHLORIDE 20 MG: 20 CAPSULE ORAL at 08:28

## 2025-06-20 RX ADMIN — ASPIRIN 81 MG: 81 TABLET, CHEWABLE ORAL at 09:48

## 2025-06-20 RX ADMIN — PANTOPRAZOLE SODIUM 40 MG: 40 TABLET, DELAYED RELEASE ORAL at 08:28

## 2025-06-20 RX ADMIN — SODIUM CHLORIDE 1250 MG: 0.9 INJECTION, SOLUTION INTRAVENOUS at 13:44

## 2025-06-20 RX ADMIN — TIZANIDINE 2 MG: 2 TABLET ORAL at 13:40

## 2025-06-20 RX ADMIN — PREGABALIN 200 MG: 100 CAPSULE ORAL at 08:28

## 2025-06-20 RX ADMIN — TIZANIDINE 2 MG: 2 TABLET ORAL at 08:28

## 2025-06-20 RX ADMIN — SODIUM CHLORIDE 1250 MG: 0.9 INJECTION, SOLUTION INTRAVENOUS at 01:58

## 2025-06-20 NOTE — PROGRESS NOTES
Vancomycin Day: 3  Current Regimen: 1250 mg IV every 12 hours        Plan:   No change today   On vanco for MRSA UTI.  Monitor srcr.

## 2025-06-20 NOTE — PROGRESS NOTES
Today's Date: 6/20/2025  Patient Name: Earnest Avila  Date of admission: 6/15/2025  3:39 PM  Patient's age: 69 y.o., 1955  Admission Dx: SBO (small bowel obstruction) (HCC) [K56.609]    Reason for Consult: sbo with duodenal lesion, rule out maligancy   Requesting Physician: Trevor Arciniega MD    CHIEF COMPLAINT:    Chief Complaint   Patient presents with    Abdominal Pain    Nausea & Vomiting     Started this morning        History Obtained From:  patient and chart    INTERVAL HISTORY:    Patient seen and examined  Denied any abdominal pain nausea vomiting  No new events  Likely discharge today    HISTORY OF PRESENT ILLNESS:    Earnest Avila is a 69 y.o. male who is admitted to the hospital on 6/15/2025  for abd pain NV  Patient has past medical history of CAD status post CABG, diabetes, hyperlipidemia.  Patient had a CT scan of the abdomen pelvis which showed small bowel obstruction and admitted for further management.    Patient had a small bowel follow-through which showed 2 cauliflower-like filling defects on the antimesenteric side of the duodenum into the second and 3rd-4th portion of duodenum suspicious for neoplasia therefore oncology was consulted.    Patient reports that he has a history of duodenal ulcers many many years ago but never had endoscopy.  He denied any unintentional weight loss night sweats fever chills.  He is seen by general surgery today and planning to have EGD and colonoscopy tomorrow  Past Medical History:   has a past medical history of Allergic rhinitis, Angina pectoris, Anxiety, Borderline hypertension, CAD (coronary artery disease), Controlled diabetes mellitus type II without complication (HCC), Debility, Dental abscess, Depression, Essential hypertension, History of colon polyps, History of suprapubic catheter, Intermittent self-catheterization of bladder, Kidney stones, Neuropathy, Obesity, Obstructive sleep apnea of adult, Osteoarthritis, Prostate enlargement,  (Oral)   Resp 18   Ht 1.93 m (6' 3.98\")   Wt 111.6 kg (246 lb)   SpO2 95%   BMI 29.96 kg/m²    Temp (24hrs), Av °F (36.7 °C), Min:97.3 °F (36.3 °C), Max:98.7 °F (37.1 °C)    General appearance - well appearing, no in pain or distress   Mental status - alert and cooperative   Eyes - pupils equal and reactive, extraocular eye movements intact   Ears - bilateral TM's and external ear canals normal   Mouth - mucous membranes moist, pharynx normal without lesions   Neck - supple, no significant adenopathy   Lymphatics - no palpable lymphadenopathy, no hepatosplenomegaly   Chest - clear to auscultation, no wheezes, rales or rhonchi, symmetric air entry   Heart - normal rate, regular rhythm, normal S1, S2, no murmurs  Abdomen - soft, nontender, nondistended, no masses or organomegaly   Neurological - alert, oriented, normal speech, no focal findings or movement disorder noted   Musculoskeletal - no joint tenderness, deformity or swelling   Extremities - peripheral pulses normal, no pedal edema, no clubbing or cyanosis   Skin - normal coloration and turgor, no rashes, no suspicious skin lesions noted ,    DATA:    Labs:   CBC:   Recent Labs     25  0605 25  0722   WBC 4.8 4.2   HGB 13.7 15.4   HCT 41.5 45.7   PLT See Reflexed IPF Result 146*     BMP:   Recent Labs     25  0605 25  0722    140   K 3.8 3.6*   CO2 21 20   BUN 21 13   CREATININE 0.9 0.9   LABGLOM >90 >90   GLUCOSE 109* 105*     PT/INR:   No results for input(s): \"PROTIME\", \"INR\" in the last 72 hours.      IMAGING DATA:  FL SMALL BOWEL FOLLOW THROUGH ONLY   Final Result   Normal transit time to the terminal ileum but terminal ileum is smoothly   narrowed without stricture formation.  There are two cauliflower like filling   defects on the antimesenteric side of the duodenum in the second and third to   fourth portions of the duodenum with endoscopy advised.      Recommendation: Upper endoscopy attention to the duodenal

## 2025-06-20 NOTE — PLAN OF CARE
Problem: Chronic Conditions and Co-morbidities  Goal: Patient's chronic conditions and co-morbidity symptoms are monitored and maintained or improved  Outcome: Progressing  Flowsheets (Taken 6/20/2025 0409)  Care Plan - Patient's Chronic Conditions and Co-Morbidity Symptoms are Monitored and Maintained or Improved:   Collaborate with multidisciplinary team to address chronic and comorbid conditions and prevent exacerbation or deterioration   Monitor and assess patient's chronic conditions and comorbid symptoms for stability, deterioration, or improvement     Problem: Discharge Planning  Goal: Discharge to home or other facility with appropriate resources  Outcome: Progressing  Flowsheets (Taken 6/20/2025 0409)  Discharge to home or other facility with appropriate resources:   Identify barriers to discharge with patient and caregiver   Arrange for needed discharge resources and transportation as appropriate   Identify discharge learning needs (meds, wound care, etc)   Refer to discharge planning if patient needs post-hospital services based on physician order or complex needs related to functional status, cognitive ability or social support system     Problem: Pain  Goal: Verbalizes/displays adequate comfort level or baseline comfort level  Outcome: Progressing  Flowsheets (Taken 6/20/2025 0409)  Verbalizes/displays adequate comfort level or baseline comfort level:   Encourage patient to monitor pain and request assistance   Assess pain using appropriate pain scale   Administer analgesics based on type and severity of pain and evaluate response   Implement non-pharmacological measures as appropriate and evaluate response   Consider cultural and social influences on pain and pain management  Note: Patient received zanaflex for pain releif. Patient  is satisfied with pain relief.      Problem: ABCDS Injury Assessment  Goal: Absence of physical injury  Outcome: Progressing  Flowsheets (Taken 6/20/2025 0409)  Absence

## 2025-06-20 NOTE — PROGRESS NOTES
Patient educated on discharge instructions which include: medication schedule, reasons for new medications and some side effects and need to follow-up. Patient verbalizes understanding of discharge and states readiness for discharge.  All belongings were gathered by patient and in patient's possession.  No distress noted at this time.     Current vital signs:  BP (!) 128/102   Pulse 52   Temp 98.2 °F (36.8 °C) (Oral)   Resp 18   Ht 1.93 m (6' 3.98\")   Wt 111.6 kg (246 lb)   SpO2 94%   BMI 29.96 kg/m²                MEWS Score: 1

## 2025-06-20 NOTE — PROGRESS NOTES
Cardiology Progress Note                       Date:   6/20/2025  Patient name: Earnest Avila  Date of admission:  6/15/2025  3:39 PM  MRN:   [id number]  YOB: 1955  PCP: Theresa Garrison MD    Reason for Admission:  small bowel obstruction     Subjective:       Patient remains bradycardic at rest with heart rate ranging in 40s, denies any dizziness or lightheadedness, does report fatigue and somewhat dyspnea on ambulation which has been chronic for him.  No chest pain.  No significant volume overload on examination.  Has been tolerating p.o., no plans for surgical intervention per general surgery  Systolic blood pressure in 130s  On room air    Scheduled Meds:   vancomycin  1,250 mg IntraVENous Q12H    vancomycin (VANCOCIN) intermittent dosing (placeholder)   Other RX Placeholder    pregabalin  200 mg Oral BID    atorvastatin  20 mg Oral Nightly    FLUoxetine  20 mg Oral QAM    pantoprazole  40 mg Oral Daily    tiZANidine  2 mg Oral TID    insulin lispro  0-4 Units SubCUTAneous 4x Daily AC & HS    sodium chloride flush  5-40 mL IntraVENous 2 times per day    [Held by provider] enoxaparin  30 mg SubCUTAneous BID       Continuous Infusions:   sodium chloride 75 mL/hr at 06/19/25 1934    dextrose      sodium chloride         Labs:     CBC:   Recent Labs     06/18/25  0605 06/19/25  0722   WBC 4.8 4.2   HGB 13.7 15.4   PLT See Reflexed IPF Result 146*     BMP:    Recent Labs     06/18/25  0605 06/19/25  0722    140   K 3.8 3.6*   * 107   CO2 21 20   BUN 21 13   CREATININE 0.9 0.9   GLUCOSE 109* 105*     Hepatic:   No results for input(s): \"AST\", \"ALT\", \"BILITOT\", \"ALKPHOS\" in the last 72 hours.    Invalid input(s): \"ALB\"    Troponin: No results for input(s): \"TROPONINI\" in the last 72 hours.  BNP: No results for input(s): \"BNP\" in the last 72 hours.  Lipids: No results for input(s): \"CHOL\", \"HDL\" in the last 72 hours.    Invalid input(s): \"LDLCALCU\"  INR:   No results for

## 2025-06-20 NOTE — PROGRESS NOTES
Dr. Pandya contacted regarding discharge. Ok with d/c from cardiac standpoint, resume ASA, plavix and atorvastatin.

## 2025-06-20 NOTE — PROGRESS NOTES
[facility]   Physical Therapy Treatment  Date: 25  Patient Name: Earnest Avila       Room:   MRN: [id number]  Account: [id number]   : 1955  (69 y.o.) Gender: male     Discharge Recommendations:  Discharge Recommendations: Continue to assess pending progress, Patient would benefit from continued therapy after discharge, Therapy recommended at discharge     PT D/C Equipment  Other: TBD  PT Equipment Recommendations  Other: TBD         Past Medical History:  has a past medical history of Allergic rhinitis, Angina pectoris, Anxiety, Borderline hypertension, CAD (coronary artery disease), Controlled diabetes mellitus type II without complication (HCC), Debility, Dental abscess, Depression, Essential hypertension, History of colon polyps, History of suprapubic catheter, Intermittent self-catheterization of bladder, Kidney stones, Neuropathy, Obesity, Obstructive sleep apnea of adult, Osteoarthritis, Prostate enlargement, Restless legs syndrome, Stomach ulcer, Tubular adenoma of colon, Type II or unspecified type diabetes mellitus without mention of complication, not stated as uncontrolled, UTI (urinary tract infection), Wears glasses, and Weight loss counseling, encounter for.  Past Surgical History:   has a past surgical history that includes Cholecystectomy (); Lithotripsy (2016); Lithotripsy; Cystocopy (2016); Prostate surgery (2016); Cystoscopy (2016); Colonoscopy (2016); Shoulder Arthroplasty (Right, 2016); Shoulder arthroscopy (Right, ); Vasectomy (); Cystoscopy (2016); Cystocopy; Cystoscopy (N/A, 2017); Cystoscopy (N/A, 2017); joint replacement; Cystourethroscopy/Urethral Dilation (11/15/2017); pr cystourethroscopy (N/A, 11/15/2017); Cystoscopy (N/A, 11/15/2017); eye surgery (Bilateral, ); Bladder surgery; Colonoscopy (N/A, 2019); Cardiac catheterization (2025); Cardiac procedure (N/A,  Transfer Training, Mobility Training, Fall Prevention Strategies  Education Method: Demonstration, Verbal  Barriers to Learning: None  Education Outcome: Continued education needed     Functional Outcome Measures  AM-PAC Basic Mobility - Inpatient   How much help is needed turning from your back to your side while in a flat bed without using bedrails?: None  How much help is needed moving from lying on your back to sitting on the side of a flat bed without using bedrails?: None  How much help is needed moving to and from a bed to a chair?: A Little  How much help is needed standing up from a chair using your arms?: A Little  How much help is needed walking in hospital room?: A Little  How much help is needed climbing 3-5 steps with a railing?: A Little  AM-[facility] Inpatient Mobility Raw Score : 20  AM-PAC Inpatient T-Scale Score : 47.67  Mobility Inpatient CMS 0-100% Score: 35.83  Mobility Inpatient CMS G-Code Modifier : CJ     Goals  Patient Goals : return home  Short Term Goals  Time Frame for Short Term Goals: 5-7 visits/week  Short Term Goal 1: pt to demo SBA supine to sit bed mobility to facilitate functional independence in home  Short Term Goal 2: pt to demo SBA sit <> stand w/ rolling walker to facilitate safe transfers  Short Term Goal 3: pt to demo ambulating at least 30-50 ft SBA w/ rolling walker to faciliate in home ambulation distances  Short Term Goal 4: pt to demo navigating at least 2 steps with hand rail and SBA to faciliate safe mobility in the home upon discharge  Short Term Goal 5: pt to tolerate at least 20 minutes of therapeutic exercise to improve tolerance to daily activity      Plan  Physical Therapy Plan  General Plan: 5-7 times per week  Current Treatment Recommendations: Strengthening, Balance training, Functional mobility training, Transfer training, Gait training, Stair training, Neuromuscular re-education, Endurance training, Therapeutic activities   Safety Devices  Type of Devices: Bed

## 2025-06-20 NOTE — PLAN OF CARE
Problem: Chronic Conditions and Co-morbidities  Goal: Patient's chronic conditions and co-morbidity symptoms are monitored and maintained or improved  6/20/2025 1502 by Angelita Mixon RN  Outcome: Progressing     Problem: Discharge Planning  Goal: Discharge to home or other facility with appropriate resources  6/20/2025 1502 by Angelita Mixon RN  Outcome: Progressing     Problem: Pain  Goal: Verbalizes/displays adequate comfort level or baseline comfort level  6/20/2025 1502 by Angelita Mixon RN  Outcome: Progressing     Problem: ABCDS Injury Assessment  Goal: Absence of physical injury  6/20/2025 1502 by Angelita Mixon RN  Outcome: Progressing

## 2025-06-20 NOTE — PROGRESS NOTES
[facility]   INPATIENT OCCUPATIONAL THERAPY  PROGRESS NOTE  Date: 2025  Patient Name: Earnest Avila       Room:   MRN: [id number]    : 1955  (69 y.o.)  Gender: male   Referring Practitioner: Trevor Arciniega MD  Diagnosis: SBO    Discharge Recommendations:  Further Occupational Therapy is recommended upon facility discharge.      OT Equipment Recommendations  Other: CTA    Restrictions/Precautions  Restrictions/Precautions  Restrictions/Precautions: Fall Risk  Activity Level: Up with Assist  Required Braces or Orthoses?: No  Implants Present? : Metal implants (rods in back, TSA with R shoulder)    Pulse: 52  SpO2: 94 %  O2 Device: None (Room air)  BP: (!) 128/102  MAP (Calculated): 111  Comment: 1L O2 via NC in pt room currently    Subjective  Subjective  Subjective: Pt pleasant and agreeable for OT tx though limited by dizziness  Pain  Pre-Pain:  (pt reports mild chronic back pain but does not rate)  Comments: NAVARRO mccall'romain OT tx    Objective  Orientation  Overall Orientation Status: Within Normal Limits  Cognition  Overall Cognitive Status: WFL    Activities of Daily Living  LE Dressing: Minimal assistance  LE Dressing Skilled Clinical Factors: Pt able to thread RLE through pant leg by flexing trunk; assistance with beginning to thread LLE then pt able to complete. SBA for balance.    Balance  Balance  Sitting Balance: Modified independent   Standing Balance: Stand by assistance    Transfers/Mobility  Bed mobility  Supine to Sit: Modified independent  Sit to Supine: Unable to assess (at end of session, pt sitting upright in recliner)  Bed Mobility Comments: HOB slightly elevated, no use of rails.  Transfers  Sit to stand: Stand by assistance  Stand to sit: Stand by assistance  Transfer Comments: SBA given for complains of dizziness. Good BUE placement. VC for positioning of BLE into wider base of support for safer transfer.    Functional Mobility  Functional -

## 2025-06-20 NOTE — PROGRESS NOTES
As stated yesterday if patient is tolerating diet and having bowel movements he is surgically stable for discharge later today.  Outpatient follow-up in the office.  Will evaluate this morning and formal progress note to follow

## 2025-06-20 NOTE — PROGRESS NOTES
[facility] Internal Medicine   Trevor Arciniega MD; MD Marion Milan MD, MD , Jayant Schaefer MD    [facility] Internal Medicine   IN-PATIENT SERVICE   [facility]    Progress note            Date:   6/20/2025  Patient name:  Earnest Avila  Date of admission:  6/15/2025  3:39 PM  MRN:   [id number]  Account:  [id number]  YOB: 1955  PCP:    Theresa Garrison MD  Room:   2093/2093-01  Code Status:    Full Code    Chief Complaint:     Chief Complaint   Patient presents with    Abdominal Pain    Nausea & Vomiting     Started this morning        History Obtained From:     Patient medical record nursing staff    History of Present Illness:     Earnest Avila is a 69 y.o. Non- / non  male who presents with Abdominal Pain and Nausea & Vomiting (Started this morning )   and is admitted to the hospital for the management of SBO (small bowel obstruction) (HCC).    69-year-old  gentleman with a recent history of a four-vessel coronary artery bypass graft done at [facility] this February  History of cholecystectomy 20 years ago history of diabetes mellitus hypertension hyperlipidemia history of colon polyps  Admitted with abdominal pain started last night gradually gotten worse over the night this morning he was having nausea vomiting no bowel movements patient claims he was passing small amount of gas  Associated with abdominal distention  Pain 10 out of 10 sharp stabbing colicky kind of pain with radiation to back diffuse all over the abdomen  Patient had a CT scan done which showed small bowel obstruction NG tube was placed  Patient is n.p.o.    Past Medical History:     Past Medical History:   Diagnosis Date    Allergic rhinitis     Angina pectoris 02/14/2025    Anxiety     Borderline hypertension     CAD (coronary artery disease)     Controlled diabetes mellitus type II without complication  4/2/25  Yes Theresa Garrison MD   polyethylene glycol (MIRALAX) 17 GM/SCOOP powder Take 17 g by mouth daily 4/2/25  Yes Theresa Garrison MD   atorvastatin (LIPITOR) 20 MG tablet Take 1 tablet by mouth nightly 2/27/25  Yes Fredrick oD APRN - NP   pantoprazole (PROTONIX) 40 MG tablet Take 1 tablet by mouth daily 2/27/25  Yes Fredrick Do APRN - NP   oxymetazoline (AFRIN) 0.05 % nasal spray 2 sprays by Nasal route 2 times daily as needed for Congestion (max 7 days at a time)   Yes Nati Albrecht MD   FLUoxetine (PROZAC) 20 MG capsule TAKE ONE CAPSULE BY MOUTH EVERY MORNING 11/18/24  Yes Larissa Rice MD   chlorzoxazone (PARAFON FORTE) 500 MG tablet Take 1 tablet by mouth 2 times daily 9/3/24  Yes Nati Albrecht MD   aspirin (ASPIRIN LOW DOSE) 81 MG chewable tablet CHEW 1 TABLET BY MOUTH DAILY 9/3/24  Yes Larissa Rice MD   pregabalin (LYRICA) 200 MG capsule Take 1 capsule by mouth 2 times daily.   Yes Nati Albrecht MD   Cholecalciferol 50 MCG (2000 UT) TABS Take 1 tablet by mouth daily   Yes Nati Albrecht MD   ONETOUCH ULTRA strip 1 each 2 times daily 3/21/25   Nait Albrecht MD   Blood Glucose Monitoring Suppl ([employer] BLOOD GLUCOSE KIT) KIT 1 Box by Does not apply route in the morning, at noon, and at bedtime 3/20/25   Torrie Mckinley APRN - CNP Misc. Devices MISC Apply 1 sterile drape (18in x 26in) for suprapubic cath change. 5/28/24   Gaby Amezcua APRN - CNP   Misc. Devices MISC Apply 1 pair of large sterile gloves for suprapubic cath changes every 2 weeks. 5/28/24   Gaby Amezcua APRN - CNP Misc. Devices [facility] (or equivalent) wolf insertion tray with pre-filled 30cc syringe, use every 2 weeks for SP tube changes. 5/28/24   Durivage, Gaby, APRN - CNP   Misc. Devices MISC Apply large drainage bag (2000cc) every 2 weeks with SP tube changes. 5/28/24   Gaby Amezcua APRN - CNP   Misc. Devices MISC 22Fr

## 2025-06-20 NOTE — PROGRESS NOTES
[facility] PULMONARY,CRITICAL CARE & SLEEP   Baljeet Myers MD/Ronnie Iyer MD/Luigi CAGLE AGAP-BC, NP-C      Jessica Camara APRN NP-C    Lalo Ruiz APRN NP-C                                         Pulmonary Progress Note    Patient - Earnest Avila   Age - 69 y.o.   - 1955  MRN - [id number]  [id number]  Date of Admission - 6/15/2025  3:39 PM    Consulting Service/Physician:       Primary Care Physician: Theresa Garrison MD    SUBJECTIVE:     Chief Complaint:   Chief Complaint   Patient presents with    Abdominal Pain    Nausea & Vomiting     Started this morning      Subjective:    No acute complaint  On room air  Being discharged    VITALS  BP (!) 128/102   Pulse 52   Temp 98.2 °F (36.8 °C) (Oral)   Resp 18   Ht 1.93 m (6' 3.98\")   Wt 111.6 kg (246 lb)   SpO2 94%   BMI 29.96 kg/m²   Wt Readings from Last 3 Encounters:   06/15/25 111.6 kg (246 lb)   25 111.6 kg (246 lb)   25 112.9 kg (248 lb 12.8 oz)     I/O (24 Hours)    Intake/Output Summary (Last 24 hours) at 2025 1553  Last data filed at 2025 1506  Gross per 24 hour   Intake 1188 ml   Output 4250 ml   Net -3062 ml     Ventilator:      Exam:   Physical Exam   Constitutional: No acute complaints, room  HENT: Unremarkable  Head: Normocephalic and atraumatic.   Eyes: EOM are normal. Pupils are equal, round, and reactive to light.   Neck: Neck supple.   Cardiovascular:  Regular rate and rhythm.  Normal heart tones.  No JVD.    Pulmonary/Chest: Clear to auscultation  Abdominal: Soft. Bowel sounds are normal. There is no tenderness.   Musculoskeletal: Normal range of motion.  Neurological:patient is alert and oriented to person, place, and time.   Skin: Skin is warm and dry. No rash noted.   Extremities: No edema or discoloration  Infusions:      sodium chloride Stopped (25 1536)    dextrose      sodium chloride       Meds:     Current Facility-Administered  Medications:     aspirin chewable tablet 81 mg, 81 mg, Oral, Daily, Marion Cedeño MD, 81 mg at 06/20/25 0948    clopidogrel (PLAVIX) tablet 75 mg, 75 mg, Oral, Daily, Marion Cedeño MD, 75 mg at 06/20/25 0948    vancomycin (VANCOCIN) 1,250 mg in sodium chloride 0.9 % 250 mL IVPB ([id number]), 1,250 mg, IntraVENous, Q12H, Trevor Arciniega MD, Stopped at 06/20/25 1514    vancomycin (VANCOCIN) intermittent dosing (placeholder), , Other, RX Placeholder, Vinny Garrison MD    pregabalin (LYRICA) capsule 200 mg, 200 mg, Oral, BID, Vinny Garrison MD, 200 mg at 06/20/25 0828    atorvastatin (LIPITOR) tablet 20 mg, 20 mg, Oral, Nightly, Vinny Garrison MD, 20 mg at 06/19/25 2042    FLUoxetine (PROZAC) capsule 20 mg, 20 mg, Oral, QAM, Vinny Garrison MD, 20 mg at 06/20/25 0828    pantoprazole (PROTONIX) tablet 40 mg, 40 mg, Oral, Daily, Vinny Garrison MD, 40 mg at 06/20/25 0828    diatrizoate meglumine-sodium (GASTROGRAFIN) 66-10 % solution 360 mL, 360 mL, Oral, ONCE PRN, Vinny Garrison MD, 360 mL at 06/17/25 1045    LORazepam (ATIVAN) injection 1 mg, 1 mg, IntraVENous, Q4H PRN, Vinny Garrison MD, 1 mg at 06/17/25 2017    ondansetron (ZOFRAN) injection 4 mg, 4 mg, IntraVENous, Q6H PRN, Vinny Garrison MD, 4 mg at 06/18/25 0437    tiZANidine (ZANAFLEX) tablet 2 mg, 2 mg, Oral, TID, Vinny Garrison MD, 2 mg at 06/20/25 1340    0.9 % sodium chloride infusion, , IntraVENous, Continuous, Vinny Garrison MD, Stopped at 06/20/25 1536    insulin lispro (HUMALOG,ADMELOG) injection vial 0-4 Units, 0-4 Units, SubCUTAneous, 4x Daily AC & HS, Vinny Garrison MD    glucose chewable tablet 16 g, 4 tablet, Oral, PRN, Vinny Garrison MD    dextrose bolus 10% 125 mL, 125 mL, IntraVENous, PRN **OR** dextrose bolus 10% 250 mL, 250 mL, IntraVENous, PRN, Vinny Garrison MD    glucagon injection 1 mg, 1 mg, SubCUTAneous, PRN, Vinny Garrison MD    dextrose 10 % infusion, , IntraVENous, Continuous PRN, Vinny Garrison MD    sodium chloride

## 2025-06-20 NOTE — CARE COORDINATION
Case Management   Daily Progress Note       Patient Name: Earnest Avila                   YOB: 1955  Diagnosis: SBO (small bowel obstruction) (HCC) [K56.609]                       GMLOS: 3 days  Length of Stay: 5  days    Readmission Risk (Low < 19, Mod (19-27), High > 27): Readmission Risk Score: 17.7      Patient is alert and oriented.    Spoke with Patient, and Current Transitional Plan is:    [x] Home Independently w/ Wife.    [] Home with HC    [] Skilled Nursing Facility    [] Acute Rehabilitation    [] Long Term Acute Care (LTAC)    [] Other:     Medical Management: Pt Remains on IV Vanco. POD #1, EGD w/ Push Enteroscopy w/ BX w/ GI. Remains on Reg diet. Will DC on PO Bactrim today.     Testing Ordered: Labs    Additional Notes: Denies VNS, PT/OT on board. Pt. States, Wife works from Home, is there 24/7. Pt. Follows at Cardiac Rehab, Apt was rescheduled for 6/23 @ 8AM. Pt. Denies Needs. Pt.wants to call Dr. Garrison's Office to schedule his Hospital F/U apt.     Electronically signed by Christina Barahona RN on 6/20/2025 at 9:27 AM

## 2025-06-21 LAB
MICROORGANISM SPEC CULT: NORMAL
MICROORGANISM SPEC CULT: NORMAL
SERVICE CMNT-IMP: NORMAL
SERVICE CMNT-IMP: NORMAL
SPECIMEN DESCRIPTION: NORMAL
SPECIMEN DESCRIPTION: NORMAL

## 2025-06-23 ENCOUNTER — APPOINTMENT (OUTPATIENT)
Dept: CARDIAC REHAB | Age: 70
End: 2025-06-23
Payer: COMMERCIAL

## 2025-06-23 ENCOUNTER — HOSPITAL ENCOUNTER (OUTPATIENT)
Dept: CARDIAC REHAB | Age: 70
Setting detail: THERAPIES SERIES
End: 2025-06-23
Payer: COMMERCIAL

## 2025-06-23 NOTE — DISCHARGE SUMMARY
IN-PATIENT SERVICE   [facility] Internal Medicine    Discharge Summary     Patient ID: Earnest Avila  :  1955   MRN: [id number]     ACCOUNT:  [id number]   Patient's PCP: Theresa Garrison MD  Admit Date: 6/15/2025   Discharge Date: 2025    Length of Stay: 5  Code Status:  Prior  Admitting Physician: Trevor Arciniega MD  Discharge Physician: Marion Cedeño MD     Active Discharge Diagnoses:     Primary Problem  SBO (small bowel obstruction) (HCC)      Hospital Problems  Active Hospital Problems    Diagnosis Date Noted    Bradycardia [R00.1] 2025     Priority: High    Sinus pause [I45.5] 2025     Priority: High    Constipation [K59.00] 2022     Priority: Medium    Abnormal findings on diagnostic imaging of abdomen [R93.5] 2025    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 male who was admitted for the management of SBO (small bowel obstruction) (HCC) , presented to ER with Abdominal Pain and Nausea & Vomiting (Started this morning )  69-year-old  gentleman with a recent history of a four-vessel coronary artery bypass graft done at [facility] this February  History of cholecystectomy 20 years ago history of diabetes mellitus hypertension hyperlipidemia history of colon polyps  Admitted with abdominal pain started last night gradually gotten worse over the night this morning he was having nausea vomiting no bowel movements patient claims he was passing small amount of gas  Associated with abdominal distention  Pain 10 out of 10 sharp stabbing colicky kind of pain with radiation to back diffuse all over the abdomen  Patient had a CT scan done which showed small bowel obstruction NG tube was placed  Chronic indwelling suprapubic catheter Staph aureus g  Patient  ulcerations or fistulas noted.  Duodenal sweep folds are prominent and there appear to be persistent filling defects in the antimesenteric aspect of the second portion of the duodenum and the third to fourth portion of the duodenum with endoscopy advised.     Normal transit time to the terminal ileum but terminal ileum is smoothly narrowed without stricture formation.  There are two cauliflower like filling defects on the antimesenteric side of the duodenum in the second and third to fourth portions of the duodenum with endoscopy advised. Recommendation: Upper endoscopy attention to the duodenal sweep     CT ABDOMEN PELVIS WO CONTRAST Additional Contrast? None  Result Date: 6/15/2025  EXAMINATION: CT OF THE ABDOMEN AND PELVIS WITHOUT CONTRAST 6/15/2025 4:37 pm TECHNIQUE: CT of the abdomen and pelvis was performed without the administration of intravenous contrast. Multiplanar reformatted images are provided for review. Automated exposure control, iterative reconstruction, and/or weight based adjustment of the mA/kV was utilized to reduce the radiation dose to as low as reasonably achievable. COMPARISON: None. HISTORY: ORDERING SYSTEM PROVIDED HISTORY: concern for bowel obstruction TECHNOLOGIST PROVIDED HISTORY: concern for bowel obstruction Decision Support Exception - unselect if not a suspected or confirmed emergency medical condition->Emergency Medical Condition (MA) Reason for Exam: concern for bowel obstruction Additional signs and symptoms: pt c/o abdominal pain with nausea and vomiting. unable to raise right arm for scan due to shoulder replacement. FINDINGS: Lower Chest: Mild scarring or atelectasis at the lung bases.  Prior median sternotomy partially seen. Organs: Liver: Normal appearance of the liver. Gallbladder: The gallbladder is surgically absent. Spleen: Unremarkable spleen. Pancreas: No peripancreatic inflammatory changes. Adrenal Glands: No focal adrenal abnormalities identified. Kidneys: No

## 2025-06-24 ENCOUNTER — TELEPHONE (OUTPATIENT)
Dept: FAMILY MEDICINE CLINIC | Age: 70
End: 2025-06-24

## 2025-06-24 ENCOUNTER — TELEPHONE (OUTPATIENT)
Age: 70
End: 2025-06-24

## 2025-06-24 LAB — SURGICAL PATHOLOGY REPORT: NORMAL

## 2025-06-24 NOTE — TELEPHONE ENCOUNTER
[facility] Care Transitions Initial Follow Up Call    Outreach made within 2 business days of discharge: Yes    Patient: Earnest Avila Patient : 1955   MRN: [id number]  Reason for Admission: There are no discharge diagnoses documented for the most recent discharge.  Discharge Date: 25       Spoke with: EARNEST    If Virtual visit made for hospital follow up, advise patient to make sure to have family member present to help assist with visit. Please make sure mobile number is updated in patient chart.     Discharge department/facility:     6/15/2025 - 2025 (5 days)  [facility] [place]       Marion Cedeño MD  Last attending  Treatment team SBO (small bowel obstruction) (HCC)  Principal problem       TCM Interactive Patient Contact:  Was patient able to fill all prescriptions: Yes  Was patient instructed to bring all medications to the follow-up visit: Yes  Is patient taking all medications as directed in the discharge summary? Yes  Does patient understand their discharge instructions: Yes  Does patient have questions or concerns that need addressed prior to 7-14 day follow up office visit: no - patient declined to schedule appt, he did mention fmla for her wife, I did advise per  an appt will be required. He stated \" no I'm not paying $100 to get a signature\" and hung up phone call.     Scheduled appointment with PCP within 7-14 days    Follow Up  Future Appointments   Date Time Provider Department Center   2025  8:00 AM [facility] REHAB   [facility]  [place]   2025  8:00 AM [facility] CARD REHAB  [address] [facility] [place]   2025  8:00 AM Theresa Garrison MD [facility] E[facility] DEP   2025 11:00 AM Chu Alvarado MD [facility]   2025  3:50 PM Adriana Pandya MD [facility]       Mignon Granados

## 2025-06-24 NOTE — TELEPHONE ENCOUNTER
Pt called in to see if he needs a hospital follow up.    I advised PT to make the appt with Dr. Severino as per discharge instructions.  PT has appt with Dr. Pandya 8/26.     PT would like Dr. Pandya to know he is still having episodes of syncope, SOB.  PT does not feel like he will pass out but states he may pass out by the end of the week.    Please advise if the appt is appropriate for hospital follow up.

## 2025-06-24 NOTE — TELEPHONE ENCOUNTER
Needs hospital follow-up, paperwork coming from his insurance to confirm his hospitalization, he must schedule a hospital follow-up with any of our providers who has openings

## 2025-06-24 NOTE — TELEPHONE ENCOUNTER
Please ask Torrie to talk with this patient, we do not do FMLA without being seen face-to-face, if he does not want to see us okay to ask the cardiologist to do his FMLA  We can also offer to cancel his appointment on 7/17/2025 and just make a hospital follow-up, please discussed with patient      Future Appointments   Date Time Provider Department Center   6/25/2025  8:00 AM [facility] CARD REHAB RM 01 [facility] [place]   6/27/2025  8:00 AM [facility] CARD REHAB RM 01 [facility] [place]   7/17/2025  8:00 AM Theresa Garrison MD [facility] BS ECC DEP   8/5/2025 11:00 AM Chu Alvarado MD [facility]   8/26/2025  3:50 PM Adriana Pandya MD [facility]

## 2025-06-25 ENCOUNTER — TELEPHONE (OUTPATIENT)
Dept: FAMILY MEDICINE CLINIC | Age: 70
End: 2025-06-25

## 2025-06-25 ENCOUNTER — HOSPITAL ENCOUNTER (OUTPATIENT)
Dept: CARDIAC REHAB | Age: 70
Setting detail: THERAPIES SERIES
End: 2025-06-25
Payer: COMMERCIAL

## 2025-06-25 RX ORDER — PANTOPRAZOLE SODIUM 40 MG/1
40 TABLET, DELAYED RELEASE ORAL DAILY
Qty: 30 TABLET | Refills: 3 | OUTPATIENT
Start: 2025-06-25

## 2025-06-25 RX ORDER — ATORVASTATIN CALCIUM 20 MG/1
TABLET, FILM COATED ORAL
Qty: 30 TABLET | Refills: 3 | OUTPATIENT
Start: 2025-06-25

## 2025-06-25 NOTE — PROGRESS NOTES
06/25/25 1203   Treatment Diagnosis   Treatment Diagnosis 1 CABG   CABG Date 02/21/25   Referral Date 02/25/25   Significant Cardiovascular History   (AFIB, HTN)   Co-morbidities Diabetes  (BRIDGETTE, DM TYPE II, NEUROPATHY, DDD,ANXIETY AND DEPRESSION, OSTEOARTHRITIS)   Exercise Assessment   Stages of Change Action   Assisted Devices Walker   Exercise Intervention/Prescription   Mode Treadmill;Bike;Stepper;Ergometer;Resistance training   Frequency per week 3   Duration Per Session 31-49 MIN.   Intensity METS       8.0  (TAKEN FROM LAST SESSION PRESENT ON 6/13/25 DUE TO HOSPITALIZATION)   Target RPE 11-15   Progression PT AT MAXIMUM DURATION OF 49 MINUTES, INTENSITY AS RPE ALLOWS   Symptoms with Exercise Denies   Target Heart Rate 106 - 128   Resistance Training Yes   RPE 13   Exercise Activity at Home   Type UNABLE TO ASSESS DUE TO ABSENCE   Exercise Education   Education Attended class;Deep breathing techniques;Dyspnea management;Equipment orientation;Exercise safety;Physically active;Pulse oximetry;Signs/symptoms to report   Exercise Goals   Patient Target Goals Individual exercise RX;Maintain exercise and activity at a moderate intensity;Improve endurance   Patient Treatment Goal PT WOULD LIKE TO RIDE HIS BIKE TWICE WEEKLY   Goal Status In progress   Progress Towards Goal UNABLE TO ASSESS DUE TO RECENT HOSPITALIZATION   Psychosocial Assessment   Stages of Change Maintenance   Psychosocial Intervention   Interventions Currently under treatment for depression   Stress Management Techniques Connects with others;Manages social media time   Currently Taking Psychotropic Meds Yes  (PROZAC 20 MG)   Medication Changes No   Psychosocial Education   Education Attended class;Cardiac meds;Coping techniques;Environmental triggers;Impact self care behaviors on health;Relaxation techniques;Sexual activity;Signs/symptoms of depression;Stress management class;Support groups   Psychosocial Goals   Patient Target Goals Assess presence

## 2025-06-25 NOTE — PROGRESS NOTES
Called and spoke with patient regarding his extended absence from Cardiac Rehab. Patient was recently admitted for a bowel obstruction and incidentally he was found to be bradycardic. Patient is scheduled to follow-up with Cardiology and Electrophysiology to determine whether he will need a pacemaker placed. He states that based on his current limitations he will not be able to return to cardiac rehab for awhile. Patient only has 2 visits remaining and I explained to him that he does have the right to use those last two visits. At this point we will cancel future appointments and patient will call us after he has current medical issues addressed if he would like to schedule those last 2 visits. Patient and writer voiced understanding of the plan. Last 2 scheduled visits cancelled. Pt. To follow up if he would like to finish those last 2 visits.

## 2025-06-27 ENCOUNTER — APPOINTMENT (OUTPATIENT)
Dept: CARDIAC REHAB | Age: 70
End: 2025-06-27
Payer: COMMERCIAL

## 2025-06-27 LAB — ECHO BSA: 2.45 M2

## 2025-07-01 NOTE — RESULT ENCOUNTER NOTE
Management per cardiology, abnormal Holter monitor, patient was referred to electrophysiologist and appointment already made        Future Appointments  7/17/2025  8:00 AM    Theresa Garrison MD    [facility] ECC DEP  8/4/2025   3:30 PM    Justo Severino MD       [facility]        [facility]  8/5/2025   11:00 AM   Chu Alvarado MD        [facility]        [facility]  8/26/2025  3:50 PM    Adriana Pandya MD       [facility]        [facility]

## 2025-07-02 ENCOUNTER — HOSPITAL ENCOUNTER (OUTPATIENT)
Dept: CARDIAC REHAB | Age: 70
Setting detail: THERAPIES SERIES
Discharge: HOME | End: 2025-07-02
Payer: COMMERCIAL

## 2025-07-02 VITALS — BODY MASS INDEX: 29.1 KG/M2 | WEIGHT: 239 LBS

## 2025-07-02 PROCEDURE — 93798 PHYS/QHP OP CAR RHAB W/ECG: CPT

## 2025-07-02 ASSESSMENT — EXERCISE STRESS TEST
PEAK_BP: 122/66
PEAK_RPE: 12
PEAK_HR: 92
PEAK_METS: 8

## 2025-07-03 RX ORDER — ATORVASTATIN CALCIUM 20 MG/1
20 TABLET, FILM COATED ORAL NIGHTLY
Qty: 90 TABLET | Refills: 3 | Status: SHIPPED | OUTPATIENT
Start: 2025-07-03

## 2025-07-03 RX ORDER — PANTOPRAZOLE SODIUM 40 MG/1
40 TABLET, DELAYED RELEASE ORAL
Qty: 30 TABLET | Refills: 0 | Status: SHIPPED | OUTPATIENT
Start: 2025-07-03

## 2025-07-05 ENCOUNTER — HOSPITAL ENCOUNTER (OUTPATIENT)
Age: 70
Discharge: HOME | End: 2025-07-05
Payer: COMMERCIAL

## 2025-07-05 LAB
ALBUMIN SERPL-MCNC: 4.1 G/DL (ref 3.5–5.2)
ALP SERPL-CCNC: 102 U/L (ref 40–129)
ALT SERPL-CCNC: 37 U/L (ref 10–50)
AST SERPL-CCNC: 34 U/L (ref 10–50)
BILIRUB DIRECT SERPL-MCNC: 0.5 MG/DL (ref 0–0.3)
BILIRUB INDIRECT SERPL-MCNC: 0.8 MG/DL (ref 0–1)
BILIRUB SERPL-MCNC: 1.3 MG/DL (ref 0–1.2)
PROT SERPL-MCNC: 7.2 G/DL (ref 6.6–8.7)

## 2025-07-05 PROCEDURE — 80076 HEPATIC FUNCTION PANEL: CPT

## 2025-07-05 PROCEDURE — 36415 COLL VENOUS BLD VENIPUNCTURE: CPT

## 2025-07-07 ENCOUNTER — RESULTS FOLLOW-UP (OUTPATIENT)
Dept: FAMILY MEDICINE CLINIC | Age: 70
End: 2025-07-07

## 2025-07-07 ENCOUNTER — HOSPITAL ENCOUNTER (OUTPATIENT)
Dept: CARDIAC REHAB | Age: 70
Setting detail: THERAPIES SERIES
Discharge: HOME | End: 2025-07-07
Payer: COMMERCIAL

## 2025-07-07 ENCOUNTER — PATIENT MESSAGE (OUTPATIENT)
Age: 70
End: 2025-07-07

## 2025-07-07 VITALS — BODY MASS INDEX: 29.23 KG/M2 | WEIGHT: 240 LBS

## 2025-07-07 PROCEDURE — 93798 PHYS/QHP OP CAR RHAB W/ECG: CPT

## 2025-07-07 ASSESSMENT — EXERCISE STRESS TEST
PEAK_BP: 122/70
PEAK_RPE: 15
PEAK_HR: 95
PEAK_METS: 7

## 2025-07-07 ASSESSMENT — PATIENT HEALTH QUESTIONNAIRE - PHQ9
SUM OF ALL RESPONSES TO PHQ QUESTIONS 1-9: 0
2. FEELING DOWN, DEPRESSED OR HOPELESS: NOT AT ALL
1. LITTLE INTEREST OR PLEASURE IN DOING THINGS: NOT AT ALL
SUM OF ALL RESPONSES TO PHQ QUESTIONS 1-9: 0

## 2025-07-07 NOTE — FLOWSHEET NOTE
07/07/25 0735   Program Discharge   Enrollment Complete Yes   Rehab Completed Yes   # of Goals Completed 2   # of Sessions Completed 36   # of ECG Monitored Sessions 32     Patient completed Cardiac Rehab program, t-shirt provided, discharge AVS given, patient does plan to attend maintenance program.  
  Education Signs/symptoms/treatment of hypo/hyperglycemia;DM & CAD relationship;Effect of diabetes on pulmonary disease process;Low saturated fat diet;Low sodium diet;Limit added sugars;Overall healthy eating pattern that emphasizes vegetables, fruits, wholegrains, healthy proteins and non-tropical oils;Reduced calorie/portion controlled diet   Nutrition Goals   Patient Target Goals LDL-C less than 70 and non-HDL-C <100 for those with ASCVD;Triglycerides less than 150;HbA1C less than 7 percent for those with diabetes   Patient Treatment Goal EAT LESS PROCESSED FOOD, MAKE HEALTHIER CHOICES WHEN EATING OUT BY COMPLETION OF PROGRAM   Goal Status Met   Progress Towards Goal Pt states he has been making healthier fast food choices, cutting down on sweets and controlling portions   Education   Learning Barrier Ready to learn   Cardiac Knowledge Test Score 9   Education Schedule Given Yes   Other Core Component - Hypertension   Hypertension Yes   Is BP WDL Yes   Hypertension Managed as Core Component No   Medications   Resource Information Provided Yes   Med(s) Change No   ACE/ARB/ARNI Prescribed No   BB Prescribed No   Antiplatelet/Anticoagulant Prescribed Yes  (ASA, Plavix)   Antiplatelet/Anticoagulant Adherent Yes   Statins/Anti-hyperlipidemic Prescribed Yes  (Lipitor 20mg)   Statins/Anti-hyperlipidemic Adherent Yes   Statins/Anti-hyperlipidemic Intensity Moderate   Other Core Component - Medication Compliance   Medication Compliance Managed as Core Component No   Other Core Component - Other Risk Factor   Other Modifiable Risk Factor Managed as Core Component No

## 2025-07-07 NOTE — DISCHARGE INSTRUCTIONS
Congratulations! You have completed Phase 2 of our Cardiac Rehabilitation Program! You can be proud of your effort and achievements. Below are recommendations for your home exercise program.    First…    Take your pulse. Your resting heart rate = ___66-75 BPM___.    Warm up for 4 minutes with stretching and an easy walk or slow bike ride.    Next…    Perform an aerobic activity for 30 + minutes, 5 times every week.    Your ideal exercise heart rate = ___80-100 BPM____.    *Remember the Rate of Perceived Exertion Scale - exercise at a 12-14.    Incorporate arm exercises into your routine before, during or after aerobic activity. Free weights or resistance bands are a great option.    Then…    Walk slowly for 5 minutes to cool down, then stretch out again.    Lastly…    Take your pulse to make sure it is within 10 beats of when you started.    Remember…    Phase 3 cardiac rehab is available up to 2 times per week. This optional program allows you to come in for non-monitored exercise in addition to your home exercise routine. Phase 3 is available Tuesdays and Thursdays for $5 per visit. An appointment is required.

## 2025-07-09 NOTE — TELEPHONE ENCOUNTER
Patient Hx of diabetes mellitus, atherosclerotic CAD status post CABG, postoperative atrial fibrillation.   Patient remains bradycardic at rest with heart rate ranging in 40s.  Holter monitor show Frequent PACs & PVCs.   Patient has an appointment with Dr Severino 7/14/2025 .  Patient is taking Carvedilol 6.25 he is asking if he need to go back on amiodarone.   Please advise

## 2025-07-10 DIAGNOSIS — E78.2 MIXED HYPERLIPIDEMIA: ICD-10-CM

## 2025-07-10 RX ORDER — ATORVASTATIN CALCIUM 20 MG/1
TABLET, FILM COATED ORAL
Qty: 30 TABLET | OUTPATIENT
Start: 2025-07-10

## 2025-07-14 ENCOUNTER — HOSPITAL ENCOUNTER (OUTPATIENT)
Dept: CARDIAC REHAB | Age: 70
Discharge: HOME | End: 2025-07-14

## 2025-07-14 PROCEDURE — 9900000065 HC CARDIAC REHAB PHASE 3 - 1 VISIT

## 2025-07-15 ENCOUNTER — TELEPHONE (OUTPATIENT)
Dept: GASTROENTEROLOGY | Age: 70
End: 2025-07-15

## 2025-07-15 NOTE — TELEPHONE ENCOUNTER
TBS for EGD/EUS/Jaelyn  Dx:  Abdnormal EGD/gastric lesion/abnormal fold  Referring: Dr. Bee    Writer spoke with patient advised will need a Plavix clearance.  Writer advised will reach out to schedule procedure once clearance received.  Clearance faxed

## 2025-07-17 ENCOUNTER — OFFICE VISIT (OUTPATIENT)
Dept: FAMILY MEDICINE CLINIC | Age: 70
End: 2025-07-17
Payer: COMMERCIAL

## 2025-07-17 VITALS
WEIGHT: 251.2 LBS | OXYGEN SATURATION: 95 % | HEIGHT: 75 IN | DIASTOLIC BLOOD PRESSURE: 62 MMHG | TEMPERATURE: 97.8 F | BODY MASS INDEX: 31.23 KG/M2 | SYSTOLIC BLOOD PRESSURE: 94 MMHG | HEART RATE: 63 BPM

## 2025-07-17 DIAGNOSIS — I25.10 CORONARY ARTERY DISEASE INVOLVING NATIVE CORONARY ARTERY OF NATIVE HEART WITHOUT ANGINA PECTORIS: ICD-10-CM

## 2025-07-17 DIAGNOSIS — Z00.00 MEDICARE ANNUAL WELLNESS VISIT, SUBSEQUENT: Primary | ICD-10-CM

## 2025-07-17 DIAGNOSIS — E11.65 TYPE 2 DIABETES MELLITUS WITH HYPERGLYCEMIA, WITHOUT LONG-TERM CURRENT USE OF INSULIN (HCC): ICD-10-CM

## 2025-07-17 DIAGNOSIS — M48.061 SPINAL STENOSIS OF LUMBAR REGION AT MULTIPLE LEVELS: ICD-10-CM

## 2025-07-17 DIAGNOSIS — G47.33 OSA ON CPAP: ICD-10-CM

## 2025-07-17 DIAGNOSIS — K59.01 SLOW TRANSIT CONSTIPATION: ICD-10-CM

## 2025-07-17 DIAGNOSIS — H91.93 BILATERAL HEARING LOSS, UNSPECIFIED HEARING LOSS TYPE: ICD-10-CM

## 2025-07-17 DIAGNOSIS — I49.3 FREQUENT PVCS: ICD-10-CM

## 2025-07-17 PROBLEM — K31.89 DUODENAL MASS: Status: RESOLVED | Noted: 2025-06-18 | Resolved: 2025-07-17

## 2025-07-17 PROBLEM — K56.609 SBO (SMALL BOWEL OBSTRUCTION) (HCC): Status: RESOLVED | Noted: 2025-06-15 | Resolved: 2025-07-17

## 2025-07-17 PROBLEM — R93.5 ABNORMAL FINDINGS ON DIAGNOSTIC IMAGING OF ABDOMEN: Status: RESOLVED | Noted: 2025-06-18 | Resolved: 2025-07-17

## 2025-07-17 PROBLEM — R06.09 CHRONIC DYSPNEA: Status: RESOLVED | Noted: 2025-02-15 | Resolved: 2025-07-17

## 2025-07-17 PROBLEM — M54.2 NECK PAIN: Status: RESOLVED | Noted: 2021-01-21 | Resolved: 2025-07-17

## 2025-07-17 PROBLEM — R00.1 BRADYCARDIA: Status: RESOLVED | Noted: 2025-06-18 | Resolved: 2025-07-17

## 2025-07-17 PROBLEM — Z87.898 H/O URINARY RETENTION: Chronic | Status: RESOLVED | Noted: 2017-07-24 | Resolved: 2025-07-17

## 2025-07-17 LAB — HBA1C MFR BLD: 5.8 %

## 2025-07-17 PROCEDURE — 1111F DSCHRG MED/CURRENT MED MERGE: CPT | Performed by: FAMILY MEDICINE

## 2025-07-17 PROCEDURE — 83036 HEMOGLOBIN GLYCOSYLATED A1C: CPT | Performed by: FAMILY MEDICINE

## 2025-07-17 PROCEDURE — 99214 OFFICE O/P EST MOD 30 MIN: CPT | Performed by: FAMILY MEDICINE

## 2025-07-17 PROCEDURE — 3017F COLORECTAL CA SCREEN DOC REV: CPT | Performed by: FAMILY MEDICINE

## 2025-07-17 PROCEDURE — G8427 DOCREV CUR MEDS BY ELIG CLIN: HCPCS | Performed by: FAMILY MEDICINE

## 2025-07-17 PROCEDURE — G0439 PPPS, SUBSEQ VISIT: HCPCS | Performed by: FAMILY MEDICINE

## 2025-07-17 PROCEDURE — 1123F ACP DISCUSS/DSCN MKR DOCD: CPT | Performed by: FAMILY MEDICINE

## 2025-07-17 PROCEDURE — 2022F DILAT RTA XM EVC RTNOPTHY: CPT | Performed by: FAMILY MEDICINE

## 2025-07-17 PROCEDURE — 3044F HG A1C LEVEL LT 7.0%: CPT | Performed by: FAMILY MEDICINE

## 2025-07-17 PROCEDURE — 1036F TOBACCO NON-USER: CPT | Performed by: FAMILY MEDICINE

## 2025-07-17 PROCEDURE — G8417 CALC BMI ABV UP PARAM F/U: HCPCS | Performed by: FAMILY MEDICINE

## 2025-07-17 RX ORDER — DOCUSATE SODIUM 100 MG/1
100 CAPSULE, LIQUID FILLED ORAL 2 TIMES DAILY
Qty: 180 CAPSULE | Refills: 3
Start: 2025-07-17

## 2025-07-17 SDOH — ECONOMIC STABILITY: FOOD INSECURITY: WITHIN THE PAST 12 MONTHS, YOU WORRIED THAT YOUR FOOD WOULD RUN OUT BEFORE YOU GOT MONEY TO BUY MORE.: NEVER TRUE

## 2025-07-17 SDOH — ECONOMIC STABILITY: FOOD INSECURITY: WITHIN THE PAST 12 MONTHS, THE FOOD YOU BOUGHT JUST DIDN'T LAST AND YOU DIDN'T HAVE MONEY TO GET MORE.: NEVER TRUE

## 2025-07-17 ASSESSMENT — VISUAL ACUITY
OS_CC: 20/20
OD_CC: 20/20

## 2025-07-17 ASSESSMENT — PATIENT HEALTH QUESTIONNAIRE - PHQ9
8. MOVING OR SPEAKING SO SLOWLY THAT OTHER PEOPLE COULD HAVE NOTICED. OR THE OPPOSITE, BEING SO FIGETY OR RESTLESS THAT YOU HAVE BEEN MOVING AROUND A LOT MORE THAN USUAL: NOT AT ALL
3. TROUBLE FALLING OR STAYING ASLEEP: NOT AT ALL
5. POOR APPETITE OR OVEREATING: NOT AT ALL
10. IF YOU CHECKED OFF ANY PROBLEMS, HOW DIFFICULT HAVE THESE PROBLEMS MADE IT FOR YOU TO DO YOUR WORK, TAKE CARE OF THINGS AT HOME, OR GET ALONG WITH OTHER PEOPLE: NOT DIFFICULT AT ALL
SUM OF ALL RESPONSES TO PHQ QUESTIONS 1-9: 3
4. FEELING TIRED OR HAVING LITTLE ENERGY: NEARLY EVERY DAY
6. FEELING BAD ABOUT YOURSELF - OR THAT YOU ARE A FAILURE OR HAVE LET YOURSELF OR YOUR FAMILY DOWN: NOT AT ALL
7. TROUBLE CONCENTRATING ON THINGS, SUCH AS READING THE NEWSPAPER OR WATCHING TELEVISION: NOT AT ALL
SUM OF ALL RESPONSES TO PHQ QUESTIONS 1-9: 3
9. THOUGHTS THAT YOU WOULD BE BETTER OFF DEAD, OR OF HURTING YOURSELF: NOT AT ALL
1. LITTLE INTEREST OR PLEASURE IN DOING THINGS: NOT AT ALL
SUM OF ALL RESPONSES TO PHQ QUESTIONS 1-9: 3
2. FEELING DOWN, DEPRESSED OR HOPELESS: NOT AT ALL
SUM OF ALL RESPONSES TO PHQ QUESTIONS 1-9: 3

## 2025-07-17 ASSESSMENT — ENCOUNTER SYMPTOMS
VOMITING: 0
SHORTNESS OF BREATH: 0
WHEEZING: 0
BACK PAIN: 1
COUGH: 0
CONSTIPATION: 1
APNEA: 1
ABDOMINAL PAIN: 0
ABDOMINAL DISTENTION: 0
CHEST TIGHTNESS: 0
DIARRHEA: 0
NAUSEA: 0

## 2025-07-17 ASSESSMENT — LIFESTYLE VARIABLES
HOW MANY STANDARD DRINKS CONTAINING ALCOHOL DO YOU HAVE ON A TYPICAL DAY: PATIENT DOES NOT DRINK
HOW OFTEN DO YOU HAVE A DRINK CONTAINING ALCOHOL: NEVER

## 2025-07-17 NOTE — ASSESSMENT & PLAN NOTE
Chronic, ongoing, present very mild hypokalemia, to increase potassium in diet, Gatorade 0, eat 1 banana every day, will have labs done in less than 3 weeks  Has appointment with electrophysiologist to discuss, he also had Holter monitor showing pauses and frequent PVCs  Orders:    ESTABLISHED, MOD MDM, 30-39 MIN [[id number]]

## 2025-07-17 NOTE — ASSESSMENT & PLAN NOTE
Chronic, stable, s/p CABG x 4 in February 2025  To continue cardiac rehab   Blood pressure recorded at 94/62, reports experiencing random attacks of terrible fatigue.  He is currently not on any blood pressure medications  - Advised to stay hydrated by drinking 48 ounces of water daily and wear compression stockings to improve energy levels.  - Recommended to engage in senior exercises to strengthen leg muscles and improve circulation.    Orders:    ESTABLISHED, MOD MDM, 30-39 MIN [[id number]]

## 2025-07-17 NOTE — PROGRESS NOTES
Visit Information    Have you changed or started any medications since your last visit including any over-the-counter medicines, vitamins, or herbal medicines? no   Have you stopped taking any of your medications? Is so, why? -  no  Are you having any side effects from any of your medications? - no    Have you seen any other physician or provider since your last visit?  yes -    Have you had any other diagnostic tests since your last visit?  yes -    Have you been seen in the emergency room and/or had an admission in a hospital since we last saw you?  yes -    Have you had your routine dental cleaning in the past 6 months?  yes -      Do you have an active MyChart account? If no, what is the barrier?  Yes    Patient Care Team:  Theresa Garrison MD as PCP - General (Family Medicine)  Theresa Garrison MD as PCP - Empaneled Provider  Adriana Pandya MD as Consulting Physician (Interventional Cardiology)  Layo Ratliff MD as Consulting Physician (Urology)  Chu Alvarado MD as Consulting Physician (Medical Oncology)  Justo Severino MD as Consulting Physician (Cardiovascular Disease)    Medical History Review  Past Medical, Family, and Social History reviewed and does contribute to the patient presenting condition    Health Maintenance   Topic Date Due    Respiratory Syncytial Virus (RSV) Pregnant or age 60 yrs+ (1 - Risk 60-74 years 1-dose series) Never done    COVID-19 Vaccine (5 - 2024-25 season) 09/01/2024    Colorectal Cancer Screen  12/20/2024    Annual Wellness Visit (Medicare)  06/04/2025    Flu vaccine (1) 08/01/2025    Diabetic foot exam  09/03/2025    A1C test (Diabetic or Prediabetic)  03/20/2026    Lipids  04/03/2026    Diabetic Alb to Cr ratio (uACR) test  04/04/2026    GFR test (Diabetes, CKD 3-4, OR last GFR 15-59)  06/19/2026    Depression Monitoring  07/07/2026    Diabetic retinal exam  07/15/2026    DTaP/Tdap/Td vaccine (3 - Td or Tdap) 01/21/2031    Shingles vaccine  Completed

## 2025-07-17 NOTE — ASSESSMENT & PLAN NOTE
Chronic, well-controlled, at goal, improving  Continue low-carb diet and Jardiance 10 Mg daily  Diabetes well-controlled with an A1C of 5.8, follows a low-carb diet and avoids sugar.  - Advised to continue current medication regimen, including Jardiance 10 mg daily.  Lab Results   Component Value Date    LABA1C 5.8 07/17/2025    LABA1C 5.9 03/20/2025    LABA1C 6.1 09/03/2024         Orders:    POCT glycosylated hemoglobin (Hb A1C)    ESTABLISHED, MOD MDM, 30-39 MIN [[id number]]

## 2025-07-17 NOTE — ASSESSMENT & PLAN NOTE
Chronic, seeing pain management, on Lyrica twice a day and Parafon twice a day   Fatigue may be related to low blood pressure and medications.  - Advised to take Lyrica once a day in the evening and Parafon once a day during dinner time to manage pain and reduce fatigue.    Orders:    ESTABLISHED, MOD MDM, 30-39 MIN [[id number]]

## 2025-07-17 NOTE — ASSESSMENT & PLAN NOTE
Chronic and worsening   Reports hearing loss, particularly in the right ear.  - Referral to an ENT specialist made for further evaluation and potential hearing aids.    Orders:    ESTABLISHED, MOD MDM, 30-39 MIN [[id number]]    [facility] - Frederick Calderon PA, Otolaryngology, [place]

## 2025-07-17 NOTE — PROGRESS NOTES
Medicare Annual Wellness Visit    Earnest Avila is here for Medicare AWV and Fatigue    Assessment & Plan   Medicare annual wellness visit, subsequent  Type 2 diabetes mellitus with hyperglycemia, without long-term current use of insulin (HCC)  Assessment & Plan:       Orders:    POCT glycosylated hemoglobin (Hb A1C)    ESTABLISHED, MOD MDM, 30-39 MIN [[id number]]    Orders:  -     POCT glycosylated hemoglobin (Hb A1C)  -     ESTABLISHED, MOD MDM, 30-39 MIN [[id number]]  Slow transit constipation  Assessment & Plan:       Orders:    ESTABLISHED, MOD MDM, 30-39 MIN [[id number]]    Orders:  -     ESTABLISHED, MOD MDM, 30-39 MIN [[id number]]  Coronary artery disease involving native coronary artery of native heart without angina pectoris  Assessment & Plan:       Orders:    ESTABLISHED, MOD MDM, 30-39 MIN [[id number]]    Orders:  -     ESTABLISHED, MOD MDM, 30-39 MIN [[id number]]  Bilateral hearing loss, unspecified hearing loss type  Assessment & Plan:       Orders:    ESTABLISHED, MOD MDM, 30-39 MIN [[id number]]    Frederick Suárez PA, Otolaryngology, [place]    Orders:  -     ESTABLISHED, MOD MDM, 30-39 MIN [[id number]]  -     Frederick Suárez PA, Otolaryngology, [place]  BRIDGETTE on CPAP  -     ESTABLISHED, MOD MDM, 30-39 MIN [[id number]]  Spinal stenosis of lumbar region at multiple levels  -     ESTABLISHED, MOD MDM, 30-39 MIN [[id number]]  Frequent PVCs  -     ESTABLISHED, MOD MDM, 30-39 MIN [[id number]]       Return in 1 year (on 7/17/2026) for Medicare AWV, Visit type MEDICARE, VISION, PHQ9, MINICOG, HRA QUESIONS.     Subjective       Patient's complete Health Risk Assessment and screening values have been reviewed and are found in Flowsheets. The following problems were reviewed today and where indicated follow up appointments were made and/or referrals ordered.    Positive Risk Factor Screenings with Interventions:    Fall Risk:  Do you feel unsteady or are you worried about falling? : (!) yes  2 or more falls in past year?: no  Fall with injury in past

## 2025-07-17 NOTE — ASSESSMENT & PLAN NOTE
Chronic, ongoing   Reports significant constipation and uses enemas, currently taking Colace (docusate) twice a day.  He says he is not allergic to docusate, corrected allergy list  - Advised to continue taking Colace twice a day and consider adding probiotics to diet or eating yogurt daily to help with bowel movements.  - Has a suprapubic catheter in place, reports no issues with urination.  - Advised to monitor for signs of infection, such as night sweats or fever.  Seeing urology    Orders:    ESTABLISHED, MOD MDM, 30-39 MIN [[id number]]    docusate sodium (COLACE) 100 MG capsule; Take 1 capsule by mouth 2 times daily For constipation. From  over the counter. No reaction

## 2025-07-17 NOTE — ASSESSMENT & PLAN NOTE
Chronic, improved with CPAP, benefits from CPAP, seeing pulmonologist yearly for CPAP management  Uses CPAP machine for 8 hours every night, which helps improve circulation and reduce fatigue.  - Advised to continue using CPAP machine as prescribed.    Orders:    ESTABLISHED, MOD MDM, 30-39 MIN [[id number]]

## 2025-07-21 ENCOUNTER — HOSPITAL ENCOUNTER (OUTPATIENT)
Dept: CARDIAC REHAB | Age: 70
Discharge: HOME | End: 2025-07-21

## 2025-07-21 PROCEDURE — 9900000065 HC CARDIAC REHAB PHASE 3 - 1 VISIT

## 2025-07-28 ENCOUNTER — HOSPITAL ENCOUNTER (OUTPATIENT)
Dept: CARDIAC REHAB | Age: 70
Discharge: HOME | End: 2025-07-28

## 2025-07-28 PROCEDURE — 9900000065 HC CARDIAC REHAB PHASE 3 - 1 VISIT

## 2025-08-04 ENCOUNTER — OFFICE VISIT (OUTPATIENT)
Age: 70
End: 2025-08-04
Payer: COMMERCIAL

## 2025-08-04 ENCOUNTER — HOSPITAL ENCOUNTER (OUTPATIENT)
Dept: CARDIAC REHAB | Age: 70
Discharge: HOME | End: 2025-08-04

## 2025-08-04 ENCOUNTER — HOSPITAL ENCOUNTER (OUTPATIENT)
Dept: LAB | Age: 70
Discharge: HOME | End: 2025-08-04
Payer: COMMERCIAL

## 2025-08-04 VITALS
TEMPERATURE: 97.4 F | HEIGHT: 75 IN | BODY MASS INDEX: 30.59 KG/M2 | SYSTOLIC BLOOD PRESSURE: 125 MMHG | DIASTOLIC BLOOD PRESSURE: 77 MMHG | HEART RATE: 67 BPM | WEIGHT: 246 LBS | OXYGEN SATURATION: 96 %

## 2025-08-04 DIAGNOSIS — I49.5 SSS (SICK SINUS SYNDROME) (HCC): Primary | ICD-10-CM

## 2025-08-04 DIAGNOSIS — I45.5 SINUS PAUSE: ICD-10-CM

## 2025-08-04 DIAGNOSIS — D64.9 ANEMIA, UNSPECIFIED TYPE: ICD-10-CM

## 2025-08-04 DIAGNOSIS — E11.65 TYPE 2 DIABETES MELLITUS WITH HYPERGLYCEMIA, WITHOUT LONG-TERM CURRENT USE OF INSULIN (HCC): ICD-10-CM

## 2025-08-04 DIAGNOSIS — I49.3 PVC'S (PREMATURE VENTRICULAR CONTRACTIONS): ICD-10-CM

## 2025-08-04 DIAGNOSIS — D64.9 ANEMIA, UNSPECIFIED TYPE: Primary | ICD-10-CM

## 2025-08-04 LAB
BASOPHILS # BLD: 0.05 K/UL (ref 0–0.2)
BASOPHILS NFR BLD: 1 % (ref 0–2)
EOSINOPHIL # BLD: 0.05 K/UL (ref 0–0.44)
EOSINOPHILS RELATIVE PERCENT: 1 % (ref 1–4)
ERYTHROCYTE [DISTWIDTH] IN BLOOD BY AUTOMATED COUNT: 14.6 % (ref 11.8–14.4)
FERRITIN SERPL-MCNC: 36 NG/ML
HCT VFR BLD AUTO: 42.9 % (ref 40.7–50.3)
HGB BLD-MCNC: 14.8 G/DL (ref 13–17)
IMM GRANULOCYTES # BLD AUTO: <0.03 K/UL (ref 0–0.3)
IMM GRANULOCYTES NFR BLD: 0 %
IRON SATN MFR SERPL: 30 % (ref 20–55)
IRON SERPL-MCNC: 98 UG/DL (ref 61–157)
LYMPHOCYTES NFR BLD: 1.64 K/UL (ref 1.1–3.7)
LYMPHOCYTES RELATIVE PERCENT: 28 % (ref 24–43)
MCH RBC QN AUTO: 32.5 PG (ref 25.2–33.5)
MCHC RBC AUTO-ENTMCNC: 34.5 G/DL (ref 28.4–34.8)
MCV RBC AUTO: 94.3 FL (ref 82.6–102.9)
MONOCYTES NFR BLD: 0.57 K/UL (ref 0.1–1.2)
MONOCYTES NFR BLD: 10 % (ref 3–12)
NEUTROPHILS NFR BLD: 60 % (ref 36–65)
NEUTS SEG NFR BLD: 3.62 K/UL (ref 1.5–8.1)
NRBC BLD-RTO: 0 PER 100 WBC
PLATELET # BLD AUTO: 190 K/UL (ref 138–453)
PMV BLD AUTO: 10.4 FL (ref 8.1–13.5)
RBC # BLD AUTO: 4.55 M/UL (ref 4.21–5.77)
RBC # BLD: ABNORMAL 10*6/UL
TIBC SERPL-MCNC: 330 UG/DL (ref 250–450)
UNSATURATED IRON BINDING CAPACITY: 232 UG/DL (ref 112–347)
WBC OTHER # BLD: 6 K/UL (ref 3.5–11.3)

## 2025-08-04 PROCEDURE — 3017F COLORECTAL CA SCREEN DOC REV: CPT | Performed by: SPECIALIST

## 2025-08-04 PROCEDURE — 2022F DILAT RTA XM EVC RTNOPTHY: CPT | Performed by: SPECIALIST

## 2025-08-04 PROCEDURE — G8417 CALC BMI ABV UP PARAM F/U: HCPCS | Performed by: SPECIALIST

## 2025-08-04 PROCEDURE — 3044F HG A1C LEVEL LT 7.0%: CPT | Performed by: SPECIALIST

## 2025-08-04 PROCEDURE — 36415 COLL VENOUS BLD VENIPUNCTURE: CPT

## 2025-08-04 PROCEDURE — 82728 ASSAY OF FERRITIN: CPT

## 2025-08-04 PROCEDURE — 1036F TOBACCO NON-USER: CPT | Performed by: SPECIALIST

## 2025-08-04 PROCEDURE — 1123F ACP DISCUSS/DSCN MKR DOCD: CPT | Performed by: SPECIALIST

## 2025-08-04 PROCEDURE — 83550 IRON BINDING TEST: CPT

## 2025-08-04 PROCEDURE — G8427 DOCREV CUR MEDS BY ELIG CLIN: HCPCS | Performed by: SPECIALIST

## 2025-08-04 PROCEDURE — 99215 OFFICE O/P EST HI 40 MIN: CPT | Performed by: SPECIALIST

## 2025-08-04 PROCEDURE — 9900000065 HC CARDIAC REHAB PHASE 3 - 1 VISIT

## 2025-08-04 PROCEDURE — 85025 COMPLETE CBC W/AUTO DIFF WBC: CPT

## 2025-08-04 PROCEDURE — 83540 ASSAY OF IRON: CPT

## 2025-08-05 ENCOUNTER — OFFICE VISIT (OUTPATIENT)
Age: 70
End: 2025-08-05
Payer: COMMERCIAL

## 2025-08-05 VITALS
DIASTOLIC BLOOD PRESSURE: 74 MMHG | SYSTOLIC BLOOD PRESSURE: 121 MMHG | RESPIRATION RATE: 14 BRPM | BODY MASS INDEX: 31.06 KG/M2 | HEART RATE: 61 BPM | OXYGEN SATURATION: 95 % | TEMPERATURE: 97.8 F | WEIGHT: 248.5 LBS

## 2025-08-05 DIAGNOSIS — D64.9 ANEMIA, UNSPECIFIED TYPE: Primary | ICD-10-CM

## 2025-08-05 DIAGNOSIS — K21.9 GASTROESOPHAGEAL REFLUX DISEASE WITHOUT ESOPHAGITIS: ICD-10-CM

## 2025-08-05 DIAGNOSIS — E83.110 HEREDITARY HEMOCHROMATOSIS: ICD-10-CM

## 2025-08-05 PROCEDURE — G8417 CALC BMI ABV UP PARAM F/U: HCPCS | Performed by: INTERNAL MEDICINE

## 2025-08-05 PROCEDURE — G8427 DOCREV CUR MEDS BY ELIG CLIN: HCPCS | Performed by: INTERNAL MEDICINE

## 2025-08-05 PROCEDURE — 99214 OFFICE O/P EST MOD 30 MIN: CPT | Performed by: INTERNAL MEDICINE

## 2025-08-05 PROCEDURE — 3017F COLORECTAL CA SCREEN DOC REV: CPT | Performed by: INTERNAL MEDICINE

## 2025-08-05 PROCEDURE — 1123F ACP DISCUSS/DSCN MKR DOCD: CPT | Performed by: INTERNAL MEDICINE

## 2025-08-05 PROCEDURE — 1036F TOBACCO NON-USER: CPT | Performed by: INTERNAL MEDICINE

## 2025-08-05 RX ORDER — PANTOPRAZOLE SODIUM 40 MG/1
40 TABLET, DELAYED RELEASE ORAL
Qty: 90 TABLET | Refills: 0 | Status: SHIPPED | OUTPATIENT
Start: 2025-08-05

## 2025-08-11 ENCOUNTER — HOSPITAL ENCOUNTER (OUTPATIENT)
Dept: CARDIAC REHAB | Age: 70
Discharge: HOME | End: 2025-08-11

## 2025-08-11 PROCEDURE — 9900000065 HC CARDIAC REHAB PHASE 3 - 1 VISIT

## 2025-08-12 ENCOUNTER — HOSPITAL ENCOUNTER (OUTPATIENT)
Age: 70
Setting detail: OUTPATIENT SURGERY
Discharge: HOME | End: 2025-08-12
Attending: SPECIALIST | Admitting: SPECIALIST
Payer: COMMERCIAL

## 2025-08-12 ENCOUNTER — APPOINTMENT (OUTPATIENT)
Dept: GENERAL RADIOLOGY | Age: 70
End: 2025-08-12
Attending: SPECIALIST
Payer: COMMERCIAL

## 2025-08-12 VITALS
BODY MASS INDEX: 31.21 KG/M2 | TEMPERATURE: 98.2 F | SYSTOLIC BLOOD PRESSURE: 140 MMHG | OXYGEN SATURATION: 95 % | HEIGHT: 75 IN | DIASTOLIC BLOOD PRESSURE: 96 MMHG | WEIGHT: 251 LBS | RESPIRATION RATE: 13 BRPM | HEART RATE: 76 BPM

## 2025-08-12 DIAGNOSIS — I49.5 SICK SINUS SYNDROME (HCC): ICD-10-CM

## 2025-08-12 DIAGNOSIS — I49.3 FREQUENT PVCS: ICD-10-CM

## 2025-08-12 LAB
BUN BLD-MCNC: 19 MG/DL (ref 8–26)
CO2 BLD CALC-SCNC: 26 MMOL/L (ref 22–30)
ECHO BSA: 2.45 M2
EGFR, POC: 65 ML/MIN/1.73M2
GLUCOSE BLD-MCNC: 132 MG/DL (ref 74–100)
HCT VFR BLD AUTO: 42 % (ref 41–53)
POC CREATININE: 1.2 MG/DL (ref 0.51–1.19)
POC HEMOGLOBIN (CALC): 14.2 G/DL (ref 13.5–17.5)
POTASSIUM BLD-SCNC: 3.9 MMOL/L (ref 3.5–4.5)
SODIUM BLD-SCNC: 144 MMOL/L (ref 138–146)

## 2025-08-12 PROCEDURE — 33208 INSRT HEART PM ATRIAL & VENT: CPT | Performed by: SPECIALIST

## 2025-08-12 PROCEDURE — 71045 X-RAY EXAM CHEST 1 VIEW: CPT

## 2025-08-12 PROCEDURE — 82374 ASSAY BLOOD CARBON DIOXIDE: CPT

## 2025-08-12 PROCEDURE — 84520 ASSAY OF UREA NITROGEN: CPT

## 2025-08-12 PROCEDURE — C1887 CATHETER, GUIDING: HCPCS | Performed by: SPECIALIST

## 2025-08-12 PROCEDURE — 82947 ASSAY GLUCOSE BLOOD QUANT: CPT

## 2025-08-12 PROCEDURE — 84132 ASSAY OF SERUM POTASSIUM: CPT

## 2025-08-12 PROCEDURE — C1898 LEAD, PMKR, OTHER THAN TRANS: HCPCS | Performed by: SPECIALIST

## 2025-08-12 PROCEDURE — C1892 INTRO/SHEATH,FIXED,PEEL-AWAY: HCPCS | Performed by: SPECIALIST

## 2025-08-12 PROCEDURE — 84295 ASSAY OF SERUM SODIUM: CPT

## 2025-08-12 PROCEDURE — 2720000010 HC SURG SUPPLY STERILE: Performed by: SPECIALIST

## 2025-08-12 PROCEDURE — 99153 MOD SED SAME PHYS/QHP EA: CPT | Performed by: SPECIALIST

## 2025-08-12 PROCEDURE — 6360000002 HC RX W HCPCS: Performed by: SPECIALIST

## 2025-08-12 PROCEDURE — 99238 HOSP IP/OBS DSCHRG MGMT 30/<: CPT | Performed by: SPECIALIST

## 2025-08-12 PROCEDURE — C1769 GUIDE WIRE: HCPCS | Performed by: SPECIALIST

## 2025-08-12 PROCEDURE — 2709999900 HC NON-CHARGEABLE SUPPLY: Performed by: SPECIALIST

## 2025-08-12 PROCEDURE — 7100000010 HC PHASE II RECOVERY - FIRST 15 MIN: Performed by: SPECIALIST

## 2025-08-12 PROCEDURE — C1785 PMKR, DUAL, RATE-RESP: HCPCS | Performed by: SPECIALIST

## 2025-08-12 PROCEDURE — C1889 IMPLANT/INSERT DEVICE, NOC: HCPCS | Performed by: SPECIALIST

## 2025-08-12 PROCEDURE — 2580000003 HC RX 258: Performed by: SPECIALIST

## 2025-08-12 PROCEDURE — 82565 ASSAY OF CREATININE: CPT

## 2025-08-12 PROCEDURE — 85014 HEMATOCRIT: CPT

## 2025-08-12 PROCEDURE — 99152 MOD SED SAME PHYS/QHP 5/>YRS: CPT | Performed by: SPECIALIST

## 2025-08-12 PROCEDURE — 99212 OFFICE O/P EST SF 10 MIN: CPT | Performed by: SPECIALIST

## 2025-08-12 DEVICE — PACEMAKER CARD 22.5GM W50.8XH46.6MM D7.4MM TI POLYUR SIL: Type: IMPLANTABLE DEVICE | Status: FUNCTIONAL

## 2025-08-12 DEVICE — ENVELOPE PACEMKR M W2.5XL2.7IN ABSRB ANTIBACT TYRX: Type: IMPLANTABLE DEVICE | Status: FUNCTIONAL

## 2025-08-12 DEVICE — LEAD PACE AD 6FR L58CM VENT SIL PLAT INSUL BPLR PLATINIZED 507658] MEDTRONIC CRM]: Type: IMPLANTABLE DEVICE | Status: FUNCTIONAL

## 2025-08-12 DEVICE — IMPLANTABLE DEVICE: Type: IMPLANTABLE DEVICE | Status: FUNCTIONAL

## 2025-08-12 RX ORDER — OXYMETAZOLINE HYDROCHLORIDE 0.05 G/100ML
2 SPRAY NASAL 2 TIMES DAILY PRN
Status: DISCONTINUED | OUTPATIENT
Start: 2025-08-12 | End: 2025-08-12 | Stop reason: HOSPADM

## 2025-08-12 RX ORDER — FENTANYL CITRATE 50 UG/ML
INJECTION, SOLUTION INTRAMUSCULAR; INTRAVENOUS PRN
Status: DISCONTINUED | OUTPATIENT
Start: 2025-08-12 | End: 2025-08-12 | Stop reason: HOSPADM

## 2025-08-12 RX ORDER — POLYETHYLENE GLYCOL 3350 17 G/17G
17 POWDER, FOR SOLUTION ORAL DAILY
Status: DISCONTINUED | OUTPATIENT
Start: 2025-08-12 | End: 2025-08-12 | Stop reason: HOSPADM

## 2025-08-12 RX ORDER — GINSENG 100 MG
CAPSULE ORAL 3 TIMES DAILY
Status: DISCONTINUED | OUTPATIENT
Start: 2025-08-12 | End: 2025-08-12 | Stop reason: HOSPADM

## 2025-08-12 RX ORDER — MIDAZOLAM HYDROCHLORIDE 1 MG/ML
INJECTION, SOLUTION INTRAMUSCULAR; INTRAVENOUS PRN
Status: DISCONTINUED | OUTPATIENT
Start: 2025-08-12 | End: 2025-08-12 | Stop reason: HOSPADM

## 2025-08-12 RX ORDER — CEPHALEXIN 500 MG/1
500 CAPSULE ORAL EVERY 8 HOURS SCHEDULED
Qty: 21 CAPSULE | Refills: 0 | Status: SHIPPED | OUTPATIENT
Start: 2025-08-12 | End: 2025-08-19

## 2025-08-12 RX ORDER — OXYCODONE HYDROCHLORIDE 5 MG/1
5 TABLET ORAL EVERY 4 HOURS PRN
Status: DISCONTINUED | OUTPATIENT
Start: 2025-08-12 | End: 2025-08-12 | Stop reason: HOSPADM

## 2025-08-12 RX ORDER — DOCUSATE SODIUM 100 MG/1
100 CAPSULE, LIQUID FILLED ORAL 2 TIMES DAILY
Status: DISCONTINUED | OUTPATIENT
Start: 2025-08-12 | End: 2025-08-12 | Stop reason: HOSPADM

## 2025-08-12 RX ORDER — PREGABALIN 100 MG/1
200 CAPSULE ORAL 2 TIMES DAILY
Status: DISCONTINUED | OUTPATIENT
Start: 2025-08-12 | End: 2025-08-12 | Stop reason: HOSPADM

## 2025-08-12 RX ORDER — MIDAZOLAM 1 MG/ML
INJECTION INTRAMUSCULAR; INTRAVENOUS PRN
Status: DISCONTINUED | OUTPATIENT
Start: 2025-08-12 | End: 2025-08-12 | Stop reason: HOSPADM

## 2025-08-12 RX ORDER — CHOLECALCIFEROL (VITAMIN D3) 50 MCG
2000 TABLET ORAL DAILY
Status: DISCONTINUED | OUTPATIENT
Start: 2025-08-13 | End: 2025-08-12 | Stop reason: HOSPADM

## 2025-08-12 RX ORDER — ONDANSETRON 2 MG/ML
4 INJECTION INTRAMUSCULAR; INTRAVENOUS EVERY 6 HOURS PRN
Status: DISCONTINUED | OUTPATIENT
Start: 2025-08-12 | End: 2025-08-12 | Stop reason: HOSPADM

## 2025-08-12 RX ORDER — ATORVASTATIN CALCIUM 20 MG/1
20 TABLET, FILM COATED ORAL NIGHTLY
Status: DISCONTINUED | OUTPATIENT
Start: 2025-08-12 | End: 2025-08-12 | Stop reason: HOSPADM

## 2025-08-12 RX ORDER — CEPHALEXIN 500 MG/1
500 CAPSULE ORAL EVERY 8 HOURS SCHEDULED
Status: DISCONTINUED | OUTPATIENT
Start: 2025-08-12 | End: 2025-08-12 | Stop reason: HOSPADM

## 2025-08-12 RX ORDER — ACETAMINOPHEN 325 MG/1
650 TABLET ORAL EVERY 4 HOURS PRN
Status: DISCONTINUED | OUTPATIENT
Start: 2025-08-12 | End: 2025-08-12 | Stop reason: HOSPADM

## 2025-08-12 RX ORDER — SODIUM CHLORIDE 9 MG/ML
INJECTION, SOLUTION INTRAVENOUS CONTINUOUS
Status: DISCONTINUED | OUTPATIENT
Start: 2025-08-12 | End: 2025-08-12 | Stop reason: HOSPADM

## 2025-08-12 RX ORDER — TIZANIDINE 2 MG/1
2 TABLET ORAL 3 TIMES DAILY
Status: DISCONTINUED | OUTPATIENT
Start: 2025-08-12 | End: 2025-08-12 | Stop reason: HOSPADM

## 2025-08-12 RX ORDER — ASPIRIN 81 MG/1
81 TABLET, CHEWABLE ORAL DAILY
Status: DISCONTINUED | OUTPATIENT
Start: 2025-08-12 | End: 2025-08-12 | Stop reason: HOSPADM

## 2025-08-12 RX ORDER — OXYCODONE HYDROCHLORIDE 5 MG/1
10 TABLET ORAL EVERY 4 HOURS PRN
Status: DISCONTINUED | OUTPATIENT
Start: 2025-08-12 | End: 2025-08-12 | Stop reason: HOSPADM

## 2025-08-12 RX ORDER — 0.9 % SODIUM CHLORIDE 0.9 %
INTRAVENOUS SOLUTION INTRAVENOUS CONTINUOUS PRN
Status: COMPLETED | OUTPATIENT
Start: 2025-08-12 | End: 2025-08-12

## 2025-08-12 RX ORDER — PANTOPRAZOLE SODIUM 40 MG/1
40 TABLET, DELAYED RELEASE ORAL
Status: DISCONTINUED | OUTPATIENT
Start: 2025-08-13 | End: 2025-08-12 | Stop reason: HOSPADM

## 2025-08-12 RX ORDER — CLOPIDOGREL BISULFATE 75 MG/1
75 TABLET ORAL DAILY
Status: DISCONTINUED | OUTPATIENT
Start: 2025-08-13 | End: 2025-08-12 | Stop reason: HOSPADM

## 2025-08-12 RX ADMIN — SODIUM CHLORIDE: 0.9 INJECTION, SOLUTION INTRAVENOUS at 09:08

## 2025-08-12 ASSESSMENT — PULMONARY FUNCTION TESTS
PIF_VALUE: 0
PIF_VALUE: 1
PIF_VALUE: 0

## 2025-08-12 ASSESSMENT — PAIN SCALES - GENERAL: PAINLEVEL_OUTOF10: 0

## 2025-08-14 DIAGNOSIS — I49.5 SSS (SICK SINUS SYNDROME) (HCC): ICD-10-CM

## 2025-08-14 DIAGNOSIS — I49.5 SSS (SICK SINUS SYNDROME) (HCC): Primary | ICD-10-CM

## 2025-08-18 ENCOUNTER — HOSPITAL ENCOUNTER (OUTPATIENT)
Dept: CARDIAC REHAB | Age: 70
Discharge: HOME | End: 2025-08-18

## 2025-08-18 ENCOUNTER — OFFICE VISIT (OUTPATIENT)
Age: 70
End: 2025-08-18

## 2025-08-18 VITALS
HEART RATE: 75 BPM | OXYGEN SATURATION: 93 % | DIASTOLIC BLOOD PRESSURE: 79 MMHG | WEIGHT: 251 LBS | SYSTOLIC BLOOD PRESSURE: 130 MMHG | BODY MASS INDEX: 31.37 KG/M2

## 2025-08-18 DIAGNOSIS — I49.3 PVC'S (PREMATURE VENTRICULAR CONTRACTIONS): ICD-10-CM

## 2025-08-18 DIAGNOSIS — I49.5 SSS (SICK SINUS SYNDROME) (HCC): Primary | ICD-10-CM

## 2025-08-18 DIAGNOSIS — Z95.0 PACEMAKER: ICD-10-CM

## 2025-08-18 PROCEDURE — 9900000065 HC CARDIAC REHAB PHASE 3 - 1 VISIT

## 2025-08-26 ENCOUNTER — OFFICE VISIT (OUTPATIENT)
Age: 70
End: 2025-08-26
Payer: COMMERCIAL

## 2025-08-26 VITALS
SYSTOLIC BLOOD PRESSURE: 122 MMHG | BODY MASS INDEX: 30.21 KG/M2 | DIASTOLIC BLOOD PRESSURE: 77 MMHG | HEIGHT: 75 IN | OXYGEN SATURATION: 76 % | HEART RATE: 98 BPM | WEIGHT: 243 LBS

## 2025-08-26 DIAGNOSIS — E78.5 DYSLIPIDEMIA: ICD-10-CM

## 2025-08-26 DIAGNOSIS — Z95.1 S/P CABG (CORONARY ARTERY BYPASS GRAFT): Primary | ICD-10-CM

## 2025-08-26 DIAGNOSIS — I25.10 ATHEROSCLEROSIS OF NATIVE CORONARY ARTERY OF NATIVE HEART WITHOUT ANGINA PECTORIS: ICD-10-CM

## 2025-08-26 DIAGNOSIS — I49.5 SICK SINUS SYNDROME (HCC): ICD-10-CM

## 2025-08-26 DIAGNOSIS — E11.65 TYPE 2 DIABETES MELLITUS WITH HYPERGLYCEMIA, WITHOUT LONG-TERM CURRENT USE OF INSULIN (HCC): ICD-10-CM

## 2025-08-26 DIAGNOSIS — M15.9 GENERALIZED OSTEOARTHRITIS: ICD-10-CM

## 2025-08-26 DIAGNOSIS — I10 ESSENTIAL HYPERTENSION: ICD-10-CM

## 2025-08-26 DIAGNOSIS — G47.33 OSA ON CPAP: ICD-10-CM

## 2025-08-26 DIAGNOSIS — N40.0 BENIGN PROSTATIC HYPERPLASIA, UNSPECIFIED WHETHER LOWER URINARY TRACT SYMPTOMS PRESENT: ICD-10-CM

## 2025-08-26 PROCEDURE — G8427 DOCREV CUR MEDS BY ELIG CLIN: HCPCS | Performed by: INTERNAL MEDICINE

## 2025-08-26 PROCEDURE — 99215 OFFICE O/P EST HI 40 MIN: CPT | Performed by: INTERNAL MEDICINE

## 2025-08-26 PROCEDURE — 3044F HG A1C LEVEL LT 7.0%: CPT | Performed by: INTERNAL MEDICINE

## 2025-08-26 PROCEDURE — 3017F COLORECTAL CA SCREEN DOC REV: CPT | Performed by: INTERNAL MEDICINE

## 2025-08-26 PROCEDURE — 3078F DIAST BP <80 MM HG: CPT | Performed by: INTERNAL MEDICINE

## 2025-08-26 PROCEDURE — 1036F TOBACCO NON-USER: CPT | Performed by: INTERNAL MEDICINE

## 2025-08-26 PROCEDURE — 3074F SYST BP LT 130 MM HG: CPT | Performed by: INTERNAL MEDICINE

## 2025-08-26 PROCEDURE — 1123F ACP DISCUSS/DSCN MKR DOCD: CPT | Performed by: INTERNAL MEDICINE

## 2025-08-26 PROCEDURE — 2022F DILAT RTA XM EVC RTNOPTHY: CPT | Performed by: INTERNAL MEDICINE

## 2025-08-26 PROCEDURE — 93000 ELECTROCARDIOGRAM COMPLETE: CPT | Performed by: INTERNAL MEDICINE

## 2025-08-26 PROCEDURE — G8417 CALC BMI ABV UP PARAM F/U: HCPCS | Performed by: INTERNAL MEDICINE

## 2025-08-26 RX ORDER — CARVEDILOL 6.25 MG/1
6.25 TABLET ORAL 2 TIMES DAILY
Qty: 180 TABLET | Refills: 3 | Status: SHIPPED | OUTPATIENT
Start: 2025-08-26

## 2025-09-04 PROBLEM — K31.89 GASTRIC NODULE: Status: ACTIVE | Noted: 2025-09-04

## 2025-09-04 PROBLEM — R19.8 ABNORMAL FINDINGS ON ESOPHAGOGASTRODUODENOSCOPY (EGD): Status: ACTIVE | Noted: 2025-09-04

## (undated) DEVICE — KIT APPL 11:1 PROC W/ FIBRIJET MED CUP APPL TIP TY

## (undated) DEVICE — APPLICATOR MEDICATED 26 CC SOLUTION HI LT ORNG CHLORAPREP

## (undated) DEVICE — SENSOR KIT AD INVOS 7100

## (undated) DEVICE — SUTURE ETHIBOND EXCL BR GRN TAPR PT 2-0 30 X563H X563H

## (undated) DEVICE — AORTIC PUNCHES ARE USED TO CREATE A UNIFORM OPENING IN BLOOD VESSELS DURING CARDIOVASCULAR SURGERY. THE VESSEL GRAFT IS INSERTED INTO THE CREATED OPENING AND SUTURED TO THE VESSEL WALL. AORTIC LANCETS ARE USED TO MAKE A SMALL UNIFORM CUT IN A BLOOD VESSEL TO FACILITATE INSERTION OF AN AORTIC PUNCH.  PUNCHES COME IN VARIOUS LENGTHS, DIAMETERS AND TIP CONFIGURATIONS.: Brand: CLEANCUT ROTATING AORTIC PUNCH

## (undated) DEVICE — SUTURE ABSORBABLE L 18 IN SZ 4-0 NDL L 19 MM POLYGLACTIN 910 36/BX

## (undated) DEVICE — CANNULA AORT L55IN OD9FR STD TIP FLOW GRD

## (undated) DEVICE — Device: Brand: VIRTUOSAPH PLUS WITH RADIAL INDICATION

## (undated) DEVICE — BAND COMPR L24CM REG CLR PLAS HEMSTAT EXT HK AND LOOP RETEN

## (undated) DEVICE — WAX BNE BULK NAT STRL LTX

## (undated) DEVICE — JELLY,LUBE,STERILE,FLIP TOP,TUBE,2-OZ: Brand: MEDLINE

## (undated) DEVICE — SUTURE PROL SZ 4-0 L36IN NONABSORBABLE BLU L26MM SH 1/2 CIR 8521H

## (undated) DEVICE — TOWEL,OR,DSP,ST,BLUE,DLX,XR,4/PK,20PK/CS: Brand: MEDLINE

## (undated) DEVICE — MPS® DELIVERY SET W/ARREST AGENT AND ADDITIVE CASSETTES, HEAT EXCHANGER & 10 FT. DELIVERY TUBING: Brand: MPS

## (undated) DEVICE — GOWN,POLY REINFORCED,LG: Brand: MEDLINE

## (undated) DEVICE — Device

## (undated) DEVICE — SINGLE USE AIR/WATER, SUCTION AND BIOPSY VALVES SET: Brand: ORCAPOD™

## (undated) DEVICE — AGENT HEMOSTATIC SURGIFLOW MATRIX KIT W/THROMBIN

## (undated) DEVICE — CATHETER URETH 16FR BLLN 5CC SIL ALLY W/ SIL HYDRGEL 2 W F

## (undated) DEVICE — BLOOD TRANSFER PACK 600 CC W/ CPLR

## (undated) DEVICE — EZ GLIDE AORTIC CANNULA: Brand: EDWARDS LIFESCIENCES EZ GLIDE AORTIC CANNULA

## (undated) DEVICE — SYRINGE MED 10ML LUERLOCK TIP W/O SFTY DISP

## (undated) DEVICE — SUTURE VICRYL 2-0 L27IN ABSRB CT BRAID COAT UD J275H

## (undated) DEVICE — SYRINGE MED 20ML STD CLR PLAS LUERLOCK TIP N CTRL DISP

## (undated) DEVICE — PACK PROCEDURE SURG CYSTO SVMMC LF

## (undated) DEVICE — SUTURE VICRYL SZ 4-0 L18IN ABSRB UD L19MM PS-2 3/8 CIR PRIM J496H

## (undated) DEVICE — GAUZE,SPONGE,4"X4",16PLY,XRAY,STRL,LF: Brand: MEDLINE

## (undated) DEVICE — DEFENDO AIR WATER SUCTION AND BIOPSY VALVE KIT FOR  OLYMPUS: Brand: DEFENDO AIR/WATER/SUCTION AND BIOPSY VALVE

## (undated) DEVICE — COVER,LIGHT HANDLE,FLX,2/PK: Brand: MEDLINE INDUSTRIES, INC.

## (undated) DEVICE — Z DISCONTINUED USE 2139346 GUIDEWIRE KAYAK URO STIFF 0.035 IN

## (undated) DEVICE — CATHETER THOR 36FR L23IN PVC R ANG 5 EYE TAPR CONN TIP SFT

## (undated) DEVICE — AGENT HEMSTAT 3GM OXIDIZED REGENERATED CELOS ABSRB FOR CONT (ORDER MULTIPLES OF 5EA)

## (undated) DEVICE — LIQUIBAND RAPID ADHESIVE 36/CS 0.8ML: Brand: MEDLINE

## (undated) DEVICE — RADIFOCUS OPTITORQUE ANGIOGRAPHIC CATHETER: Brand: OPTITORQUE

## (undated) DEVICE — SUTURE SZ 7 L18IN NONABSORBABLE SIL CCS L48MM 1/2 CIR STRNM M655G

## (undated) DEVICE — GLOVE SURG SZ 7 CRM LTX FREE POLYISOPRENE POLYMER BEAD ANTI

## (undated) DEVICE — APPLICATOR MEDICATED 10.5 CC SOLUTION HI LT ORNG CHLORAPREP

## (undated) DEVICE — INTRODUCER SPLIT SHEATH PRELUDE SNAP 8FR X 13CM

## (undated) DEVICE — SUTURE PROL SZ 6-0 L24IN NONABSORBABLE BLU L13MM C-1 3/8 8726H

## (undated) DEVICE — SUMP INTCARD W/ 1/4INCH CONN 20FRENCH 15INCH DLP

## (undated) DEVICE — AGENT HEMOSTATIC SURG ORIGINAL ABS 2X14IN LOOSE KNIT 12/CA

## (undated) DEVICE — PROTECTOR ULN NRV PUR FOAM HK LOOP STRP ANATOMICALLY

## (undated) DEVICE — GLOVE SURG SZ 75 CRM LTX FREE POLYISOPRENE POLYMER BEAD ANTI

## (undated) DEVICE — CANNULA PERF L2IN BLNT TIP 2MM VES CLR RADPQ BODY FEM LUER

## (undated) DEVICE — DEVICE BLOWER/MISTER AXIUS

## (undated) DEVICE — SUTURE VICRYL + SZ 2-0 L27IN ABSRB CLR CT-1 1/2 CIR TAPERCUT VCP259H

## (undated) DEVICE — AGENT HEMSTAT W6XL9IN OXIDIZED REGENERATED CELOS ABSRB FOR

## (undated) DEVICE — Z DISCONTINUED USE 2859063 SUTURE VICRYL 3-0 L27IN ABSRB UD PSL L30MM 1/2 CIR TAPERPOINT J502H

## (undated) DEVICE — GOWN,SIRUS,NONRNF,SETINSLV,XL,20/CS: Brand: MEDLINE

## (undated) DEVICE — GLIDESHEATH SLENDER STAINLESS STEEL KIT: Brand: GLIDESHEATH SLENDER

## (undated) DEVICE — BAG URIN DRNGE 2000ML VYN INF CTRL GRAD ANTI REFLX VLV

## (undated) DEVICE — GLOVE ORANGE PI 7   MSG9070

## (undated) DEVICE — GAUZE,SPONGE,4"X4",16PLY,STRL,LF,10/TRAY: Brand: MEDLINE

## (undated) DEVICE — 3M™ TEGADERM™ TRANSPARENT FILM DRESSING FRAME STYLE, 1626W, 4 IN X 4-3/4 IN (10 CM X 12 CM), 50/CT 4CT/CASE: Brand: 3M™ TEGADERM™

## (undated) DEVICE — GLOVE ORANGE PI 8   MSG9080

## (undated) DEVICE — SUTURE PERMA-HAND SZ 0 L30IN NONABSORBABLE BLK L36MM CT-1 424H

## (undated) DEVICE — BLADE OPHTH D5MM 15DEG GRN W/ RND KNURLED HNDL MICRO-SHARP

## (undated) DEVICE — DALE FOLEY CATHETER HOLDER, LEGBAND, FITS UP TO 30": Brand: DALE FOLEY CATHETER HOLDER

## (undated) DEVICE — 1LYRTR 16FR10ML100%SILTMPS SNP: Brand: MEDLINE INDUSTRIES, INC.

## (undated) DEVICE — CATHETER IV 24GA L0.56IN SM VEIN SHLD SFTY WNG AUTOGRD

## (undated) DEVICE — TRAY SURG CUST CRD CATH TOLEDO

## (undated) DEVICE — SUTURE VICRYL SZ 0 L27IN ABSRB VLT L48MM CTX 1/2 CIR TAPR PNT J364H

## (undated) DEVICE — PREMIUM DRY TRAY LF: Brand: MEDLINE INDUSTRIES, INC.

## (undated) DEVICE — ENDOSCOPIC KIT 1.1+ 10 FT OP4 CA DE

## (undated) DEVICE — PENCIL ES L3M BTTN SWCH HOLSTER W/ BLDE ELECTRD EDGE

## (undated) DEVICE — AGENT HEMSTAT W2XL4IN OXIDIZED REGENERATED CELOS ABSRB SFT

## (undated) DEVICE — SUTURE PERMA-HAND SZ 0 L18IN NONABSORBABLE BLK CT-2 L26MM C027D

## (undated) DEVICE — Device: Brand: SPOT EX ENDOSCOPIC TATTOO

## (undated) DEVICE — GUIDE SURG SELECTION FOR GILLINOV COSGROVE LAA EXCLUSION SYS

## (undated) DEVICE — Z DUP USE 2125626 KIT US PRB 48IN FOR SITE-RITE VISN II 9001C1097] BARD INC]

## (undated) DEVICE — CONNECTOR TBNG Y 6IN 1 PLAS LTWT

## (undated) DEVICE — GOWN,AURORA,NONREINFORCED,LARGE: Brand: MEDLINE

## (undated) DEVICE — SYRINGE MEDICAL 3ML CLEAR PLASTIC STANDARD NON CONTROL LUERLOCK TIP DISPOSABLE

## (undated) DEVICE — FIBER LSR DIA1000UM HOLM 2 WVLNGTH DEL SYS REUSE SLM LN

## (undated) DEVICE — GLOVE SURG SZ 65 L12IN FNGR THK79MIL GRN LTX FREE

## (undated) DEVICE — 1/4 FORCE SURGICAL SPRING CLIP: Brand: STEALTH® SPRING CLIP

## (undated) DEVICE — KIT ANGEL PRP

## (undated) DEVICE — FOGARTY - HYDRAGRIP SURGICAL - CLAMP INSERTS: Brand: FOGARTY HYDRAJAW

## (undated) DEVICE — SYRINGE MED 30ML STD CLR PLAS LUERLOCK TIP N CTRL DISP

## (undated) DEVICE — ANGIOGRAPHIC CATHETER: Brand: EXPO™

## (undated) DEVICE — SPONGE LAP W18XL18IN WHT COT 4 PLY FLD STRUNG RADPQ DISP ST 2 PER PACK

## (undated) DEVICE — CATHETER THOR 36FR L23CM DIA12MM POLYVI CHL TAPR CONN TIP

## (undated) DEVICE — INSUFFLATION TUBING SET WITH FILTER, FUNNEL CONNECTOR AND LUER LOCK: Brand: JOSNOE MEDICAL INC

## (undated) DEVICE — FORCEPS BX L240CM WRK CHN 2.8MM STD CAP W/ NDL MIC MESH

## (undated) DEVICE — DRAPE SLUSH DISC W44XL66IN ST FOR RND BSIN HUSH SLUSH SYS

## (undated) DEVICE — CANNULA TIP SPRY MAGELLAN

## (undated) DEVICE — Z DISCONTINUED NO SUB IDED CATHETER ANGIO FL3 6 FRX100 CM EXPO

## (undated) DEVICE — BITEBLOCK 54FR W/ DENT RIM BLOX

## (undated) DEVICE — SUTURE MONOCRYL SZ 4-0 L27IN ABSRB UD L19MM PS-2 1/2 CIR PRIM Y426H

## (undated) DEVICE — SUTURE VICRYL + SZ 4-0 L27IN ABSRB UD L26MM SH 1/2 CIR VCP415H

## (undated) DEVICE — GLOVE SURG SZ 7 L12IN FNGR THK79MIL GRN LTX FREE

## (undated) DEVICE — STERNUM BLADE, OFFSET (32.0 X 0.8 X 6.3MM)

## (undated) DEVICE — TOWEL,OR,DSP,ST,NATURAL,DLX,4/PK,20PK/CS: Brand: MEDLINE

## (undated) DEVICE — INTRODUCER CATH 16FR ORNG SUPRPUB PLAS SHRP TIP BVL TRCR

## (undated) DEVICE — BLADE,CARBON-STEEL,15,STRL,DISPOSABLE: Brand: MEDLINE

## (undated) DEVICE — KIT MICRO INTRO 4FR STIFF 40CM NIGHTENALL TUNGSTEN 7SMT

## (undated) DEVICE — CATHETER,URETHRAL,REDRUBBER,STRL,18FR: Brand: MEDLINE

## (undated) DEVICE — GUIDEWIRE 35/260/FC/PTFE/3J: Brand: GUIDEWIRE

## (undated) DEVICE — POSITIONER,HEAD,MULTIRING,36CS: Brand: MEDLINE

## (undated) DEVICE — SPONGE,PEANUT,XRAY,ST,SM,3/8",5/CARD: Brand: MEDLINE INDUSTRIES, INC.

## (undated) DEVICE — GARMENT COMPR STD FOR 17IN CALF UNIF THER FLOTRN

## (undated) DEVICE — CATHETER PLUG WITH CAP: Brand: DOVER

## (undated) DEVICE — CATHETER FOL 2 W 18 FR 5 CC URETH SPEC TIEM BARDX IC

## (undated) DEVICE — SUTURE NONABSORBABLE MONOFILAMENT 4-0 RB-1 36 IN BLU PROLENE 8557H

## (undated) DEVICE — PROBE VASC STD 1-1.5 MM 45 CM

## (undated) DEVICE — SUTURE NONABSORBABLE MONOFILAMENT 5-0 C-1 1X24 IN PROLENE 8725H

## (undated) DEVICE — TIP APPL GEL PLT ENDO 5MMX32CM

## (undated) DEVICE — SUTURE PROL SZ 7-0 L24IN NONABSORBABLE BLU L8MM BV175-6 3/8 8735H

## (undated) DEVICE — INTRODUCER SHTH L13CM OD7FR SH ORNG HUB SEAMLESS SAFSHTH

## (undated) DEVICE — AVID DUAL STAGE VENOUS DRAINAGE CANNULA: Brand: AVID DUAL STAGE VENOUS DRAINAGE CANNULA

## (undated) DEVICE — SUTURE VICRYL + SZ 3-0 L36IN ABSRB UD L36MM CT-1 1/2 CIR VCP944H